# Patient Record
Sex: MALE | Race: BLACK OR AFRICAN AMERICAN
[De-identification: names, ages, dates, MRNs, and addresses within clinical notes are randomized per-mention and may not be internally consistent; named-entity substitution may affect disease eponyms.]

---

## 2017-06-21 ENCOUNTER — HOSPITAL ENCOUNTER (INPATIENT)
Dept: HOSPITAL 47 - EC | Age: 72
LOS: 2 days | Discharge: HOME HEALTH | DRG: 682 | End: 2017-06-23
Attending: HOSPITALIST | Admitting: HOSPITALIST
Payer: MEDICARE

## 2017-06-21 VITALS — BODY MASS INDEX: 33.5 KG/M2

## 2017-06-21 DIAGNOSIS — E86.0: ICD-10-CM

## 2017-06-21 DIAGNOSIS — Z79.4: ICD-10-CM

## 2017-06-21 DIAGNOSIS — E44.0: ICD-10-CM

## 2017-06-21 DIAGNOSIS — M54.30: ICD-10-CM

## 2017-06-21 DIAGNOSIS — Z79.82: ICD-10-CM

## 2017-06-21 DIAGNOSIS — R06.00: ICD-10-CM

## 2017-06-21 DIAGNOSIS — M10.9: ICD-10-CM

## 2017-06-21 DIAGNOSIS — E11.42: ICD-10-CM

## 2017-06-21 DIAGNOSIS — I25.2: ICD-10-CM

## 2017-06-21 DIAGNOSIS — B18.2: ICD-10-CM

## 2017-06-21 DIAGNOSIS — J44.9: ICD-10-CM

## 2017-06-21 DIAGNOSIS — Z95.0: ICD-10-CM

## 2017-06-21 DIAGNOSIS — I50.22: ICD-10-CM

## 2017-06-21 DIAGNOSIS — M51.36: ICD-10-CM

## 2017-06-21 DIAGNOSIS — I11.0: ICD-10-CM

## 2017-06-21 DIAGNOSIS — R01.1: ICD-10-CM

## 2017-06-21 DIAGNOSIS — G93.41: ICD-10-CM

## 2017-06-21 DIAGNOSIS — R32: ICD-10-CM

## 2017-06-21 DIAGNOSIS — E78.5: ICD-10-CM

## 2017-06-21 DIAGNOSIS — M19.90: ICD-10-CM

## 2017-06-21 DIAGNOSIS — D69.6: ICD-10-CM

## 2017-06-21 DIAGNOSIS — Z79.899: ICD-10-CM

## 2017-06-21 DIAGNOSIS — N17.0: Primary | ICD-10-CM

## 2017-06-21 DIAGNOSIS — F32.9: ICD-10-CM

## 2017-06-21 DIAGNOSIS — E11.65: ICD-10-CM

## 2017-06-21 LAB
ALP SERPL-CCNC: 230 U/L (ref 38–126)
ALT SERPL-CCNC: 106 U/L (ref 21–72)
ANION GAP SERPL CALC-SCNC: 9 MMOL/L
APTT BLD: 27 SEC (ref 22–30)
AST SERPL-CCNC: 95 U/L (ref 17–59)
BASOPHILS # BLD AUTO: 0 K/UL (ref 0–0.2)
BASOPHILS NFR BLD AUTO: 1 %
BUN SERPL-SCNC: 70 MG/DL (ref 9–20)
CALCIUM SPEC-MCNC: 8 MG/DL (ref 8.4–10.2)
CH: 31.1
CHCM: 32.9
CHLORIDE SERPL-SCNC: 101 MMOL/L (ref 98–107)
CK SERPL-CCNC: 260 U/L (ref 55–170)
CO2 SERPL-SCNC: 27 MMOL/L (ref 22–30)
EOSINOPHIL # BLD AUTO: 0.3 K/UL (ref 0–0.7)
EOSINOPHIL NFR BLD AUTO: 4 %
ERYTHROCYTE [DISTWIDTH] IN BLOOD BY AUTOMATED COUNT: 4.42 M/UL (ref 4.3–5.9)
ERYTHROCYTE [DISTWIDTH] IN BLOOD: 13.5 % (ref 11.5–15.5)
GLUCOSE BLD-MCNC: 117 MG/DL (ref 75–99)
GLUCOSE BLD-MCNC: 194 MG/DL (ref 75–99)
GLUCOSE BLD-MCNC: 245 MG/DL (ref 75–99)
GLUCOSE SERPL-MCNC: 191 MG/DL (ref 74–99)
HCT VFR BLD AUTO: 42 % (ref 39–53)
HDW: 2.14
HEMOGLOBIN A1C: 9.7 % (ref 4.2–6.1)
HGB BLD-MCNC: 13.6 GM/DL (ref 13–17.5)
INR PPP: 1.3 (ref ?–1.1)
LUC NFR BLD AUTO: 2 %
LYMPHOCYTES # SPEC AUTO: 1 K/UL (ref 1–4.8)
LYMPHOCYTES NFR SPEC AUTO: 15 %
MAGNESIUM SPEC-SCNC: 1.6 MG/DL (ref 1.6–2.3)
MCH RBC QN AUTO: 30.7 PG (ref 25–35)
MCHC RBC AUTO-ENTMCNC: 32.3 G/DL (ref 31–37)
MCV RBC AUTO: 95.1 FL (ref 80–100)
MONOCYTES # BLD AUTO: 0.4 K/UL (ref 0–1)
MONOCYTES NFR BLD AUTO: 6 %
NEUTROPHILS # BLD AUTO: 4.7 K/UL (ref 1.3–7.7)
NEUTROPHILS NFR BLD AUTO: 72 %
NON-AFRICAN AMERICAN GFR(MDRD): 30
PH UR: 5 [PH] (ref 5–8)
POTASSIUM SERPL-SCNC: 4 MMOL/L (ref 3.5–5.1)
PROT SERPL-MCNC: 7.1 G/DL (ref 6.3–8.2)
PT BLD: 12.4 SEC (ref 9–12)
SODIUM SERPL-SCNC: 137 MMOL/L (ref 137–145)
SP GR UR: 1.01 (ref 1–1.03)
TROPONIN I SERPL-MCNC: 0.06 NG/ML (ref 0–0.03)
UA BILLING (MACRO VS. MICRO): (no result)
UROBILINOGEN UR QL STRIP: <2 MG/DL (ref ?–2)
WBC # BLD AUTO: 0.15 10*3/UL
WBC # BLD AUTO: 6.5 K/UL (ref 3.8–10.6)
WBC (PEROX): 6.02

## 2017-06-21 PROCEDURE — 84484 ASSAY OF TROPONIN QUANT: CPT

## 2017-06-21 PROCEDURE — 96374 THER/PROPH/DIAG INJ IV PUSH: CPT

## 2017-06-21 PROCEDURE — 36415 COLL VENOUS BLD VENIPUNCTURE: CPT

## 2017-06-21 PROCEDURE — 71020: CPT

## 2017-06-21 PROCEDURE — 93005 ELECTROCARDIOGRAM TRACING: CPT

## 2017-06-21 PROCEDURE — 74176 CT ABD & PELVIS W/O CONTRAST: CPT

## 2017-06-21 PROCEDURE — 82550 ASSAY OF CK (CPK): CPT

## 2017-06-21 PROCEDURE — 82553 CREATINE MB FRACTION: CPT

## 2017-06-21 PROCEDURE — 76770 US EXAM ABDO BACK WALL COMP: CPT

## 2017-06-21 PROCEDURE — 83735 ASSAY OF MAGNESIUM: CPT

## 2017-06-21 PROCEDURE — 81003 URINALYSIS AUTO W/O SCOPE: CPT

## 2017-06-21 PROCEDURE — 80053 COMPREHEN METABOLIC PANEL: CPT

## 2017-06-21 PROCEDURE — 71010: CPT

## 2017-06-21 PROCEDURE — 83880 ASSAY OF NATRIURETIC PEPTIDE: CPT

## 2017-06-21 PROCEDURE — 94640 AIRWAY INHALATION TREATMENT: CPT

## 2017-06-21 PROCEDURE — 83036 HEMOGLOBIN GLYCOSYLATED A1C: CPT

## 2017-06-21 PROCEDURE — 85610 PROTHROMBIN TIME: CPT

## 2017-06-21 PROCEDURE — 99285 EMERGENCY DEPT VISIT HI MDM: CPT

## 2017-06-21 PROCEDURE — 85025 COMPLETE CBC W/AUTO DIFF WBC: CPT

## 2017-06-21 PROCEDURE — 85730 THROMBOPLASTIN TIME PARTIAL: CPT

## 2017-06-21 RX ADMIN — DOCUSATE SODIUM SCH MG: 100 CAPSULE, LIQUID FILLED ORAL at 21:23

## 2017-06-21 RX ADMIN — CEFAZOLIN SCH MLS/HR: 330 INJECTION, POWDER, FOR SOLUTION INTRAMUSCULAR; INTRAVENOUS at 11:36

## 2017-06-21 RX ADMIN — GABAPENTIN SCH MG: 400 CAPSULE ORAL at 21:20

## 2017-06-21 RX ADMIN — GABAPENTIN SCH MG: 400 CAPSULE ORAL at 17:45

## 2017-06-21 RX ADMIN — CARVEDILOL SCH MG: 12.5 TABLET, FILM COATED ORAL at 17:45

## 2017-06-21 RX ADMIN — IPRATROPIUM BROMIDE AND ALBUTEROL SULFATE SCH ML: .5; 3 SOLUTION RESPIRATORY (INHALATION) at 20:03

## 2017-06-21 RX ADMIN — INSULIN LISPRO SCH UNIT: 100 INJECTION, SOLUTION INTRAVENOUS; SUBCUTANEOUS at 17:49

## 2017-06-21 RX ADMIN — IPRATROPIUM BROMIDE AND ALBUTEROL SULFATE SCH ML: .5; 3 SOLUTION RESPIRATORY (INHALATION) at 15:56

## 2017-06-21 RX ADMIN — INSULIN LISPRO SCH UNIT: 100 INJECTION, SOLUTION INTRAVENOUS; SUBCUTANEOUS at 21:24

## 2017-06-21 RX ADMIN — DOCUSATE SODIUM SCH MG: 100 CAPSULE, LIQUID FILLED ORAL at 21:20

## 2017-06-21 NOTE — US
EXAMINATION TYPE: US kidneys/renal and bladder

 

DATE OF EXAM: 6/21/2017

 

COMPARISON: NONE

 

CLINICAL HISTORY: taina.

 

EXAM MEASUREMENTS:

 

Right Kidney:  11.2 x 5.3 x 5.2 cm

Left Kidney: 11.1 x 5.0 x 5.4 cm

 

Right Kidney: No hydronephrosis or masses seen  

Left Kidney: No hydronephrosis or masses seen  

Bladder: wnl

Ureteral jets are not identified.

 

IMPRESSION:

 

1. Normal retroperitoneal ultrasound.

## 2017-06-21 NOTE — XR
EXAMINATION TYPE: XR chest 2V

 

DATE OF EXAM: 6/21/2017

 

COMPARISON: Chest x-ray November 3, 2016

 

HISTORY: Difficulty in breathing.

 

TECHNIQUE:  Frontal and lateral views of the chest are obtained.

 

FINDINGS:  There is no focal air space opacity, pleural effusion, or pneumothorax seen.  The cardiac 
silhouette size is enlarged with multilead pacemaker/AICD. There is perhaps mild central vascular con
gestion though this is less prominent than prior.  The osseous structures are somewhat demineralized.


 

IMPRESSION:  Cardiomegaly with perhaps mild central vascular congestion, correlate for CHF exacerbati
on though there is improvement from prior chest x-ray noted.

## 2017-06-21 NOTE — ED
General Adult HPI





- General


Chief complaint: Weakness


Stated complaint: SOB


Time Seen by Provider: 06/21/17 08:00


Source: patient, RN notes reviewed


Mode of arrival: wheelchair


Limitations: no limitations





- History of Present Illness


Initial comments: 





This is a 72-year-old male who presents emergency department and is a very poor 

historian.  Patient states he is here because of left buttocks pain which 

radiates down to the mid thigh.  Patient states she's had this for years.  

Patient states he has not followed up with his primary medical care doctor for 

this he has not gotten MRI and is never seen a specialist for this.  Patient 

also states he is always short of breath.  Patient states this is been ongoing 

for many months as well.  Patient states it's no different today than it is any 

day but he would like it checked out today while he is here.  Patient denies 

any chest pain or pressure.  Patient denies any palpitations.  Patient denies 

any recent fever chills or cough.  Patient denies any smoking history.  Patient 

denies any lightheadedness or dizziness per patient denies headache patient 

denies numbness weakness.  Patient denies any abdominal pain.  Patient denies 

nausea vomiting diarrhea.  Patient denies any numbness or weakness he denies 

any problems with urinary retention or urinary incontinence.





- Related Data


 Home Medications











 Medication  Instructions  Recorded  Confirmed


 


Nitroglycerin Sl Tabs [Nitrostat] 0.4 mg SUBLINGUAL Q5M PRN 05/18/14 06/21/17


 


HYDROcodone/APAP 5-325MG [Norco 1 tab PO Q6H PRN 06/04/15 06/21/17





5-325]   


 


Allopurinol [Zyloprim] 100 mg PO DAILY 07/14/15 06/21/17


 


Carvedilol 25 mg PO BID 07/14/15 06/21/17


 


Aspirin 325 mg PO DAILY 11/25/15 06/21/17


 


Docusate [Colace] 100 mg PO HS 11/25/15 06/21/17


 


Gabapentin [Neurontin] 400 mg PO TID 11/03/16 06/21/17


 


Torsemide [Demadex] 50 mg PO DAILY PRN 11/03/16 06/21/17


 


Insulin Glargine [Lantus] 45 unit SQ QAM 06/21/17 06/21/17


 


Losartan [Cozaar] 50 mg PO HS 06/21/17 06/21/17


 


Spironolactone [Aldactone] 25 mg PO DAILY 06/21/17 06/21/17


 


amLODIPine [Norvasc] 5 mg PO DAILY 06/21/17 06/21/17








 Previous Rx's











 Medication  Instructions  Recorded


 


Isosorbide Mononitrate ER [Imdur] 30 mg PO DAILY #30 tab.er.24h 11/27/15











 Allergies











Allergy/AdvReac Type Severity Reaction Status Date / Time


 


No Known Allergies Allergy   Verified 06/21/17 08:48














Review of Systems


ROS Statement: 


Those systems with pertinent positive or pertinent negative responses have been 

documented in the HPI.





ROS Other: All systems not noted in ROS Statement are negative.





Past Medical History


Past Medical History: Chest Pain / Angina, Heart Failure, COPD, Diabetes 

Mellitus, Hyperlipidemia, Hypertension, Myocardial Infarction (MI), 

Osteoarthritis (OA)


Additional Past Medical History / Comment(s): 11/25/15  Pt presented to [facility] ER 

EMS feeling weak and having mild headache and anterior neck pain.  Symptoms 

began days ago.  Pt being admitted with clinical impression of weakness, acute 

renal failure.  PT last admitted to [facility] 7/14/15 with acute DKA, acute 

metabolic encephalitis from DKA, acute renal failure, severe hyperkalemia, 

pseudohyponatremia.  Other HX:  Chronic CHF systolic dysfunction with EF 20%,  

gout, gouty arthiritis to R great toe, nerve pain, ddd lumbar region, 

folliculitis, constipation, renal insufficiency, hep c 1-1-14, diabetes 

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 

liver bx tx with chest tube, cellulitis to lt foot/ankle, pt was born with R 

leg larger than L leg.


Last Myocardial Infarction Date:: 5/2014


History of Any Multi-Drug Resistant Organisms: None Reported


Past Surgical History: Heart Catheterization, Pacemaker


Additional Past Surgical History / Comment(s): CATARACTS ROCKY EYES REMOVED.  

liver biopsy on 4-7-14, heart cath 2005


Past Anesthesia/Blood Transfusion Reactions: No Reported Reaction


Type of Cardiac Device: Permanent Pacemaker


Device Placement Date:: UNK


Past Psychological History: Bipolar, Depression


Smoking Status: Never smoker


Past Alcohol Use History: Occasional


Past Drug Use History: None Reported





- Past Family History


  ** Mother


Family Medical History: Cancer





  ** Sister(s)


Family Medical History: Cancer





General Exam





- General Exam Comments


Initial Comments: 





GENERAL:


Patient is well-developed and well-nourished.  Patient is nontoxic and well-

hydrated and is in mild distress.





ENT:


Neck is soft and supple.  No significant lymphadenopathy is noted.  Oropharynx 

is clear.  Moist mucous membranes.  Neck has full range of motion without 

eliciting any pain.  





EYES:


The sclera were anicteric and conjunctiva were pink and moist.  Extraocular 

movements were intact and pupils were equal round and reactive to light.  

Eyelids were unremarkable.





PULMONARY:


Unlabored respirations.  Good breath sounds bilaterally.  No audible rales 

rhonchi or wheezing was noted.





CARDIOVASCULAR:


There is a regular rate and rhythm without any murmurs gallops or rubs.  





ABDOMEN:


Soft and nontender with normal bowel sounds.  No palpable organomegaly was 

noted.  There is no palpable pulsatile mass.





SKIN:


Skin is clear with no lesions or rashes and otherwise unremarkable.





NEUROLOGIC:


Patient is alert and oriented x3.  Cranial nerves II through XII are grossly 

intact.  Motor and sensory are also intact.  Normal speech, volume and content.

  Symmetrical smile.  


no perineum





LYMPHATICS:


No significant lymphadenopathy is noted





MUSCULOSKELETAL:


Normal extremities with adequate strength and full range of motion.  No lower 

extremity swelling or edema.  No calf tenderness.  Patient has a straight leg 

teis positive at 60 on the left only.  Patient's perineum exam is normal.  

Patient has some slight tenderness in the left buttocks.


Limitations: no limitations





Course


 Vital Signs











  06/21/17 06/21/17 06/21/17





  08:03 08:45 10:29


 


Temperature 97.1 F L  


 


Pulse Rate 54 L  59 L


 


Respiratory 18 24 20





Rate   


 


Blood Pressure 125/73  140/62


 


O2 Sat by Pulse 98  97





Oximetry   














Medical Decision Making





- Medical Decision Making





EKG shows a ventricular paced rhythm at 52 bpm QRS is 142 QT intervals 484 QTC 

is 450.





Chest x-ray shows possible mild venous congestion





I went into the room to reevaluate the patient he was sleeping comfortably.





I spoke with Dr. Kern he agreed to admit the patient and I wrote admitting 

orders.





- Lab Data


Result diagrams: 


 06/21/17 09:09





 06/21/17 09:09


 Lab Results











  06/21/17 06/21/17 06/21/17 Range/Units





  09:09 09:09 09:09 


 


WBC   6.5   (3.8-10.6)  k/uL


 


RBC   4.42   (4.30-5.90)  m/uL


 


Hgb   13.6   (13.0-17.5)  gm/dL


 


Hct   42.0   (39.0-53.0)  %


 


MCV   95.1   (80.0-100.0)  fL


 


MCH   30.7   (25.0-35.0)  pg


 


MCHC   32.3   (31.0-37.0)  g/dL


 


RDW   13.5   (11.5-15.5)  %


 


Plt Count   93 L   (150-450)  k/uL


 


Neutrophils %   72   %


 


Lymphocytes %   15   %


 


Monocytes %   6   %


 


Eosinophils %   4   %


 


Basophils %   1   %


 


Neutrophils #   4.7   (1.3-7.7)  k/uL


 


Lymphocytes #   1.0   (1.0-4.8)  k/uL


 


Monocytes #   0.4   (0-1.0)  k/uL


 


Eosinophils #   0.3   (0-0.7)  k/uL


 


Basophils #   0.0   (0-0.2)  k/uL


 


Large Platelets   Present   


 


Poikilocytosis (manual   Present   


 


Anisocytosis (manual)   Present   


 


PT     (9.0-12.0)  sec


 


INR     (<1.1)  


 


APTT     (22.0-30.0)  sec


 


Sodium    137  (137-145)  mmol/L


 


Potassium    4.0  (3.5-5.1)  mmol/L


 


Chloride    101  ()  mmol/L


 


Carbon Dioxide    27  (22-30)  mmol/L


 


Anion Gap    9  mmol/L


 


BUN    70 H  (9-20)  mg/dL


 


Creatinine    2.20 H  (0.66-1.25)  mg/dL


 


Est GFR (MDRD) Af Amer    36  (>60 ml/min/1.73 sqM)  


 


Est GFR (MDRD) Non-Af    30  (>60 ml/min/1.73 sqM)  


 


Glucose    191 H  (74-99)  mg/dL


 


Calcium    8.0 L  (8.4-10.2)  mg/dL


 


Magnesium    1.6  (1.6-2.3)  mg/dL


 


Total Bilirubin    1.2  (0.2-1.3)  mg/dL


 


AST    95 H  (17-59)  U/L


 


ALT    106 H  (21-72)  U/L


 


Alkaline Phosphatase    230 H  ()  U/L


 


Total Creatine Kinase  260 H    ()  U/L


 


CK-MB (CK-2)  3.8 H*    (0.0-2.4)  ng/mL


 


CK-MB (CK-2) Rel Index  1.5    


 


Troponin I  0.058 H*    (0.000-0.034)  ng/mL


 


NT-Pro-B Natriuret Pep     pg/mL


 


Total Protein    7.1  (6.3-8.2)  g/dL


 


Albumin    2.9 L  (3.5-5.0)  g/dL














  06/21/17 06/21/17 Range/Units





  09:09 09:09 


 


WBC    (3.8-10.6)  k/uL


 


RBC    (4.30-5.90)  m/uL


 


Hgb    (13.0-17.5)  gm/dL


 


Hct    (39.0-53.0)  %


 


MCV    (80.0-100.0)  fL


 


MCH    (25.0-35.0)  pg


 


MCHC    (31.0-37.0)  g/dL


 


RDW    (11.5-15.5)  %


 


Plt Count    (150-450)  k/uL


 


Neutrophils %    %


 


Lymphocytes %    %


 


Monocytes %    %


 


Eosinophils %    %


 


Basophils %    %


 


Neutrophils #    (1.3-7.7)  k/uL


 


Lymphocytes #    (1.0-4.8)  k/uL


 


Monocytes #    (0-1.0)  k/uL


 


Eosinophils #    (0-0.7)  k/uL


 


Basophils #    (0-0.2)  k/uL


 


Large Platelets    


 


Poikilocytosis (manual    


 


Anisocytosis (manual)    


 


PT   12.4 H  (9.0-12.0)  sec


 


INR   1.3  (<1.1)  


 


APTT   27.0  (22.0-30.0)  sec


 


Sodium    (137-145)  mmol/L


 


Potassium    (3.5-5.1)  mmol/L


 


Chloride    ()  mmol/L


 


Carbon Dioxide    (22-30)  mmol/L


 


Anion Gap    mmol/L


 


BUN    (9-20)  mg/dL


 


Creatinine    (0.66-1.25)  mg/dL


 


Est GFR (MDRD) Af Amer    (>60 ml/min/1.73 sqM)  


 


Est GFR (MDRD) Non-Af    (>60 ml/min/1.73 sqM)  


 


Glucose    (74-99)  mg/dL


 


Calcium    (8.4-10.2)  mg/dL


 


Magnesium    (1.6-2.3)  mg/dL


 


Total Bilirubin    (0.2-1.3)  mg/dL


 


AST    (17-59)  U/L


 


ALT    (21-72)  U/L


 


Alkaline Phosphatase    ()  U/L


 


Total Creatine Kinase    ()  U/L


 


CK-MB (CK-2)    (0.0-2.4)  ng/mL


 


CK-MB (CK-2) Rel Index    


 


Troponin I    (0.000-0.034)  ng/mL


 


NT-Pro-B Natriuret Pep  352   pg/mL


 


Total Protein    (6.3-8.2)  g/dL


 


Albumin    (3.5-5.0)  g/dL














Disposition


Clinical Impression: 


 Sciatica, Acute renal failure, Dyspnea





Disposition: ADMITTED AS IP TO THIS HOSP


Referrals: 


Milton Samayoa DO [Primary Care Provider] - 1-2 days


Time of Disposition: 10:52

## 2017-06-21 NOTE — P.NPCON
History of Present Illness





- Reason for Consult


acute renal failure





- History of Present Illness





Reason for consultation: Acute kidney injury





History of present illness:


Patient is a 72-year-old male seen in renal consultation for acute kidney 

injury.  His baseline creatinine from September 2016 was near 1.  It is 

elevated at 2.2 this admission.  Patient presented to the hospital with buttock 

pain which is constant and achy in nature with radiation down to his leg.  

Patient also admits to dyspnea which she states is chronic in nature and not 

any worse than his baseline.  I do note that he was taking Demadex as well as 

Aldactone at home.  He did receive a dose of Toradol in the ER.  He admits to 

good urine output.  No hematuria or dysuria.  Does admit to incontinence.  No 

vomiting or diarrhea.  Oral intake has been fair.  Denies chest pain.  Denies 

use of NSAIDs at home.  Denies any family history of renal disease.  

Hemodynamically he stable.  Denies any fever or chills.  No headache dizziness 

or syncopal episodes.





Vital signs are stable.


General: The patient appeared well nourished and normally developed. 


HEENT: Head exam is unremarkable. Neck is without jugular venous distension.


LUNGS: Lungs are clear to auscultation and percussion. Breath sounds decreased.


HEART: Rate and Rhythm are regular. First and second heart sounds normal. No 

murmurs, rubs or gallops. 


ABDOMEN: Abdominal exam reveals normal bowel sounds. Non-tender and non-

distended. No evidence of peritonitis.


EXTREMITITES: No clubbing, cyanosis, or edema.





Past Medical History


Past Medical History: Chest Pain / Angina, Heart Failure, COPD, Diabetes 

Mellitus, Hyperlipidemia, Hypertension, Myocardial Infarction (MI), 

Osteoarthritis (OA)


Additional Past Medical History / Comment(s): 11/25/15  Pt presented to [facility] ER 

EMS feeling weak and having mild headache and anterior neck pain.  Symptoms 

began days ago.  Pt being admitted with clinical impression of weakness, acute 

renal failure.  PT last admitted to [facility] 7/14/15 with acute DKA, acute 

metabolic encephalitis from DKA, acute renal failure, severe hyperkalemia, 

pseudohyponatremia.  Other HX:  Chronic CHF systolic dysfunction with EF 20%,  

gout, gouty arthiritis to R great toe, nerve pain, ddd lumbar region, 

folliculitis, constipation, renal insufficiency, hep c 1-1-14, diabetes 

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 

liver bx tx with chest tube, cellulitis to lt foot/ankle, pt was born with R 

leg larger than L leg.


Last Myocardial Infarction Date:: 5/2014


History of Any Multi-Drug Resistant Organisms: None Reported


Past Surgical History: Heart Catheterization, Pacemaker


Additional Past Surgical History / Comment(s): CATARACTS ROCKY EYES REMOVED.  

liver biopsy on 4-7-14, heart cath 2005


Past Anesthesia/Blood Transfusion Reactions: No Reported Reaction


Type of Cardiac Device: Permanent Pacemaker


Device Placement Date:: UNK


Past Psychological History: Bipolar, Depression


Smoking Status: Never smoker


Past Alcohol Use History: Occasional


Past Drug Use History: None Reported





- Past Family History


  ** Mother


Family Medical History: Cancer





  ** Sister(s)


Family Medical History: Cancer





Medications and Allergies


 Home Medications











 Medication  Instructions  Recorded  Confirmed  Type


 


Nitroglycerin Sl Tabs [Nitrostat] 0.4 mg SUBLINGUAL Q5M PRN 05/18/14 06/21/17 

History


 


HYDROcodone/APAP 5-325MG [Norco 1 tab PO Q6H PRN 06/04/15 06/21/17 History





5-325]    


 


Allopurinol [Zyloprim] 100 mg PO DAILY 07/14/15 06/21/17 History


 


Carvedilol 25 mg PO BID 07/14/15 06/21/17 History


 


Aspirin 325 mg PO DAILY 11/25/15 06/21/17 History


 


Docusate [Colace] 100 mg PO HS 11/25/15 06/21/17 History


 


Gabapentin [Neurontin] 400 mg PO TID 11/03/16 06/21/17 History


 


Torsemide [Demadex] 50 mg PO DAILY PRN 11/03/16 06/21/17 History


 


Insulin Glargine [Lantus] 45 unit SQ QAM 06/21/17 06/21/17 History


 


Losartan [Cozaar] 50 mg PO HS 06/21/17 06/21/17 History


 


Spironolactone [Aldactone] 25 mg PO DAILY 06/21/17 06/21/17 History


 


amLODIPine [Norvasc] 5 mg PO DAILY 06/21/17 06/21/17 History











 Allergies











Allergy/AdvReac Type Severity Reaction Status Date / Time


 


No Known Allergies Allergy   Verified 06/21/17 08:48














Physical Exam


Vitals: 


 Vital Signs











  Temp Pulse Resp BP Pulse Ox


 


 06/21/17 10:29   59 L  20  140/62  97


 


 06/21/17 08:45    24  


 


 06/21/17 08:03  97.1 F L  54 L  18  125/73  98








 Intake and Output











 06/20/17 06/21/17 06/21/17





 22:59 06:59 14:59


 


Other:   


 


  Weight   99.79 kg








 Patient Weight











 06/22/17





 06:59


 


Weight 99.79 kg














Results





- Lab Results


 Most recent lab results











Calcium  8.0 mg/dL (8.4-10.2)  L  06/21/17  09:09    


 


Magnesium  1.6 mg/dL (1.6-2.3)   06/21/17  09:09    














 06/21/17 09:09





 06/21/17 09:09





Assessment and Plan


Plan: 





Guerrero:


#1.  Nonoliguric acute kidney injury mostly prerenal in nature secondary to 

diuretics and use of ARB.  He should also received a dose of Toradol in the ER.

  Baseline creatinine is near 1 from September 2016 and is up to 2.2 this 

admission.


#2.  Buttock pain with radiation down to his leg possibly due to sciatica.





Plan:


Start normal saline to be run at 60 mL an hour.  


Avoid nephrotoxic agents and hypotensive episodes.


Check urinalysis.


Check renal ultrasound.


Repeat electrolytes in the morning.





Thank you for the consultation.  I will continue to follow the patient with you 

during his hospital stay.

## 2017-06-22 LAB
ALP SERPL-CCNC: 168 U/L (ref 38–126)
ALT SERPL-CCNC: 78 U/L (ref 21–72)
ANION GAP SERPL CALC-SCNC: 9 MMOL/L
AST SERPL-CCNC: 65 U/L (ref 17–59)
BASOPHILS # BLD AUTO: 0 K/UL (ref 0–0.2)
BASOPHILS NFR BLD AUTO: 1 %
BUN SERPL-SCNC: 69 MG/DL (ref 9–20)
CALCIUM SPEC-MCNC: 8.1 MG/DL (ref 8.4–10.2)
CH: 30.4
CHCM: 32
CHLORIDE SERPL-SCNC: 103 MMOL/L (ref 98–107)
CO2 SERPL-SCNC: 22 MMOL/L (ref 22–30)
EOSINOPHIL # BLD AUTO: 0.3 K/UL (ref 0–0.7)
EOSINOPHIL NFR BLD AUTO: 5 %
ERYTHROCYTE [DISTWIDTH] IN BLOOD BY AUTOMATED COUNT: 4.07 M/UL (ref 4.3–5.9)
ERYTHROCYTE [DISTWIDTH] IN BLOOD: 13.3 % (ref 11.5–15.5)
GLUCOSE BLD-MCNC: 148 MG/DL (ref 75–99)
GLUCOSE BLD-MCNC: 168 MG/DL (ref 75–99)
GLUCOSE BLD-MCNC: 175 MG/DL (ref 75–99)
GLUCOSE BLD-MCNC: 482 MG/DL (ref 75–99)
GLUCOSE SERPL-MCNC: 149 MG/DL (ref 74–99)
HCT VFR BLD AUTO: 38.9 % (ref 39–53)
HDW: 2.16
HGB BLD-MCNC: 12.9 GM/DL (ref 13–17.5)
LUC NFR BLD AUTO: 4 %
LYMPHOCYTES # SPEC AUTO: 1.6 K/UL (ref 1–4.8)
LYMPHOCYTES NFR SPEC AUTO: 32 %
MCH RBC QN AUTO: 31.6 PG (ref 25–35)
MCHC RBC AUTO-ENTMCNC: 33.1 G/DL (ref 31–37)
MCV RBC AUTO: 95.5 FL (ref 80–100)
MONOCYTES # BLD AUTO: 0.4 K/UL (ref 0–1)
MONOCYTES NFR BLD AUTO: 8 %
NEUTROPHILS # BLD AUTO: 2.6 K/UL (ref 1.3–7.7)
NEUTROPHILS NFR BLD AUTO: 51 %
NON-AFRICAN AMERICAN GFR(MDRD): 37
POTASSIUM SERPL-SCNC: 4.2 MMOL/L (ref 3.5–5.1)
PROT SERPL-MCNC: 6.4 G/DL (ref 6.3–8.2)
SODIUM SERPL-SCNC: 134 MMOL/L (ref 137–145)
WBC # BLD AUTO: 0.2 10*3/UL
WBC # BLD AUTO: 5.1 K/UL (ref 3.8–10.6)
WBC (PEROX): 5.26

## 2017-06-22 RX ADMIN — ASPIRIN 325 MG ORAL TABLET SCH MG: 325 PILL ORAL at 09:10

## 2017-06-22 RX ADMIN — ALLOPURINOL SCH MG: 100 TABLET ORAL at 13:12

## 2017-06-22 RX ADMIN — CEFAZOLIN SCH MLS/HR: 330 INJECTION, POWDER, FOR SOLUTION INTRAMUSCULAR; INTRAVENOUS at 21:17

## 2017-06-22 RX ADMIN — DOCUSATE SODIUM SCH MG: 100 CAPSULE, LIQUID FILLED ORAL at 21:17

## 2017-06-22 RX ADMIN — IPRATROPIUM BROMIDE AND ALBUTEROL SULFATE SCH ML: .5; 3 SOLUTION RESPIRATORY (INHALATION) at 16:24

## 2017-06-22 RX ADMIN — INSULIN GLARGINE SCH: 100 INJECTION, SOLUTION SUBCUTANEOUS at 09:10

## 2017-06-22 RX ADMIN — GABAPENTIN SCH MG: 400 CAPSULE ORAL at 17:18

## 2017-06-22 RX ADMIN — IPRATROPIUM BROMIDE AND ALBUTEROL SULFATE SCH ML: .5; 3 SOLUTION RESPIRATORY (INHALATION) at 08:38

## 2017-06-22 RX ADMIN — GABAPENTIN SCH MG: 400 CAPSULE ORAL at 06:41

## 2017-06-22 RX ADMIN — IPRATROPIUM BROMIDE AND ALBUTEROL SULFATE SCH: .5; 3 SOLUTION RESPIRATORY (INHALATION) at 20:33

## 2017-06-22 RX ADMIN — ISOSORBIDE MONONITRATE SCH MG: 30 TABLET, EXTENDED RELEASE ORAL at 09:10

## 2017-06-22 RX ADMIN — CEFAZOLIN SCH MLS/HR: 330 INJECTION, POWDER, FOR SOLUTION INTRAMUSCULAR; INTRAVENOUS at 05:54

## 2017-06-22 RX ADMIN — INSULIN LISPRO SCH UNIT: 100 INJECTION, SOLUTION INTRAVENOUS; SUBCUTANEOUS at 21:18

## 2017-06-22 RX ADMIN — IPRATROPIUM BROMIDE AND ALBUTEROL SULFATE SCH ML: .5; 3 SOLUTION RESPIRATORY (INHALATION) at 12:06

## 2017-06-22 RX ADMIN — CARVEDILOL SCH MG: 12.5 TABLET, FILM COATED ORAL at 17:18

## 2017-06-22 RX ADMIN — IOHEXOL PRN ML: 350 INJECTION, SOLUTION INTRAVENOUS at 13:12

## 2017-06-22 RX ADMIN — GABAPENTIN SCH MG: 400 CAPSULE ORAL at 21:17

## 2017-06-22 RX ADMIN — IOHEXOL PRN ML: 350 INJECTION, SOLUTION INTRAVENOUS at 14:24

## 2017-06-22 RX ADMIN — CARVEDILOL SCH MG: 12.5 TABLET, FILM COATED ORAL at 06:42

## 2017-06-22 RX ADMIN — INSULIN LISPRO SCH UNIT: 100 INJECTION, SOLUTION INTRAVENOUS; SUBCUTANEOUS at 13:12

## 2017-06-22 RX ADMIN — INSULIN LISPRO SCH UNIT: 100 INJECTION, SOLUTION INTRAVENOUS; SUBCUTANEOUS at 06:44

## 2017-06-22 RX ADMIN — INSULIN LISPRO SCH UNIT: 100 INJECTION, SOLUTION INTRAVENOUS; SUBCUTANEOUS at 17:18

## 2017-06-22 NOTE — CT
EXAMINATION TYPE: CT abdomen pelvis wo con

 

DATE OF EXAM: 6/22/2017

 

COMPARISON: 6/15/2015

 

INDICATION: Pelvic pain.

 

DLP: 1061 mGycm, Automated exposure control for dose reduction was used.

 

CONTRAST: mL of . 

                        Study performed with Oral Contrast

 

TECHNIQUE: Axial images were obtained from above the diaphragm to the pubic rami in the axial plane a
t 5 mm thick sections.  Reconstructed images are reviewed on the computer in the coronal plane.  

 

FINDINGS:

 

Limited CT sections are obtained the lung bases.  The lung bases are clear.  Minimal reflux or residu
al within the esophagus may be present.

 

CT ABDOMEN:

 

Liver: Normal

 

Spleen: Normal

 

Pancreas: Normal

 

Adrenal glands: The adrenal glands are normal.

 

Gallbladder: Normal  

 

Kidneys: No masses are evident. No hydronephrosis is present.   No cysts are present.  Delayed images
 were obtained through the kidneys, which remain unremarkable.

 

Aorta: Normal

 

Inferior vena cava: Normal.

 

CT PELVIS: 

Loops of bowel within the abdomen and pelvis are normal.     There are loops of bowel which are incom
pletely distended or lack oral contrast limiting their evaluation.

 

Appendix: Normal as visualized.

 

Urinary bladder: Decompressed with limited evaluation. 

 

Genitourinary structures: Prostate appears normal

 

Osseous structures: No suspicious lytic or sclerotic lesions. Facet degenerative changes are present.


 

IMPRESSIONS:

1.  No suspicious acute changes abdomen and pelvis CT

## 2017-06-22 NOTE — P.PN
Subjective





She does seen in follow-up for acute kidney injury.  His baseline creatinine 

from September 2016 was 1 and elevated at 2.200 admission.  He is maintained on 

IV fluids and creatinine is down to 1.8 today.  Diuretics are held.  His 

dyspnea is at baseline.  No vomiting or diarrhea.  Currently having lunch.  No 

issues with urination.





Vital signs are stable.


General: The patient appeared well nourished and normally developed. 


HEENT: Head exam is unremarkable. Neck is without jugular venous distension.


LUNGS: Lungs are clear to auscultation and percussion. Breath sounds decreased.


HEART: Rate and Rhythm are regular. First and second heart sounds normal. No 

murmurs, rubs or gallops. 


ABDOMEN: Abdominal exam reveals normal bowel sounds. Non-tender and non-

distended. No evidence of peritonitis.


EXTREMITITES: No clubbing, cyanosis, or edema.





Objective





- Vital Signs


Vital signs: 


 Vital Signs











Temp  97.1 F L  06/22/17 08:00


 


Pulse  72   06/22/17 12:18


 


Resp  18   06/22/17 11:24


 


BP  133/77   06/22/17 08:00


 


Pulse Ox  100   06/22/17 08:00








 Intake & Output











 06/21/17 06/22/17 06/22/17





 18:59 06:59 18:59


 


Intake Total 360 500 


 


Balance 360 500 


 


Weight 98.4 kg 97.2 kg 97.2 kg


 


Intake:   


 


  Intake, IV Titration  500 





  Amount   


 


    Sodium Chloride 0.9% 1,  500 





    000 ml @ 60 mls/hr IV .   





    [id number] [facility] Rx#:[id number]   


 


  Oral 360  


 


Other:   


 


  Voiding Method  Toilet 





  Urinal 


 


  # Voids  1 


 


  # Bowel Movements  1 














- Labs


CBC & Chem 7: 


 06/22/17 05:53





 06/22/17 05:53


Labs: 


 Abnormal Lab Results - Last 24 Hours (Table)











  06/21/17 06/21/17 06/21/17 Range/Units





  09:09 16:55 21:01 


 


RBC     (4.30-5.90)  m/uL


 


Hgb     (13.0-17.5)  gm/dL


 


Hct     (39.0-53.0)  %


 


Plt Count     (150-450)  k/uL


 


Sodium     (137-145)  mmol/L


 


BUN     (9-20)  mg/dL


 


Creatinine     (0.66-1.25)  mg/dL


 


Glucose     (74-99)  mg/dL


 


POC Glucose (mg/dL)   194 H  245 H  (75-99)  mg/dL


 


Hemoglobin A1c  9.7 H    (4.2-6.1)  %


 


Calcium     (8.4-10.2)  mg/dL














  06/22/17 06/22/17 06/22/17 Range/Units





  05:53 05:53 05:58 


 


RBC  4.07 L    (4.30-5.90)  m/uL


 


Hgb  12.9 L    (13.0-17.5)  gm/dL


 


Hct  38.9 L    (39.0-53.0)  %


 


Plt Count  82 L    (150-450)  k/uL


 


Sodium   134 L   (137-145)  mmol/L


 


BUN   69 H   (9-20)  mg/dL


 


Creatinine   1.83 H   (0.66-1.25)  mg/dL


 


Glucose   149 H   (74-99)  mg/dL


 


POC Glucose (mg/dL)    148 H  (75-99)  mg/dL


 


Hemoglobin A1c     (4.2-6.1)  %


 


Calcium   8.1 L   (8.4-10.2)  mg/dL














  06/22/17 06/22/17 Range/Units





  11:36 11:38 


 


RBC    (4.30-5.90)  m/uL


 


Hgb    (13.0-17.5)  gm/dL


 


Hct    (39.0-53.0)  %


 


Plt Count    (150-450)  k/uL


 


Sodium    (137-145)  mmol/L


 


BUN    (9-20)  mg/dL


 


Creatinine    (0.66-1.25)  mg/dL


 


Glucose    (74-99)  mg/dL


 


POC Glucose (mg/dL)  422 H  482 H  (75-99)  mg/dL


 


Hemoglobin A1c    (4.2-6.1)  %


 


Calcium    (8.4-10.2)  mg/dL














Assessment and Plan


Plan: 





Guerrero:


#1.  Nonoliguric acute kidney injury mostly prerenal in nature secondary to 

diuretics and use of ARB.  He also received a dose of Toradol in the ER.  

Baseline creatinine is near 1 from September 2016 and was up to 2.2 this 

admission.  Improving with IV hydration.  Down to 1.8 today.  Calluses noted to 

be benign.  No evidence of hydronephrosis on renal ultrasound.


#2.  Buttock pain with radiation down to his leg possibly due to sciatica.  CT 

scan pending.





Plan:


Continue normal saline to be run at 60 mL an hour.  


Encourage oral intake.


Avoid nephrotoxic agents and hypotensive episodes.


Repeat electrolytes in the morning.

## 2017-06-22 NOTE — HP
DATE OF ADMISSION:  



DATE OF SERVICE:  06/21/2017 



CHIEF COMPLAINT:  Weakness, back pain and as well as renal failure. 



HISTORY OF PRESENT ILLNESS: This 72-year-old gentleman with a past 

medical history of multiple medical problems including history of CHF, 

COPD, diabetes mellitus, hypertension, hyperlipidemia, history of DJD, 

being followed by Dr. Milton Samayoa and as well as Dr. Nehal Katz in 

the outpatient setting was admitted with significant weakness. The 

patient had multiple episodes of diabetic ketoacidosis. The patient 

also had ejection fraction of 20% with chronic systolic dysfunction, 

history  

of gout as well.  Currently, the patient is complaining of back pain 

which radiated to the back, which is also is also aggravated by 

walking and being relieved by sitting or lying on the bed. The patient 

was apparently being evaluated previously. There is history of any 

fever, rigors. No history of headache, loss of consciousness or 

seizures.  



PAST MEDICAL HISTORY: History of CHF, history of COPD, diabetes 

mellitus, hypertension, hyperlipidemia, history of DJD, history of 

cardiac catheterization, history of pacemaker, history of bipolar, 

depression.  



Medications prior to admission include home medications are: 

1. Aldactone 25 mg p.o. daily. 

2. Cozaar 50 mg q.h.s.

3. Lantus 45 units subcu q.a.m. 

4. Norvasc 5 mg p.o. daily. 

5. Demadex 50 mg daily p.r.n. 

6. Nitrostat 0.4 sublingual p.r.n. 

7. Imdur 30 mg daily. 

8. Norco 1 tablet 5 mg q.6 p.r.n. 

9. Neurontin 400 mg p.o. t.i.d. 

10. Colace 100 mg p.o. q.h.s. 

11. Coreg 25 mg p.o. b.i.d. 

12. Aspirin 325 mg daily. 

13. Zyloprim 100 mg p.o. daily. 



ALLERGIES: None. 



FAMILY HISTORY: History of cancer in the family. 



SOCIAL HISTORY: No history of smoking. No history of alcohol intake. 



REVIEW OF SYSTEMS:

ENT: No diminishing hearing or diminished vision. 

CARDIOVASCULAR SYSTEM: No angina. 

RESPIRATORY SYSTEM: No cough or hemoptysis. 

GI: No nausea. 

: No dysuria. 

NERVOUS SYSTEM:  No numbness or weakness. 

ALLERGY/IMMUNOLOGY:  No history of asthma. 

MUSCULOSKELETAL:  As mentioned earlier. 

HEMATOLOGY/ONCOLOGY:  No history of anemia. 

ENDOCRINE:  ntd. 

CONSTITUTIONAL:  As mentioned earlier. 

DERMATOLOGY:  Negative. 

RHEUMATOLOGY:  Negative. 

PSYCHIATRY:  As mentioned earlier. 



PHYSICAL EXAMINATION: Patient is alert and oriented x3. Pulse is 54, 

blood pressure 94/59,  respirations 18, temperature 97.3, pulse ox 98% 

on 2 L.  

HEENT:  Conjunctivae normal. 

NECK:  No jugular venous distention. 

CARDIOVASCULAR: S1 and S2, muffled. 

RESPIRATORY:  Breath sounds diminished at the bases. Bilateral 

scattered rhonchi and crackles.  

ABDOMEN:  Soft, obese, nontender. No mass palpable. 

LEGS: No edema, no swelling.  

NERVOUS SYSTEM:  Higher function as mentioned. Moves all 4 limbs. No 

focal motor or sensory deficits.  

LYMPHATICS:  No lymphadenopathy of neck, axillae or groin. 

EXAMINATION OF THE BACK:   tenderness appreciated. 



LABS: WBC 6.5, hemoglobin 13. 6, platelets 93. INR 1.3. Creatinine is 

2.20. Hemoglobin A1c 9.7, AST is 95, ALT is 106, alk phos 230. CK MB 

3.8 and troponin 0.058. Albumin is 2.9.  



ASSESSMENT:

1. Acute renal failure with some prerenal factors and dehydration, 

acute tubular necrosis.  

2. Back pain, possible degenerative joint disease. 

3. Elevated AST, ALT, possible acute hepatitis, rule out chronic liver 

disease.  

4. Hypoalbuminemia with mild to moderate chronic protein calorie 

malnutrition.  

5. Increased random blood sugar and diabetes mellitus type 2 

uncontrolled.  

6. Thrombocytopenia, chronic. 

7. History of renal failure. 

8. History of gout. 

9. History of congestive heart failure with chronic systolic 

dysfunction with  ejection fraction 20% to 25%.  

10. History of hypertension. 

11. History of diabetic ketoacidosis. 

12. History of metabolic encephalopathy. 

13. History of hepatitis C. 

14. History of peripheral neuropathy. 

15. History of pacemaker. 

16. FULL CODE. 



RECOMMENDATIONS AND DISCUSSION: This 72-year-old gentleman who 

presented with multiple complex medical issues, we will monitor the 

patient closely. Continue the current medications. Gentle hydration. 

Patient with multiple complex medical issues as mentioned earlier. The 

patient also possibly has chronic liver disease as well with some 

acute component. I will follow the patient closely. Recommend also CAT 

scan of the abdomen and pelvis to rule out the possibility of any 

pelvic pathology as the reason for the severe back pain the patient is 

complaining of, which is severe debilitating back pain the patient is 

complaining. Prognosis guarded. Further recommendations to follow. See 

orders for further details.  



MTDD

## 2017-06-23 VITALS
HEART RATE: 56 BPM | SYSTOLIC BLOOD PRESSURE: 123 MMHG | DIASTOLIC BLOOD PRESSURE: 63 MMHG | TEMPERATURE: 97 F | RESPIRATION RATE: 20 BRPM

## 2017-06-23 LAB
ALP SERPL-CCNC: 143 U/L (ref 38–126)
ALT SERPL-CCNC: 69 U/L (ref 21–72)
ANION GAP SERPL CALC-SCNC: 9 MMOL/L
AST SERPL-CCNC: 55 U/L (ref 17–59)
BASOPHILS # BLD AUTO: 0 K/UL (ref 0–0.2)
BASOPHILS NFR BLD AUTO: 0 %
BUN SERPL-SCNC: 56 MG/DL (ref 9–20)
CALCIUM SPEC-MCNC: 9.1 MG/DL (ref 8.4–10.2)
CH: 30.2
CHCM: 32.4
CHLORIDE SERPL-SCNC: 113 MMOL/L (ref 98–107)
CO2 SERPL-SCNC: 23 MMOL/L (ref 22–30)
EOSINOPHIL # BLD AUTO: 0.3 K/UL (ref 0–0.7)
EOSINOPHIL NFR BLD AUTO: 6 %
ERYTHROCYTE [DISTWIDTH] IN BLOOD BY AUTOMATED COUNT: 3.94 M/UL (ref 4.3–5.9)
ERYTHROCYTE [DISTWIDTH] IN BLOOD: 13.4 % (ref 11.5–15.5)
GLUCOSE BLD-MCNC: 116 MG/DL (ref 75–99)
GLUCOSE BLD-MCNC: 235 MG/DL (ref 75–99)
GLUCOSE SERPL-MCNC: 229 MG/DL (ref 74–99)
HCT VFR BLD AUTO: 36.9 % (ref 39–53)
HDW: 2.25
HGB BLD-MCNC: 12.7 GM/DL (ref 13–17.5)
LUC NFR BLD AUTO: 3 %
LYMPHOCYTES # SPEC AUTO: 1.3 K/UL (ref 1–4.8)
LYMPHOCYTES NFR SPEC AUTO: 29 %
MCH RBC QN AUTO: 32.1 PG (ref 25–35)
MCHC RBC AUTO-ENTMCNC: 34.3 G/DL (ref 31–37)
MCV RBC AUTO: 93.6 FL (ref 80–100)
MONOCYTES # BLD AUTO: 0.4 K/UL (ref 0–1)
MONOCYTES NFR BLD AUTO: 8 %
NEUTROPHILS # BLD AUTO: 2.5 K/UL (ref 1.3–7.7)
NEUTROPHILS NFR BLD AUTO: 54 %
NON-AFRICAN AMERICAN GFR(MDRD): 46
POTASSIUM SERPL-SCNC: 5.2 MMOL/L (ref 3.5–5.1)
PROT SERPL-MCNC: 6.5 G/DL (ref 6.3–8.2)
SODIUM SERPL-SCNC: 145 MMOL/L (ref 137–145)
WBC # BLD AUTO: 0.15 10*3/UL
WBC # BLD AUTO: 4.6 K/UL (ref 3.8–10.6)
WBC (PEROX): 5.15

## 2017-06-23 RX ADMIN — ISOSORBIDE MONONITRATE SCH MG: 30 TABLET, EXTENDED RELEASE ORAL at 10:36

## 2017-06-23 RX ADMIN — GABAPENTIN SCH MG: 400 CAPSULE ORAL at 10:35

## 2017-06-23 RX ADMIN — IPRATROPIUM BROMIDE AND ALBUTEROL SULFATE SCH ML: .5; 3 SOLUTION RESPIRATORY (INHALATION) at 07:04

## 2017-06-23 RX ADMIN — INSULIN GLARGINE SCH: 100 INJECTION, SOLUTION SUBCUTANEOUS at 09:45

## 2017-06-23 RX ADMIN — IPRATROPIUM BROMIDE AND ALBUTEROL SULFATE SCH ML: .5; 3 SOLUTION RESPIRATORY (INHALATION) at 11:26

## 2017-06-23 RX ADMIN — CARVEDILOL SCH MG: 12.5 TABLET, FILM COATED ORAL at 08:15

## 2017-06-23 RX ADMIN — GABAPENTIN SCH MG: 400 CAPSULE ORAL at 15:50

## 2017-06-23 RX ADMIN — INSULIN GLARGINE SCH UNIT: 100 INJECTION, SOLUTION SUBCUTANEOUS at 12:22

## 2017-06-23 RX ADMIN — ASPIRIN 325 MG ORAL TABLET SCH MG: 325 PILL ORAL at 10:35

## 2017-06-23 RX ADMIN — INSULIN LISPRO SCH UNIT: 100 INJECTION, SOLUTION INTRAVENOUS; SUBCUTANEOUS at 12:22

## 2017-06-23 RX ADMIN — INSULIN GLARGINE SCH UNIT: 100 INJECTION, SOLUTION SUBCUTANEOUS at 09:45

## 2017-06-23 RX ADMIN — INSULIN LISPRO SCH: 100 INJECTION, SOLUTION INTRAVENOUS; SUBCUTANEOUS at 07:46

## 2017-06-23 RX ADMIN — ALLOPURINOL SCH MG: 100 TABLET ORAL at 10:35

## 2017-06-23 RX ADMIN — CEFAZOLIN SCH: 330 INJECTION, POWDER, FOR SOLUTION INTRAMUSCULAR; INTRAVENOUS at 15:49

## 2017-06-23 NOTE — PN
DATE OF SERVICE:  06/22/2017



This 72-year-old gentleman who was admitted with back pain and acute 

renal failure with prerenal factors, dehydration, acute tubular 

necrosis, is being closely monitored. The patient also had an 

abdominal pelvic CAT scan did not show acute abnormalities. Patient 

also had elevated LFTs.  



PAST MEDICAL HISTORY: Reviewed. 



REVIEW OF SYSTEMS:

CARDIOVASCULAR: No angina. 

RESPIRATORY: As mentioned earlier. 

GI: As mentioned earlier. 

: No dysuria. 

NERVOUS SYSTEM: No numbness or weakness. 



Current medications are reviewed and include: 

1. Norco 5 mg p.o. q.6. 

2. DuoNeb q.i.d. and p.r.n. 

3. Zyloprim 100 mg daily. 

4. Norvasc 5 mg daily. 

5. Aspirin 325 mg daily. 

6. Coreg 25 mg p.o. b.i.d.

7. Colace 100 mg p.o. q.h.s. 

8. Neurontin. 

9. Lantus. 

10. Omnipaque. 

11. Imdur. 

12. Melatonin. 

13. Nitrostat. 



PHYSICAL EXAMINATION: The patient is alert and oriented x3. Pulse is 

58, blood pressure 112/61, respirations 14, temperature 97.1, pulse ox 

100% on 2 L.  

HEENT:  Conjunctivae normal. 

NECK:  No jugular venous distention. 

CARDIOVASCULAR: S1 and S2, muffled. 

RESPIRATORY:  Breath sounds diminished at the bases. A few scattered 

rhonchi, no crackles.  

ABDOMEN:  Soft, obese, nontender. 

LEGS: No edema, no swelling.  

NERVOUS SYSTEM:  No focal deficits. 



LABS:  Creatinine 1.83. Creatinine is improved. AST is 65 and ALT 78, 

which is also improving.  



ASSESSMENT:

1. Acute renal failure with some prerenal factors and dehydration with 

acute tubular necrosis.  

2. Back pain, possible degenerative joint disease. 

3. Hepatitis secondary to chronic hepatitis C. 

4. Elevated AST, ALT and acute hepatitis, rule out chronic liver 

disease.  

5. Hypoalbuminemia with mild to moderate protein calorie malnutrition. 

6. Increased random blood sugar and diabetes mellitus type 2, 

uncontrolled.  

7. Thrombocytopenia, chronic. 

8. History of renal failure. 

9. History of gout. 

10. History of congestive heart failure with chronic systolic 

dysfunction, ejection fraction 20% to  25%.  

11. History of hypertension, essential. 

12. History of diabetic ketoacidosis. 

13. History of metabolic encephalopathy. 

14. History of hepatitis C. 

15. History of peripheral neuropathy. 

16. History of pacemaker. 

17. FULL CODE. 



RECOMMENDATIONS AND DISCUSSION: In this 72-year-old gentleman who 

presented with multiple complex medical issues, we will monitor the 

patient closely. Continue the current medications. Continue 

symptomatic treatment. Otherwise, at this time I recommend repeat 

labs. Monitor creatinine closely. Prognosis guarded because of 

multiple complex medical issues and further recommendations to follow. 

  See orders for details.

## 2017-06-23 NOTE — XR
EXAMINATION TYPE: XR chest 1V portable

 

DATE OF EXAM: 6/23/2017

 

COMPARISON: 6/21/2017

 

HISTORY: Shortness of breath

 

TECHNIQUE: Single frontal view of the chest is obtained.

 

FINDINGS:  Hyperinflation noted. There is mild cardiomegaly and cardiac device. Subsegmental changes 
at the left lung base. Correlate for COPD

 

IMPRESSION:  

1. Technically Limited exam demonstrates bibasilar subsegmental elevations. Correlate for atelectasis
 versus early infiltrate.

2. Correlate for COPD and  cardiomegaly.

## 2017-06-24 NOTE — DS
DATE OF ADMISSION:   06/22/2017

DATE OF DISCHARGE:   06/23/2017



FINAL DIAGNOSES: 

1. Acute renal failure with prerenal factors with dehydration with 

acute tubular necrosis.  

2. Back pain, possible degenerative joint disease. 

3. Hepatitis secondary to chronic hepatitis C. 

4. Elevated AST, ALT from chronic hepatitis C. 

5. Hypoalbuminemia with mild to moderate protein calorie malnutrition. 

6. Increased random blood sugar, diabetes mellitus type 2 uncontrolled. 

7. Thrombocytopenia, chronic. 

8. History of renal failure. 

9. History of gout. 

10. History of congestive heart failure with chronic systolic 

dysfunction, ejection fraction 25%. 

11. History of hypertension, essential. 

12. History of diabetic ketoacidosis. 

13. Metabolic encephalopathy.

14. History of hepatitis C.

15. History of peripheral neuropathy.

16. History of pacemaker.

17. FULL CODE. 

 

DISCHARGE DISPOSITION: The patient will be discharged in stable 

condition with guarded prognosis. Total time taken is 35 minutes.  



HISTORY OF PRESENT ILLNESS: This 72-year-old gentleman with a past 

history medical history of multiple medical problems admitted with 

back pain and multiple other medical problems. Otherwise, the patient 

was treated symptomatically. CT scan of the abdomen was negative. The 

patient seen by nephrology. The chest x-ray had significant 

improvement. The creatinine did improve to 1.5 at this time and from 

the admission value of 2.2.  



On exam, vitals stable. 

CARDIOVASCULAR SYSTEM: S1, S2. 

ABDOMEN: Soft. 

NERVOUS SYSTEM: No focal deficits. 



DISCHARGE ADVICE:

1. Diet id cardiac. 

2. Activity is limited until follow up. 

3. Follow up with Dr. Samayoa in 2 to 3 days. 

4. Follow with Dr. Green as advised. 



Medications will be as recommended:

1. Zyloprim 100 mg p.o. daily. 

2. Norvasc 5 mg p.o. daily. 

3. Aspirin 325 mg daily. 

4. Coreg 25 mg p.o. b.i.d. 

5. Colace 100 mg q.h.s. 

6. Neurontin 400 mg t.i.d. 

7. Hydrocodone 5 mg q.6. 

8. Lantus 45 units subcu in the morning.

9. Imdur ER 30 mg p.o. daily. 

10. Nitrostat 0.4 sublingual p.r.n. 



Once again, the patient is discharged in stable condition with guarded 

prognosis.

## 2018-03-03 ENCOUNTER — HOSPITAL ENCOUNTER (INPATIENT)
Dept: HOSPITAL 47 - EC | Age: 73
LOS: 13 days | Discharge: NURSING, REHAB OR LONG-TERM CARE | DRG: 438 | End: 2018-03-16
Payer: MEDICARE

## 2018-03-03 VITALS — BODY MASS INDEX: 32.8 KG/M2

## 2018-03-03 DIAGNOSIS — E87.2: ICD-10-CM

## 2018-03-03 DIAGNOSIS — S37.39XA: ICD-10-CM

## 2018-03-03 DIAGNOSIS — J44.9: ICD-10-CM

## 2018-03-03 DIAGNOSIS — I25.2: ICD-10-CM

## 2018-03-03 DIAGNOSIS — D69.59: ICD-10-CM

## 2018-03-03 DIAGNOSIS — I42.9: ICD-10-CM

## 2018-03-03 DIAGNOSIS — K38.1: ICD-10-CM

## 2018-03-03 DIAGNOSIS — R31.0: ICD-10-CM

## 2018-03-03 DIAGNOSIS — M19.90: ICD-10-CM

## 2018-03-03 DIAGNOSIS — E11.42: ICD-10-CM

## 2018-03-03 DIAGNOSIS — N17.0: ICD-10-CM

## 2018-03-03 DIAGNOSIS — R53.81: ICD-10-CM

## 2018-03-03 DIAGNOSIS — E66.9: ICD-10-CM
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
## 2018-03-03 DIAGNOSIS — I11.0: ICD-10-CM

## 2018-03-03 DIAGNOSIS — E78.5: ICD-10-CM

## 2018-03-03 DIAGNOSIS — F32.9: ICD-10-CM

## 2018-03-03 DIAGNOSIS — G47.30: ICD-10-CM

## 2018-03-03 DIAGNOSIS — J98.11: ICD-10-CM

## 2018-03-03 DIAGNOSIS — Z79.4: ICD-10-CM

## 2018-03-03 DIAGNOSIS — I50.23: ICD-10-CM

## 2018-03-03 DIAGNOSIS — E78.00: ICD-10-CM

## 2018-03-03 DIAGNOSIS — B18.2: ICD-10-CM

## 2018-03-03 DIAGNOSIS — Z86.61: ICD-10-CM

## 2018-03-03 DIAGNOSIS — J96.01: ICD-10-CM

## 2018-03-03 DIAGNOSIS — Z95.810: ICD-10-CM

## 2018-03-03 DIAGNOSIS — I27.20: ICD-10-CM

## 2018-03-03 DIAGNOSIS — E87.5: ICD-10-CM

## 2018-03-03 DIAGNOSIS — Z79.82: ICD-10-CM

## 2018-03-03 DIAGNOSIS — K85.90: Primary | ICD-10-CM

## 2018-03-03 DIAGNOSIS — F03.90: ICD-10-CM

## 2018-03-03 DIAGNOSIS — R32: ICD-10-CM

## 2018-03-03 DIAGNOSIS — E11.65: ICD-10-CM
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
## 2018-03-03 DIAGNOSIS — M10.9: ICD-10-CM

## 2018-03-03 DIAGNOSIS — Z79.899: ICD-10-CM

## 2018-03-03 DIAGNOSIS — M51.36: ICD-10-CM

## 2018-03-03 LAB
ALBUMIN SERPL-MCNC: 3.9 G/DL (ref 3.5–5)
ALP SERPL-CCNC: 386 U/L (ref 38–126)
ALT SERPL-CCNC: 44 U/L (ref 21–72)
AMYLASE SERPL-CCNC: 305 U/L (ref 30–110)
ANION GAP SERPL CALC-SCNC: 8 MMOL/L
AST SERPL-CCNC: 69 U/L (ref 17–59)
BASOPHILS # BLD AUTO: 0 K/UL (ref 0–0.2)
BASOPHILS NFR BLD AUTO: 1 %
BILIRUB UR QL STRIP.AUTO: (no result)
BUN SERPL-SCNC: 23 MG/DL (ref 9–20)
CALCIUM SPEC-MCNC: 9.3 MG/DL (ref 8.4–10.2)
CHLORIDE SERPL-SCNC: 102 MMOL/L (ref 98–107)
CK SERPL-CCNC: 121 U/L (ref 55–170)
CO2 SERPL-SCNC: 28 MMOL/L (ref 22–30)
EOSINOPHIL # BLD AUTO: 0.4 K/UL (ref 0–0.7)
EOSINOPHIL NFR BLD AUTO: 7 %
ERYTHROCYTE [DISTWIDTH] IN BLOOD BY AUTOMATED COUNT: 4.74 M/UL (ref 4.3–5.9)
ERYTHROCYTE [DISTWIDTH] IN BLOOD: 13 % (ref 11.5–15.5)
GLUCOSE BLD-MCNC: 170 MG/DL (ref 75–99)
GLUCOSE BLD-MCNC: 186 MG/DL (ref 75–99)
GLUCOSE BLD-MCNC: 239 MG/DL (ref 75–99)
GLUCOSE SERPL-MCNC: 227 MG/DL (ref 74–99)
GLUCOSE UR QL: (no result)
HCT VFR BLD AUTO: 44.1 % (ref 39–53)
HGB BLD-MCNC: 14.1 GM/DL (ref 13–17.5)
HYALINE CASTS UR QL AUTO: 82 /LPF (ref 0–2)
LIPASE SERPL-CCNC: 4990 U/L (ref 23–300)
LYMPHOCYTES # SPEC AUTO: 1.1 K/UL (ref 1–4.8)
LYMPHOCYTES NFR SPEC AUTO: 20 %
MCH RBC QN AUTO: 29.8 PG (ref 25–35)
MCHC RBC AUTO-ENTMCNC: 32.1 G/DL (ref 31–37)
MCV RBC AUTO: 93 FL (ref 80–100)
MONOCYTES # BLD AUTO: 0.2 K/UL (ref 0–1)
MONOCYTES NFR BLD AUTO: 4 %
NEUTROPHILS # BLD AUTO: 3.6 K/UL (ref 1.3–7.7)
NEUTROPHILS NFR BLD AUTO: 68 %
PH UR: 5 [PH] (ref 5–8)
PLATELET # BLD AUTO: 114 K/UL (ref 150–450)
POTASSIUM SERPL-SCNC: 4.6 MMOL/L (ref 3.5–5.1)
PROT SERPL-MCNC: 7.9 G/DL (ref 6.3–8.2)
PROT UR QL: (no result)
RBC UR QL: 4 /HPF (ref 0–5)
SODIUM SERPL-SCNC: 138 MMOL/L (ref 137–145)
SP GR UR: 1.01 (ref 1–1.03)
SQUAMOUS UR QL AUTO: 1 /HPF (ref 0–4)
TROPONIN I SERPL-MCNC: 0.02 NG/ML (ref 0–0.03)
UROBILINOGEN UR QL STRIP: 8 MG/DL (ref ?–2)
WBC # BLD AUTO: 5.4 K/UL (ref 3.8–10.6)
WBC #/AREA URNS HPF: 7 /HPF (ref 0–5)

## 2018-03-03 PROCEDURE — 84132 ASSAY OF SERUM POTASSIUM: CPT

## 2018-03-03 PROCEDURE — 82248 BILIRUBIN DIRECT: CPT

## 2018-03-03 PROCEDURE — 94760 N-INVAS EAR/PLS OXIMETRY 1: CPT

## 2018-03-03 PROCEDURE — 87205 SMEAR GRAM STAIN: CPT

## 2018-03-03 PROCEDURE — 85610 PROTHROMBIN TIME: CPT

## 2018-03-03 PROCEDURE — 87902 NFCT AGT GNTYP ALYS HEP C: CPT

## 2018-03-03 PROCEDURE — 82140 ASSAY OF AMMONIA: CPT

## 2018-03-03 PROCEDURE — 86301 IMMUNOASSAY TUMOR CA 19-9: CPT

## 2018-03-03 PROCEDURE — 93306 TTE W/DOPPLER COMPLETE: CPT

## 2018-03-03 PROCEDURE — 71046 X-RAY EXAM CHEST 2 VIEWS: CPT

## 2018-03-03 PROCEDURE — 82553 CREATINE MB FRACTION: CPT

## 2018-03-03 PROCEDURE — 82150 ASSAY OF AMYLASE: CPT

## 2018-03-03 PROCEDURE — 93005 ELECTROCARDIOGRAM TRACING: CPT

## 2018-03-03 PROCEDURE — 94660 CPAP INITIATION&MGMT: CPT

## 2018-03-03 PROCEDURE — 80074 ACUTE HEPATITIS PANEL: CPT

## 2018-03-03 PROCEDURE — 74176 CT ABD & PELVIS W/O CONTRAST: CPT

## 2018-03-03 PROCEDURE — 96361 HYDRATE IV INFUSION ADD-ON: CPT

## 2018-03-03 PROCEDURE — 85025 COMPLETE CBC W/AUTO DIFF WBC: CPT

## 2018-03-03 PROCEDURE — 74022 RADEX COMPL AQT ABD SERIES: CPT

## 2018-03-03 PROCEDURE — 36600 WITHDRAWAL OF ARTERIAL BLOOD: CPT

## 2018-03-03 PROCEDURE — 83036 HEMOGLOBIN GLYCOSYLATED A1C: CPT

## 2018-03-03 PROCEDURE — 87040 BLOOD CULTURE FOR BACTERIA: CPT

## 2018-03-03 PROCEDURE — 80320 DRUG SCREEN QUANTALCOHOLS: CPT

## 2018-03-03 PROCEDURE — 96375 TX/PRO/DX INJ NEW DRUG ADDON: CPT

## 2018-03-03 PROCEDURE — 83605 ASSAY OF LACTIC ACID: CPT

## 2018-03-03 PROCEDURE — 81001 URINALYSIS AUTO W/SCOPE: CPT

## 2018-03-03 PROCEDURE — 80053 COMPREHEN METABOLIC PANEL: CPT

## 2018-03-03 PROCEDURE — 76705 ECHO EXAM OF ABDOMEN: CPT

## 2018-03-03 PROCEDURE — 36415 COLL VENOUS BLD VENIPUNCTURE: CPT

## 2018-03-03 PROCEDURE — 82550 ASSAY OF CK (CPK): CPT

## 2018-03-03 PROCEDURE — 71045 X-RAY EXAM CHEST 1 VIEW: CPT

## 2018-03-03 PROCEDURE — 82105 ALPHA-FETOPROTEIN SERUM: CPT

## 2018-03-03 PROCEDURE — 87522 HEPATITIS C REVRS TRNSCRPJ: CPT

## 2018-03-03 PROCEDURE — 87324 CLOSTRIDIUM AG IA: CPT

## 2018-03-03 PROCEDURE — 83735 ASSAY OF MAGNESIUM: CPT

## 2018-03-03 PROCEDURE — 82805 BLOOD GASES W/O2 SATURATION: CPT

## 2018-03-03 PROCEDURE — 83690 ASSAY OF LIPASE: CPT

## 2018-03-03 PROCEDURE — 80061 LIPID PANEL: CPT

## 2018-03-03 PROCEDURE — 87086 URINE CULTURE/COLONY COUNT: CPT

## 2018-03-03 PROCEDURE — 84484 ASSAY OF TROPONIN QUANT: CPT

## 2018-03-03 PROCEDURE — 84100 ASSAY OF PHOSPHORUS: CPT

## 2018-03-03 PROCEDURE — 99285 EMERGENCY DEPT VISIT HI MDM: CPT

## 2018-03-03 PROCEDURE — 96374 THER/PROPH/DIAG INJ IV PUSH: CPT

## 2018-03-03 RX ADMIN — INSULIN ASPART SCH: 100 INJECTION, SOLUTION INTRAVENOUS; SUBCUTANEOUS at 20:43

## 2018-03-03 RX ADMIN — CARVEDILOL SCH MG: 12.5 TABLET, FILM COATED ORAL at 17:30

## 2018-03-03 RX ADMIN — INSULIN ASPART SCH UNIT: 100 INJECTION, SOLUTION INTRAVENOUS; SUBCUTANEOUS at 18:23

## 2018-03-03 RX ADMIN — DEXTROSE MONOHYDRATE SCH MLS/HR: 5 INJECTION, SOLUTION INTRAVENOUS at 23:25

## 2018-03-03 RX ADMIN — DEXTROSE MONOHYDRATE SCH MLS/HR: 5 INJECTION, SOLUTION INTRAVENOUS at 17:29

## 2018-03-03 NOTE — US
EXAMINATION TYPE: US gallbladder

 

DATE OF EXAM: 3/3/2018

 

COMPARISON: Retroperitoneal ultrasound 6/21/2017, CT abdomen pelvis 3/3/2018

 

CLINICAL HISTORY: Pain.

 

EXAM MEASUREMENTS:

 

Liver Length:  13.7 cm   

Gallbladder Wall:  0.3 cm   

CBD:  0.4 cm

Right Kidney:  10.9 x 5.6 x 5.1 cm

 

Patient has large abdomen, SOB, and confusion. Unable to cooperate with examiner.

 

Pancreas:  Obscured by bowel gas

Liver:  limited views, portions visualized appear unremarkable  

Gallbladder:  limited views, portions visualized appear unremarkable 

**Evidence for sonographic Miller's sign:  No

CBD:  wnl 

Right Kidney: no hydro or masses identified

 

 

 

IMPRESSION: 

1. Some limitation due to bowel gas.

2. Right upper quadrant ultrasound appears without acute changes.

## 2018-03-03 NOTE — ED
Abdominal Pain HPI





- General


Source: family, EMS, RN notes reviewed


Mode of arrival: EMS


Limitations: no limitations





<Stephen Sandoval - Last Filed: 03/03/18 06:54>





<Stephen Orta - Last Filed: 03/03/18 09:33>





- General


Chief Complaint: Abdominal Pain


Stated Complaint: Chest Pain


Time Seen by Provider: 03/03/18 05:30





- History of Present Illness


Initial Comments: 





This is a 73-year-old male with past medical history significant for kidney 

failure diabetes hypertension high cholesterol and high cholesterol.  Patient 

states about 5 hours ago started having mid abdominal pain.  Patient states it 

is been constant since then.  Patient denies any nausea vomiting or diarrhea.  

Patient denies any fever chills.  Patient states his abdomen is not distended 

is normally is but easily is currently.  Patient denies any chest pain 

difficulty breathing or shortness of breath.  Patient denies any recent fever 

chills or cough.  Patient denies any palpitations.  Patient denies any headache 

patient denies numbness weakness.  Patient denies any injury or trauma 

recently.  Patient denies any lightheadedness dizziness or near syncopal 

episode. (Stephen Sandoval)





- Related Data


 Home Medications











 Medication  Instructions  Recorded  Confirmed


 


Nitroglycerin Sl Tabs [Nitrostat] 0.4 mg SUBLINGUAL Q5M PRN 05/18/14 03/03/18


 


HYDROcodone/APAP 5-325MG [Norco 1 tab PO Q6H PRN 06/04/15 03/03/18





5-325]   


 


Allopurinol [Zyloprim] 100 mg PO DAILY 07/14/15 03/03/18


 


Carvedilol 25 mg PO BID 07/14/15 03/03/18


 


Aspirin 325 mg PO DAILY 11/25/15 03/03/18


 


Docusate [Colace] 100 mg PO HS 11/25/15 03/03/18


 


Gabapentin [Neurontin] 400 mg PO TID 11/03/16 03/03/18


 


Insulin Glargine [Lantus] 45 unit SQ QAM 06/21/17 03/03/18


 


amLODIPine [Norvasc] 5 mg PO DAILY 06/21/17 03/03/18








 Previous Rx's











 Medication  Instructions  Recorded


 


Isosorbide Mononitrate ER [Imdur] 30 mg PO DAILY #30 tab.er.24h 11/27/15











 Allergies











Allergy/AdvReac Type Severity Reaction Status Date / Time


 


No Known Allergies Allergy   Verified 03/03/18 05:36














Review of Systems


ROS Other: All systems not noted in ROS Statement are negative.





<Stephen Sandoval - Last Filed: 03/03/18 06:54>


ROS Other: All systems not noted in ROS Statement are negative.





<Stephen Orta - Last Filed: 03/03/18 09:33>


ROS Statement: 


Those systems with pertinent positive or pertinent negative responses have been 

documented in the HPI.








Past Medical History


Past Medical History: Chest Pain / Angina, Heart Failure, COPD, Diabetes 

Mellitus, Hyperlipidemia, Hypertension, Myocardial Infarction (MI), 

Osteoarthritis (OA)


Additional Past Medical History / Comment(s): 11/25/15  Pt presented to [facility] ER 

EMS feeling weak and having mild headache and anterior neck pain.  Symptoms 

began days ago.  Pt being admitted with clinical impression of weakness, acute 

renal failure.  PT last admitted to [facility] 7/14/15 with acute DKA, acute 

metabolic encephalitis from DKA, acute renal failure, severe hyperkalemia, 

pseudohyponatremia.  Other HX:  Chronic CHF systolic dysfunction with EF 20%,  

gout, gouty arthiritis to R great toe, nerve pain, ddd lumbar region, 

folliculitis, constipation, renal insufficiency, hep c 1-1-14, diabetes 

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 

liver bx tx with chest tube, cellulitis to lt foot/ankle, pt was born with R 

leg larger than L leg.


Last Myocardial Infarction Date:: 5/2014


History of Any Multi-Drug Resistant Organisms: None Reported


Past Surgical History: Heart Catheterization, Pacemaker


Additional Past Surgical History / Comment(s): CATARACTS ROCKY EYES REMOVED.  

liver biopsy on 4-7-14, heart cath 2005


Past Anesthesia/Blood Transfusion Reactions: No Reported Reaction


Type of Cardiac Device: Permanent Pacemaker


Device Placement Date:: UNK


Past Psychological History: Bipolar, Depression


Smoking Status: Never smoker


Past Alcohol Use History: Occasional


Past Drug Use History: None Reported





- Past Family History


  ** Mother


Family Medical History: Cancer





  ** Sister(s)


Family Medical History: Cancer





<Stephen Sandoval - Last Filed: 03/03/18 06:54>





General Exam


Limitations: no limitations





<Stephen Sandoval - Last Filed: 03/03/18 06:54>





<Stephen Orta - Last Filed: 03/03/18 09:33>





- General Exam Comments


Initial Comments: 





GENERAL:


Patient is well-developed and well-nourished.  Patient is nontoxic and well-

hydrated and is in mild distress.





ENT:


Neck is soft and supple.  No significant lymphadenopathy is noted.  Oropharynx 

is clear.  Moist mucous membranes.  Neck has full range of motion without 

eliciting any pain.  





EYES:


The sclera were anicteric and conjunctiva were pink and moist.  Extraocular 

movements were intact and pupils were equal round and reactive to light.  

Eyelids were unremarkable.





PULMONARY:


Unlabored respirations.  Good breath sounds bilaterally.  No audible rales 

rhonchi or wheezing was noted.





CARDIOVASCULAR:


There is a regular rate and rhythm without any murmurs gallops or rubs. 





ABDOMEN:


Soft and nontender with normal bowel sounds.  No palpable organomegaly was 

noted.  There is no palpable pulsatile mass.





SKIN:


Skin is clear with no lesions or rashes and otherwise unremarkable.





NEUROLOGIC:


Patient is alert and oriented x3.  Cranial nerves II through XII are grossly 

intact.  Motor and sensory are also intact.  Normal speech, volume and content.

  Symmetrical smile.  





MUSCULOSKELETAL:


Normal extremities with adequate strength and full range of motion.  





LYMPHATICS:


No significant lymphadenopathy is noted





PSYCHIATRIC:


Normal psychiatric evaluation.  Normal interpersonal interactions appears 

functionally intact in deals appropriately with others.   (Stephen Sandoval)





 Vital Signs











  03/03/18 03/03/18 03/03/18





  05:33 07:49 09:20


 


Temperature 97.9 F  


 


Pulse Rate 57 L 68 75


 


Respiratory 20 18 18





Rate   


 


Blood Pressure 212/98 214/103 207/90


 


O2 Sat by Pulse 99 97 94 L





Oximetry   














Medical Decision Making





- Lab Data


Result diagrams: 


 03/03/18 06:23








<Stephen Sandoval - Last Filed: 03/03/18 06:54>





- Lab Data


Result diagrams: 


 03/03/18 06:23





 03/03/18 06:23





- Radiology Data


Radiology results: report reviewed (CT abdomen and pelvis positive for 

appendicitis, ultrasound gallbladder pending), image reviewed





<Stephen Orta - Last Filed: 03/03/18 09:33>





- Medical Decision Making





EKG shows a ventricular paced rhythm at 50 bpm SC interval is 132 QRS is 132 QT 

interval 490 QTC is 481.





Dr. Orta will be taking over the care of this patient at 7 AM


 (Stephen Sandoval)


73 male the ER for evaluation of bowel pain.  Patient's positive for 

appendicitis, positive for pancreatitis.  Patient be admitted for surgical 

evaluation and treatment


 (Stephen Orta)





- Lab Data





 Lab Results











  03/03/18 03/03/18 03/03/18 Range/Units





  06:23 06:23 06:23 


 


WBC    5.4  (3.8-10.6)  k/uL


 


RBC    4.74  (4.30-5.90)  m/uL


 


Hgb    14.1  (13.0-17.5)  gm/dL


 


Hct    44.1  (39.0-53.0)  %


 


MCV    93.0  (80.0-100.0)  fL


 


MCH    29.8  (25.0-35.0)  pg


 


MCHC    32.1  (31.0-37.0)  g/dL


 


RDW    13.0  (11.5-15.5)  %


 


Plt Count    114 L  (150-450)  k/uL


 


Neutrophils %    68  %


 


Lymphocytes %    20  %


 


Monocytes %    4  %


 


Eosinophils %    7  %


 


Basophils %    1  %


 


Neutrophils #    3.6  (1.3-7.7)  k/uL


 


Lymphocytes #    1.1  (1.0-4.8)  k/uL


 


Monocytes #    0.2  (0-1.0)  k/uL


 


Eosinophils #    0.4  (0-0.7)  k/uL


 


Basophils #    0.0  (0-0.2)  k/uL


 


Manual Slide Review    Performed  


 


Large Platelets    Present  


 


Sodium  138    (137-145)  mmol/L


 


Potassium  4.6    (3.5-5.1)  mmol/L


 


Chloride  102    ()  mmol/L


 


Carbon Dioxide  28    (22-30)  mmol/L


 


Anion Gap  8    mmol/L


 


BUN  23 H    (9-20)  mg/dL


 


Creatinine  0.87    (0.66-1.25)  mg/dL


 


Est GFR (MDRD) Af Amer  >60    (>60 ml/min/1.73 sqM)  


 


Est GFR (MDRD) Non-Af  >60    (>60 ml/min/1.73 sqM)  


 


Glucose  227 H    (74-99)  mg/dL


 


Plasma Lactic Acid Buddy     (0.7-2.0)  mmol/L


 


Calcium  9.3    (8.4-10.2)  mg/dL


 


Total Bilirubin  2.1 H    (0.2-1.3)  mg/dL


 


AST  69 H    (17-59)  U/L


 


ALT  44    (21-72)  U/L


 


Alkaline Phosphatase  386 H    ()  U/L


 


Total Creatine Kinase   121   ()  U/L


 


CK-MB (CK-2)   2.9 H*   (0.0-2.4)  ng/mL


 


CK-MB (CK-2) Rel Index   2.4   


 


Troponin I   0.024   (0.000-0.034)  ng/mL


 


Total Protein  7.9    (6.3-8.2)  g/dL


 


Albumin  3.9    (3.5-5.0)  g/dL


 


Amylase  305 H*    ()  U/L


 


Lipase  4990 H    ()  U/L














  03/03/18 Range/Units





  07:37 


 


WBC   (3.8-10.6)  k/uL


 


RBC   (4.30-5.90)  m/uL


 


Hgb   (13.0-17.5)  gm/dL


 


Hct   (39.0-53.0)  %


 


MCV   (80.0-100.0)  fL


 


MCH   (25.0-35.0)  pg


 


MCHC   (31.0-37.0)  g/dL


 


RDW   (11.5-15.5)  %


 


Plt Count   (150-450)  k/uL


 


Neutrophils %   %


 


Lymphocytes %   %


 


Monocytes %   %


 


Eosinophils %   %


 


Basophils %   %


 


Neutrophils #   (1.3-7.7)  k/uL


 


Lymphocytes #   (1.0-4.8)  k/uL


 


Monocytes #   (0-1.0)  k/uL


 


Eosinophils #   (0-0.7)  k/uL


 


Basophils #   (0-0.2)  k/uL


 


Manual Slide Review   


 


Large Platelets   


 


Sodium   (137-145)  mmol/L


 


Potassium   (3.5-5.1)  mmol/L


 


Chloride   ()  mmol/L


 


Carbon Dioxide   (22-30)  mmol/L


 


Anion Gap   mmol/L


 


BUN   (9-20)  mg/dL


 


Creatinine   (0.66-1.25)  mg/dL


 


Est GFR (MDRD) Af Amer   (>60 ml/min/1.73 sqM)  


 


Est GFR (MDRD) Non-Af   (>60 ml/min/1.73 sqM)  


 


Glucose   (74-99)  mg/dL


 


Plasma Lactic Acid Buddy  1.4  (0.7-2.0)  mmol/L


 


Calcium   (8.4-10.2)  mg/dL


 


Total Bilirubin   (0.2-1.3)  mg/dL


 


AST   (17-59)  U/L


 


ALT   (21-72)  U/L


 


Alkaline Phosphatase   ()  U/L


 


Total Creatine Kinase   ()  U/L


 


CK-MB (CK-2)   (0.0-2.4)  ng/mL


 


CK-MB (CK-2) Rel Index   


 


Troponin I   (0.000-0.034)  ng/mL


 


Total Protein   (6.3-8.2)  g/dL


 


Albumin   (3.5-5.0)  g/dL


 


Amylase   ()  U/L


 


Lipase   ()  U/L














Disposition





<Stephen Sandoval - Last Filed: 03/03/18 06:54>





<Stephen Orta - Last Filed: 03/03/18 09:33>


Clinical Impression: 


 Pancreatitis, Acute appendicitis





Disposition: ADMITTED AS IP TO THIS HOSP


Condition: Fair


Referrals: 


Matti Adams DO [Primary Care Provider] - 1-2 days

## 2018-03-03 NOTE — P.GSCN
History of Present Illness


Consult date: 03/03/18


History of present illness: 


 Patient is a 73-year-old black male who presents to the emergency room after 

calling an ambulance and complaining of chest/abdominal discomfort.  The 

patient states he last ate yesterday and had a bowel movement yesterday.  The 

patient is a very poor historian and states at this time is not having 

abdominal pain.  The patient had radiographic studies performed including a CAT 

scan of the abdomen which is consistent with a the ansa possible distal 

fecalith with appendicitis.  Additionally patient was noted to have elevated 

lipase 4990 elevated amylase of 305 and elevated bilirubin at 2.1 elevated AST 

of 69 and elevated alk phos at 386 the patient's white blood cell count is 5.4


Past surgical history: Patient unable to give history past medical history: 

Patient states he has congestive heart failure


ALLERGIES: Patient states negative


Social history: Patient denies smoking or alcohol use


Review of systems:


Lungs: Negative


Heart: Congestive heart failure


Abdomen: As above


: Negative








Review of Systems





- Constitutional


Reports as per HPI





- Cardiovascular


Cardiovascular Comment(s): 





CHF





- Gastrointestinal


Reports as per HPI





- Genitourinary


Reports as per HPI





Past Medical History


Past Medical History: Chest Pain / Angina, Heart Failure, COPD, Diabetes 

Mellitus, Hyperlipidemia, Hypertension, Myocardial Infarction (MI), 

Osteoarthritis (OA)


Additional Past Medical History / Comment(s): 11/25/15  Pt presented to [facility] ER 

EMS feeling weak and having mild headache and anterior neck pain.  Symptoms 

began days ago.  Pt being admitted with clinical impression of weakness, acute 

renal failure.  PT last admitted to [facility] 7/14/15 with acute DKA, acute 

metabolic encephalitis from DKA, acute renal failure, severe hyperkalemia, 

pseudohyponatremia.  Other HX:  Chronic CHF systolic dysfunction with EF 20%,  

gout, gouty arthiritis to R great toe, nerve pain, ddd lumbar region, 

folliculitis, constipation, renal insufficiency, hep c 1-1-14, diabetes 

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 

liver bx tx with chest tube, cellulitis to lt foot/ankle, pt was born with R 

leg larger than L leg.


Last Myocardial Infarction Date:: 5/2014


History of Any Multi-Drug Resistant Organisms: None Reported


Past Surgical History: Heart Catheterization, Pacemaker


Additional Past Surgical History / Comment(s): CATARACTS ROCKY EYES REMOVED.  

liver biopsy on 4-7-14, heart cath 2005


Past Anesthesia/Blood Transfusion Reactions: No Reported Reaction


Type of Cardiac Device: Permanent Pacemaker


Device Placement Date:: UNK


Past Psychological History: Bipolar, Depression


Smoking Status: Never smoker


Past Alcohol Use History: Occasional


Past Drug Use History: None Reported





- Past Family History


  ** Mother


Family Medical History: Cancer





  ** Sister(s)


Family Medical History: Cancer





Medications and Allergies


 Home Medications











 Medication  Instructions  Recorded  Confirmed  Type


 


Nitroglycerin Sl Tabs [Nitrostat] 0.4 mg SUBLINGUAL Q5M PRN 05/18/14 03/03/18 

History


 


HYDROcodone/APAP 5-325MG [Norco 1 tab PO Q6H PRN 06/04/15 03/03/18 History





5-325]    


 


Allopurinol [Zyloprim] 100 mg PO DAILY 07/14/15 03/03/18 History


 


Carvedilol 25 mg PO BID 07/14/15 03/03/18 History


 


Aspirin 325 mg PO DAILY 11/25/15 03/03/18 History


 


Docusate [Colace] 100 mg PO HS 11/25/15 03/03/18 History


 


Isosorbide Mononitrate ER [Imdur] 30 mg PO DAILY #30 tab.er.24h 11/27/15 03/03/

18 Rx


 


Gabapentin [Neurontin] 400 mg PO TID 11/03/16 03/03/18 History


 


Insulin Glargine [Lantus] 45 unit SQ QAM 06/21/17 03/03/18 History


 


amLODIPine [Norvasc] 5 mg PO DAILY 06/21/17 03/03/18 History











 Allergies











Allergy/AdvReac Type Severity Reaction Status Date / Time


 


No Known Allergies Allergy   Verified 03/03/18 05:36














Surgical - Exam


 Vital Signs











Temp Pulse Resp BP Pulse Ox


 


 97.9 F   57 L  20   212/98   99 


 


 03/03/18 05:33  03/03/18 05:33  03/03/18 05:33  03/03/18 05:33  03/03/18 05:33














- General


moderate distress, obese





- Eyes





 Mild exophthalmos





- ENT


normal pinna





- Neck


no masses, trachea midline





- Respiratory





Decreased breath sounds bilaterally





- Cardiovascular


Heart Sounds: normal: S1, S2





- Abdomen





No guarding or rebound


Midepigastric discomfort


Abdomen: soft, distended





- Integumentary


no rash





- Psychiatric





 poor historian.  Difficult to obtain history


oriented to person





Results





- Labs





 03/03/18 06:23





 03/03/18 06:23


 Abnormal Lab Results - Last 24 Hours (Table)











  03/03/18 03/03/18 03/03/18 Range/Units





  06:23 06:23 06:23 


 


Plt Count    114 L  (150-450)  k/uL


 


BUN  23 H    (9-20)  mg/dL


 


Glucose  227 H    (74-99)  mg/dL


 


Total Bilirubin  2.1 H    (0.2-1.3)  mg/dL


 


AST  69 H    (17-59)  U/L


 


Alkaline Phosphatase  386 H    ()  U/L


 


CK-MB (CK-2)   2.9 H*   (0.0-2.4)  ng/mL


 


Amylase  305 H*    ()  U/L


 


Lipase  4990 H    ()  U/L








 Diabetes panel











  03/03/18 Range/Units





  06:23 


 


Sodium  138  (137-145)  mmol/L


 


Potassium  4.6  (3.5-5.1)  mmol/L


 


Chloride  102  ()  mmol/L


 


Carbon Dioxide  28  (22-30)  mmol/L


 


BUN  23 H  (9-20)  mg/dL


 


Creatinine  0.87  (0.66-1.25)  mg/dL


 


Glucose  227 H  (74-99)  mg/dL


 


Calcium  9.3  (8.4-10.2)  mg/dL


 


AST  69 H  (17-59)  U/L


 


ALT  44  (21-72)  U/L


 


Alkaline Phosphatase  386 H  ()  U/L


 


Total Protein  7.9  (6.3-8.2)  g/dL


 


Albumin  3.9  (3.5-5.0)  g/dL








 Calcium panel











  03/03/18 Range/Units





  06:23 


 


Calcium  9.3  (8.4-10.2)  mg/dL


 


Albumin  3.9  (3.5-5.0)  g/dL








 Pituitary panel











  03/03/18 Range/Units





  06:23 


 


Sodium  138  (137-145)  mmol/L


 


Potassium  4.6  (3.5-5.1)  mmol/L


 


Chloride  102  ()  mmol/L


 


Carbon Dioxide  28  (22-30)  mmol/L


 


BUN  23 H  (9-20)  mg/dL


 


Creatinine  0.87  (0.66-1.25)  mg/dL


 


Glucose  227 H  (74-99)  mg/dL


 


Calcium  9.3  (8.4-10.2)  mg/dL








 Adrenal panel











  03/03/18 Range/Units





  06:23 


 


Sodium  138  (137-145)  mmol/L


 


Potassium  4.6  (3.5-5.1)  mmol/L


 


Chloride  102  ()  mmol/L


 


Carbon Dioxide  28  (22-30)  mmol/L


 


BUN  23 H  (9-20)  mg/dL


 


Creatinine  0.87  (0.66-1.25)  mg/dL


 


Glucose  227 H  (74-99)  mg/dL


 


Calcium  9.3  (8.4-10.2)  mg/dL


 


Total Bilirubin  2.1 H  (0.2-1.3)  mg/dL


 


AST  69 H  (17-59)  U/L


 


ALT  44  (21-72)  U/L


 


Alkaline Phosphatase  386 H  ()  U/L


 


Total Protein  7.9  (6.3-8.2)  g/dL


 


Albumin  3.9  (3.5-5.0)  g/dL














- Imaging


CT scan - abdomen: report reviewed, image reviewed





Assessment and Plan


Assessment: 


Impression/plan:


1.  Extremely poor historian presents without family support and complaint of 

abdominal/chest discomfort


2.  Computed tomography scan of the abdomen is consistent with a possible early 

acute distal appendicitis


2.  Elevated lipase and amylase consistent with a pancreatitis


3.  History of diabetes in the past records


4.  History of renal disease


5.  History of depression


Plan:


1.  Patient  admited for medical stabilization


2.  GI consultation regarding pancreatitis


3.  Possible surgical intervention after medically stabilized from possible 

early appendicitis


4.  At this time the patient does not have an acute surgical abdomen his white 

count is normal and he has no guarding or rebound.  His pain seems to be 

consistent with pancreatitis more than an early acute appendicitis and would 

like to get an ultrasound of the gallbladder and GI consultation prior to any 

operative intervention.

## 2018-03-03 NOTE — XR
EXAM:

  XR Chest, 2 Views

 

CLINICAL HISTORY:

  abdominal pain

 

TECHNIQUE:

  Frontal and lateral views of the chest.

 

COMPARISON:

  6/21/17

 

FINDINGS:

  Lungs:  Prominent pulmonary vasculature bilaterally

  Pleural space:  Unremarkable.  No pneumothorax.

  Heart:  Unremarkable.  No cardiomegaly.

  Mediastinum:  Unremarkable.

  Bones/joints:  Unremarkable.

  Other findings:  Left pacer is again noted.

 

IMPRESSION:     Prominent pulmonary vasculature, likely due to 

underinflation rather than pulmonary edema.

## 2018-03-03 NOTE — P.PN
Progress Note - Text


 Patient's computed tomography scan and ultrasound were reviewed with Dr. Gallegos from radiology.  Although there does appear to be a distal 

appendicolith there is minimal stranding near the area of the appendix to 

indicate acute appendicitis.  The patient does have an elevated lipase 

indicating pancreatitis however the  CAT scan does not reveal pancreatitis.  

Additionally it should be noted that ultrasound of the gallbladder does not 

appear to show any acute biliary disease.  At this time we are obtaining a GI 

consult and are hesitant to operate in the face of possible acute pancreatitis.

  The patient is very comfortable and abdomen is very soft with no guarding or 

rebound at this time.

## 2018-03-03 NOTE — P.HPIM
History of Present Illness


H&P Date: 03/03/18


Chief Complaint: Abdominal pain





This is a 73-year-old -American male with past medical history 

significant for systolic congestive heart failure, type 2 diabetes with 

peripheral neuropathy,  hypertension, dyslipidemia, chronic hepatitis C, 

chronic thrombocytopenia.  Patient states he began having moderate related to 

severe mid abdominal pain with radiation into the bilateral lower quadrants 

since last night  Patient states it is been constant since then.  Patient 

denies any nausea, diarrhea reports mild vomiting  Patient denies any fever 

chills.  Patient states his abdomen is not distended is normally is but easily 

is currently.  Patient denies any chest pain difficulty breathing or shortness 

of breath.  Patient denies any recent fever chills or cough.  Patient denies 

any palpitations.  Patient denies any headache patient denies numbness 

weakness.  Patient denies any injury or trauma recently.  Patient denies any 

lightheadedness dizziness or near syncopal episode.  The patient denies any 

recent drinking binges, and reports his only had 3 beers all week. 


In the ER he had a workup consistent with CT abdomen and pelvis that had a 

distal dilated appendix with the 8 mm appendicolith the small amount of 

stranding with possibility of early appenicitis, he was also noted to have 

elevated serum lipase level of 4990 suggestive of acute pancreatitis and was 

recommended for admission, he was started on IV morphine and antiemetics with 

Zofran and started on fluids and general surgery Dr. Rafi Ballard was consulted 





Past Medical History


Past Medical History: Chest Pain / Angina, Heart Failure, COPD, Diabetes 

Mellitus, Hyperlipidemia, Hypertension, Myocardial Infarction (MI), 

Osteoarthritis (OA)


Additional Past Medical History / Comment(s): 11/25/15  Pt presented to [facility] ER 

EMS feeling weak and having mild headache and anterior neck pain.  Symptoms 

began days ago.  Pt being admitted with clinical impression of weakness, acute 

renal failure.  PT last admitted to [facility] 7/14/15 with acute DKA, acute 

metabolic encephalitis from DKA, acute renal failure, severe hyperkalemia, 

pseudohyponatremia.  Other HX:  Chronic CHF systolic dysfunction with EF 20%,  

gout, gouty arthiritis to R great toe, nerve pain, ddd lumbar region, 

folliculitis, constipation, renal insufficiency, hep c 1-1-14, diabetes 

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 

liver bx tx with chest tube, cellulitis to lt foot/ankle, pt was born with R 

leg larger than L leg.


Last Myocardial Infarction Date:: 5/2014


History of Any Multi-Drug Resistant Organisms: None Reported


Past Surgical History: Heart Catheterization, Pacemaker


Additional Past Surgical History / Comment(s): CATARACTS ROCKY EYES REMOVED.  

liver biopsy on 4-7-14, heart cath 2005


Past Anesthesia/Blood Transfusion Reactions: No Reported Reaction


Type of Cardiac Device: Permanent Pacemaker


Device Placement Date:: UNK


Past Psychological History: Bipolar, Depression


Smoking Status: Never smoker


Past Alcohol Use History: Occasional


Past Drug Use History: None Reported





- Past Family History


  ** Mother


Family Medical History: Cancer





  ** Sister(s)


Family Medical History: Cancer





Medications and Allergies


 Home Medications











 Medication  Instructions  Recorded  Confirmed  Type


 


Nitroglycerin Sl Tabs [Nitrostat] 0.4 mg SUBLINGUAL Q5M PRN 05/18/14 03/03/18 

History


 


HYDROcodone/APAP 5-325MG [Norco 1 tab PO Q6H PRN 06/04/15 03/03/18 History





5-325]    


 


Carvedilol 25 mg PO AC-BID 07/14/15 03/03/18 History


 


Docusate [Colace] 100 - 200 mg PO DAILY 11/25/15 03/03/18 History


 


Isosorbide Mononitrate ER [Imdur] 30 mg PO DAILY #30 tab.er.24h 11/27/15 03/03/

18 Rx


 


Insulin Glargine [Lantus] 35 unit SQ QAM 06/21/17 03/03/18 History


 


INSULIN LISPRO (humaLOG) [humaLOG] 8 - 21 units SQ AC-TID 03/03/18 03/03/18 

History


 


Ibuprofen [Motrin] 400 mg PO Q6HR PRN 03/03/18 03/03/18 History


 


Losartan [Cozaar] 12.5 mg PO DAILY 03/03/18 03/03/18 History


 


Spironolactone [Aldactone] 25 mg PO DAILY 03/03/18 03/03/18 History











 Allergies











Allergy/AdvReac Type Severity Reaction Status Date / Time


 


No Known Allergies Allergy   Verified 03/03/18 11:35














Physical Exam


Vitals: 


 Vital Signs











  Temp Pulse Pulse Resp BP BP Pulse Ox


 


 03/03/18 11:50  97.8 F   77  18   150/73  97


 


 03/03/18 11:33  98.4 F  80   16  160/82   95


 


 03/03/18 10:18   76     


 


 03/03/18 10:08   79     


 


 03/03/18 10:00   79   18  160/75   96


 


 03/03/18 09:20   75   18  207/90   94 L


 


 03/03/18 07:49   68   18  214/103   97


 


 03/03/18 05:33  97.9 F  57 L   20  212/98   99








 Intake and Output











 03/02/18 03/03/18 03/03/18





 22:59 06:59 14:59


 


Other:   


 


  Weight  99.79 kg 














Constitutional: No acute distress, conversant, pleasant





Eyes: Anicteric sclerae, moist conjunctiva, no lid-lag, PERRLA





ENMT: NC/AT,Oropharynx clear, no erythema, exudates





Neck:Supple, FROM, no masses, or JVD, No carotid bruits; No thyromegaly





Lungs: Clear to auscultation, Clear to percussion, Normal respiratory effort, 

no accessory muscle use 





Cardiovascular: Heart regular in rate and rhythm,  No murmurs, gallops, or rubs 

no peripheral edema





Abdominal: Soft tender to deep palpation in the midepigastrium and right lower 

quadrant, moderately distended, no guarding, no rebound or  rigidity, 

Normoactive bowel sounds No hepatomegaly, No splenomegaly,  No palpable mass No 

abdominal wall hernia noted 





Skin: Normal temperature, tone, texture, turgor, No induration No subcutaneous 

nodules, No rash, lesions, No ulcers





Extremities:No digital cyanosis No clubbing, Pedal pulses intact and  

symmetrical Radial pulses intact and symmetrical Normal gait and station, No 

calf tenderness


 


Psychiatric: Awake alert, Appropriate affect Intact judgement   


      


Neuro: Muscles Strength 5/5 in all 4 extremities, Sensation to light touch 

grossly present throughout, Cranial nerves II-XII grossly intact. No focal 

sensory deficits








Results


CBC & Chem 7: 


 03/03/18 06:23





 03/03/18 06:23


Labs: 


 Abnormal Lab Results - Last 24 Hours (Table)











  03/03/18 03/03/18 03/03/18 Range/Units





  06:23 06:23 06:23 


 


Plt Count    114 L  (150-450)  k/uL


 


BUN  23 H    (9-20)  mg/dL


 


Glucose  227 H    (74-99)  mg/dL


 


POC Glucose (mg/dL)     (75-99)  mg/dL


 


Total Bilirubin  2.1 H    (0.2-1.3)  mg/dL


 


AST  69 H    (17-59)  U/L


 


Alkaline Phosphatase  386 H    ()  U/L


 


CK-MB (CK-2)   2.9 H*   (0.0-2.4)  ng/mL


 


Amylase  305 H*    ()  U/L


 


Lipase  4990 H    ()  U/L














  03/03/18 Range/Units





  12:13 


 


Plt Count   (150-450)  k/uL


 


BUN   (9-20)  mg/dL


 


Glucose   (74-99)  mg/dL


 


POC Glucose (mg/dL)  239 H  (75-99)  mg/dL


 


Total Bilirubin   (0.2-1.3)  mg/dL


 


AST   (17-59)  U/L


 


Alkaline Phosphatase   ()  U/L


 


CK-MB (CK-2)   (0.0-2.4)  ng/mL


 


Amylase   ()  U/L


 


Lipase   ()  U/L














Thrombosis Risk Factor Assmnt





- Choose All That Apply


Each Risk Factor Represents 2 Points: Age 61-74 years


Thrombosis Risk Factor Assessment Total Risk Factor Score: 2


Thrombosis Risk Factor Assessment Level: Low Risk





Assessment and Plan


(1) Acute pancreatitis


Current Visit: Yes   Status: Acute   Code(s): K85.90 - ACUTE PANCREATITIS 

WITHOUT NECROSIS OR INFECTION, UNSP   SNOMED Code(s): 074669650


   





(2) Acute appendicitis


Current Visit: Yes   Status: Acute   Code(s): K35.80 - UNSPECIFIED ACUTE 

APPENDICITIS   SNOMED Code(s): 30178850


   





(3) Type 2 diabetes mellitus with peripheral neuropathy


Current Visit: Yes   Status: Acute   Code(s): E11.42 - TYPE 2 DIABETES MELLITUS 

WITH DIABETIC POLYNEUROPATHY   SNOMED Code(s): 0833635643347


   





(4) Essential hypertension


Current Visit: Yes   Status: Acute   Code(s): I10 - ESSENTIAL (PRIMARY) 

HYPERTENSION   SNOMED Code(s): 73462442


   


Plan: 





The patient is a 73-year-old -American male was admitted with 

anticipation of a greater than 2 midnight stay for acute pancreatitis and 

concern for possible acute appendicitis.  Right upper quadrant ultrasound didn'

t show any findings suggestive of acute cholecystitis, the patient was started 

on IV fluid hydration, made nothing by mouth except meds, and started on IV 

morphine and antiemetics with Zofran.  Gen. surgery Dr. Rafi Ballard was 

consulted and recommends watchful waiting at this time as a patient is having 

acute pancreatitis flare and is reluctant to consider any appendectomy while 

this persists.  GI was consulted for further recommendations.  I will order a 

serum alcohol level, lipid panel, hemoglobin A1c, Accu-Cheks and initiated 

correctional scale insulin.  Initiate Protonix and Lovenox for GI and DVT 

prophylaxis respectively.  Resume his oral antihypertensive medications for his 

accelerated hypertension. We'll continue to follow this patient's clinical 

course

## 2018-03-03 NOTE — CT
EXAM:

  CT Abdomen and Pelvis Without Intravenous Contrast

 

CLINICAL HISTORY: abdominal pain

 

TECHNIQUE:

  Axial computed tomography images of the abdomen and pelvis without 

intravenous contrast.  CTDI is 24.2  mGy and DLP is 1207.7 mGy-cm.  This 

CT exam was performed using one or more of the following dose reduction 

techniques: automated exposure control, adjustment of the mA and/or kV 

according to patient size, and/or use of iterative reconstruction 

technique.  Coronal and sagittal reconstructions are performed

 

COMPARISON:

   6/22/17

 

FINDINGS:

  Lower thorax: No acute findings.

 

 ABDOMEN:

  Liver:  Unremarkable.

  Gallbladder and bile ducts:  Unremarkable.  No calcified stones.  No 

ductal dilation.

  Pancreas:  Unremarkable.  No ductal dilation.

  Spleen:  Unremarkable.  No splenomegaly.

  Adrenals:  Unremarkable.  No mass.

  Kidneys and ureters:  Small left renal cyst, cannot be fully 

characterized due to lack of IV contrast, similar to on the prior study.  

No obstructing stones.

  Stomach and bowel:  Unremarkable.  No obstruction.  No mucosal 

thickening.

  Appendix: Distal  appendix is dilated to 9 mm containing an 8 mm 

appendicolith, surrounded by small amount of stranding,  new from the 

prior study

 

 PELVIS:

  Bladder:  Unremarkable.  No stones.

  Reproductive:  Unremarkable as visualized.

 

 ABDOMEN and PELVIS:

  Intraperitoneal space:  Unremarkable.  No free air.  No significant 

fluid collection.

  Bones/joints:  Osteopenia.  Moderate degenerative changes.  No acute 

fracture.  No dislocation.

  Soft tissues: Questionable right gynecomastia.

  Vasculature:  Unremarkable.  No abdominal aortic aneurysm.

  Lymph nodes:  Unremarkable.  No enlarged lymph nodes.

  Tubes, lines and devices:  Left pacer leads are again noted.

 

IMPRESSION:  Distal  appendix is dilated to 9 mm containing an 8 mm 

appendicolith, surrounded by small amount of stranding,  new from the 

prior study , may suggest early appendicitis.

 

 

 

[employer]

 

03/03/18 07:41 Verify Receipt Verified receipt with MESHA Grimaldo in ER for 

Dr. Mathis on 03/03 07:41 (-05:00)

## 2018-03-04 LAB
AMYLASE SERPL-CCNC: 213 U/L (ref 30–110)
BASOPHILS # BLD AUTO: 0 K/UL (ref 0–0.2)
BASOPHILS NFR BLD AUTO: 0 %
CHOLEST SERPL-MCNC: 129 MG/DL (ref ?–200)
EOSINOPHIL # BLD AUTO: 0.1 K/UL (ref 0–0.7)
EOSINOPHIL NFR BLD AUTO: 1 %
ERYTHROCYTE [DISTWIDTH] IN BLOOD BY AUTOMATED COUNT: 4.08 M/UL (ref 4.3–5.9)
ERYTHROCYTE [DISTWIDTH] IN BLOOD: 13.1 % (ref 11.5–15.5)
GLUCOSE BLD-MCNC: 127 MG/DL (ref 75–99)
GLUCOSE BLD-MCNC: 130 MG/DL (ref 75–99)
GLUCOSE BLD-MCNC: 134 MG/DL (ref 75–99)
GLUCOSE BLD-MCNC: 136 MG/DL (ref 75–99)
HBA1C MFR BLD: 10.6 % (ref 4–6)
HCT VFR BLD AUTO: 38.5 % (ref 39–53)
HDLC SERPL-MCNC: 70 MG/DL (ref 40–60)
HGB BLD-MCNC: 12.2 GM/DL (ref 13–17.5)
LDLC SERPL CALC-MCNC: 30 MG/DL (ref 0–99)
LIPASE SERPL-CCNC: 1072 U/L (ref 23–300)
LYMPHOCYTES # SPEC AUTO: 1.3 K/UL (ref 1–4.8)
LYMPHOCYTES NFR SPEC AUTO: 21 %
MCH RBC QN AUTO: 30 PG (ref 25–35)
MCHC RBC AUTO-ENTMCNC: 31.7 G/DL (ref 31–37)
MCV RBC AUTO: 94.4 FL (ref 80–100)
MONOCYTES # BLD AUTO: 0.4 K/UL (ref 0–1)
MONOCYTES NFR BLD AUTO: 6 %
NEUTROPHILS # BLD AUTO: 4.5 K/UL (ref 1.3–7.7)
NEUTROPHILS NFR BLD AUTO: 70 %
PLATELET # BLD AUTO: 97 K/UL (ref 150–450)
TRIGL SERPL-MCNC: 146 MG/DL (ref ?–150)
WBC # BLD AUTO: 6.4 K/UL (ref 3.8–10.6)

## 2018-03-04 RX ADMIN — ENOXAPARIN SODIUM SCH MG: 40 INJECTION SUBCUTANEOUS at 08:10

## 2018-03-04 RX ADMIN — CARVEDILOL SCH MG: 12.5 TABLET, FILM COATED ORAL at 17:24

## 2018-03-04 RX ADMIN — CARVEDILOL SCH MG: 12.5 TABLET, FILM COATED ORAL at 08:10

## 2018-03-04 RX ADMIN — ISOSORBIDE MONONITRATE SCH MG: 30 TABLET, EXTENDED RELEASE ORAL at 08:10

## 2018-03-04 RX ADMIN — DEXTROSE MONOHYDRATE SCH MLS/HR: 5 INJECTION, SOLUTION INTRAVENOUS at 15:52

## 2018-03-04 RX ADMIN — INSULIN ASPART SCH: 100 INJECTION, SOLUTION INTRAVENOUS; SUBCUTANEOUS at 14:23

## 2018-03-04 RX ADMIN — DEXTROSE MONOHYDRATE SCH MLS/HR: 5 INJECTION, SOLUTION INTRAVENOUS at 08:10

## 2018-03-04 RX ADMIN — SPIRONOLACTONE SCH MG: 25 TABLET, FILM COATED ORAL at 08:10

## 2018-03-04 RX ADMIN — INSULIN ASPART SCH: 100 INJECTION, SOLUTION INTRAVENOUS; SUBCUTANEOUS at 17:13

## 2018-03-04 RX ADMIN — INSULIN ASPART SCH UNIT: 100 INJECTION, SOLUTION INTRAVENOUS; SUBCUTANEOUS at 20:24

## 2018-03-04 RX ADMIN — PANTOPRAZOLE SODIUM SCH MG: 40 INJECTION, POWDER, FOR SOLUTION INTRAVENOUS at 08:10

## 2018-03-04 RX ADMIN — DEXTROSE MONOHYDRATE SCH MLS/HR: 5 INJECTION, SOLUTION INTRAVENOUS at 23:11

## 2018-03-04 RX ADMIN — INSULIN ASPART SCH: 100 INJECTION, SOLUTION INTRAVENOUS; SUBCUTANEOUS at 12:44

## 2018-03-04 NOTE — P.PN
Subjective


Progress Note Date: 03/04/18





Patient at this time denies any abdominal pain.  The patient was seen in 

consultation by Dr. Chirinos who believes he may have alcoholic related 

pancreatitis.  His lipase is decreased today to 1072 and his amylase is 213.  

The patient's computed tomography scan findings did show an appendicolith in 

the distal appendix with a question of some early appendicitis.  However 

clinically the patient is denying any abdominal pain and states he is hungry.  

His white blood cell count is 6.4.  The patient's main complaint at this time 

is that he is hungry.





Objective





- Vital Signs


Vital signs: 


 Vital Signs











Temp  99.0 F   03/04/18 07:00


 


Pulse  83   03/04/18 07:00


 


Resp  16   03/04/18 07:00


 


BP  170/84   03/04/18 07:00


 


Pulse Ox  90 L  03/04/18 07:00








 Intake & Output











 03/03/18 03/04/18 03/04/18





 18:59 06:59 18:59


 


Intake Total 0 1390 


 


Output Total 100  300


 


Balance -100 1390 -300


 


Intake:   


 


  Intake, IV Titration  800 





  Amount   


 


    Sodium Chloride 0.9% 1,  800 





    000 ml @ 100 mls/hr IV .   





    Q10H STA Rx#:[id number]   


 


  Oral 0 590 


 


Output:   


 


  Urine 100  300


 


Other:   


 


  # Voids  2 














- Constitutional


General appearance: Present: obese





- Respiratory


Details: 





Decreased breath sounds at the bases





- Cardiovascular


Heart sounds: normal: S1, S2





- Gastrointestinal


General gastrointestinal: Present: decreased bowel sounds, distended, soft





- Psychiatric


Psychiatric Comment(s): 





Lethargic


Difficult to obtain history





- Labs


CBC & Chem 7: 


 03/04/18 07:17





 03/03/18 06:23


Labs: 


 Abnormal Lab Results - Last 24 Hours (Table)











  03/03/18 03/03/18 03/03/18 Range/Units





  12:13 17:17 17:31 


 


RBC     (4.30-5.90)  m/uL


 


Hgb     (13.0-17.5)  gm/dL


 


Hct     (39.0-53.0)  %


 


Plt Count     (150-450)  k/uL


 


POC Glucose (mg/dL)  239 H   186 H  (75-99)  mg/dL


 


HDL Cholesterol     (40-60)  mg/dL


 


Amylase     ()  U/L


 


Lipase     ()  U/L


 


Urine Protein   1+ H   (Negative)  


 


Urine Glucose (UA)   1+ H   (Negative)  


 


Urine Bilirubin   2+ H   (Negative)  


 


Urine WBC   7 H   (0-5)  /hpf


 


Amorphous Sediment   Rare H   (None)  /hpf


 


Hyaline Casts   82 H   (0-2)  /lpf


 


Urine Mucus   Rare H   (None)  /hpf














  03/03/18 03/04/18 03/04/18 Range/Units





  20:25 07:17 07:17 


 


RBC   4.08 L   (4.30-5.90)  m/uL


 


Hgb   12.2 L   (13.0-17.5)  gm/dL


 


Hct   38.5 L   (39.0-53.0)  %


 


Plt Count   97 L   (150-450)  k/uL


 


POC Glucose (mg/dL)  170 H    (75-99)  mg/dL


 


HDL Cholesterol    70 H  (40-60)  mg/dL


 


Amylase    213 H  ()  U/L


 


Lipase    1072 H  ()  U/L


 


Urine Protein     (Negative)  


 


Urine Glucose (UA)     (Negative)  


 


Urine Bilirubin     (Negative)  


 


Urine WBC     (0-5)  /hpf


 


Amorphous Sediment     (None)  /hpf


 


Hyaline Casts     (0-2)  /lpf


 


Urine Mucus     (None)  /hpf














  03/04/18 Range/Units





  08:08 


 


RBC   (4.30-5.90)  m/uL


 


Hgb   (13.0-17.5)  gm/dL


 


Hct   (39.0-53.0)  %


 


Plt Count   (150-450)  k/uL


 


POC Glucose (mg/dL)  134 H  (75-99)  mg/dL


 


HDL Cholesterol   (40-60)  mg/dL


 


Amylase   ()  U/L


 


Lipase   ()  U/L


 


Urine Protein   (Negative)  


 


Urine Glucose (UA)   (Negative)  


 


Urine Bilirubin   (Negative)  


 


Urine WBC   (0-5)  /hpf


 


Amorphous Sediment   (None)  /hpf


 


Hyaline Casts   (0-2)  /lpf


 


Urine Mucus   (None)  /hpf














Assessment and Plan


Assessment: 


Impression/plan:


1.  Extremely poor historian presents without family support and complaint of 

abdominal/chest discomfort


2.  Computed tomography scan of the abdomen is consistent with a possible early 

acute distal appendicitis


2.  Elevated lipase and amylase consistent with a pancreatitis


3.  History of diabetes in the past records


4.  History of renal disease


5.  History of depression


Plan:


1.  Patient  admited for medical stabilization


2.  GI consultation regarding pancreatitis appreciated and appears to have 

improvement in lipase and amylase at this time


3.  Possible surgical intervention after medically stabilized for possible 

early appendicitis


4.  At this time the patient does not have an acute surgical abdomen his white 

count is normal and he has no guarding or rebound.  His pain seems to be 

consistent with pancreatitis more than an early acute appendicitis and would 

like to get an ultrasound of the gallbladder was negative for gallstones, and 

GI consultation feels he may well have alcoholic pancreatitis which seems to be 

improving


5.  At this time patient does not have an acute surgical abdomen, he has no 

pain related to possible early appendicitis, he has a normal white count, and 

we are hesitant to operate in the setting of pancreatitis, he appears to have a 

distal appendicolit which is not symptomatic at this time we're following this 

very closely


6.  Clear liquids if okay with GI

## 2018-03-04 NOTE — P.CONS
History of Present Illness





- Reason for Consult


Consult date: 03/03/18


Abdominal pain and suspected pancreatitis





- History of Present Illness





The patient is a 73-year-old male who arrived to the emergency room by 

ambulance because of abdominal and chest pains.  The patient pointed to his 

whole abdomen as the area of pain and was having chest discomfort as well.  His 

evaluation revealed elevated amylase and lipase.  Very slight elevation in his 

liver enzymes.  CT of the abdomen and pelvis did not show stranding around the 

pancreas or the appendix.  The patient reported drinking alcohol 3-4 times a 

week up to 3 or 4 beers at the time.  No vomiting or hematemesis.  No jaundice, 

fever or chills.  He was evaluated by surgery for possible appendicitis, but 

this was found to be low probability.  Were asked to see him for possible 

pancreatitis.





Review of Systems





Constitutional: Denies fever, chills, sweats, weight gain, or loss.


HEENT: Negative for migraines, blurred vision or loss, earaches, drainage, 

tinnitus, oral mucosal lesions, dysphagia, or odynophagia.


Cardiac: Negative for chest pain, arrhythmias, or palpitation.


Respiratory: Negative for shortness of breath, hemoptysis, cough, or sputum 

production.


Gastrointestinal: See HPI for pertinent findings.


Genitourinary: Negative for hematuria, urgency, frequency, polyuria, dysuria.


Musculoskeletal: Negative for muscle aches, swelling, arthritis, and 

arthralgias.  


Neurologic: Negative for stroke or TIA.


Endocrine: Negative for thyroid problems.


Skin: Negative for rash or itching.


Psychiatric: Negative history for depression and anxiety





Past Medical History


Past Medical History: Chest Pain / Angina, Heart Failure, COPD, Diabetes 

Mellitus, Hyperlipidemia, Hypertension, Myocardial Infarction (MI), 

Osteoarthritis (OA)


Additional Past Medical History / Comment(s): 11/25/15  Pt presented to [facility] ER 

EMS feeling weak and having mild headache and anterior neck pain.  Symptoms 

began days ago.  Pt being admitted with clinical impression of weakness, acute 

renal failure.  PT last admitted to [facility] 7/14/15 with acute DKA, acute 

metabolic encephalitis from DKA, acute renal failure, severe hyperkalemia, 

pseudohyponatremia.  Other HX:  Chronic CHF systolic dysfunction with EF 20%,  

gout, gouty arthiritis to R great toe, nerve pain, ddd lumbar region, 

folliculitis, constipation, renal insufficiency, hep c 1-1-14, diabetes 

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 

liver bx tx with chest tube, cellulitis to lt foot/ankle, pt was born with R 

leg larger than L leg.


Last Myocardial Infarction Date:: 5/2014


History of Any Multi-Drug Resistant Organisms: None Reported


Past Surgical History: Heart Catheterization, Pacemaker


Additional Past Surgical History / Comment(s): CATARACTS ROCKY EYES REMOVED.  

liver biopsy on 4-7-14, heart cath 2005


Past Anesthesia/Blood Transfusion Reactions: No Reported Reaction


Type of Cardiac Device: Permanent Pacemaker


Device Placement Date:: UNK


Past Psychological History: Bipolar, Depression


Smoking Status: Never smoker


Past Alcohol Use History: Occasional


Past Drug Use History: None Reported





- Past Family History


  ** Mother


Family Medical History: Cancer





  ** Sister(s)


Family Medical History: Cancer





Medications and Allergies


 Home Medications











 Medication  Instructions  Recorded  Confirmed  Type


 


Nitroglycerin Sl Tabs [Nitrostat] 0.4 mg SUBLINGUAL Q5M PRN 05/18/14 03/03/18 

History


 


HYDROcodone/APAP 5-325MG [Norco 1 tab PO Q6H PRN 06/04/15 03/03/18 History





5-325]    


 


Carvedilol 25 mg PO AC-BID 07/14/15 03/03/18 History


 


Docusate [Colace] 100 - 200 mg PO DAILY 11/25/15 03/03/18 History


 


Isosorbide Mononitrate ER [Imdur] 30 mg PO DAILY #30 tab.er.24h 11/27/15 03/03/

18 Rx


 


Insulin Glargine [Lantus] 35 unit SQ QAM 06/21/17 03/03/18 History


 


INSULIN LISPRO (humaLOG) [humaLOG] 8 - 21 units SQ AC-TID 03/03/18 03/03/18 

History


 


Ibuprofen [Motrin] 400 mg PO Q6HR PRN 03/03/18 03/03/18 History


 


Losartan [Cozaar] 12.5 mg PO DAILY 03/03/18 03/03/18 History


 


Spironolactone [Aldactone] 25 mg PO DAILY 03/03/18 03/03/18 History











 Allergies











Allergy/AdvReac Type Severity Reaction Status Date / Time


 


No Known Allergies Allergy   Verified 03/03/18 11:35














Physical Exam


Vitals: 


 Vital Signs











  Temp Pulse Resp BP Pulse Ox


 


 03/03/18 10:18   76   


 


 03/03/18 10:08   79   


 


 03/03/18 10:00   79  18  160/75  96


 


 03/03/18 09:20   75  18  207/90  94 L


 


 03/03/18 07:49   68  18  214/103  97


 


 03/03/18 05:33  97.9 F  57 L  20  212/98  99








 Intake and Output











 03/02/18 03/03/18 03/03/18





 22:59 06:59 14:59


 


Other:   


 


  Weight  99.79 kg 














General appearance: The patient is alert, oriented, in no acute distress.


HET: Head is normocephalic and atraumatic.  Pupils are equal and reactive.  

Oropharynx is clear without lesions.


Neck: Supple without lymphadenopathy.  Trachea midline.


Heart: S1 S2.  Regular rate and rhythm.


Lungs: No crackles or wheezes are heard.


Abdomen: Soft, distended with  bowel sounds.  No peritoneal signs.  No palpable 

organomegaly or masses.


Extremities: Normal skin color and turgor.  No cyanosis, rash, ulceration, 

clubbing, or edema.  Radial and pedal pulses are 2/4 bilaterally.


Neurological: No focal deficits.  Strength and sensation are grossly intact.





Results


CBC & Chem 7: 


 03/04/18 07:17





 03/03/18 06:23


Labs: 


 Abnormal Lab Results - Last 24 Hours (Table)











  03/03/18 03/03/18 03/03/18 Range/Units





  06:23 06:23 06:23 


 


Plt Count    114 L  (150-450)  k/uL


 


BUN  23 H    (9-20)  mg/dL


 


Glucose  227 H    (74-99)  mg/dL


 


Total Bilirubin  2.1 H    (0.2-1.3)  mg/dL


 


AST  69 H    (17-59)  U/L


 


Alkaline Phosphatase  386 H    ()  U/L


 


CK-MB (CK-2)   2.9 H*   (0.0-2.4)  ng/mL


 


Amylase  305 H*    ()  U/L


 


Lipase  4990 H    ()  U/L














Assessment and Plan


Assessment: 





Acute onset of abdominal pain and elevated amylase and lipase likely on the 

basis of acute pancreatitis despite the normal CT of the abdomen at this time.  

With his history of alcohol consumption, I suspect alcohol related pancreatitis 

is the etiology.


Plan: 





Agree with your current management.  Would monitor closely.  Repeat his labs 

and make further further decisions based on his course.  Will follow with you 

closely.

## 2018-03-04 NOTE — P.PN
Subjective


Progress Note Date: 03/04/18





Patient only reports mild abdominal pain denies any nausea or vomiting states 

it is hungry and wants to eat.  No acute events overnight





Objective





- Vital Signs


Vital signs: 


 Vital Signs











Temp  99.0 F   03/04/18 07:00


 


Pulse  83   03/04/18 08:00


 


Resp  16   03/04/18 08:00


 


BP  170/84   03/04/18 07:00


 


Pulse Ox  90 L  03/04/18 07:00








 Intake & Output











 03/03/18 03/04/18 03/04/18





 18:59 06:59 18:59


 


Intake Total 0 1390 


 


Output Total 100  300


 


Balance -100 1390 -300


 


Intake:   


 


  Intake, IV Titration  800 





  Amount   


 


    Sodium Chloride 0.9% 1,  800 





    000 ml @ 100 mls/hr IV .   





    Q10H STA Rx#:[id number]   


 


  Oral 0 590 


 


Output:   


 


  Urine 100  300


 


Other:   


 


  Voiding Method   Urinal


 


  # Voids  2 














- Exam





Constitutional: No acute distress, conversant, pleasant





Eyes: Anicteric sclerae, moist conjunctiva, no lid-lag, PERRLA





ENMT: NC/AT,Oropharynx clear, no erythema, exudates





Neck:Supple, FROM, no masses, or JVD, No carotid bruits; No thyromegaly





Lungs: Clear to auscultation, Clear to percussion, Normal respiratory effort, 

no accessory muscle use 





Cardiovascular: Heart regular in rate and rhythm,  No murmurs, gallops, or rubs 

no peripheral edema





Abdominal: Mildly distended, nom distended, no guarding, no rebound or  rigidity

, Normoactive bowel sounds No hepatomegaly, No splenomegaly,  No palpable mass 

No abdominal wall hernia noted 





Skin: Normal temperature, tone, texture, turgor, No induration No subcutaneous 

nodules, No rash, lesions, No ulcers





Extremities:No digital cyanosis No clubbing, Pedal pulses intact and  

symmetrical Radial pulses intact and symmetrical Normal gait and station, No 

calf tenderness


 


Psychiatric: Awake alert, Appropriate affect Intact judgement   


      


Neuro: Muscles Strength 5/5 in all 4 extremities, Sensation to light touch 

grossly present throughout, Cranial nerves II-XII grossly intact. No focal 

sensory deficits








- Labs


CBC & Chem 7: 


 03/04/18 07:17





 03/03/18 06:23


Labs: 


 Abnormal Lab Results - Last 24 Hours (Table)











  03/03/18 03/03/18 03/03/18 Range/Units





  17:17 17:31 20:25 


 


RBC     (4.30-5.90)  m/uL


 


Hgb     (13.0-17.5)  gm/dL


 


Hct     (39.0-53.0)  %


 


Plt Count     (150-450)  k/uL


 


POC Glucose (mg/dL)   186 H  170 H  (75-99)  mg/dL


 


HDL Cholesterol     (40-60)  mg/dL


 


Amylase     ()  U/L


 


Lipase     ()  U/L


 


Urine Protein  1+ H    (Negative)  


 


Urine Glucose (UA)  1+ H    (Negative)  


 


Urine Bilirubin  2+ H    (Negative)  


 


Urine WBC  7 H    (0-5)  /hpf


 


Amorphous Sediment  Rare H    (None)  /hpf


 


Hyaline Casts  82 H    (0-2)  /lpf


 


Urine Mucus  Rare H    (None)  /hpf














  03/04/18 03/04/18 03/04/18 Range/Units





  07:17 07:17 08:08 


 


RBC  4.08 L    (4.30-5.90)  m/uL


 


Hgb  12.2 L    (13.0-17.5)  gm/dL


 


Hct  38.5 L    (39.0-53.0)  %


 


Plt Count  97 L    (150-450)  k/uL


 


POC Glucose (mg/dL)    134 H  (75-99)  mg/dL


 


HDL Cholesterol   70 H   (40-60)  mg/dL


 


Amylase   213 H   ()  U/L


 


Lipase   1072 H   ()  U/L


 


Urine Protein     (Negative)  


 


Urine Glucose (UA)     (Negative)  


 


Urine Bilirubin     (Negative)  


 


Urine WBC     (0-5)  /hpf


 


Amorphous Sediment     (None)  /hpf


 


Hyaline Casts     (0-2)  /lpf


 


Urine Mucus     (None)  /hpf














  03/04/18 Range/Units





  11:19 


 


RBC   (4.30-5.90)  m/uL


 


Hgb   (13.0-17.5)  gm/dL


 


Hct   (39.0-53.0)  %


 


Plt Count   (150-450)  k/uL


 


POC Glucose (mg/dL)  127 H  (75-99)  mg/dL


 


HDL Cholesterol   (40-60)  mg/dL


 


Amylase   ()  U/L


 


Lipase   ()  U/L


 


Urine Protein   (Negative)  


 


Urine Glucose (UA)   (Negative)  


 


Urine Bilirubin   (Negative)  


 


Urine WBC   (0-5)  /hpf


 


Amorphous Sediment   (None)  /hpf


 


Hyaline Casts   (0-2)  /lpf


 


Urine Mucus   (None)  /hpf














Assessment and Plan


(1) Acute pancreatitis


Narrative/Plan: 





* Likely alcohol induced pancreatitis


* Lipase trending down from 4990 to 1072, appreciate GI recommendations


* We'll continue with nothing by mouth status, IV fluids, morphine and Zofran


Current Visit: Yes   Status: Acute   Code(s): K85.90 - ACUTE PANCREATITIS 

WITHOUT NECROSIS OR INFECTION, UNSP   SNOMED Code(s): 405448591


   





(2) Acute appendicitis


Narrative/Plan: 





* Appreciate recommendations by general surgery


* Despite having an appendicolith on CT exam appears that his abdominal pain is 

more due to his pancreatitis rather than any acute appendicitis the patient 

does not have a surgical abdomen on exam


Current Visit: Yes   Status: Acute   Code(s): K35.80 - UNSPECIFIED ACUTE 

APPENDICITIS   SNOMED Code(s): 04385899


   





(3) Type 2 diabetes mellitus with peripheral neuropathy


Narrative/Plan: 





* Blood sugars are pretty stable continue with correctional scale coverage


* A1c still pending


Current Visit: Yes   Status: Acute   Code(s): E11.42 - TYPE 2 DIABETES MELLITUS 

WITH DIABETIC POLYNEUROPATHY   SNOMED Code(s): 3256290675021


   





(4) Essential hypertension


Narrative/Plan: 





* Patient's blood pressures elevated up to systolic of 170s we'll titrate up 

his Cozaar 25 mg by mouth daily


* Continue to monitor


Current Visit: Yes   Status: Acute   Code(s): I10 - ESSENTIAL (PRIMARY) 

HYPERTENSION   SNOMED Code(s): 24807770

## 2018-03-05 LAB
ALBUMIN SERPL-MCNC: 3.3 G/DL (ref 3.5–5)
ALP SERPL-CCNC: 342 U/L (ref 38–126)
ALT SERPL-CCNC: 38 U/L (ref 21–72)
AMYLASE SERPL-CCNC: 189 U/L (ref 30–110)
ANION GAP SERPL CALC-SCNC: 14 MMOL/L
AST SERPL-CCNC: 58 U/L (ref 17–59)
BASOPHILS # BLD AUTO: 0 K/UL (ref 0–0.2)
BASOPHILS NFR BLD AUTO: 0 %
BUN SERPL-SCNC: 37 MG/DL (ref 9–20)
CALCIUM SPEC-MCNC: 9 MG/DL (ref 8.4–10.2)
CHLORIDE SERPL-SCNC: 110 MMOL/L (ref 98–107)
CO2 SERPL-SCNC: 16 MMOL/L (ref 22–30)
EOSINOPHIL # BLD AUTO: 0.1 K/UL (ref 0–0.7)
EOSINOPHIL NFR BLD AUTO: 2 %
ERYTHROCYTE [DISTWIDTH] IN BLOOD BY AUTOMATED COUNT: 3.91 M/UL (ref 4.3–5.9)
ERYTHROCYTE [DISTWIDTH] IN BLOOD: 13.1 % (ref 11.5–15.5)
GLUCOSE BLD-MCNC: 177 MG/DL (ref 75–99)
GLUCOSE BLD-MCNC: 257 MG/DL (ref 75–99)
GLUCOSE BLD-MCNC: 315 MG/DL (ref 75–99)
GLUCOSE BLD-MCNC: 360 MG/DL (ref 75–99)
GLUCOSE SERPL-MCNC: 237 MG/DL (ref 74–99)
HCT VFR BLD AUTO: 37.5 % (ref 39–53)
HGB BLD-MCNC: 11.8 GM/DL (ref 13–17.5)
LIPASE SERPL-CCNC: 1043 U/L (ref 23–300)
LYMPHOCYTES # SPEC AUTO: 1.3 K/UL (ref 1–4.8)
LYMPHOCYTES NFR SPEC AUTO: 16 %
MCH RBC QN AUTO: 30.3 PG (ref 25–35)
MCHC RBC AUTO-ENTMCNC: 31.5 G/DL (ref 31–37)
MCV RBC AUTO: 96 FL (ref 80–100)
MONOCYTES # BLD AUTO: 0.6 K/UL (ref 0–1)
MONOCYTES NFR BLD AUTO: 7 %
NEUTROPHILS # BLD AUTO: 6 K/UL (ref 1.3–7.7)
NEUTROPHILS NFR BLD AUTO: 74 %
PLATELET # BLD AUTO: 93 K/UL (ref 150–450)
POTASSIUM SERPL-SCNC: 5.4 MMOL/L (ref 3.5–5.1)
PROT SERPL-MCNC: 7 G/DL (ref 6.3–8.2)
SODIUM SERPL-SCNC: 140 MMOL/L (ref 137–145)
WBC # BLD AUTO: 8.1 K/UL (ref 3.8–10.6)

## 2018-03-05 RX ADMIN — INSULIN ASPART SCH UNIT: 100 INJECTION, SOLUTION INTRAVENOUS; SUBCUTANEOUS at 20:36

## 2018-03-05 RX ADMIN — ENOXAPARIN SODIUM SCH MG: 40 INJECTION SUBCUTANEOUS at 07:36

## 2018-03-05 RX ADMIN — SPIRONOLACTONE SCH MG: 25 TABLET, FILM COATED ORAL at 07:37

## 2018-03-05 RX ADMIN — DEXTROSE MONOHYDRATE SCH MLS/HR: 5 INJECTION, SOLUTION INTRAVENOUS at 23:09

## 2018-03-05 RX ADMIN — CARVEDILOL SCH MG: 12.5 TABLET, FILM COATED ORAL at 17:51

## 2018-03-05 RX ADMIN — PANTOPRAZOLE SODIUM SCH MG: 40 INJECTION, POWDER, FOR SOLUTION INTRAVENOUS at 07:36

## 2018-03-05 RX ADMIN — DEXTROSE MONOHYDRATE SCH MLS/HR: 5 INJECTION, SOLUTION INTRAVENOUS at 16:26

## 2018-03-05 RX ADMIN — INSULIN ASPART SCH UNIT: 100 INJECTION, SOLUTION INTRAVENOUS; SUBCUTANEOUS at 07:26

## 2018-03-05 RX ADMIN — DEXTROSE MONOHYDRATE SCH MLS/HR: 5 INJECTION, SOLUTION INTRAVENOUS at 07:27

## 2018-03-05 RX ADMIN — INSULIN ASPART SCH UNIT: 100 INJECTION, SOLUTION INTRAVENOUS; SUBCUTANEOUS at 12:32

## 2018-03-05 RX ADMIN — ISOSORBIDE MONONITRATE SCH MG: 30 TABLET, EXTENDED RELEASE ORAL at 07:36

## 2018-03-05 RX ADMIN — CARVEDILOL SCH MG: 12.5 TABLET, FILM COATED ORAL at 07:35

## 2018-03-05 RX ADMIN — INSULIN ASPART SCH UNIT: 100 INJECTION, SOLUTION INTRAVENOUS; SUBCUTANEOUS at 17:52

## 2018-03-05 NOTE — XR
EXAMINATION TYPE: XR chest 1V portable

 

DATE OF EXAM: 3/5/2018

 

COMPARISON: 3/3/2018

 

HISTORY: Shortness of breath

 

TECHNIQUE: Single frontal view of the chest is obtained.

 

FINDINGS:  There is mild pulmonary vascular congestion and cardiomegaly in combination with a multile
ad left-sided cardiac device. Skinfold overlies the right peripheral upper lung. Copious soft tissues
 partially obscure the lower lungs and costophrenic angles. Mild degenerative changes of the right ac
romioclavicular joint are noted.

 

IMPRESSION:  Mild pulmonary vascular congestion and cardiomegaly raises suspicion for to compensating
 congestive heart failure.

## 2018-03-05 NOTE — P.PN
Subjective


Progress Note Date: 03/05/18





Patient is a pretty poor historian, is complaining of bilateral eye pain, 

denies any significant abdominal pain or nausea, patient had had a low-grade 

temperature 100.2 overnight.  Is being followed by Dr. Mckee and Dr. Ang 

[facility] general surgery and GI respectively





Objective





- Vital Signs


Vital signs: 


 Vital Signs











Temp  98.7 F   03/05/18 07:00


 


Pulse  67   03/05/18 08:00


 


Resp  22   03/05/18 08:00


 


BP  158/74   03/05/18 07:00


 


Pulse Ox  97   03/05/18 07:00








 Intake & Output











 03/04/18 03/05/18 03/05/18





 18:59 06:59 18:59


 


Intake Total 700 2180 


 


Output Total 900 200 


 


Balance -200 1980 


 


Weight  99.79 kg 99.79 kg


 


Intake:   


 


  IV  1000 


 


    0.9@100  1000 


 


  Intake, IV Titration 700 100 





  Amount   


 


    Piperacillin-Tazobactam 3  100 





    .375 gm In Dextrose/Water   





    1 50ml.bag @ 12.5 mls/hr   





    IVPB Q8HR ANTOLIN Rx#:   





    [id number]   


 


    Sodium Chloride 0.9% 1, 700  





    000 ml @ 100 mls/hr IV .   





    Q10H STA Rx#:[id number]   


 


  Oral  1080 


 


Output:   


 


  Urine 900 200 


 


Other:   


 


  Voiding Method Urinal Urinal Urinal





  Incontinent Incontinent


 


  # Voids  2 














- Exam





Constitutional: No acute distress, conversant, pleasant





Eyes: Anicteric sclerae, moist conjunctiva, no lid-lag, PERRLA





ENMT: NC/AT,Oropharynx clear, no erythema, exudates





Neck:Supple, FROM, no masses, or JVD, No carotid bruits; No thyromegaly





Lungs: Clear to auscultation, Clear to percussion, Normal respiratory effort, 

no accessory muscle use 





Cardiovascular: Heart regular in rate and rhythm,  No murmurs, gallops, or rubs 

no peripheral edema





Abdominal: Mildly distended, nom distended, no guarding, no rebound or  rigidity

, Normoactive bowel sounds No hepatomegaly, No splenomegaly,  No palpable mass 

No abdominal wall hernia noted 





Skin: Normal temperature, tone, texture, turgor, No induration No subcutaneous 

nodules, No rash, lesions, No ulcers





Extremities:No digital cyanosis No clubbing, Pedal pulses intact and  

symmetrical Radial pulses intact and symmetrical Normal gait and station, No 

calf tenderness


 


Psychiatric: Somnolent but arousable te affect Intact judgement   


      


Neuro: Muscles Strength 5/5 in all 4 extremities, Sensation to light touch 

grossly present throughout, Cranial nerves II-XII grossly intact. No focal 

sensory deficits








- Labs


CBC & Chem 7: 


 03/05/18 08:51





 03/05/18 08:51


Labs: 


 Abnormal Lab Results - Last 24 Hours (Table)











  03/03/18 03/04/18 03/04/18 Range/Units





  06:23 16:50 20:18 


 


RBC     (4.30-5.90)  m/uL


 


Hgb     (13.0-17.5)  gm/dL


 


Hct     (39.0-53.0)  %


 


Plt Count     (150-450)  k/uL


 


Potassium     (3.5-5.1)  mmol/L


 


Chloride     ()  mmol/L


 


Carbon Dioxide     (22-30)  mmol/L


 


BUN     (9-20)  mg/dL


 


Creatinine     (0.66-1.25)  mg/dL


 


Glucose     (74-99)  mg/dL


 


POC Glucose (mg/dL)   130 H  136 H  (75-99)  mg/dL


 


Hemoglobin A1c  10.6 H    (4.0-6.0)  %


 


Total Bilirubin     (0.2-1.3)  mg/dL


 


Alkaline Phosphatase     ()  U/L


 


Albumin     (3.5-5.0)  g/dL


 


Amylase     ()  U/L


 


Lipase     ()  U/L














  03/05/18 03/05/18 03/05/18 Range/Units





  07:00 08:51 08:51 


 


RBC   3.91 L   (4.30-5.90)  m/uL


 


Hgb   11.8 L   (13.0-17.5)  gm/dL


 


Hct   37.5 L   (39.0-53.0)  %


 


Plt Count   93 L   (150-450)  k/uL


 


Potassium     (3.5-5.1)  mmol/L


 


Chloride     ()  mmol/L


 


Carbon Dioxide     (22-30)  mmol/L


 


BUN     (9-20)  mg/dL


 


Creatinine     (0.66-1.25)  mg/dL


 


Glucose     (74-99)  mg/dL


 


POC Glucose (mg/dL)  177 H    (75-99)  mg/dL


 


Hemoglobin A1c     (4.0-6.0)  %


 


Total Bilirubin     (0.2-1.3)  mg/dL


 


Alkaline Phosphatase     ()  U/L


 


Albumin     (3.5-5.0)  g/dL


 


Amylase    189 H  ()  U/L


 


Lipase    1043 H  ()  U/L














  03/05/18 03/05/18 Range/Units





  08:51 11:17 


 


RBC    (4.30-5.90)  m/uL


 


Hgb    (13.0-17.5)  gm/dL


 


Hct    (39.0-53.0)  %


 


Plt Count    (150-450)  k/uL


 


Potassium  5.4 H   (3.5-5.1)  mmol/L


 


Chloride  110 H   ()  mmol/L


 


Carbon Dioxide  16 L   (22-30)  mmol/L


 


BUN  37 H   (9-20)  mg/dL


 


Creatinine  1.74 H   (0.66-1.25)  mg/dL


 


Glucose  237 H   (74-99)  mg/dL


 


POC Glucose (mg/dL)   257 H  (75-99)  mg/dL


 


Hemoglobin A1c    (4.0-6.0)  %


 


Total Bilirubin  4.9 H   (0.2-1.3)  mg/dL


 


Alkaline Phosphatase  342 H   ()  U/L


 


Albumin  3.3 L   (3.5-5.0)  g/dL


 


Amylase    ()  U/L


 


Lipase    ()  U/L














Assessment and Plan


(1) Acute pancreatitis


Narrative/Plan: 





* Likely alcohol induced pancreatitis


* Lipase trending down from 4990 to 1072, appreciate GI recommendations


* Continue with clear liquids for now then NPO AFTER MIDNIGHT IV fluids, 

morphine and Zofran


Current Visit: Yes   Status: Acute   Code(s): K85.90 - ACUTE PANCREATITIS 

WITHOUT NECROSIS OR INFECTION, UNSP   SNOMED Code(s): 095289049


   





(2) Acute appendicitis


Narrative/Plan: 





* Patient did have a fever overnight despite being on antibiotics with Zosyn 

since admission, concern for smoldering sepsis pancreatitis


* Appreciate recommendations by general surgery will reevaluate the patient in 

the morning check labs and if there is no significant improvement we'll plan to 

take the patient for appendicectomy


* Despite having an appendicolith on CT exam appears that his abdominal pain is 

more due to his pancreatitis rather than any acute appendicitis the patient 

does not have a surgical abdomen on exam


Current Visit: Yes   Status: Acute   Code(s): K35.80 - UNSPECIFIED ACUTE 

APPENDICITIS   SNOMED Code(s): 89646366


   





(3) Type 2 diabetes mellitus with peripheral neuropathy


Narrative/Plan: 





* Blood sugars are pretty stable continue with correctional scale coverage


* A1c still pending


Current Visit: Yes   Status: Acute   Code(s): E11.42 - TYPE 2 DIABETES MELLITUS 

WITH DIABETIC POLYNEUROPATHY   SNOMED Code(s): 1492792299521


   





(4) Essential hypertension


Narrative/Plan: 





* Patient's blood pressures elevated up to systolic of 150s-170s


* We'll add Norvasc to his regimen, continue Aldactone and Coreg


Current Visit: Yes   Status: Acute   Code(s): I10 - ESSENTIAL (PRIMARY) 

HYPERTENSION   SNOMED Code(s): 49385737


   





(5) Acute renal failure


Narrative/Plan: 





* Acute kidney injury with mild hyperkalemia and metabolic acidosis, due to 

possible sepsis?? vs medication induced from increased dose of Cozaar


* Repeat labs later today


* We'll hold ARB, consult nephrology for further recommendations 





Current Visit: No   Status: Acute   Code(s): N17.9 - ACUTE KIDNEY FAILURE, 

UNSPECIFIED   SNOMED Code(s): 94557534

## 2018-03-05 NOTE — P.PN
Subjective


Progress Note Date: 03/05/18





Patient at this time denies any abdominal pain.  The patient was seen in 

consultation by Dr. Chirinos who believes he may have alcoholic related 

pancreatitis.    The patient's computed tomography scan findings did show an 

appendicolith in the distal appendix with a question of some early 

appendicitis.  However clinically the patient is denying any abdominal pain and 

states he is hungry.  He seems to have resolving pancreatitis and we are 

awaiting today's laboratory studies.  The patient does complain of some pain in 

his eyes. 





Objective





- Vital Signs


Vital signs: 


 Vital Signs











Temp  98.7 F   03/05/18 07:00


 


Pulse  67   03/05/18 07:00


 


Resp  22   03/05/18 07:00


 


BP  158/74   03/05/18 07:00


 


Pulse Ox  97   03/05/18 07:00








 Intake & Output











 03/04/18 03/05/18 03/05/18





 18:59 06:59 18:59


 


Intake Total 700 2180 


 


Output Total 900 200 


 


Balance -200 1980 


 


Weight  99.79 kg 


 


Intake:   


 


  IV  1000 


 


    0.9@100  1000 


 


  Intake, IV Titration 700 100 





  Amount   


 


    Piperacillin-Tazobactam 3  100 





    .375 gm In Dextrose/Water   





    1 50ml.bag @ 12.5 mls/hr   





    IVPB Q8HR ANTOLIN Rx#:   





    [id number]   


 


    Sodium Chloride 0.9% 1, 700  





    000 ml @ 100 mls/hr IV .   





    Q10H STA Rx#:[id number]   


 


  Oral  1080 


 


Output:   


 


  Urine 900 200 


 


Other:   


 


  Voiding Method Urinal Urinal 





  Incontinent 


 


  # Voids  2 














- Constitutional


General appearance: Present: obese





- Respiratory


Details: 





Decreased breath sounds at the bases





- Cardiovascular


Rhythm: regular


Heart sounds: normal: S1, S2





- Gastrointestinal


Gastrointestinal Comment(s): 





No guarding or rebound


General gastrointestinal: Present: distended, soft





- Psychiatric


Psychiatric Comment(s): 





Patient seems more alert and oriented today


 I discussed with the patient the fact that he may have 2 things going on 

resolving pancreatitis as well as an early appendicitis however he has no 

abdominal pain and is refusing any operative intervention at this time; he is 

his own power of : At this time the patient clinically has no evidence 

of an acute abdomen and this is a reasonable approach


I discussed his case with his cousin as well who was given as his next of kin





- Labs


CBC & Chem 7: 


 03/04/18 07:17





 03/03/18 06:23


Labs: 


 Abnormal Lab Results - Last 24 Hours (Table)











  03/03/18 03/04/18 03/04/18 Range/Units





  06:23 11:19 16:50 


 


POC Glucose (mg/dL)   127 H  130 H  (75-99)  mg/dL


 


Hemoglobin A1c  10.6 H    (4.0-6.0)  %














  03/04/18 03/05/18 Range/Units





  20:18 07:00 


 


POC Glucose (mg/dL)  136 H  177 H  (75-99)  mg/dL


 


Hemoglobin A1c    (4.0-6.0)  %














Assessment and Plan


Assessment: 


Impression/plan:


1.  Improved alertness today have discussed pancreatitis as well as possible 

early appendicitis as noted on computed tomography scan which is clinically not 

apparent at this time


2.  Computed tomography scan of the abdomen is consistent with a possible early 

acute distal appendicitis


2.  Elevated lipase and amylase consistent with a pancreatitis awaiting 

laboratory studies


3.  History of diabetes in the past records


4.  History of renal disease


5.  History of depression


6.  History of alcohol intake


Plan:


1.  Patient  admited for medical stabilization


2.  GI consultation regarding pancreatitis appreciated and appears to have 

improvement in lipase and amylase at this time


3.  Possible surgical intervention after medically stabilized for possible 

early appendicitis


4.  At this time the patient does not have an acute surgical abdomen his white 

count is normal and he has no guarding or rebound.  His pain seems to be 

consistent with pancreatitis more than an early acute appendicitis and an 

ultrasound of the gallbladder was negative for gallstones, and GI consultation 

feels he may well have alcoholic pancreatitis which seems to be improving


5.  At this time patient does not have an acute surgical abdomen, he has no 

pain related to possible early appendicitis,  and we are hesitant to operate in 

the setting of pancreatitis, he appears to have a distal appendicolit which is 

not symptomatic at this time we're following this very closely


6.  Clear liquids if okay with GI


7.  Await a.m. laboratory studies

## 2018-03-06 LAB
ALBUMIN SERPL-MCNC: 3.3 G/DL (ref 3.5–5)
ALP SERPL-CCNC: 347 U/L (ref 38–126)
ALT SERPL-CCNC: 52 U/L (ref 21–72)
AMYLASE SERPL-CCNC: 143 U/L (ref 30–110)
ANION GAP SERPL CALC-SCNC: 8 MMOL/L
AST SERPL-CCNC: 77 U/L (ref 17–59)
BILIRUB INDIRECT SERPL-MCNC: 0.6 MG/DL (ref 0–1.1)
BILIRUB UR QL STRIP.AUTO: (no result)
BILIRUBIN DIRECT+TOT PNL SERPL-MCNC: 2 MG/DL (ref 0–0.2)
BUN SERPL-SCNC: 45 MG/DL (ref 9–20)
CALCIUM SPEC-MCNC: 8.9 MG/DL (ref 8.4–10.2)
CHLORIDE SERPL-SCNC: 103 MMOL/L (ref 98–107)
CO2 SERPL-SCNC: 22 MMOL/L (ref 22–30)
GLUCOSE BLD-MCNC: 231 MG/DL (ref 75–99)
GLUCOSE BLD-MCNC: 258 MG/DL (ref 75–99)
GLUCOSE BLD-MCNC: 266 MG/DL (ref 75–99)
GLUCOSE BLD-MCNC: 284 MG/DL (ref 75–99)
GLUCOSE SERPL-MCNC: 264 MG/DL (ref 74–99)
LIPASE SERPL-CCNC: 1044 U/L (ref 23–300)
PH UR: 5.5 [PH] (ref 5–8)
POTASSIUM SERPL-SCNC: 5.5 MMOL/L (ref 3.5–5.1)
PROT SERPL-MCNC: 6.9 G/DL (ref 6.3–8.2)
RBC UR QL: 3 /HPF (ref 0–5)
SODIUM SERPL-SCNC: 133 MMOL/L (ref 137–145)
SP GR UR: 1.02 (ref 1–1.03)
SQUAMOUS UR QL AUTO: <1 /HPF (ref 0–4)
UROBILINOGEN UR QL STRIP: 3 MG/DL (ref ?–2)
WBC #/AREA URNS HPF: 17 /HPF (ref 0–5)

## 2018-03-06 RX ADMIN — IOHEXOL PRN ML: 350 INJECTION, SOLUTION INTRAVENOUS at 12:16

## 2018-03-06 RX ADMIN — CEFAZOLIN SCH MLS/HR: 330 INJECTION, POWDER, FOR SOLUTION INTRAMUSCULAR; INTRAVENOUS at 17:30

## 2018-03-06 RX ADMIN — INSULIN ASPART SCH UNIT: 100 INJECTION, SOLUTION INTRAVENOUS; SUBCUTANEOUS at 21:35

## 2018-03-06 RX ADMIN — CEFAZOLIN SCH MLS/HR: 330 INJECTION, POWDER, FOR SOLUTION INTRAMUSCULAR; INTRAVENOUS at 09:53

## 2018-03-06 RX ADMIN — INSULIN ASPART SCH UNIT: 100 INJECTION, SOLUTION INTRAVENOUS; SUBCUTANEOUS at 07:33

## 2018-03-06 RX ADMIN — ENOXAPARIN SODIUM SCH: 40 INJECTION SUBCUTANEOUS at 08:36

## 2018-03-06 RX ADMIN — SPIRONOLACTONE SCH MG: 25 TABLET, FILM COATED ORAL at 08:37

## 2018-03-06 RX ADMIN — PANTOPRAZOLE SODIUM SCH MG: 40 INJECTION, POWDER, FOR SOLUTION INTRAVENOUS at 08:37

## 2018-03-06 RX ADMIN — ISOSORBIDE MONONITRATE SCH MG: 30 TABLET, EXTENDED RELEASE ORAL at 08:37

## 2018-03-06 RX ADMIN — CARVEDILOL SCH MG: 12.5 TABLET, FILM COATED ORAL at 07:33

## 2018-03-06 RX ADMIN — INSULIN ASPART SCH: 100 INJECTION, SOLUTION INTRAVENOUS; SUBCUTANEOUS at 16:57

## 2018-03-06 RX ADMIN — DEXTROSE MONOHYDRATE SCH MLS/HR: 5 INJECTION, SOLUTION INTRAVENOUS at 23:22

## 2018-03-06 RX ADMIN — DEXTROSE MONOHYDRATE SCH MLS/HR: 5 INJECTION, SOLUTION INTRAVENOUS at 15:54

## 2018-03-06 RX ADMIN — CARVEDILOL SCH MG: 12.5 TABLET, FILM COATED ORAL at 17:31

## 2018-03-06 RX ADMIN — INSULIN ASPART SCH: 100 INJECTION, SOLUTION INTRAVENOUS; SUBCUTANEOUS at 12:08

## 2018-03-06 RX ADMIN — DEXTROSE MONOHYDRATE SCH MLS/HR: 5 INJECTION, SOLUTION INTRAVENOUS at 08:31

## 2018-03-06 RX ADMIN — IOHEXOL PRN ML: 350 INJECTION, SOLUTION INTRAVENOUS at 11:19

## 2018-03-06 NOTE — P.PN
Subjective


Progress Note Date: 03/06/18


Principal diagnosis: 


Acute pancreatitis





73-year-old gentleman with a history of diabetes mellitus kidney disease 

obesity presented with acute abdominal pain elevated pancreatic liver enzymes 

consistent with acute pancreatitis.  No stones mentioned on ultrasound abdomen/

CT.  CT abdomen and pelvis without IV contrast reported appendicolith possible 

early appendicitis could not be excluded and he's been monitored closely by 

general surgery since admission.  This morning he is sitting up in a bedside 

chair denies abdominal pain no nausea vomiting.  Afebrile.  Incontinent of 

urine.  Elevated creatinine 2.1.  He appears jaundice.  Glucose 284-87788 

hours.  Potassium 5.5.  Sodium 133.  Lipase unchanged from yesterday 1044.  

Amylase 143.








Objective





- Vital Signs


Vital signs: 


 Vital Signs











Temp  98.2 F   03/06/18 07:00


 


Pulse  67   03/06/18 07:00


 


Resp  24   03/06/18 07:00


 


BP  181/82   03/06/18 07:00


 


Pulse Ox  91 L  03/06/18 07:00








 Intake & Output











 03/05/18 03/06/18 03/06/18





 18:59 06:59 18:59


 


Intake Total 550 50 


 


Balance 550 50 


 


Weight 99.79 kg 110 kg 


 


Intake:   


 


  Intake, IV Titration 50 50 





  Amount   


 


    Piperacillin-Tazobactam 3 50 50 





    .375 gm In Dextrose/Water   





    1 50ml.bag @ 12.5 mls/hr   





    IVPB Q8HR [facility] Rx#:   





    [id number]   


 


  Oral 500  


 


Other:   


 


  Voiding Method Urinal Urinal 





 Incontinent Incontinent 


 


  # Voids  1 














- Exam


General appearance: The patient is alert, oriented, in no acute distress.  

Jaundice.


HET: Head is normocephalic and atraumatic.  Pupils are equal and reactive.  

Sclerae icterus.  Oropharynx is clear without lesions.


Neck: Supple without lymphadenopathy.  Trachea midline.


Heart: S1 S2.  Regular rate and rhythm.


Lungs: No crackles or wheezes are heard.


Abdomen: Soft, nontender, nondistended with  bowel sounds.  No peritoneal 

signs.  No palpable organomegaly or masses.


Extremities: Normal skin color and turgor.  No cyanosis, rash, ulceration, 

clubbing, or edema.  Radial and pedal pulses are 2/4 bilaterally.


Neurological: No focal deficits.  Strength and sensation are grossly intact.








- Labs


CBC & Chem 7: 


 03/05/18 08:51





 03/06/18 07:05


Labs: 


 Abnormal Lab Results - Last 24 Hours (Table)











  03/05/18 03/05/18 03/05/18 Range/Units





  08:51 08:51 08:51 


 


RBC  3.91 L    (4.30-5.90)  m/uL


 


Hgb  11.8 L    (13.0-17.5)  gm/dL


 


Hct  37.5 L    (39.0-53.0)  %


 


Plt Count  93 L    (150-450)  k/uL


 


Sodium     (137-145)  mmol/L


 


Potassium    5.4 H  (3.5-5.1)  mmol/L


 


Chloride    110 H  ()  mmol/L


 


Carbon Dioxide    16 L  (22-30)  mmol/L


 


BUN    37 H  (9-20)  mg/dL


 


Creatinine    1.74 H  (0.66-1.25)  mg/dL


 


Glucose    237 H  (74-99)  mg/dL


 


POC Glucose (mg/dL)     (75-99)  mg/dL


 


Total Bilirubin    4.9 H  (0.2-1.3)  mg/dL


 


Alkaline Phosphatase    342 H  ()  U/L


 


Albumin    3.3 L  (3.5-5.0)  g/dL


 


Amylase   189 H   ()  U/L


 


Lipase   1043 H   ()  U/L


 


Urine Protein     (Negative)  


 


Urine Ketones     (Negative)  


 


Urine Bilirubin     (Negative)  


 


Urine WBC     (0-5)  /hpf


 


Urine Mucus     (None)  /hpf














  03/05/18 03/05/18 03/05/18 Range/Units





  11:17 17:17 18:02 


 


RBC     (4.30-5.90)  m/uL


 


Hgb     (13.0-17.5)  gm/dL


 


Hct     (39.0-53.0)  %


 


Plt Count     (150-450)  k/uL


 


Sodium     (137-145)  mmol/L


 


Potassium    5.4 H  (3.5-5.1)  mmol/L


 


Chloride     ()  mmol/L


 


Carbon Dioxide     (22-30)  mmol/L


 


BUN     (9-20)  mg/dL


 


Creatinine     (0.66-1.25)  mg/dL


 


Glucose     (74-99)  mg/dL


 


POC Glucose (mg/dL)  257 H  360 H   (75-99)  mg/dL


 


Total Bilirubin     (0.2-1.3)  mg/dL


 


Alkaline Phosphatase     ()  U/L


 


Albumin     (3.5-5.0)  g/dL


 


Amylase     ()  U/L


 


Lipase     ()  U/L


 


Urine Protein     (Negative)  


 


Urine Ketones     (Negative)  


 


Urine Bilirubin     (Negative)  


 


Urine WBC     (0-5)  /hpf


 


Urine Mucus     (None)  /hpf














  03/05/18 03/06/18 03/06/18 Range/Units





  20:01 01:40 07:05 


 


RBC     (4.30-5.90)  m/uL


 


Hgb     (13.0-17.5)  gm/dL


 


Hct     (39.0-53.0)  %


 


Plt Count     (150-450)  k/uL


 


Sodium    133 L  (137-145)  mmol/L


 


Potassium    5.5 H  (3.5-5.1)  mmol/L


 


Chloride     ()  mmol/L


 


Carbon Dioxide     (22-30)  mmol/L


 


BUN    45 H  (9-20)  mg/dL


 


Creatinine    2.13 H  (0.66-1.25)  mg/dL


 


Glucose    264 H  (74-99)  mg/dL


 


POC Glucose (mg/dL)  315 H    (75-99)  mg/dL


 


Total Bilirubin     (0.2-1.3)  mg/dL


 


Alkaline Phosphatase     ()  U/L


 


Albumin     (3.5-5.0)  g/dL


 


Amylase    143 H  ()  U/L


 


Lipase    1044 H  ()  U/L


 


Urine Protein   Trace H   (Negative)  


 


Urine Ketones   Trace H   (Negative)  


 


Urine Bilirubin   2+ H   (Negative)  


 


Urine WBC   17 H   (0-5)  /hpf


 


Urine Mucus   Rare H   (None)  /hpf














  03/06/18 Range/Units





  07:05 


 


RBC   (4.30-5.90)  m/uL


 


Hgb   (13.0-17.5)  gm/dL


 


Hct   (39.0-53.0)  %


 


Plt Count   (150-450)  k/uL


 


Sodium   (137-145)  mmol/L


 


Potassium   (3.5-5.1)  mmol/L


 


Chloride   ()  mmol/L


 


Carbon Dioxide   (22-30)  mmol/L


 


BUN   (9-20)  mg/dL


 


Creatinine   (0.66-1.25)  mg/dL


 


Glucose   (74-99)  mg/dL


 


POC Glucose (mg/dL)  284 H  (75-99)  mg/dL


 


Total Bilirubin   (0.2-1.3)  mg/dL


 


Alkaline Phosphatase   ()  U/L


 


Albumin   (3.5-5.0)  g/dL


 


Amylase   ()  U/L


 


Lipase   ()  U/L


 


Urine Protein   (Negative)  


 


Urine Ketones   (Negative)  


 


Urine Bilirubin   (Negative)  


 


Urine WBC   (0-5)  /hpf


 


Urine Mucus   (None)  /hpf














Assessment and Plan


(1) Acute pancreatitis


Narrative/Plan: 


73-year-old male admitted with acute abdominal pain elevated pancreatic and 

liver enzymes consistent with acute pancreatitis.  No mentioning of gallstones 

on ultrasound or CAT scan with worsening hyperbilirubinemia and creatinine.


Current Visit: Yes   Status: Acute   Code(s): K85.90 - ACUTE PANCREATITIS 

WITHOUT NECROSIS OR INFECTION, UNSP   SNOMED Code(s): 002444441


   





(2) Elevated serum creatinine


Narrative/Plan: 


Acute kidney injury


Current Visit: Yes   Status: Acute   Code(s): R79.89 - OTHER SPECIFIED ABNORMAL 

FINDINGS OF BLOOD CHEMISTRY   SNOMED Code(s): 136532795


   





(3) Elevated liver enzymes


Current Visit: Yes   Status: Acute   Code(s): R74.8 - ABNORMAL LEVELS OF OTHER 

SERUM ENZYMES   SNOMED Code(s): 555652132


   





(4) Jaundice


Narrative/Plan: 


Elevated bilirubin possibly from peripancreatic edema


Current Visit: Yes   Status: Acute   Code(s): R17 - UNSPECIFIED JAUNDICE   

SNOMED Code(s): 66307274


   





(5) Appendicolith


Narrative/Plan: 


Possible early acute appendicitis being monitored closely by general surgery 

presently without fever or abdominal pain.


Current Visit: Yes   Status: Acute   Code(s): K38.9 - DISEASE OF APPENDIX, 

UNSPECIFIED   SNOMED Code(s): 84279331


   





(6) Obesity (BMI 30-39.9)


Current Visit: Yes   Status: Chronic   Code(s): E66.9 - OBESITY, UNSPECIFIED   

SNOMED Code(s): 333475983


   





(7) Hyperkalemia


Current Visit: Yes   Status: Acute   Code(s): E87.5 - HYPERKALEMIA   SNOMED Code

(s): 86892460


   





(8) Diabetes mellitus


Current Visit: Yes   Status: Chronic   Code(s): E11.9 - TYPE 2 DIABETES 

MELLITUS WITHOUT COMPLICATIONS   SNOMED Code(s): 10996576


   


Plan: 


1.  Hepatitis screen.  Ca 19-9. CMP with fractionated bilirubin.


2.  Intravenous hydration normal saline 125 mL an hour; with electrolyte 

monitoring.


3.  Nothing by mouth except medications.  Case was discussed with Dr. Burks and 

general surgery nurse practitioner Kajal Burks.


4.  Will follow closely with you.





Assessment and plan a care discussed with Dr. Murcia

## 2018-03-06 NOTE — CONS
CONSULTATION



REASON FOR CONSULT:

Renal failure.



HISTORY OF PRESENT ILLNESS:

Patient is a 73-year-old -American male who was admitted to the hospital 
on

3/3/2018 with abdominal pain. He was found to have acute pancreatitis and is 
currently

maintained on IV fluids.  The patient is being followed by General Surgery.  The

abdominal CT was done twice, initially on 3/3 and then again today, and both the

studies were without IV contrast.  No bile duct stones were noted. No 
hydronephrosis.



Patient's serum creatinine was 0.87 mg/dL on initial admission.  It has gone up 
to 2.13

today.  The patient has been incontinent.  He has been voiding in a urinal. 
Blood

pressure has not been low; in fact, it is on the higher side, and again patient 
has not

received any IV contrast.  He is not on any nephrotoxic medications currently.



PAST MEDICAL HISTORY:

1. COPD.

2. Type 2 diabetes.

3. Hyperlipidemia.

4. History of MI.

5. Osteoarthritis.

6. Hypertension.

7. Cardiomyopathy, ejection fraction 20%.

8. Gout.

9. Peripheral neuropathy.

10.Bipolar disorder.

11.Depression.



PAST SURGICAL HISTORY:

1. Cardiac catheterization.

2. Cataract surgery.

3. Pacemaker.

4. Liver biopsy.



MEDICATIONS:

Medications prior to admission included:

1. Motrin.

2. Cozaar.

3. Aldactone.

4. Colace.

5. Coreg.



ALLERGIES:

NONE.



PHYSICAL EXAMINATION:

Patient is currently in the bathroom.  His blood pressure was noted to be 147/
72.

Heart rate was at 56 per minute and regular.  Patient is afebrile.

I did not examine his abdomen.

He has no underlying edema and no major focal deficits are noted.

The patient is alert and oriented x3.



LABS:

Sodium 133, potassium 5.5 today. BUN 45, serum creatinine 2.13, AST 77, ALT 52, 
lipase

1044, which is down from 4990 on initial admission.  UA shows trace protein, 
trace

ketones, WBC 17, no blood.



ASSESSMENT:

1. Acute kidney injury, most likely acute tubular necrosis, currently non-
oliguric

    associated with  pancreatitis.  Rule out underlying urine retention.  Will 
check a

    postvoidal residual.  Continue with IV fluids.  No nephrotoxic agents on 
board.

    Patient had been on ACE inhibitors and nonsteroidal anti-inflammatory 
agents prior

    to admission, which are now currently on hold.  I will also continue with 
the IV

    fluids for now and repeat labs in a.m.  Avoid hypotension.  No need to check

    ultrasound of the kidneys, as no evidence of hydronephrosis noted on 2 
abdominal

    CTs done already.

2. Cardiomyopathy with ejection fraction 20%, currently compensated, although 
chest x-

    ray from yesterday did show mild pulmonary vascular congestion.

3. Acute pancreatitis, being followed by GI and Surgery.  No evidence of biliary

    obstruction noted on the CT scan.

4. Hyperkalemia associated with acute kidney injury.  Maintain patient on low-

    potassium diet.  Avoid Kayexalate and control blood sugars.  The patient's 
blood

    sugar was elevated at 264. Continue off of ACE/I/ARBs.



PLAN:

Check postvoidal residual.  We continue IV fluids.  I will decrease the rate of 
the IV

fluids tomorrow.  Continue current antihypertensive medications and repeat labs 
in a.m.

Currently patient is n.p.o.  If he remains hyperkalemic, he will be maintained 
on a low-

potassium diet.



Thank you for this consultation.  Will continue to follow the patient with you 
during

his hospitalization.





MMODL / IJN: [id number] / Job#: [id number]

JÚNIOR

## 2018-03-06 NOTE — P.PN
Subjective


Progress Note Date: 03/06/18





Patient is a pretty poor historian, but is much more awake and alert today up 

sitting on the chair at the bedside.  Is being followed by Dr. Mckee and Dr. Ang [facility] general surgery and GI respectively





Objective





- Vital Signs


Vital signs: 


 Vital Signs











Temp  98.2 F   03/06/18 07:00


 


Pulse  67   03/06/18 08:00


 


Resp  24   03/06/18 08:00


 


BP  181/82   03/06/18 07:00


 


Pulse Ox  91 L  03/06/18 07:00








 Intake & Output











 03/05/18 03/06/18 03/06/18





 18:59 06:59 18:59


 


Intake Total 550 50 


 


Balance 550 50 


 


Weight 99.79 kg 110 kg 


 


Intake:   


 


  Intake, IV Titration 50 50 





  Amount   


 


    Piperacillin-Tazobactam 3 50 50 





    .375 gm In Dextrose/Water   





    1 50ml.bag @ 12.5 mls/hr   





    IVPB Q8HR [facility] Rx#:   





    [id number]   


 


  Oral 500  


 


Other:   


 


  Voiding Method Urinal Urinal Urinal





 Incontinent Incontinent 


 


  # Voids  1 














- Exam





Constitutional: No acute distress, conversant, pleasant





Eyes: Anicteric sclerae, moist conjunctiva, no lid-lag, PERRLA





ENMT: NC/AT,Oropharynx clear, no erythema, exudates





Neck:Supple, FROM, no masses, or JVD, No carotid bruits; No thyromegaly





Lungs: Clear to auscultation, Clear to percussion, Normal respiratory effort, 

no accessory muscle use 





Cardiovascular: Heart regular in rate and rhythm,  No murmurs, gallops, or rubs 

no peripheral edema





Abdominal: Mildly distended, nom distended, no guarding, no rebound or  rigidity

, Normoactive bowel sounds No hepatomegaly, No splenomegaly,  No palpable mass 

No abdominal wall hernia noted 





Skin: Normal temperature, tone, texture, turgor, No induration No subcutaneous 

nodules, No rash, lesions, No ulcers





Extremities:No digital cyanosis No clubbing, Pedal pulses intact and  

symmetrical Radial pulses intact and symmetrical Normal gait and station, No 

calf tenderness


 


Psychiatric: Somnolent but arousable te affect Intact judgement   


      


Neuro: Muscles Strength 5/5 in all 4 extremities, Sensation to light touch 

grossly present throughout, Cranial nerves II-XII grossly intact. No focal 

sensory deficits








- Labs


CBC & Chem 7: 


 03/05/18 08:51





 03/06/18 07:05


Labs: 


 Abnormal Lab Results - Last 24 Hours (Table)











  03/05/18 03/05/18 03/05/18 Range/Units





  17:17 18:02 20:01 


 


Sodium     (137-145)  mmol/L


 


Potassium   5.4 H   (3.5-5.1)  mmol/L


 


BUN     (9-20)  mg/dL


 


Creatinine     (0.66-1.25)  mg/dL


 


Glucose     (74-99)  mg/dL


 


POC Glucose (mg/dL)  360 H   315 H  (75-99)  mg/dL


 


Total Bilirubin     (0.2-1.3)  mg/dL


 


Conjugated Bilirubin     (0.0-0.3)  mg/dL


 


Delta Bilirubin     (0.0-0.2)  mg/dL


 


AST     (17-59)  U/L


 


Alkaline Phosphatase     ()  U/L


 


Albumin     (3.5-5.0)  g/dL


 


Amylase     ()  U/L


 


Lipase     ()  U/L


 


Urine Protein     (Negative)  


 


Urine Ketones     (Negative)  


 


Urine Bilirubin     (Negative)  


 


Urine WBC     (0-5)  /hpf


 


Urine Mucus     (None)  /hpf














  03/06/18 03/06/18 03/06/18 Range/Units





  01:40 07:05 07:05 


 


Sodium   133 L   (137-145)  mmol/L


 


Potassium   5.5 H   (3.5-5.1)  mmol/L


 


BUN   45 H   (9-20)  mg/dL


 


Creatinine   2.13 H   (0.66-1.25)  mg/dL


 


Glucose   264 H   (74-99)  mg/dL


 


POC Glucose (mg/dL)    284 H  (75-99)  mg/dL


 


Total Bilirubin   5.4 H   (0.2-1.3)  mg/dL


 


Conjugated Bilirubin   2.8 H   (0.0-0.3)  mg/dL


 


Delta Bilirubin   2.0 H   (0.0-0.2)  mg/dL


 


AST   77 H   (17-59)  U/L


 


Alkaline Phosphatase   347 H   ()  U/L


 


Albumin   3.3 L   (3.5-5.0)  g/dL


 


Amylase   143 H   ()  U/L


 


Lipase   1044 H   ()  U/L


 


Urine Protein  Trace H    (Negative)  


 


Urine Ketones  Trace H    (Negative)  


 


Urine Bilirubin  2+ H    (Negative)  


 


Urine WBC  17 H    (0-5)  /hpf


 


Urine Mucus  Rare H    (None)  /hpf














  03/06/18 Range/Units





  11:04 


 


Sodium   (137-145)  mmol/L


 


Potassium   (3.5-5.1)  mmol/L


 


BUN   (9-20)  mg/dL


 


Creatinine   (0.66-1.25)  mg/dL


 


Glucose   (74-99)  mg/dL


 


POC Glucose (mg/dL)  231 H  (75-99)  mg/dL


 


Total Bilirubin   (0.2-1.3)  mg/dL


 


Conjugated Bilirubin   (0.0-0.3)  mg/dL


 


Delta Bilirubin   (0.0-0.2)  mg/dL


 


AST   (17-59)  U/L


 


Alkaline Phosphatase   ()  U/L


 


Albumin   (3.5-5.0)  g/dL


 


Amylase   ()  U/L


 


Lipase   ()  U/L


 


Urine Protein   (Negative)  


 


Urine Ketones   (Negative)  


 


Urine Bilirubin   (Negative)  


 


Urine WBC   (0-5)  /hpf


 


Urine Mucus   (None)  /hpf














Assessment and Plan


(1) Acute pancreatitis


Narrative/Plan: 





* Likely alcohol induced pancreatitis


* Lipase trending down from 4990 to 1072, appreciate GI recommendations


* Continue with clear liquids IV fluids, morphine and Zofran


* Repeat CT abdomen and pelvis pending


Current Visit: Yes   Status: Acute   Code(s): K85.90 - ACUTE PANCREATITIS 

WITHOUT NECROSIS OR INFECTION, UNSP   SNOMED Code(s): 880144779


   





(2) Acute appendicitis


Narrative/Plan: 





* Patient did have a fever overnight despite being on antibiotics with Zosyn 

since admission, concern for smoldering sepsis pancreatitis


* Appreciate recommendations by general surgery will reevaluate the patient in 

the morning check labs and if there is no significant improvement we'll plan to 

take the patient for appendicectomy


* Despite having an appendicolith on CT exam appears that his abdominal pain is 

more due to his pancreatitis rather than any acute appendicitis the patient 

does not have a surgical abdomen on exam


Current Visit: Yes   Status: Acute   Code(s): K35.80 - UNSPECIFIED ACUTE 

APPENDICITIS   SNOMED Code(s): 09126255


   





(3) Type 2 diabetes mellitus with peripheral neuropathy


Narrative/Plan: 





* Blood sugars are elevated continue with correctional scale coverage


* A1c 10.6


* Initiated basal insulin Levemir at 12 units at at bedtime and continue with 

correctional scale


Current Visit: Yes   Status: Acute   Code(s): E11.42 - TYPE 2 DIABETES MELLITUS 

WITH DIABETIC POLYNEUROPATHY   SNOMED Code(s): 1873030489860


   





(4) Essential hypertension


Narrative/Plan: 





* Patient's blood pressures elevated up to systolic of 150s-170s


* We'll add Norvasc to his regimen, continue Aldactone and Coreg


Current Visit: Yes   Status: Acute   Code(s): I10 - ESSENTIAL (PRIMARY) 

HYPERTENSION   SNOMED Code(s): 27824341


   





(5) Acute renal failure


Narrative/Plan: 





* Acute kidney injury with mild hyperkalemia and metabolic acidosis, due to 

possible sepsis?? vs medication induced from increased dose of Cozaar


* Creatinine trending up gradually to 2.1 today


* We'll hold ARB, awaiting nephrology recommendations


* Apparently the patient's IV fluids was discontinued or fell off his MAR, NS @ 

125 cc/hr was restarted this am 





Current Visit: No   Status: Acute   Code(s): N17.9 - ACUTE KIDNEY FAILURE, 

UNSPECIFIED   SNOMED Code(s): 02927767


   





(6) Hyperbilirubinemia


Narrative/Plan: 





* Possibly secondary to dehydration/ ESTEVAN  


* Continue with hydration and repeat  labs tomorrow


Current Visit: Yes   Status: Acute   Code(s): E80.6 - OTHER DISORDERS OF 

BILIRUBIN METABOLISM   SNOMED Code(s): 31339423

## 2018-03-06 NOTE — P.PN
Subjective


Progress Note Date: 03/06/18





73-year-old male being seen and examined at bedside this morning.  Patient 

continues to report having no abdominal pain no nausea vomiting did note the 

patient's creatinine is elevated 2.1.  Patient has a jaundice appearance.  

Lipase is unchanged from the day before remains elevated at 1044.  Patient is 

being followed by surgical service for initial presentation to the emergency 

room with acute abdominal pain and elevated pancreatic liver enzymes consistent 

with acute pancreatitis.  Ultrasound of the abdomen report reviewed no mention 

of stones.  CAT scan abdomen and pelvis without IV contrast obtained report 

indicated appendicoth with possible early appendicitis could not be excluded.  


Patient will be scheduled this morning for an oral contrast CT abdomen pelvis 

evaluating the appendix patient is aware of the plan of care





Objective





- Vital Signs


Vital signs: 


 Vital Signs











Temp  98.2 F   03/06/18 07:00


 


Pulse  67   03/06/18 08:00


 


Resp  24   03/06/18 08:00


 


BP  181/82   03/06/18 07:00


 


Pulse Ox  91 L  03/06/18 07:00








 Intake & Output











 03/05/18 03/06/18 03/06/18





 18:59 06:59 18:59


 


Intake Total 550 50 


 


Balance 550 50 


 


Weight 99.79 kg 110 kg 


 


Intake:   


 


  Intake, IV Titration 50 50 





  Amount   


 


    Piperacillin-Tazobactam 3 50 50 





    .375 gm In Dextrose/Water   





    1 50ml.bag @ 12.5 mls/hr   





    IVPB Q8HR [facility] Rx#:   





    [id number]   


 


  Oral 500  


 


Other:   


 


  Voiding Method Urinal Urinal Urinal





 Incontinent Incontinent 


 


  # Voids  1 














- Exam





Physical exam


73-year-old male sitting up in bed drinking the oral contrast continues to 

report tired out appears jaundiced


Lungs diminished at the bases otherwise adequate air movement


Heart S1-S2 audible regular


Abdomen firm and distended no facial grimacing with palpitation to the 

abdominal wall not able to palpate any organomegaly no nausea no vomiting no 

stool


Extremities no edema noted to the bilateral lower extremities





- Labs


CBC & Chem 7: 


 03/05/18 08:51





 03/06/18 07:05


Labs: 


 Abnormal Lab Results - Last 24 Hours (Table)











  03/05/18 03/05/18 03/05/18 Range/Units





  17:17 18:02 20:01 


 


Sodium     (137-145)  mmol/L


 


Potassium   5.4 H   (3.5-5.1)  mmol/L


 


BUN     (9-20)  mg/dL


 


Creatinine     (0.66-1.25)  mg/dL


 


Glucose     (74-99)  mg/dL


 


POC Glucose (mg/dL)  360 H   315 H  (75-99)  mg/dL


 


Total Bilirubin     (0.2-1.3)  mg/dL


 


Conjugated Bilirubin     (0.0-0.3)  mg/dL


 


Delta Bilirubin     (0.0-0.2)  mg/dL


 


AST     (17-59)  U/L


 


Alkaline Phosphatase     ()  U/L


 


Albumin     (3.5-5.0)  g/dL


 


Amylase     ()  U/L


 


Lipase     ()  U/L


 


Urine Protein     (Negative)  


 


Urine Ketones     (Negative)  


 


Urine Bilirubin     (Negative)  


 


Urine WBC     (0-5)  /hpf


 


Urine Mucus     (None)  /hpf














  03/06/18 03/06/18 03/06/18 Range/Units





  01:40 07:05 07:05 


 


Sodium   133 L   (137-145)  mmol/L


 


Potassium   5.5 H   (3.5-5.1)  mmol/L


 


BUN   45 H   (9-20)  mg/dL


 


Creatinine   2.13 H   (0.66-1.25)  mg/dL


 


Glucose   264 H   (74-99)  mg/dL


 


POC Glucose (mg/dL)    284 H  (75-99)  mg/dL


 


Total Bilirubin   5.4 H   (0.2-1.3)  mg/dL


 


Conjugated Bilirubin   2.8 H   (0.0-0.3)  mg/dL


 


Delta Bilirubin   2.0 H   (0.0-0.2)  mg/dL


 


AST   77 H   (17-59)  U/L


 


Alkaline Phosphatase   347 H   ()  U/L


 


Albumin   3.3 L   (3.5-5.0)  g/dL


 


Amylase   143 H   ()  U/L


 


Lipase   1044 H   ()  U/L


 


Urine Protein  Trace H    (Negative)  


 


Urine Ketones  Trace H    (Negative)  


 


Urine Bilirubin  2+ H    (Negative)  


 


Urine WBC  17 H    (0-5)  /hpf


 


Urine Mucus  Rare H    (None)  /hpf














  03/06/18 Range/Units





  11:04 


 


Sodium   (137-145)  mmol/L


 


Potassium   (3.5-5.1)  mmol/L


 


BUN   (9-20)  mg/dL


 


Creatinine   (0.66-1.25)  mg/dL


 


Glucose   (74-99)  mg/dL


 


POC Glucose (mg/dL)  231 H  (75-99)  mg/dL


 


Total Bilirubin   (0.2-1.3)  mg/dL


 


Conjugated Bilirubin   (0.0-0.3)  mg/dL


 


Delta Bilirubin   (0.0-0.2)  mg/dL


 


AST   (17-59)  U/L


 


Alkaline Phosphatase   ()  U/L


 


Albumin   (3.5-5.0)  g/dL


 


Amylase   ()  U/L


 


Lipase   ()  U/L


 


Urine Protein   (Negative)  


 


Urine Ketones   (Negative)  


 


Urine Bilirubin   (Negative)  


 


Urine WBC   (0-5)  /hpf


 


Urine Mucus   (None)  /hpf














Assessment and Plan


Assessment: 





Impression


Present on admission abdominal pain CAT scan abdomen pelvis reported 

appendicolith possible acute early appendicitis not ruled out


Hyperkalemia


Obesity BMI 36


Type 2 diabetes with episodes of hyperglycemia


Prior history of alcohol intake


Depressive disorder nonspecified


Present on admission elevated lipase and amylase consistent with pancreatitis


Acute kidney injury


Hyperkalemia





Plan


We'll repeat a CAT scan abdomen pelvis with oral contrast follow up on results


Further surgical recommendations pending findings from the CAT scan abdomen 

pelvis


IV fluid for hydration


Will follow labs


DVT and GI prophylaxis


Follow-up on pending hepatitis panel





Progress note dictated for Dr. Young





The above impression and plan of care have been discussed and directed by 

signing physician. Kajal Burks nurse practitioner acting as scribe for signing 

physician.

## 2018-03-06 NOTE — P.PN
Progress Note - Text





Patient was seen and evaluated.  At this time patient denies any abdominal 

pain.  The patient states he is passing flatus.  The patient had a repeat 

computed tomography scan this reveals persistent colon with no change in the 

findings at the distal appendix.  It is felt that this is most likely an 

incidental finding and the patient does not have an acute appendicitis at this 

time.  The patient was noted to have an elevated bilirubin today and there is 

some concern that he may have hepatitis.  He is being evaluated for this by the 

GI service.  At this time we will hold off on operative intervention.

## 2018-03-06 NOTE — CT
EXAMINATION TYPE: CT abdomen pelvis wo con

 

DATE OF EXAM: 3/6/2018

 

COMPARISON: Prior CT 3/3/2018 an ultrasound 3/3/2018

 

HISTORY: Patient complains of periumbilical pain.

 

CT DLP: 1322.1 mGycm

Automated exposure control for dose reduction was used.

 

TECHNIQUE:  Helical acquisition of images from the lung bases through the pelvis.

 

FINDINGS: The heart is enlarged. Anterior cardiac defibrillator leads are again noted. Pulmonary gui
ry may be prominent. Lack of contrast may compromise sensitivity.

 

LUNG BASES: Small effusions and associated atelectasis have developed in the interval.

 

AORTA:  No significant abnormality is appreciated.

 

LIVER/GB: No significant abnormality is appreciated.

 

PANCREAS: Mildly bulbous appearance of the pancreas is indeterminate but stable..

 

SPLEEN: No significant abnormality is seen.

 

ADRENALS: No significant abnormality is seen.

 

KIDNEYS: No significant abnormality is seen.

 

 

REPRODUCTIVE ORGANS:  No significant abnormality is seen.

 

URINARY BLADDER:  Thickened wall may be due to chronic outlet obstruction, correlate to exclude cysti
tis versus lack of distention.

 

BOWEL:  Stable appearance. There is an appendicolith present within a mildly distended gallbladder wi
th mild wall thickening, there is not significant local inflammatory change however. Only mild thicke
warner of the appendix wall extending to the level of the cecum is noted with some minimal local linear
 increased soft tissue density within the mesenteric fat.

 

FREE AIR:  No Free Air is visible.

 

ASCITES:  None visible.

 

PELVIC ADENOPATHY:  None visualized.

 

RETROPERITONEAL ADENOPATHY:  No Retroperitoneal Adenopathy visible.

 

OSSEOUS STRUCTURES:  No significant abnormality is seen.

 

Some local anasarca changes are present, increased attenuation within the subcutaneous fat about the 
flanks.

 

IMPRESSION: 

FINDINGS ARE SIMILAR TO PRIOR EXAM, CORRELATE TO EXCLUDE APPENDICITIS. LACK OF CONTRAST MAY COMPROMIS
E SENSITIVITY. INTERVAL DEVELOPMENT OF SMALL PLEURAL EFFUSIONS AND LOCAL ATELECTASIS. CARDIOMEGALY. A
DDITIONAL FINDINGS ABOVE. SOME MILD PROMINENCE OF THE PANCREATIC HEAD, MRI MAY BE OF BENEFIT

## 2018-03-07 LAB
ALBUMIN SERPL-MCNC: 3 G/DL (ref 3.5–5)
ALP SERPL-CCNC: 326 U/L (ref 38–126)
ALT SERPL-CCNC: 44 U/L (ref 21–72)
ANION GAP SERPL CALC-SCNC: 9 MMOL/L
AST SERPL-CCNC: 59 U/L (ref 17–59)
BASOPHILS # BLD AUTO: 0 K/UL (ref 0–0.2)
BASOPHILS NFR BLD AUTO: 1 %
BILIRUB INDIRECT SERPL-MCNC: 0.4 MG/DL (ref 0–1.1)
BILIRUBIN DIRECT+TOT PNL SERPL-MCNC: 2 MG/DL (ref 0–0.2)
BUN SERPL-SCNC: 46 MG/DL (ref 9–20)
CALCIUM SPEC-MCNC: 8.6 MG/DL (ref 8.4–10.2)
CHLORIDE SERPL-SCNC: 104 MMOL/L (ref 98–107)
CO2 SERPL-SCNC: 22 MMOL/L (ref 22–30)
EOSINOPHIL # BLD AUTO: 0.1 K/UL (ref 0–0.7)
EOSINOPHIL NFR BLD AUTO: 3 %
ERYTHROCYTE [DISTWIDTH] IN BLOOD BY AUTOMATED COUNT: 4.02 M/UL (ref 4.3–5.9)
ERYTHROCYTE [DISTWIDTH] IN BLOOD: 12.9 % (ref 11.5–15.5)
GLUCOSE BLD-MCNC: 187 MG/DL (ref 75–99)
GLUCOSE BLD-MCNC: 192 MG/DL (ref 75–99)
GLUCOSE BLD-MCNC: 202 MG/DL (ref 75–99)
GLUCOSE BLD-MCNC: 276 MG/DL (ref 75–99)
GLUCOSE BLD-MCNC: 285 MG/DL (ref 75–99)
GLUCOSE SERPL-MCNC: 189 MG/DL (ref 74–99)
HCT VFR BLD AUTO: 37.3 % (ref 39–53)
HGB BLD-MCNC: 12.3 GM/DL (ref 13–17.5)
LIPASE SERPL-CCNC: 1150 U/L (ref 23–300)
LYMPHOCYTES # SPEC AUTO: 0.8 K/UL (ref 1–4.8)
LYMPHOCYTES NFR SPEC AUTO: 18 %
MCH RBC QN AUTO: 30.5 PG (ref 25–35)
MCHC RBC AUTO-ENTMCNC: 32.9 G/DL (ref 31–37)
MCV RBC AUTO: 92.7 FL (ref 80–100)
MONOCYTES # BLD AUTO: 0.4 K/UL (ref 0–1)
MONOCYTES NFR BLD AUTO: 8 %
NEUTROPHILS # BLD AUTO: 3.1 K/UL (ref 1.3–7.7)
NEUTROPHILS NFR BLD AUTO: 67 %
PLATELET # BLD AUTO: 107 K/UL (ref 150–450)
POTASSIUM SERPL-SCNC: 5 MMOL/L (ref 3.5–5.1)
PROT SERPL-MCNC: 6.6 G/DL (ref 6.3–8.2)
SODIUM SERPL-SCNC: 135 MMOL/L (ref 137–145)
WBC # BLD AUTO: 4.6 K/UL (ref 3.8–10.6)

## 2018-03-07 RX ADMIN — ENOXAPARIN SODIUM SCH MG: 40 INJECTION SUBCUTANEOUS at 08:01

## 2018-03-07 RX ADMIN — DEXTROSE MONOHYDRATE SCH MLS/HR: 5 INJECTION, SOLUTION INTRAVENOUS at 08:05

## 2018-03-07 RX ADMIN — INSULIN ASPART SCH UNIT: 100 INJECTION, SOLUTION INTRAVENOUS; SUBCUTANEOUS at 07:59

## 2018-03-07 RX ADMIN — DEXTROSE MONOHYDRATE SCH MLS/HR: 5 INJECTION, SOLUTION INTRAVENOUS at 23:00

## 2018-03-07 RX ADMIN — INSULIN DETEMIR SCH UNIT: 100 INJECTION, SOLUTION SUBCUTANEOUS at 20:25

## 2018-03-07 RX ADMIN — CEFAZOLIN SCH: 330 INJECTION, POWDER, FOR SOLUTION INTRAMUSCULAR; INTRAVENOUS at 14:33

## 2018-03-07 RX ADMIN — CEFAZOLIN SCH MLS/HR: 330 INJECTION, POWDER, FOR SOLUTION INTRAMUSCULAR; INTRAVENOUS at 02:09

## 2018-03-07 RX ADMIN — PANTOPRAZOLE SODIUM SCH MG: 40 INJECTION, POWDER, FOR SOLUTION INTRAVENOUS at 08:00

## 2018-03-07 RX ADMIN — INSULIN ASPART SCH UNIT: 100 INJECTION, SOLUTION INTRAVENOUS; SUBCUTANEOUS at 12:55

## 2018-03-07 RX ADMIN — ISOSORBIDE MONONITRATE SCH MG: 30 TABLET, EXTENDED RELEASE ORAL at 08:00

## 2018-03-07 RX ADMIN — DEXTROSE MONOHYDRATE SCH MLS/HR: 5 INJECTION, SOLUTION INTRAVENOUS at 17:29

## 2018-03-07 RX ADMIN — CARVEDILOL SCH MG: 12.5 TABLET, FILM COATED ORAL at 07:59

## 2018-03-07 RX ADMIN — INSULIN ASPART SCH UNIT: 100 INJECTION, SOLUTION INTRAVENOUS; SUBCUTANEOUS at 18:13

## 2018-03-07 RX ADMIN — CEFAZOLIN SCH MLS/HR: 330 INJECTION, POWDER, FOR SOLUTION INTRAMUSCULAR; INTRAVENOUS at 12:12

## 2018-03-07 RX ADMIN — INSULIN ASPART SCH UNIT: 100 INJECTION, SOLUTION INTRAVENOUS; SUBCUTANEOUS at 20:24

## 2018-03-07 RX ADMIN — SPIRONOLACTONE SCH MG: 25 TABLET, FILM COATED ORAL at 08:00

## 2018-03-07 RX ADMIN — CARVEDILOL SCH MG: 12.5 TABLET, FILM COATED ORAL at 17:29

## 2018-03-07 NOTE — P.PN
Subjective


Progress Note Date: 03/07/18


Principal diagnosis: 


Acute pancreatitis





73-year-old gentleman with a history of diabetes mellitus kidney disease 

obesity presented with acute abdominal pain elevated pancreatic liver enzymes 

consistent with acute pancreatitis.  No stones mentioned on ultrasound abdomen/

CT.  CT abdomen and pelvis without IV contrast reported appendicolith possible 

early appendicitis could not be excluded and he's been monitored closely by 

general surgery since admission.  





Afebrile.  Incontinent of urine.  Morning chemistries pending.  Hepatitis 

screen positive hepatitis C IgG antibody.





Surveillance CT abdomen and pelvis without contrast yesterday findings prior 

exam correlate to exclude appendicitis.  Interval development of small pleural 

effusions and local atelectasis.  Cardiomegaly.  Mild prominence of the 

pancreatic head.





Objective





- Vital Signs


Vital signs: 


 Vital Signs











Temp  97.7 F   03/07/18 07:00


 


Pulse  62   03/07/18 07:00


 


Resp  18   03/07/18 07:00


 


BP  186/84   03/07/18 07:00


 


Pulse Ox  92 L  03/07/18 07:00








 Intake & Output











 03/06/18 03/07/18 03/07/18





 18:59 06:59 18:59


 


Output Total 250  


 


Balance -250  


 


Weight 110 kg 110.2 kg 


 


Output:   


 


  Urine 250  


 


Other:   


 


  Voiding Method Urinal Toilet Urinal





  Urinal Incontinent


 


  # Voids  1 














- Exam


General appearance: The patient is alert,  in no acute distress.  Jaundice.


HET: Head is normocephalic and atraumatic.  Pupils are equal and reactive.  

Sclerae icterus.  Oropharynx is clear without lesions.


Neck: Supple without lymphadenopathy.  Trachea midline.


Heart: S1 S2.  Regular rate and rhythm.


Lungs: Poor inspiratory effort.  Diminished in bases bilaterally.  No crackles 

or wheezes are heard.


Abdomen: Soft, nontender, with  bowel sounds.  No peritoneal signs.  No 

palpable organomegaly or masses.


Extremities: Normal skin color and turgor.  No cyanosis, rash, ulceration, 

clubbing, or edema.  Radial and pedal pulses are 2/4 bilaterally.


Neurological: No focal deficits.  Strength and sensation are grossly intact.








- Labs


CBC & Chem 7: 


 03/05/18 08:51





 03/06/18 07:05


Labs: 


 Abnormal Lab Results - Last 24 Hours (Table)











  03/06/18 03/06/18 03/06/18 Range/Units





  07:05 10:20 11:04 


 


Sodium  133 L    (137-145)  mmol/L


 


Potassium  5.5 H    (3.5-5.1)  mmol/L


 


BUN  45 H    (9-20)  mg/dL


 


Creatinine  2.13 H    (0.66-1.25)  mg/dL


 


Glucose  264 H    (74-99)  mg/dL


 


POC Glucose (mg/dL)    231 H  (75-99)  mg/dL


 


Total Bilirubin  5.4 H    (0.2-1.3)  mg/dL


 


Conjugated Bilirubin  2.8 H    (0.0-0.3)  mg/dL


 


Delta Bilirubin  2.0 H    (0.0-0.2)  mg/dL


 


AST  77 H    (17-59)  U/L


 


Alkaline Phosphatase  347 H    ()  U/L


 


Albumin  3.3 L    (3.5-5.0)  g/dL


 


Amylase  143 H    ()  U/L


 


Lipase  1044 H    ()  U/L


 


Hep C IgG Ab   Reactive H   (Non-Reactive)  














  03/06/18 03/06/18 03/07/18 Range/Units





  16:50 20:14 01:18 


 


Sodium     (137-145)  mmol/L


 


Potassium     (3.5-5.1)  mmol/L


 


BUN     (9-20)  mg/dL


 


Creatinine     (0.66-1.25)  mg/dL


 


Glucose     (74-99)  mg/dL


 


POC Glucose (mg/dL)  258 H  266 H  202 H  (75-99)  mg/dL


 


Total Bilirubin     (0.2-1.3)  mg/dL


 


Conjugated Bilirubin     (0.0-0.3)  mg/dL


 


Delta Bilirubin     (0.0-0.2)  mg/dL


 


AST     (17-59)  U/L


 


Alkaline Phosphatase     ()  U/L


 


Albumin     (3.5-5.0)  g/dL


 


Amylase     ()  U/L


 


Lipase     ()  U/L


 


Hep C IgG Ab     (Non-Reactive)  














  03/07/18 Range/Units





  07:50 


 


Sodium   (137-145)  mmol/L


 


Potassium   (3.5-5.1)  mmol/L


 


BUN   (9-20)  mg/dL


 


Creatinine   (0.66-1.25)  mg/dL


 


Glucose   (74-99)  mg/dL


 


POC Glucose (mg/dL)  187 H  (75-99)  mg/dL


 


Total Bilirubin   (0.2-1.3)  mg/dL


 


Conjugated Bilirubin   (0.0-0.3)  mg/dL


 


Delta Bilirubin   (0.0-0.2)  mg/dL


 


AST   (17-59)  U/L


 


Alkaline Phosphatase   ()  U/L


 


Albumin   (3.5-5.0)  g/dL


 


Amylase   ()  U/L


 


Lipase   ()  U/L


 


Hep C IgG Ab   (Non-Reactive)  








 Microbiology - Last 24 Hours (Table)











 03/05/18 13:20 Blood Culture - Preliminary





 Blood    No Growth after 24 hours














Assessment and Plan


(1) Acute pancreatitis


Narrative/Plan: 


73-year-old male admitted with acute abdominal pain elevated pancreatic and 

liver enzymes consistent with acute pancreatitis.  No mentioning of gallstones 

on ultrasound or CAT scan with worsening hyperbilirubinemia and creatinine.


Current Visit: Yes   Status: Acute   Code(s): K85.90 - ACUTE PANCREATITIS 

WITHOUT NECROSIS OR INFECTION, UNSP   SNOMED Code(s): 522084813


   





(2) Elevated serum creatinine


Narrative/Plan: 


Acute kidney injury most likely acute tubular necrosis nonoliguric


Current Visit: Yes   Status: Acute   Code(s): R79.89 - OTHER SPECIFIED ABNORMAL 

FINDINGS OF BLOOD CHEMISTRY   SNOMED Code(s): 061978621


   





(3) Elevated liver enzymes


Current Visit: Yes   Status: Acute   Code(s): R74.8 - ABNORMAL LEVELS OF OTHER 

SERUM ENZYMES   SNOMED Code(s): 556255121


   





(4) Jaundice


Narrative/Plan: 


Elevated bilirubin possibly from peripancreatic edema


Current Visit: Yes   Status: Acute   Code(s): R17 - UNSPECIFIED JAUNDICE   

SNOMED Code(s): 06025570


   





(5) Appendicolith


Narrative/Plan: 


Possible early acute appendicitis being monitored closely by general surgery 

presently without fever or abdominal pain.


Current Visit: Yes   Status: Acute   Code(s): K38.9 - DISEASE OF APPENDIX, 

UNSPECIFIED   SNOMED Code(s): 35911187


   





(6) Obesity (BMI 30-39.9)


Current Visit: Yes   Status: Chronic   Code(s): E66.9 - OBESITY, UNSPECIFIED   

SNOMED Code(s): 355551693


   





(7) Hyperkalemia


Current Visit: Yes   Status: Acute   Code(s): E87.5 - HYPERKALEMIA   SNOMED Code

(s): 16486130


   





(8) Diabetes mellitus


Current Visit: Yes   Status: Chronic   Code(s): E11.9 - TYPE 2 DIABETES 

MELLITUS WITHOUT COMPLICATIONS   SNOMED Code(s): 95615282


   


Plan: 


1.  Hepatitis screen reviewed.  Morning chemistries including CA 19-9 pending.  

Consideration for MRCP today after review of morning chemistries.


2.  Continue with Intravenous hydration with electrolyte monitoring.  

Nephrology recommendations reviewed and appreciated.


3.  Nothing by mouth except medications.  Case was discussed with Dr. Goldsmith and  

general surgery nurse practitioner Kajal Burks.


4.  Will follow closely with you.





Assessment and plan a care discussed with Dr. Murcia

## 2018-03-07 NOTE — P.PN
Subjective


Progress Note Date: 03/07/18


Principal diagnosis: 





Abdominal pain.








Patient was asking to go home today. He denied having any chest pain or 

shortness of breath. No abdominal pain, no nausea or vomiting.





Objective





- Vital Signs


Vital signs: 


 Vital Signs











Temp  97.7 F   03/07/18 07:00


 


Pulse  62   03/07/18 07:00


 


Resp  18   03/07/18 07:00


 


BP  186/84   03/07/18 07:00


 


Pulse Ox  92 L  03/07/18 07:00








 Intake & Output











 03/06/18 03/07/18 03/07/18





 18:59 06:59 18:59


 


Output Total 250  


 


Balance -250  


 


Weight 110 kg 110.2 kg 


 


Output:   


 


  Urine 250  


 


Other:   


 


  Voiding Method Urinal Toilet Urinal





  Urinal Incontinent


 


  # Voids  1 














- Labs


CBC & Chem 7: 


 03/07/18 08:33





 03/07/18 08:28


Labs: 


 Abnormal Lab Results - Last 24 Hours (Table)











  03/06/18 03/06/18 03/06/18 Range/Units





  10:20 16:50 20:14 


 


RBC     (4.30-5.90)  m/uL


 


Hgb     (13.0-17.5)  gm/dL


 


Hct     (39.0-53.0)  %


 


Plt Count     (150-450)  k/uL


 


Lymphocytes #     (1.0-4.8)  k/uL


 


Sodium     (137-145)  mmol/L


 


BUN     (9-20)  mg/dL


 


Creatinine     (0.66-1.25)  mg/dL


 


Glucose     (74-99)  mg/dL


 


POC Glucose (mg/dL)   258 H  266 H  (75-99)  mg/dL


 


Total Bilirubin     (0.2-1.3)  mg/dL


 


Conjugated Bilirubin     (0.0-0.3)  mg/dL


 


Delta Bilirubin     (0.0-0.2)  mg/dL


 


Alkaline Phosphatase     ()  U/L


 


Albumin     (3.5-5.0)  g/dL


 


Lipase     ()  U/L


 


Hep C IgG Ab  Reactive H    (Non-Reactive)  














  03/07/18 03/07/18 03/07/18 Range/Units





  01:18 07:50 08:28 


 


RBC     (4.30-5.90)  m/uL


 


Hgb     (13.0-17.5)  gm/dL


 


Hct     (39.0-53.0)  %


 


Plt Count     (150-450)  k/uL


 


Lymphocytes #     (1.0-4.8)  k/uL


 


Sodium    135 L  (137-145)  mmol/L


 


BUN    46 H  (9-20)  mg/dL


 


Creatinine    1.96 H  (0.66-1.25)  mg/dL


 


Glucose    189 H  (74-99)  mg/dL


 


POC Glucose (mg/dL)  202 H  187 H   (75-99)  mg/dL


 


Total Bilirubin    5.1 H  (0.2-1.3)  mg/dL


 


Conjugated Bilirubin    2.7 H  (0.0-0.3)  mg/dL


 


Delta Bilirubin    2.0 H  (0.0-0.2)  mg/dL


 


Alkaline Phosphatase    326 H  ()  U/L


 


Albumin    3.0 L  (3.5-5.0)  g/dL


 


Lipase    1150 H  ()  U/L


 


Hep C IgG Ab     (Non-Reactive)  














  03/07/18 03/07/18 Range/Units





  08:33 12:08 


 


RBC  4.02 L   (4.30-5.90)  m/uL


 


Hgb  12.3 L   (13.0-17.5)  gm/dL


 


Hct  37.3 L   (39.0-53.0)  %


 


Plt Count  107 L   (150-450)  k/uL


 


Lymphocytes #  0.8 L   (1.0-4.8)  k/uL


 


Sodium    (137-145)  mmol/L


 


BUN    (9-20)  mg/dL


 


Creatinine    (0.66-1.25)  mg/dL


 


Glucose    (74-99)  mg/dL


 


POC Glucose (mg/dL)   192 H  (75-99)  mg/dL


 


Total Bilirubin    (0.2-1.3)  mg/dL


 


Conjugated Bilirubin    (0.0-0.3)  mg/dL


 


Delta Bilirubin    (0.0-0.2)  mg/dL


 


Alkaline Phosphatase    ()  U/L


 


Albumin    (3.5-5.0)  g/dL


 


Lipase    ()  U/L


 


Hep C IgG Ab    (Non-Reactive)  








 Microbiology - Last 24 Hours (Table)











 03/05/18 13:20 Blood Culture - Preliminary





 Blood    No Growth after 24 hours














Assessment and Plan


Plan: 





(1) Acute pancreatitis/Hyperbilirubinemia


* Likely alcohol induced pancreatitis


* Lipase plateaued today. 


* Continue with clear liquids IV fluids, morphine and Zofran


* Will discuss with GI.  No gallstones were found on computed tomography scan, 

Will possibly require ERCP versus MRCP.


   


(2) Acute appendicitis


* Was initially suspected because of the presence of appendicolith on CT


* Surgery service doesn't think patient has appendicitis, no plans for 

appendicectomy


* Despite having an appendicolith on CT exam appears that his abdominal pain is 

more due to his pancreatitis rather than any acute appendicitis the patient 

does not have a surgical abdomen on exam





(3) Type 2 diabetes mellitus with peripheral neuropathy


* Blood sugars are still slightly elevated 


* A1c 10.6


* Basal insulin Levemir was started, initially received 10 units, will increase 

to 12 units at at bedtime and continue with correctional scale   





(4) Essential hypertension


* Still slightly uncontrolled


* Norvasc added to his regimen, continue Aldactone and Coreg   





(5) Acute renal failure


* Acute kidney injury with mild hyperkalemia and metabolic acidosis, due to 

possible sepsis?? vs medication induced from increased dose of Cozaar


* Creatinine improved today


* Awaiting nephrology recommendations


* Continue IV fluids

## 2018-03-07 NOTE — PN
PROGRESS NOTE



Patient is seen for followup for acute kidney injury.  This morning he is 
complaining

of more shortness of breath.  He has been maintained on IV fluids overnight.  
Patient

denies any chest pain.  No significant abdominal pain.  He states he has been 
voiding

well.



PHYSICAL EXAMINATION:

Blood pressure was 186/84, heart rate 62 per minute.  He is afebrile.

Examination of the heart, S1, S2.  Examination of the lungs, bilateral basal 
crackles

are heard.  Abdomen is soft.  No significant tenderness noted. Examination of 
lower

extremities shows trace edema bilaterally.  CNS exam is grossly intact. Patient 
moving

all 4 extremities.



LABS:

Show sodium 135, potassium 5.0, BUN 46, serum creatinine 1.96, hemoglobin 12.3 g
/dL.

Lipase is 1150.



ASSESSMENT:

1. Acute kidney injury secondary to ATN with acute pancreatitis.  patient is 
currently

    nonoliguric.  A bladder scan will be done to rule out any underlying urine

    retention.  patient has been maintained on IV fluids.  However, he is more 
short of

    breath today.  I will decrease the fluids and give him one dose of IV Lasix.

2. Cardiomyopathy with ejection fraction of 20% mainly systolic heart failure 
with

    exacerbation currently, decrease IV fluids and Lasix x1 now.

3. Acute pancreatitis with no evidence of biliary obstruction noted on the CAT 
scan,

    being followed by GI and Surgery.

4. Hyperkalemia associated with acute kidney injury and hyperglycemia, currently

    improved with improved blood sugars.

5. Type 2 diabetes.



PLAN:

Decrease IV fluids.  Lasix x1.  Continue to hold off on angiotensin receptor 
blockers

and repeat labs in a.m.  Check bladder scan, rule out urine retention.





MMODL / IJN: [id number] / Job#: [id number]

[facility]

## 2018-03-08 LAB
ALBUMIN SERPL-MCNC: 3.1 G/DL (ref 3.5–5)
ALBUMIN SERPL-MCNC: 3.2 G/DL (ref 3.5–5)
ALP SERPL-CCNC: 304 U/L (ref 38–126)
ALP SERPL-CCNC: 307 U/L (ref 38–126)
ALT SERPL-CCNC: 41 U/L (ref 21–72)
ALT SERPL-CCNC: 42 U/L (ref 21–72)
AMYLASE SERPL-CCNC: 185 U/L (ref 30–110)
ANION GAP SERPL CALC-SCNC: 9 MMOL/L
ANION GAP SERPL CALC-SCNC: 9 MMOL/L
AST SERPL-CCNC: 56 U/L (ref 17–59)
AST SERPL-CCNC: 56 U/L (ref 17–59)
BASOPHILS # BLD AUTO: 0 K/UL (ref 0–0.2)
BASOPHILS NFR BLD AUTO: 0 %
BUN SERPL-SCNC: 50 MG/DL (ref 9–20)
BUN SERPL-SCNC: 52 MG/DL (ref 9–20)
CALCIUM SPEC-MCNC: 8.8 MG/DL (ref 8.4–10.2)
CALCIUM SPEC-MCNC: 8.8 MG/DL (ref 8.4–10.2)
CHLORIDE SERPL-SCNC: 106 MMOL/L (ref 98–107)
CHLORIDE SERPL-SCNC: 107 MMOL/L (ref 98–107)
CO2 BLDA-SCNC: 23 MMOL/L (ref 19–24)
CO2 SERPL-SCNC: 20 MMOL/L (ref 22–30)
CO2 SERPL-SCNC: 20 MMOL/L (ref 22–30)
EOSINOPHIL # BLD AUTO: 0.1 K/UL (ref 0–0.7)
EOSINOPHIL NFR BLD AUTO: 1 %
ERYTHROCYTE [DISTWIDTH] IN BLOOD BY AUTOMATED COUNT: 3.96 M/UL (ref 4.3–5.9)
ERYTHROCYTE [DISTWIDTH] IN BLOOD: 13.2 % (ref 11.5–15.5)
GLUCOSE BLD-MCNC: 129 MG/DL (ref 75–99)
GLUCOSE BLD-MCNC: 132 MG/DL (ref 75–99)
GLUCOSE BLD-MCNC: 151 MG/DL (ref 75–99)
GLUCOSE BLD-MCNC: 189 MG/DL (ref 75–99)
GLUCOSE SERPL-MCNC: 116 MG/DL (ref 74–99)
GLUCOSE SERPL-MCNC: 124 MG/DL (ref 74–99)
HCO3 BLDA-SCNC: 21 MMOL/L (ref 21–25)
HCO3 BLDA-SCNC: 22 MMOL/L (ref 21–25)
HCT VFR BLD AUTO: 37.7 % (ref 39–53)
HGB BLD-MCNC: 12.2 GM/DL (ref 13–17.5)
HYALINE CASTS UR QL AUTO: 3 /LPF (ref 0–2)
INR PPP: 1 (ref ?–1.2)
LIPASE SERPL-CCNC: 1194 U/L (ref 23–300)
LYMPHOCYTES # SPEC AUTO: 0.7 K/UL (ref 1–4.8)
LYMPHOCYTES NFR SPEC AUTO: 13 %
MAGNESIUM SPEC-SCNC: 2.2 MG/DL (ref 1.6–2.3)
MCH RBC QN AUTO: 30.8 PG (ref 25–35)
MCHC RBC AUTO-ENTMCNC: 32.4 G/DL (ref 31–37)
MCV RBC AUTO: 95.1 FL (ref 80–100)
MONOCYTES # BLD AUTO: 0.4 K/UL (ref 0–1)
MONOCYTES NFR BLD AUTO: 8 %
NEUTROPHILS # BLD AUTO: 3.8 K/UL (ref 1.3–7.7)
NEUTROPHILS NFR BLD AUTO: 75 %
PCO2 BLDA: 46 MMHG (ref 35–45)
PCO2 BLDA: 48 MMHG (ref 35–45)
PH BLDA: 7.27 [PH] (ref 7.35–7.45)
PH BLDA: 7.29 [PH] (ref 7.35–7.45)
PH UR: 5 [PH] (ref 5–8)
PLATELET # BLD AUTO: 80 K/UL (ref 150–450)
PO2 BLDA: 62 MMHG (ref 83–108)
PO2 BLDA: 75 MMHG (ref 83–108)
POTASSIUM SERPL-SCNC: 4.9 MMOL/L (ref 3.5–5.1)
POTASSIUM SERPL-SCNC: 5.2 MMOL/L (ref 3.5–5.1)
PROT SERPL-MCNC: 6.9 G/DL (ref 6.3–8.2)
PROT SERPL-MCNC: 6.9 G/DL (ref 6.3–8.2)
PT BLD: 10.1 SEC (ref 9–12)
RBC UR QL: 19 /HPF (ref 0–5)
SODIUM SERPL-SCNC: 135 MMOL/L (ref 137–145)
SODIUM SERPL-SCNC: 136 MMOL/L (ref 137–145)
SP GR UR: 1.01 (ref 1–1.03)
UROBILINOGEN UR QL STRIP: <2 MG/DL (ref ?–2)
WBC # BLD AUTO: 5.1 K/UL (ref 3.8–10.6)
WBC #/AREA URNS HPF: 1 /HPF (ref 0–5)

## 2018-03-08 RX ADMIN — ISOSORBIDE MONONITRATE SCH MG: 30 TABLET, EXTENDED RELEASE ORAL at 08:06

## 2018-03-08 RX ADMIN — INSULIN ASPART SCH UNIT: 100 INJECTION, SOLUTION INTRAVENOUS; SUBCUTANEOUS at 20:59

## 2018-03-08 RX ADMIN — HEPARIN SODIUM SCH UNIT: 5000 INJECTION, SOLUTION INTRAVENOUS; SUBCUTANEOUS at 20:59

## 2018-03-08 RX ADMIN — INSULIN ASPART SCH: 100 INJECTION, SOLUTION INTRAVENOUS; SUBCUTANEOUS at 08:06

## 2018-03-08 RX ADMIN — PANTOPRAZOLE SODIUM SCH MG: 40 INJECTION, POWDER, FOR SOLUTION INTRAVENOUS at 08:10

## 2018-03-08 RX ADMIN — INSULIN ASPART SCH: 100 INJECTION, SOLUTION INTRAVENOUS; SUBCUTANEOUS at 16:25

## 2018-03-08 RX ADMIN — INSULIN ASPART SCH: 100 INJECTION, SOLUTION INTRAVENOUS; SUBCUTANEOUS at 12:27

## 2018-03-08 RX ADMIN — CARVEDILOL SCH MG: 12.5 TABLET, FILM COATED ORAL at 17:13

## 2018-03-08 RX ADMIN — CEFAZOLIN SCH: 330 INJECTION, POWDER, FOR SOLUTION INTRAMUSCULAR; INTRAVENOUS at 02:06

## 2018-03-08 RX ADMIN — DEXTROSE MONOHYDRATE SCH MLS/HR: 5 INJECTION, SOLUTION INTRAVENOUS at 08:06

## 2018-03-08 RX ADMIN — INSULIN DETEMIR SCH UNIT: 100 INJECTION, SOLUTION SUBCUTANEOUS at 20:59

## 2018-03-08 RX ADMIN — HEPARIN SODIUM SCH: 5000 INJECTION, SOLUTION INTRAVENOUS; SUBCUTANEOUS at 16:21

## 2018-03-08 RX ADMIN — CARVEDILOL SCH MG: 12.5 TABLET, FILM COATED ORAL at 08:06

## 2018-03-08 RX ADMIN — ENOXAPARIN SODIUM SCH: 40 INJECTION SUBCUTANEOUS at 08:06

## 2018-03-08 NOTE — P.PN
Subjective


Progress Note Date: 03/08/18


Principal diagnosis: 


Acute pancreatitis





73-year-old gentleman with a history of diabetes mellitus kidney disease 

obesity presented with acute abdominal pain elevated pancreatic liver enzymes 

consistent with acute pancreatitis.  No stones mentioned on ultrasound abdomen/

CT.  CT abdomen and pelvis without IV contrast reported appendicolith possible 

early appendicitis could not be excluded and he's been monitored closely by 

general surgery since admission.  





Afebrile.  Incontinent of urine.  Increased creatinine over the last few days 

evaluation by nephrology completed; creatinine 2.1 today.  Slow improvement in 

liver enzymes; total bilirubin 4.3.  AST 56.  ALT 41.  Alkaline phosphatase 

304.  Lipase 1194.  Amylase 185.  CA-19-9 50.6.  Hepatitis screen positive 

hepatitis C IgG antibody.





Surveillance CT abdomen and pelvis without contrast 2 days ago reported 

findings prior exam correlate to exclude appendicitis.  Interval development of 

small pleural effusions and local atelectasis.  Cardiomegaly.  Mild prominence 

of the pancreatic head.





MRI unable to be completed secondary to history of pacemaker.  Patient is more 

agitated this morning removed his Castro catheter with balloon intact.  No 

passage of stool or flatus.





Abdominal x-rays left lower lobe pneumonia correlation with minimal left 

pleural effusion is not excluded.  Unremarkable abdomen.  Cardiomegaly.





Objective





- Vital Signs


Vital signs: 


 Vital Signs











Temp  98.6 F   03/08/18 07:00


 


Pulse  65   03/08/18 07:00


 


Resp  19   03/08/18 07:00


 


BP  177/91   03/08/18 07:00


 


Pulse Ox  95   03/08/18 07:00








 Intake & Output











 03/07/18 03/08/18 03/08/18





 18:59 06:59 18:59


 


Intake Total 650 120 


 


Output Total  75 


 


Balance 650 45 


 


Weight  110 kg 110 kg


 


Intake:   


 


    


 


    0.9@100 400  


 


  Intake, IV Titration 250  





  Amount   


 


    Piperacillin-Tazobactam 3 50  





    .375 gm In Dextrose/Water   





    1 50ml.bag @ 12.5 mls/hr   





    IVPB Q8HR ANTOLIN Rx#:   





    [id number]   


 


    Sodium Chloride 0.9% 1, 200  





    000 ml @ 50 mls/hr IV .   





    Q20H ANTOLIN Rx#:[id number]   


 


  Oral  120 


 


Output:   


 


  Urine  75 


 


Other:   


 


  Voiding Method Urinal Urinal 





 Incontinent Incontinent 


 


  # Voids  2 














- Exam


General appearance: The patient is alert, agitated in no acute distress.  

Jaundice.


HET: Head is normocephalic and atraumatic.  Pupils are equal and reactive.  

Sclerae icterus.  Oropharynx is clear without lesions.


Neck: Supple without lymphadenopathy.  Trachea midline.


Heart: S1 S2.  Regular rate and rhythm.


Lungs: Poor inspiratory effort.  Diminished in bases bilaterally.  No crackles 

or wheezes are heard.


Abdomen: Soft, nontender, with  bloatedness distention hypoactive bowel sounds.

  No peritoneal signs.  No palpable organomegaly or masses.


Extremities: Normal skin color and turgor.  No cyanosis, rash, ulceration, 

clubbing, or edema.  Radial and pedal pulses are 2/4 bilaterally.  Castro 

removed a clear yellow urine in collection container.


Neurological: No focal deficits.  Strength and sensation are grossly intact.








- Labs


CBC & Chem 7: 


 03/08/18 04:06





 03/08/18 07:56


Labs: 


 Abnormal Lab Results - Last 24 Hours (Table)











  03/07/18 03/07/18 03/07/18 Range/Units





  07:41 12:08 17:26 


 


RBC     (4.30-5.90)  m/uL


 


Hgb     (13.0-17.5)  gm/dL


 


Hct     (39.0-53.0)  %


 


Plt Count     (150-450)  k/uL


 


Lymphocytes #     (1.0-4.8)  k/uL


 


ABG pH     (7.35-7.45)  


 


ABG pCO2     (35-45)  mmHg


 


ABG pO2     ()  mmHg


 


ABG O2 Saturation     (94-97)  %


 


Sodium     (137-145)  mmol/L


 


Potassium     (3.5-5.1)  mmol/L


 


Carbon Dioxide     (22-30)  mmol/L


 


BUN     (9-20)  mg/dL


 


Creatinine     (0.66-1.25)  mg/dL


 


Glucose     (74-99)  mg/dL


 


POC Glucose (mg/dL)   192 H  285 H  (75-99)  mg/dL


 


Plasma Lactic Acid Buddy     (0.7-2.0)  mmol/L


 


Total Bilirubin     (0.2-1.3)  mg/dL


 


Alkaline Phosphatase     ()  U/L


 


Albumin     (3.5-5.0)  g/dL


 


Amylase     ()  U/L


 


Lipase     ()  U/L


 


CA 19-9 Antigen  50.6 H    (0.0-34.9)  U/mL














  03/07/18 03/08/18 03/08/18 Range/Units





  20:02 04:04 04:06 


 


RBC     (4.30-5.90)  m/uL


 


Hgb     (13.0-17.5)  gm/dL


 


Hct     (39.0-53.0)  %


 


Plt Count     (150-450)  k/uL


 


Lymphocytes #     (1.0-4.8)  k/uL


 


ABG pH   7.27 L   (7.35-7.45)  


 


ABG pCO2   48 H   (35-45)  mmHg


 


ABG pO2   62 L   ()  mmHg


 


ABG O2 Saturation   91.0 L   (94-97)  %


 


Sodium    135 L  (137-145)  mmol/L


 


Potassium     (3.5-5.1)  mmol/L


 


Carbon Dioxide    20 L  (22-30)  mmol/L


 


BUN    50 H  (9-20)  mg/dL


 


Creatinine    2.10 H  (0.66-1.25)  mg/dL


 


Glucose    116 H  (74-99)  mg/dL


 


POC Glucose (mg/dL)  276 H    (75-99)  mg/dL


 


Plasma Lactic Acid Buddy     (0.7-2.0)  mmol/L


 


Total Bilirubin    4.5 H  (0.2-1.3)  mg/dL


 


Alkaline Phosphatase    307 H  ()  U/L


 


Albumin    3.2 L  (3.5-5.0)  g/dL


 


Amylase     ()  U/L


 


Lipase     ()  U/L


 


CA 19-9 Antigen     (0.0-34.9)  U/mL














  03/08/18 03/08/18 03/08/18 Range/Units





  04:06 04:06 07:17 


 


RBC   3.96 L   (4.30-5.90)  m/uL


 


Hgb   12.2 L   (13.0-17.5)  gm/dL


 


Hct   37.7 L   (39.0-53.0)  %


 


Plt Count   80 L   (150-450)  k/uL


 


Lymphocytes #   0.7 L   (1.0-4.8)  k/uL


 


ABG pH     (7.35-7.45)  


 


ABG pCO2     (35-45)  mmHg


 


ABG pO2     ()  mmHg


 


ABG O2 Saturation     (94-97)  %


 


Sodium     (137-145)  mmol/L


 


Potassium     (3.5-5.1)  mmol/L


 


Carbon Dioxide     (22-30)  mmol/L


 


BUN     (9-20)  mg/dL


 


Creatinine     (0.66-1.25)  mg/dL


 


Glucose     (74-99)  mg/dL


 


POC Glucose (mg/dL)    132 H  (75-99)  mg/dL


 


Plasma Lactic Acid Buddy  0.6 L    (0.7-2.0)  mmol/L


 


Total Bilirubin     (0.2-1.3)  mg/dL


 


Alkaline Phosphatase     ()  U/L


 


Albumin     (3.5-5.0)  g/dL


 


Amylase     ()  U/L


 


Lipase     ()  U/L


 


CA 19-9 Antigen     (0.0-34.9)  U/mL














  03/08/18 03/08/18 03/08/18 Range/Units





  07:46 07:47 07:56 


 


RBC     (4.30-5.90)  m/uL


 


Hgb     (13.0-17.5)  gm/dL


 


Hct     (39.0-53.0)  %


 


Plt Count     (150-450)  k/uL


 


Lymphocytes #     (1.0-4.8)  k/uL


 


ABG pH   7.29 L   (7.35-7.45)  


 


ABG pCO2   46 H   (35-45)  mmHg


 


ABG pO2   75 L   ()  mmHg


 


ABG O2 Saturation     (94-97)  %


 


Sodium    136 L  (137-145)  mmol/L


 


Potassium    5.2 H  (3.5-5.1)  mmol/L


 


Carbon Dioxide    20 L  (22-30)  mmol/L


 


BUN    52 H  (9-20)  mg/dL


 


Creatinine    2.18 H  (0.66-1.25)  mg/dL


 


Glucose    124 H  (74-99)  mg/dL


 


POC Glucose (mg/dL)     (75-99)  mg/dL


 


Plasma Lactic Acid Buddy     (0.7-2.0)  mmol/L


 


Total Bilirubin    4.3 H  (0.2-1.3)  mg/dL


 


Alkaline Phosphatase    304 H  ()  U/L


 


Albumin    3.1 L  (3.5-5.0)  g/dL


 


Amylase  185 H    ()  U/L


 


Lipase  1194 H    ()  U/L


 


CA 19-9 Antigen     (0.0-34.9)  U/mL








 Microbiology - Last 24 Hours (Table)











 03/06/18 09:32 Urine Culture - Preliminary





 Urine,Voided 


 


 03/05/18 13:20 Blood Culture - Preliminary





 Blood    No Growth after 48 hours














Assessment and Plan


(1) Acute pancreatitis


Narrative/Plan: 


73-year-old male admitted with acute abdominal pain elevated pancreatic and 

liver enzymes consistent with acute moderate pancreatitis.  No mentioning of 

gallstones on ultrasound or CAT scan with elevated hyperbilirubinemia and 

creatinine.


Current Visit: Yes   Status: Acute   Code(s): K85.90 - ACUTE PANCREATITIS 

WITHOUT NECROSIS OR INFECTION, UNSP   SNOMED Code(s): 405084746


   





(2) Elevated serum creatinine


Narrative/Plan: 


Acute kidney injury most likely acute tubular necrosis nonoliguric


Current Visit: Yes   Status: Acute   Code(s): R79.89 - OTHER SPECIFIED ABNORMAL 

FINDINGS OF BLOOD CHEMISTRY   SNOMED Code(s): 924279291


   





(3) Elevated liver enzymes


Current Visit: Yes   Status: Acute   Code(s): R74.8 - ABNORMAL LEVELS OF OTHER 

SERUM ENZYMES   SNOMED Code(s): 520226341


   





(4) Jaundice


Narrative/Plan: 


Elevated bilirubin suspect from peripancreatic edema slowly improving


Current Visit: Yes   Status: Acute   Code(s): R17 - UNSPECIFIED JAUNDICE   

SNOMED Code(s): 99786784


   





(5) Appendicolith


Narrative/Plan: 


Possible early acute appendicitis being monitored closely by general surgery 

presently without fever or abdominal pain.


Current Visit: Yes   Status: Acute   Code(s): K38.9 - DISEASE OF APPENDIX, 

UNSPECIFIED   SNOMED Code(s): 39466888


   





(6) Obesity (BMI 30-39.9)


Current Visit: Yes   Status: Chronic   Code(s): E66.9 - OBESITY, UNSPECIFIED   

SNOMED Code(s): 110244997


   





(7) Hyperkalemia


Current Visit: Yes   Status: Acute   Code(s): E87.5 - HYPERKALEMIA   SNOMED Code

(s): 01156873


   





(8) Diabetes mellitus


Current Visit: Yes   Status: Chronic   Code(s): E11.9 - TYPE 2 DIABETES 

MELLITUS WITHOUT COMPLICATIONS   SNOMED Code(s): 50095537


   





(9) Hepatitis C antibody test positive


Current Visit: Yes   Status: Acute   Code(s): R76.8 - OTHER SPECIFIED ABNORMAL 

IMMUNOLOGICAL FINDINGS IN SERUM   SNOMED Code(s): 350952949


   


Plan: 


1.  Additional hepatitis C serology pending.  Hepatitis screen reviewed.  AFP 

requested.  Not candidate for MRCP secondary to pacemaker.  Clinically ERCP is 

not indicated at this time total bilirubin is slowly improving but also 

consideration for patient's poor inspiratory effort required BiPAP earlier this 

morning as well as large body habitus would certainly compromise his pulmonary 

status with prone positioning for ERCP. These recommendations were discussed 

with general surgeon Dr. Rafi Ballard as well as attending Dr. Parson.


2.  Continue with Intravenous hydration with electrolyte monitoring.  

Nephrology recommendations reviewed and appreciated.


3.  Dulcolax suppository ordered.  Clear liquid diet as tolerated.  Parenteral 

nutrition was discussed with attending if patient is not able to tolerate a 

diet within the next 24-48 hours.  


4.  Will follow closely with you.





Assessment and plan a care discussed with Dr. Murcia

## 2018-03-08 NOTE — PN
PROGRESS NOTE



Patient is seen for followup for acute kidney injury. Overnight  the patient was quite

short of breath.  He had a Castro catheter placed.  However, he had only about 75 mL of

urine output.  Patient received another extra dose of Lasix last night.  He did go down

for an x-ray which showed evidence of left lower lobe infiltrate. There was a prominent

interstitial markings consistent with pulmonary edema on the x-ray done at 3:30 am.

The patient has been refusing BiPAP.  This morning I gave him 80 mg of Lasix IV push

and he has diuresed.  According to nursing staff, he is breathing easier.



EXAMINATION:

Blood pressure is 177/91, heart rate 65 per minute. Patient is afebrile.  Examination

of the heart: S1, S2.  Examination of lungs: Bilateral breath sounds are heard.

Crackles noted at the bases.  Abdomen is soft, distended, nontender.  Examination lower

extremities shows trace edema bilaterally.  CNS exam is grossly intact.



LABS:

Sodium 136, potassium 5.2, chloride 107, BUN 62, serum creatinine 2.1, lipase is 1194.



ASSESSMENT:

1. Acute kidney injury, acute tubular necrosis, currently nonoliguric.  Responded to

    higher dose of Lasix.  Will repeat another dose of Lasix this evening and continue

    off of IV fluids.  There are no nephrotoxic agents on board and continue with the

    indwelling Castro catheter.  I will switch the Protonix to Pepcid.

2. Volume overload, responded to 80 mg of Lasix this morning.  We will repeat another

    dose in the evening.  Continue off of IV fluids.

3. Hypertension, maintained on Coreg.  Add hydralazine if blood pressure remains

    elevated.

4. Acute pancreatitis with serum lipase still remaining elevated.  No evidence of

    biliary obstruction noted on CT scan.

5. Mild hyperkalemia.  Expect improvement with increased diuresis.  A MRI CT was

    initially planned, but cannot be done as the patient has had a pacemaker.

6. Positive hepatitis C antibody.



PLAN:

Continue off of IV fluids.  Repeat Lasix this evening.  Monitor blood pressure more

closely.  Consider switching Protonix to IV Pepcid.





MMODL / IJN: [id number] / Job#: [id number]

## 2018-03-08 NOTE — XR
EXAMINATION TYPE: XR abdomen acute w cxr

 

DATE OF EXAM: 3/8/2018

 

COMPARISON: NONE

 

HISTORY: Distended abdomen difficulty breathing

 

TECHNIQUE: Acute abdominal series performed with a frontal chest upright and supine views of the abdo
men.

 

FINDINGS: Heart size is mildly prominent. A left lower lobe infiltrate is likely present. Pacemaker o
verlies left chest. No free air is under the diaphragm.

 

Normal colonic bowel gas is present. No mass effect is evident. Psoas margins faintly visualized are 
normal.

 

IMPRESSION:

1.  Clinical correlation recommended for left lower lobe pneumonia. Minimal left pleural effusion is 
not excluded.

2. Cardiomegaly.

3. Unremarkable abdomen.

## 2018-03-08 NOTE — P.PN
Progress Note - Text


Progress Note Date: 03/08/18





I was called to evaluate the patient for SOB. Pt with acute pancreatitis and 

appendicitis, CHF wit hEF 20%, developing ESTEVAN. SOB since this am and earlier 

received Lasix and fluid decreased to 100 cc/hr. 1 hr ago received 40 mg Lasix 

IV and fluid changed to KVO. No urine output since then and bladder scan 

showing only minimal urine. 


Pt was in bed, laying flat, awake,  looks in discomfort due to SOB. Denies any 

abdominal or chest pain. States no pain but only SOB. Conversational dyspnea 

obvious


on exam VS showed BP in 130s, HR in 90s-100, no O2 available


Heads showed facial puffiness


Respiratory: quick shallow breaths, diminished BS and crackles in the bases


CVS muffled, tachy, regular


Abdomen: soft, obese, BS diminished, no tenderness


Extr: 2+ edema b/l no warmth or tenderness





A/P:


Clearly pt is in fluid overload, CHF exacerbation


Concern for decreased urine output and ESTEVAN


Immediately will need BIPAP, BMP, Lactic acid and ABG, CXR, nitro paste, Castro 

catheter, EKG


He also may need dialysis if no urine output. I will contact nephrology if any 

significant derangements on BMP and no urine output in next hour. Discontinue 

Aldactone. 


Needs stepdown unit at least, but no beds available there or in ICU hence he 

will need to remain on the floor for now until bed available, will start him on 

telemetry.

## 2018-03-08 NOTE — P.PN
Subjective


Progress Note Date: 03/08/18


Principal diagnosis: 





patient is seen and examined in follow up for acute pancreatitis





Patient seen and examined today, night shift reported difficulty breathing 

along with diminished breath sounds and crackles for which she was started on 

Lasix and BiPAP.


Patient only made less than 75 mL of urine over 4 hours with Lasix IV 40 mg.


Currently patient is sitting in bed reports some trouble breathing but 

otherwise denies any chest pain or abdominal pain.  He denies any coughing 

headache or dizziness.  Patient probably has some component of dementia however 

he recognizes that he is at the hospital.


I discussed the case with Kajal Burks who came to bedside to address my concerns 

and reevaluate the abd which I found to be distended and probably causing some 

trouble breathing


Patient has used the BiPAP for short period and then took it off because he 

found uncomfortable





Objective





- Vital Signs


Vital signs: 


 Vital Signs











Temp  98.6 F   03/08/18 07:00


 


Pulse  65   03/08/18 07:00


 


Resp  19   03/08/18 07:00


 


BP  177/91   03/08/18 07:00


 


Pulse Ox  95   03/08/18 07:00








 Intake & Output











 03/07/18 03/08/18 03/08/18





 18:59 06:59 18:59


 


Intake Total 650 120 


 


Output Total  75 


 


Balance 650 45 


 


Weight  110 kg 


 


Intake:   


 


    


 


    0.9@100 400  


 


  Intake, IV Titration 250  





  Amount   


 


    Piperacillin-Tazobactam 3 50  





    .375 gm In Dextrose/Water   





    1 50ml.bag @ 12.5 mls/hr   





    IVPB Q8HR ANTOLIN Rx#:   





    [id number]   


 


    Sodium Chloride 0.9% 1, 200  





    000 ml @ 50 mls/hr IV .   





    Q20H ANTOLIN Rx#:[id number]   


 


  Oral  120 


 


Output:   


 


  Urine  75 


 


Other:   


 


  Voiding Method Urinal Urinal 





 Incontinent Incontinent 


 


  # Voids  2 














- Exam





Constitutional:   vital signs showing elevated blood pressure, oxygenation 

saturation 95% on 3 L nasal cannula right now, patient is using accessory 

muscle of respiration and at times is having shallow rapid breathing.


Eyes: Pupils equal round reactive to light, conjunctiva is edematous


Lungs: Clear to auscultation bilaterally, clear to percussion, patient using 

accessory muscles of respiration, there is some diminished breath sounds over 

the right lung base, no crackles no rales no wheezing 


Cardiovascular: Regular rate and rhythm, no murmurs, no gallops, no rubs, +1 

peripheral edema, and dependent edema and the abdominal flanks


Gastrointestinal: Abdomen is distended mainly at the epigastric and right upper 

quadrant, at times feels hard to the touch due to contraction off abdominal 

wall muscles, bowel sounds positive but sluggish, no tenderness to palpation, 

possible component of epigastric hernia


Extremities: No digital cyanosis or clubbing, peripheral pulses palpable and 

equal over bilateral radial arteries and dorsalis pedis artery,  no calf muscle 

tenderness, capillary refill is immediate over bilateral big toes


Psych: Alert, oriented to place, person, appropriate affect, following commands 

appropriately


Castro catheter in place, only 75 mL of urine was made over the past 5 hours, 

clear yellow urine














- Labs


CBC & Chem 7: 


 03/08/18 04:06





 03/08/18 07:56


Labs: 


 Abnormal Lab Results - Last 24 Hours (Table)











  03/07/18 03/07/18 03/07/18 Range/Units





  07:41 08:28 08:33 


 


RBC    4.02 L  (4.30-5.90)  m/uL


 


Hgb    12.3 L  (13.0-17.5)  gm/dL


 


Hct    37.3 L  (39.0-53.0)  %


 


Plt Count    107 L  (150-450)  k/uL


 


Lymphocytes #    0.8 L  (1.0-4.8)  k/uL


 


ABG pH     (7.35-7.45)  


 


ABG pCO2     (35-45)  mmHg


 


ABG pO2     ()  mmHg


 


ABG O2 Saturation     (94-97)  %


 


Sodium   135 L   (137-145)  mmol/L


 


Carbon Dioxide     (22-30)  mmol/L


 


BUN   46 H   (9-20)  mg/dL


 


Creatinine   1.96 H   (0.66-1.25)  mg/dL


 


Glucose   189 H   (74-99)  mg/dL


 


POC Glucose (mg/dL)     (75-99)  mg/dL


 


Plasma Lactic Acid Buddy     (0.7-2.0)  mmol/L


 


Total Bilirubin   5.1 H   (0.2-1.3)  mg/dL


 


Conjugated Bilirubin   2.7 H   (0.0-0.3)  mg/dL


 


Delta Bilirubin   2.0 H   (0.0-0.2)  mg/dL


 


Alkaline Phosphatase   326 H   ()  U/L


 


Albumin   3.0 L   (3.5-5.0)  g/dL


 


Lipase   1150 H   ()  U/L


 


CA 19-9 Antigen  50.6 H    (0.0-34.9)  U/mL














  03/07/18 03/07/18 03/07/18 Range/Units





  12:08 17:26 20:02 


 


RBC     (4.30-5.90)  m/uL


 


Hgb     (13.0-17.5)  gm/dL


 


Hct     (39.0-53.0)  %


 


Plt Count     (150-450)  k/uL


 


Lymphocytes #     (1.0-4.8)  k/uL


 


ABG pH     (7.35-7.45)  


 


ABG pCO2     (35-45)  mmHg


 


ABG pO2     ()  mmHg


 


ABG O2 Saturation     (94-97)  %


 


Sodium     (137-145)  mmol/L


 


Carbon Dioxide     (22-30)  mmol/L


 


BUN     (9-20)  mg/dL


 


Creatinine     (0.66-1.25)  mg/dL


 


Glucose     (74-99)  mg/dL


 


POC Glucose (mg/dL)  192 H  285 H  276 H  (75-99)  mg/dL


 


Plasma Lactic Acid Buddy     (0.7-2.0)  mmol/L


 


Total Bilirubin     (0.2-1.3)  mg/dL


 


Conjugated Bilirubin     (0.0-0.3)  mg/dL


 


Delta Bilirubin     (0.0-0.2)  mg/dL


 


Alkaline Phosphatase     ()  U/L


 


Albumin     (3.5-5.0)  g/dL


 


Lipase     ()  U/L


 


CA 19-9 Antigen     (0.0-34.9)  U/mL














  03/08/18 03/08/18 03/08/18 Range/Units





  04:04 04:06 04:06 


 


RBC     (4.30-5.90)  m/uL


 


Hgb     (13.0-17.5)  gm/dL


 


Hct     (39.0-53.0)  %


 


Plt Count     (150-450)  k/uL


 


Lymphocytes #     (1.0-4.8)  k/uL


 


ABG pH  7.27 L    (7.35-7.45)  


 


ABG pCO2  48 H    (35-45)  mmHg


 


ABG pO2  62 L    ()  mmHg


 


ABG O2 Saturation  91.0 L    (94-97)  %


 


Sodium   135 L   (137-145)  mmol/L


 


Carbon Dioxide   20 L   (22-30)  mmol/L


 


BUN   50 H   (9-20)  mg/dL


 


Creatinine   2.10 H   (0.66-1.25)  mg/dL


 


Glucose   116 H   (74-99)  mg/dL


 


POC Glucose (mg/dL)     (75-99)  mg/dL


 


Plasma Lactic Acid Buddy    0.6 L  (0.7-2.0)  mmol/L


 


Total Bilirubin   4.5 H   (0.2-1.3)  mg/dL


 


Conjugated Bilirubin     (0.0-0.3)  mg/dL


 


Delta Bilirubin     (0.0-0.2)  mg/dL


 


Alkaline Phosphatase   307 H   ()  U/L


 


Albumin   3.2 L   (3.5-5.0)  g/dL


 


Lipase     ()  U/L


 


CA 19-9 Antigen     (0.0-34.9)  U/mL














  03/08/18 03/08/18 03/08/18 Range/Units





  04:06 07:17 07:47 


 


RBC  3.96 L    (4.30-5.90)  m/uL


 


Hgb  12.2 L    (13.0-17.5)  gm/dL


 


Hct  37.7 L    (39.0-53.0)  %


 


Plt Count  80 L    (150-450)  k/uL


 


Lymphocytes #  0.7 L    (1.0-4.8)  k/uL


 


ABG pH    7.29 L  (7.35-7.45)  


 


ABG pCO2    46 H  (35-45)  mmHg


 


ABG pO2    75 L  ()  mmHg


 


ABG O2 Saturation     (94-97)  %


 


Sodium     (137-145)  mmol/L


 


Carbon Dioxide     (22-30)  mmol/L


 


BUN     (9-20)  mg/dL


 


Creatinine     (0.66-1.25)  mg/dL


 


Glucose     (74-99)  mg/dL


 


POC Glucose (mg/dL)   132 H   (75-99)  mg/dL


 


Plasma Lactic Acid Buddy     (0.7-2.0)  mmol/L


 


Total Bilirubin     (0.2-1.3)  mg/dL


 


Conjugated Bilirubin     (0.0-0.3)  mg/dL


 


Delta Bilirubin     (0.0-0.2)  mg/dL


 


Alkaline Phosphatase     ()  U/L


 


Albumin     (3.5-5.0)  g/dL


 


Lipase     ()  U/L


 


CA 19-9 Antigen     (0.0-34.9)  U/mL








 Microbiology - Last 24 Hours (Table)











 03/06/18 09:32 Urine Culture - Preliminary





 Urine,Voided 


 


 03/05/18 13:20 Blood Culture - Preliminary





 Blood    No Growth after 48 hours














Assessment and Plan


Assessment: 





73-year-old male with past medical history of congestive heart failure with 

left ventricular ejection fraction of 20%.  Patient patient presented to the 

hospital with abdominal pain found to have acute pancreatitis, he was also 

suspected to have appendicitis however that was ruled out by general surgery 

team.  GI is assisting in management of his pancreatitis however patient cannot 

give MRCP due to having a pacemaker.  Lipase has been trending down.  Patient 

denies any more abdominal pain nausea or vomiting.  He continues to be nothing 

by mouth.  Patient also developed acute kidney injury and currently he is 

oliguric Castro catheter was placed overnight for accurate ins and outs.  

Patient has developed respiratory distress today overnight which is thought to 

be due to fluid overload and heart failure he was given a dose of Lasix and was 

placed on BiPAP however there were no beds in the ICU or in the stepdown so 

patient could not be transferred.


Plan: 





# Acute renal failure, oliguric 


#. Metabolic acidosis secondary to acute renal failure


#. Shortness of breath , multifactorial , 


    1.  I believe Acute renal failure and acidosis is the main culprit here


     2.  acute exacerbation of CHF with LVEF of 20% s/p ICD due to fluid 

overload (oliguria and patient was receiving IVF for acute pancreatitis)


     3.  Distended abd which is not leaving much room for breathing





I discussed with nephrology consideration for urgent dialysis, they will 

evaluate


Continue with BiPAP at bedside to help with work of breathing, as obviously 

patient is trying to clear the CO2 to help with acidosis but he is getting 

tired.


Continue with close monitoring of vital signs, oxygen saturation, and urine 

output


Check acute abdominal series rule out any dilated loops of bowels due to 

distended abdomen


Case discussed with general surgery at bedside


Continue with KVO at this time due to fluid overload earlier


Patient could not be transferred to the ICU or stepdown due to unavailability 

of beds





#Hypertension, malignant with component of pulmonary edema


Systolic blood pressure in the high 170s with evidence of pulmonary edema 

earlier


Nitropaste in place


Patient to take morning meds including Coreg isoSorbide


Reevaluate blood pressure 1 hour after taking medications


I will add when necessary hydralazine for systolic blood pressures more than 170





#Diabetes mellitus with peripheral neuropathy


Continue with current insulin management


Blood sugars overall are ranging between 150s and low 200s





#Acute pancreatitis, improving


Lipase was trending down, currently patient denies any abdominal pain nausea or 

vomiting


Patient could not get MRCP done due to having pacemaker





#Hyperbilirubinemia, mostly of the conjugated type, liver enzymes are 

unremarkable except for elevated alkaline phosphatase


This is an obstructive jaundice picture, due to edema from acute pancreatitis


However patient is afebrile, no leukocytosis


GI and general surgery are following





DVT prophylaxis, discontinue Lovenox due to acute kidney injury switch to 

heparin sc TID





History of chronic hepatitis C





Thrombocytopenia most likely secondary to underlying hepatitis C


No evidence of active bleeding at this time








Patient is at moderate to high risk for any surgical intervention at this point 

due to acute CHF exacerbation with fluid overload and acute renal failure, and 

if not absolutely needed should be postponed at this point, per general surgery 

they think that the finding of fecalith in the appendix is an incidental 

finding and there is no enough evidence that the appendix is acutely inflamed, 

however patient did receive 5 days worth of antibiotics Zosyn and currently 

patient is not complaining of any fevers no leukocytosis and abdominal exam 

does not reveal any tenderness to palpation.  General surgery will continue to 

follow-up and update the plan as needed.  Meanwhile we will optimize his renal 

function and fluid overload status to get him in shape for any future plans.  





Discontinue Zosyn as general surgery ruled out acute appendicitis


If patient develops diarrhea consider checking C. diff


I will keep close monitoring of the patient today once bed available patient 

will at least need to be transferred to stepdown unit

## 2018-03-08 NOTE — XR
EXAM:

  XR Chest, 1 View

 

CLINICAL HISTORY:

  ITS.REASON XR Reason: sob

 

TECHNIQUE:

  Frontal view of the chest.

 

COMPARISON:

  Chest x-ray dated 3/3/2018.

 

FINDINGS:

  Lungs:  Reidentified patchy perihilar opacities and prominent 

interstitial structures compatible with pulmonary edema.  These findings 

appear similar to the prior exam.  There is worsened obscuration of the 

left hemidiaphragm.

  Pleural space:  Unremarkable.  No pneumothorax.

  Heart:  Reidentified severe cardiomegaly.

  Mediastinum:  Unremarkable.

  Bones/joints:  Unremarkable.

  Tubes, lines and devices:  3-lead AICD overlying the left chest wall.

 

IMPRESSION:     

1.  Reidentified patchy perihilar opacities and prominent interstitial 

structures compatible with pulmonary edema.  These findings appear 

similar to the prior exam.

2.  Worsened obscuration of the left hemidiaphragm that may represent 

atelectasis, pneumonia, or effusion.

## 2018-03-08 NOTE — P.PN
Subjective


Progress Note Date: 03/08/18





73-year-old male seen and examined this morning at the bedside.  Events noted 

during the night.  Patient reportedly was experiencing increase difficulty in 

breathing with diminished breath sounds  patient was given Lasix and placed on 

BiPAP.   This morning the lung fields are clear  abdomen remains distended  

firm with a few hypoactive bowel tones    Nursing reports they found Castro 

catheter with the balloon intact between the patient's legs patient was totally 

unaware that he had  pulled the Castro catheter out  was not experiencing any 

pain With the event   patient continues to adamantly deny any abdominal pain 

any nausea or vomiting.  With palpitation to the abdominal wall no facial 

grimacing.  Patients being followed by GI service.  Patient was scheduled for 

an MRI yesterday this was not able to be completed given the patient has a 

history of a pacemaker.





Objective





- Vital Signs


Vital signs: 


 Vital Signs











Temp  98.6 F   03/08/18 07:00


 


Pulse  65   03/08/18 08:00


 


Resp  19   03/08/18 08:00


 


BP  177/91   03/08/18 07:00


 


Pulse Ox  95   03/08/18 07:00








 Intake & Output











 03/07/18 03/08/18 03/08/18





 18:59 06:59 18:59


 


Intake Total 650 120 


 


Output Total  75 250


 


Balance 650 45 -250


 


Weight  110 kg 110 kg


 


Intake:   


 


    


 


    0.9@100 400  


 


  Intake, IV Titration 250  





  Amount   


 


    Piperacillin-Tazobactam 3 50  





    .375 gm In Dextrose/Water   





    1 50ml.bag @ 12.5 mls/hr   





    IVPB Q8HR ANTOLIN Rx#:   





    [id number]   


 


    Sodium Chloride 0.9% 1, 200  





    000 ml @ 50 mls/hr IV .   





    Q20H ANTOLIN Rx#:[id number]   


 


  Oral  120 


 


Output:   


 


  Urine  75 250


 


    Uretheral (Castro)   250


 


Other:   


 


  Voiding Method Urinal Urinal Urinal





 Incontinent Incontinent Indwelling Catheter


 


  # Voids  2 














- Exam





Physical exam:


73-year-old male more confused this morning and prior assessment oriented to 

self and place


Lungs posterior diminished at the bases not able to appreciate any crackles no 

cough noted currently on a nasal cannula at 3 L keeping a sat greater than 90% 

no cough noted


Heart S1-S2 audible and regular


Abdomen firm  distended with bloating active bowel tones noted patient is 

adamant and gets upset when questioning about abdominal pain stating stop 

asking that I don't have any pain in my abdomen no facial grimacing with 

palpitation to the abdominal wall no guarding no nausea no vomiting


Extremity no edema noted tenderness to the bilateral lower extreme





- Labs


CBC & Chem 7: 


 03/08/18 04:06





 03/08/18 07:56


Labs: 


 Abnormal Lab Results - Last 24 Hours (Table)











  03/07/18 03/07/18 03/07/18 Range/Units





  07:41 17:26 20:02 


 


RBC     (4.30-5.90)  m/uL


 


Hgb     (13.0-17.5)  gm/dL


 


Hct     (39.0-53.0)  %


 


Plt Count     (150-450)  k/uL


 


Lymphocytes #     (1.0-4.8)  k/uL


 


ABG pH     (7.35-7.45)  


 


ABG pCO2     (35-45)  mmHg


 


ABG pO2     ()  mmHg


 


ABG O2 Saturation     (94-97)  %


 


Sodium     (137-145)  mmol/L


 


Potassium     (3.5-5.1)  mmol/L


 


Carbon Dioxide     (22-30)  mmol/L


 


BUN     (9-20)  mg/dL


 


Creatinine     (0.66-1.25)  mg/dL


 


Glucose     (74-99)  mg/dL


 


POC Glucose (mg/dL)   285 H  276 H  (75-99)  mg/dL


 


Plasma Lactic Acid Buddy     (0.7-2.0)  mmol/L


 


Total Bilirubin     (0.2-1.3)  mg/dL


 


Alkaline Phosphatase     ()  U/L


 


Ammonia     (<30)  umol/L


 


Albumin     (3.5-5.0)  g/dL


 


Amylase     ()  U/L


 


Lipase     ()  U/L


 


CA 19-9 Antigen  50.6 H    (0.0-34.9)  U/mL


 


Urine Blood     (Negative)  


 


Urine RBC     (0-5)  /hpf


 


Urine Bacteria     (None)  /hpf


 


Hyaline Casts     (0-2)  /lpf


 


Urine Mucus     (None)  /hpf














  03/08/18 03/08/18 03/08/18 Range/Units





  04:04 04:06 04:06 


 


RBC     (4.30-5.90)  m/uL


 


Hgb     (13.0-17.5)  gm/dL


 


Hct     (39.0-53.0)  %


 


Plt Count     (150-450)  k/uL


 


Lymphocytes #     (1.0-4.8)  k/uL


 


ABG pH  7.27 L    (7.35-7.45)  


 


ABG pCO2  48 H    (35-45)  mmHg


 


ABG pO2  62 L    ()  mmHg


 


ABG O2 Saturation  91.0 L    (94-97)  %


 


Sodium   135 L   (137-145)  mmol/L


 


Potassium     (3.5-5.1)  mmol/L


 


Carbon Dioxide   20 L   (22-30)  mmol/L


 


BUN   50 H   (9-20)  mg/dL


 


Creatinine   2.10 H   (0.66-1.25)  mg/dL


 


Glucose   116 H   (74-99)  mg/dL


 


POC Glucose (mg/dL)     (75-99)  mg/dL


 


Plasma Lactic Acid Buddy    0.6 L  (0.7-2.0)  mmol/L


 


Total Bilirubin   4.5 H   (0.2-1.3)  mg/dL


 


Alkaline Phosphatase   307 H   ()  U/L


 


Ammonia     (<30)  umol/L


 


Albumin   3.2 L   (3.5-5.0)  g/dL


 


Amylase     ()  U/L


 


Lipase     ()  U/L


 


CA 19-9 Antigen     (0.0-34.9)  U/mL


 


Urine Blood     (Negative)  


 


Urine RBC     (0-5)  /hpf


 


Urine Bacteria     (None)  /hpf


 


Hyaline Casts     (0-2)  /lpf


 


Urine Mucus     (None)  /hpf














  03/08/18 03/08/18 03/08/18 Range/Units





  04:06 07:17 07:46 


 


RBC  3.96 L    (4.30-5.90)  m/uL


 


Hgb  12.2 L    (13.0-17.5)  gm/dL


 


Hct  37.7 L    (39.0-53.0)  %


 


Plt Count  80 L    (150-450)  k/uL


 


Lymphocytes #  0.7 L    (1.0-4.8)  k/uL


 


ABG pH     (7.35-7.45)  


 


ABG pCO2     (35-45)  mmHg


 


ABG pO2     ()  mmHg


 


ABG O2 Saturation     (94-97)  %


 


Sodium     (137-145)  mmol/L


 


Potassium     (3.5-5.1)  mmol/L


 


Carbon Dioxide     (22-30)  mmol/L


 


BUN     (9-20)  mg/dL


 


Creatinine     (0.66-1.25)  mg/dL


 


Glucose     (74-99)  mg/dL


 


POC Glucose (mg/dL)   132 H   (75-99)  mg/dL


 


Plasma Lactic Acid Buddy     (0.7-2.0)  mmol/L


 


Total Bilirubin     (0.2-1.3)  mg/dL


 


Alkaline Phosphatase     ()  U/L


 


Ammonia     (<30)  umol/L


 


Albumin     (3.5-5.0)  g/dL


 


Amylase    185 H  ()  U/L


 


Lipase    1194 H  ()  U/L


 


CA 19-9 Antigen     (0.0-34.9)  U/mL


 


Urine Blood     (Negative)  


 


Urine RBC     (0-5)  /hpf


 


Urine Bacteria     (None)  /hpf


 


Hyaline Casts     (0-2)  /lpf


 


Urine Mucus     (None)  /hpf














  03/08/18 03/08/18 03/08/18 Range/Units





  07:47 07:56 11:26 


 


RBC     (4.30-5.90)  m/uL


 


Hgb     (13.0-17.5)  gm/dL


 


Hct     (39.0-53.0)  %


 


Plt Count     (150-450)  k/uL


 


Lymphocytes #     (1.0-4.8)  k/uL


 


ABG pH  7.29 L    (7.35-7.45)  


 


ABG pCO2  46 H    (35-45)  mmHg


 


ABG pO2  75 L    ()  mmHg


 


ABG O2 Saturation     (94-97)  %


 


Sodium   136 L   (137-145)  mmol/L


 


Potassium   5.2 H   (3.5-5.1)  mmol/L


 


Carbon Dioxide   20 L   (22-30)  mmol/L


 


BUN   52 H   (9-20)  mg/dL


 


Creatinine   2.18 H   (0.66-1.25)  mg/dL


 


Glucose   124 H   (74-99)  mg/dL


 


POC Glucose (mg/dL)    129 H  (75-99)  mg/dL


 


Plasma Lactic Acid Buddy     (0.7-2.0)  mmol/L


 


Total Bilirubin   4.3 H   (0.2-1.3)  mg/dL


 


Alkaline Phosphatase   304 H   ()  U/L


 


Ammonia     (<30)  umol/L


 


Albumin   3.1 L   (3.5-5.0)  g/dL


 


Amylase     ()  U/L


 


Lipase     ()  U/L


 


CA 19-9 Antigen     (0.0-34.9)  U/mL


 


Urine Blood     (Negative)  


 


Urine RBC     (0-5)  /hpf


 


Urine Bacteria     (None)  /hpf


 


Hyaline Casts     (0-2)  /lpf


 


Urine Mucus     (None)  /hpf














  03/08/18 03/08/18 Range/Units





  12:46 13:45 


 


RBC    (4.30-5.90)  m/uL


 


Hgb    (13.0-17.5)  gm/dL


 


Hct    (39.0-53.0)  %


 


Plt Count    (150-450)  k/uL


 


Lymphocytes #    (1.0-4.8)  k/uL


 


ABG pH    (7.35-7.45)  


 


ABG pCO2    (35-45)  mmHg


 


ABG pO2    ()  mmHg


 


ABG O2 Saturation    (94-97)  %


 


Sodium    (137-145)  mmol/L


 


Potassium    (3.5-5.1)  mmol/L


 


Carbon Dioxide    (22-30)  mmol/L


 


BUN    (9-20)  mg/dL


 


Creatinine    (0.66-1.25)  mg/dL


 


Glucose    (74-99)  mg/dL


 


POC Glucose (mg/dL)    (75-99)  mg/dL


 


Plasma Lactic Acid Buddy    (0.7-2.0)  mmol/L


 


Total Bilirubin    (0.2-1.3)  mg/dL


 


Alkaline Phosphatase    ()  U/L


 


Ammonia  49 H   (<30)  umol/L


 


Albumin    (3.5-5.0)  g/dL


 


Amylase    ()  U/L


 


Lipase    ()  U/L


 


CA 19-9 Antigen    (0.0-34.9)  U/mL


 


Urine Blood   Moderate H  (Negative)  


 


Urine RBC   19 H  (0-5)  /hpf


 


Urine Bacteria   Rare H  (None)  /hpf


 


Hyaline Casts   3 H  (0-2)  /lpf


 


Urine Mucus   Rare H  (None)  /hpf








 Microbiology - Last 24 Hours (Table)











 03/06/18 09:32 Urine Culture - Final





 Urine,Voided 


 


 03/05/18 13:20 Blood Culture - Preliminary





 Blood    No Growth after 48 hours














Assessment and Plan


Assessment: 





Impression


Present on admission abdominal pain CAT scan abdomen pelvis reported 

appendicolith possible acute early appendicitis not ruled out no fever no white 

count


Hyperkalemia


Obesity BMI 36


Type 2 diabetes with episodes of hyperglycemia


Prior history of alcohol intake


Depressive disorder nonspecified


Present on admission elevated lipase and amylase consistent with acute 

pancreatitis


Acute kidney injury


Hyperkalemia


CAT scan abdomen and pelvis incidental finding appendicoth with no evidence of 

an acute surgical abdomen no fever no white count


Acute exacerbation of systolic heart failure EF 20% status post ICD likely due 

to fluid overload








Plan


Patient is felt to be at a moderate to high risk for any surgical intervention 

given patient's comorbidities given patient's systolic heart failure 

exacerbation with worsening of the renal status with fluid overload


No plan for a surgical intervention at this time


IV fluid for hydration


Will follow labs


DVT and GI prophylaxis


GI service indicates clinically ERCP is not indicated at this time total 

bilirubin slowly improving


Dulcolax suppository ordered now


Defer to the attending to address medical issues


Parental nutrition will be initiated if not able to tolerate a diet within the 

next 24-40 hours defer to the attending





Progress note dictated for 





The above impression and plan of care have been discussed and directed by 

signing physician. Kajal Burks nurse practitioner acting as scribe for signing 

physician.

## 2018-03-09 LAB
ALBUMIN SERPL-MCNC: 3.2 G/DL (ref 3.5–5)
ALP SERPL-CCNC: 305 U/L (ref 38–126)
ALT SERPL-CCNC: 38 U/L (ref 21–72)
AMYLASE SERPL-CCNC: 199 U/L (ref 30–110)
ANION GAP SERPL CALC-SCNC: 11 MMOL/L
AST SERPL-CCNC: 48 U/L (ref 17–59)
BASOPHILS # BLD AUTO: 0 K/UL (ref 0–0.2)
BASOPHILS NFR BLD AUTO: 1 %
BUN SERPL-SCNC: 57 MG/DL (ref 9–20)
CALCIUM SPEC-MCNC: 8.7 MG/DL (ref 8.4–10.2)
CHLORIDE SERPL-SCNC: 106 MMOL/L (ref 98–107)
CO2 SERPL-SCNC: 20 MMOL/L (ref 22–30)
EOSINOPHIL # BLD AUTO: 0.1 K/UL (ref 0–0.7)
EOSINOPHIL NFR BLD AUTO: 1 %
ERYTHROCYTE [DISTWIDTH] IN BLOOD BY AUTOMATED COUNT: 4.03 M/UL (ref 4.3–5.9)
ERYTHROCYTE [DISTWIDTH] IN BLOOD: 13.1 % (ref 11.5–15.5)
GLUCOSE BLD-MCNC: 150 MG/DL (ref 75–99)
GLUCOSE BLD-MCNC: 239 MG/DL (ref 75–99)
GLUCOSE BLD-MCNC: 248 MG/DL (ref 75–99)
GLUCOSE BLD-MCNC: 269 MG/DL (ref 75–99)
GLUCOSE BLD-MCNC: 287 MG/DL (ref 75–99)
GLUCOSE SERPL-MCNC: 142 MG/DL (ref 74–99)
HCT VFR BLD AUTO: 38 % (ref 39–53)
HCV RNA SERPL NAA+PROBE-LOG IU: 5.62 {LOG_IU}/ML (ref ?–1.08)
HGB BLD-MCNC: 12.3 GM/DL (ref 13–17.5)
LIPASE SERPL-CCNC: 1591 U/L (ref 23–300)
LYMPHOCYTES # SPEC AUTO: 0.9 K/UL (ref 1–4.8)
LYMPHOCYTES NFR SPEC AUTO: 15 %
MCH RBC QN AUTO: 30.5 PG (ref 25–35)
MCHC RBC AUTO-ENTMCNC: 32.3 G/DL (ref 31–37)
MCV RBC AUTO: 94.4 FL (ref 80–100)
MONOCYTES # BLD AUTO: 0.4 K/UL (ref 0–1)
MONOCYTES NFR BLD AUTO: 7 %
NEUTROPHILS # BLD AUTO: 4.2 K/UL (ref 1.3–7.7)
NEUTROPHILS NFR BLD AUTO: 73 %
PLATELET # BLD AUTO: 114 K/UL (ref 150–450)
POTASSIUM SERPL-SCNC: 5.2 MMOL/L (ref 3.5–5.1)
PROT SERPL-MCNC: 6.8 G/DL (ref 6.3–8.2)
SODIUM SERPL-SCNC: 137 MMOL/L (ref 137–145)
WBC # BLD AUTO: 5.8 K/UL (ref 3.8–10.6)

## 2018-03-09 RX ADMIN — HEPARIN SODIUM SCH UNIT: 5000 INJECTION, SOLUTION INTRAVENOUS; SUBCUTANEOUS at 08:45

## 2018-03-09 RX ADMIN — INSULIN ASPART SCH UNIT: 100 INJECTION, SOLUTION INTRAVENOUS; SUBCUTANEOUS at 12:22

## 2018-03-09 RX ADMIN — LACTULOSE SCH GM: 20 SOLUTION ORAL at 12:22

## 2018-03-09 RX ADMIN — LACTULOSE SCH GM: 20 SOLUTION ORAL at 22:16

## 2018-03-09 RX ADMIN — HEPARIN SODIUM SCH UNIT: 5000 INJECTION, SOLUTION INTRAVENOUS; SUBCUTANEOUS at 22:16

## 2018-03-09 RX ADMIN — INSULIN DETEMIR SCH UNIT: 100 INJECTION, SOLUTION SUBCUTANEOUS at 22:16

## 2018-03-09 RX ADMIN — INSULIN ASPART SCH UNIT: 100 INJECTION, SOLUTION INTRAVENOUS; SUBCUTANEOUS at 22:16

## 2018-03-09 RX ADMIN — INSULIN ASPART SCH UNIT: 100 INJECTION, SOLUTION INTRAVENOUS; SUBCUTANEOUS at 08:45

## 2018-03-09 RX ADMIN — ISOSORBIDE MONONITRATE SCH MG: 30 TABLET, EXTENDED RELEASE ORAL at 08:45

## 2018-03-09 RX ADMIN — PANTOPRAZOLE SODIUM SCH MG: 40 INJECTION, POWDER, FOR SOLUTION INTRAVENOUS at 08:45

## 2018-03-09 RX ADMIN — HEPARIN SODIUM SCH UNIT: 5000 INJECTION, SOLUTION INTRAVENOUS; SUBCUTANEOUS at 17:39

## 2018-03-09 RX ADMIN — CARVEDILOL SCH MG: 12.5 TABLET, FILM COATED ORAL at 17:40

## 2018-03-09 RX ADMIN — INSULIN ASPART SCH UNIT: 100 INJECTION, SOLUTION INTRAVENOUS; SUBCUTANEOUS at 17:39

## 2018-03-09 RX ADMIN — CARVEDILOL SCH MG: 12.5 TABLET, FILM COATED ORAL at 08:45

## 2018-03-09 NOTE — P.PN
Subjective


Progress Note Date: 03/09/18





73-year-old seen and examined this morning.  Is more awake and alert this 

morning chief complaint this morning is he's hungry.  Did note the indwelling 

Castro catheter gross hematuria.  Patient is adamant that he is not having any 

abdominal pain.  Nursing reports patient did have a bowel movement last night.  

No reports the nausea vomiting.  Patient's being followed by GI service.  An 

ERCP is contraindicated at this time total bilirubin improving per 

recommendations GI service abdomen is significantly softer compared to prior 

assessment





Patient has been followed by surgical service since admission following a CAT 

scan of the abdomen pelvis without contrast reported appendicolith possible 

early appendicitis could not be excluded  being  monitored closely by general 

surgery since admission.  











Objective





- Vital Signs


Vital signs: 


 Vital Signs











Temp  97.9 F   03/09/18 07:00


 


Pulse  51 L  03/09/18 08:00


 


Resp  22   03/09/18 09:08


 


BP  170/81   03/09/18 07:00


 


Pulse Ox  93 L  03/09/18 09:08








 Intake & Output











 03/08/18 03/09/18 03/09/18





 18:59 06:59 18:59


 


Output Total 250  


 


Balance -250  


 


Weight 110 kg 113 kg 113 kg


 


Output:   


 


  Urine 250  


 


    Uretheral (Castro) 250  


 


Other:   


 


  Voiding Method Urinal Indwelling Catheter Indwelling Catheter





 Indwelling Catheter  


 


  # Bowel Movements  1 














- Exam





Physical exam:


73-year-old male more awake and alert this morning cooperative oriented to self 

and place


Lungs posterior decreased at the bases currently 2 L nasal cannula sats are 93%


Heart S1-S2 audible and regular


Abdomen softer less distended compared to prior assessment bowel tones active 

no nausea no vomiting indwelling Castro catheter hematuria noted


Extremity no edema noted tenderness to the bilateral lower extreme





- Labs


CBC & Chem 7: 


 03/09/18 07:09





 03/09/18 07:09


Labs: 


 Abnormal Lab Results - Last 24 Hours (Table)











  03/08/18 03/08/18 03/08/18 Range/Units





  12:46 13:45 17:11 


 


RBC     (4.30-5.90)  m/uL


 


Hgb     (13.0-17.5)  gm/dL


 


Hct     (39.0-53.0)  %


 


Plt Count     (150-450)  k/uL


 


Lymphocytes #     (1.0-4.8)  k/uL


 


Potassium     (3.5-5.1)  mmol/L


 


Carbon Dioxide     (22-30)  mmol/L


 


BUN     (9-20)  mg/dL


 


Creatinine     (0.66-1.25)  mg/dL


 


Glucose     (74-99)  mg/dL


 


POC Glucose (mg/dL)    151 H  (75-99)  mg/dL


 


Total Bilirubin     (0.2-1.3)  mg/dL


 


Alkaline Phosphatase     ()  U/L


 


Ammonia  49 H    (<30)  umol/L


 


Albumin     (3.5-5.0)  g/dL


 


Amylase     ()  U/L


 


Lipase     ()  U/L


 


Urine Blood   Moderate H   (Negative)  


 


Urine RBC   19 H   (0-5)  /hpf


 


Urine Bacteria   Rare H   (None)  /hpf


 


Hyaline Casts   3 H   (0-2)  /lpf


 


Urine Mucus   Rare H   (None)  /hpf














  03/08/18 03/09/18 03/09/18 Range/Units





  19:42 07:09 07:09 


 


RBC   4.03 L   (4.30-5.90)  m/uL


 


Hgb   12.3 L   (13.0-17.5)  gm/dL


 


Hct   38.0 L   (39.0-53.0)  %


 


Plt Count   114 L   (150-450)  k/uL


 


Lymphocytes #   0.9 L   (1.0-4.8)  k/uL


 


Potassium    5.2 H  (3.5-5.1)  mmol/L


 


Carbon Dioxide    20 L  (22-30)  mmol/L


 


BUN    57 H  (9-20)  mg/dL


 


Creatinine    2.18 H  (0.66-1.25)  mg/dL


 


Glucose    142 H  (74-99)  mg/dL


 


POC Glucose (mg/dL)  189 H    (75-99)  mg/dL


 


Total Bilirubin    3.1 H  (0.2-1.3)  mg/dL


 


Alkaline Phosphatase    305 H  ()  U/L


 


Ammonia     (<30)  umol/L


 


Albumin    3.2 L  (3.5-5.0)  g/dL


 


Amylase    199 H  ()  U/L


 


Lipase    1591 H  ()  U/L


 


Urine Blood     (Negative)  


 


Urine RBC     (0-5)  /hpf


 


Urine Bacteria     (None)  /hpf


 


Hyaline Casts     (0-2)  /lpf


 


Urine Mucus     (None)  /hpf














  03/09/18 03/09/18 Range/Units





  08:06 11:25 


 


RBC    (4.30-5.90)  m/uL


 


Hgb    (13.0-17.5)  gm/dL


 


Hct    (39.0-53.0)  %


 


Plt Count    (150-450)  k/uL


 


Lymphocytes #    (1.0-4.8)  k/uL


 


Potassium    (3.5-5.1)  mmol/L


 


Carbon Dioxide    (22-30)  mmol/L


 


BUN    (9-20)  mg/dL


 


Creatinine    (0.66-1.25)  mg/dL


 


Glucose    (74-99)  mg/dL


 


POC Glucose (mg/dL)  150 H  287 H  (75-99)  mg/dL


 


Total Bilirubin    (0.2-1.3)  mg/dL


 


Alkaline Phosphatase    ()  U/L


 


Ammonia    (<30)  umol/L


 


Albumin    (3.5-5.0)  g/dL


 


Amylase    ()  U/L


 


Lipase    ()  U/L


 


Urine Blood    (Negative)  


 


Urine RBC    (0-5)  /hpf


 


Urine Bacteria    (None)  /hpf


 


Hyaline Casts    (0-2)  /lpf


 


Urine Mucus    (None)  /hpf








 Microbiology - Last 24 Hours (Table)











 03/05/18 13:20 Blood Culture - Preliminary





 Blood    No Growth after 72 hours


 


 03/06/18 09:32 Urine Culture - Final





 Urine,Voided 














Assessment and Plan


Assessment: 





Impression


Present on admission abdominal pain CAT scan abdomen pelvis reported 

appendicolith possible acute early appendicitis not ruled out no fever no white 

count


Hyperkalemia


Obesity BMI 36


Type 2 diabetes with episodes of hyperglycemia


Prior history of alcohol intake


Depressive disorder nonspecified


Present on admission elevated lipase and amylase consistent with acute 

pancreatitis


Acute kidney injury


Hyperkalemia


CAT scan abdomen and pelvis incidental finding appendicoth with no evidence of 

an acute surgical abdomen no fever no white count


Acute exacerbation of systolic heart failure EF 20% status post ICD likely due 

to fluid overload


Gross hematuria likely related to self removal of his indwelling Castro catheter 

with the balloon inflated





Plan


Patient is felt to be at a moderate to high risk for any surgical intervention 

given patient's comorbidities given patient's systolic heart failure 

exacerbation with worsening of the renal status with fluid overload


No plan for a surgical intervention at this time


IV fluid for hydration


Will follow with you


DVT and GI prophylaxis


GI service indicates clinically ERCP is not indicated at this time total 

bilirubin slowly improving


Defer to the attending to address medical issues


Parental nutrition will be initiated if not able to tolerate a diet within the 

next 24-40 hours defer to the attending


Neurology's recommendations noted and appreciated








Progress note dictated for 





The above impression and plan of care have been discussed and directed by 

signing physician. Kajal Burks nurse practitioner acting as scribe for signing 

physician.

## 2018-03-09 NOTE — P.PN
Subjective


Progress Note Date: 03/09/18


Principal diagnosis: 





patient is seen and examined in follow up for acute pancreatitis, acute renal 

failure, and CHF exacerbation





Patient seen and examined today, patient continues to have some shortness of 

breath even while resting doing nothing, no wheezing no coughing no phlegm 

production.  Denies any abdominal pain nausea or vomiting.  Denies any chest 

pain dizziness or lightheadedness


Patient developed hematuria after he had self removed the Peters catheter while 

the balloon is inflated.





Objective





- Vital Signs


Vital signs: 


 Vital Signs











Temp  97.9 F   03/09/18 07:00


 


Pulse  58 L  03/09/18 12:40


 


Resp  20   03/09/18 12:40


 


BP  173/81   03/09/18 12:40


 


Pulse Ox  98   03/09/18 12:40








 Intake & Output











 03/08/18 03/09/18 03/09/18





 18:59 06:59 18:59


 


Output Total 250  


 


Balance -250  


 


Weight 110 kg 113 kg 113 kg


 


Output:   


 


  Urine 250  


 


    Uretheral (Peters) 250  


 


Other:   


 


  Voiding Method Urinal Indwelling Catheter Indwelling Catheter





 Indwelling Catheter  


 


  # Bowel Movements  1 














- Exam





Constitutional:   vital signs showing elevated systolic blood pressure, patient 

is conversant, not in any acute distress


Lungs: Clear to auscultation bilaterally except for respiratory rales and some 

diminished sounds at lung bases, clear to percussion, patient using accessory 

muscles of respiration, no wheezing


Cardiovascular: Regular rate and rhythm, no murmurs, no gallops, no rubs, no 

peripheral edema


Gastrointestinal: No tenderness palpation of the abdomen, mildly distended, 

bowel sounds are positive 


Extremities: No digital cyanosis or clubbing, peripheral pulses palpable and 

equal over bilateral radial arteries and dorsalis pedis artery,  no calf muscle 

tenderness, capillary refill is immediate over bilateral big toes


Psych: Alert, oriented to place, person, appropriate affect, following commands 

appropriately


Peters catheter in place, with gross hematuria 














- Labs


CBC & Chem 7: 


 03/09/18 07:09





 03/09/18 07:09


Labs: 


 Abnormal Lab Results - Last 24 Hours (Table)











  03/08/18 03/08/18 03/08/18 Range/Units





  12:46 13:45 17:11 


 


RBC     (4.30-5.90)  m/uL


 


Hgb     (13.0-17.5)  gm/dL


 


Hct     (39.0-53.0)  %


 


Plt Count     (150-450)  k/uL


 


Lymphocytes #     (1.0-4.8)  k/uL


 


Potassium     (3.5-5.1)  mmol/L


 


Carbon Dioxide     (22-30)  mmol/L


 


BUN     (9-20)  mg/dL


 


Creatinine     (0.66-1.25)  mg/dL


 


Glucose     (74-99)  mg/dL


 


POC Glucose (mg/dL)    151 H  (75-99)  mg/dL


 


Total Bilirubin     (0.2-1.3)  mg/dL


 


Alkaline Phosphatase     ()  U/L


 


Ammonia  49 H    (<30)  umol/L


 


Albumin     (3.5-5.0)  g/dL


 


Amylase     ()  U/L


 


Lipase     ()  U/L


 


Urine Blood   Moderate H   (Negative)  


 


Urine RBC   19 H   (0-5)  /hpf


 


Urine Bacteria   Rare H   (None)  /hpf


 


Hyaline Casts   3 H   (0-2)  /lpf


 


Urine Mucus   Rare H   (None)  /hpf














  03/08/18 03/09/18 03/09/18 Range/Units





  19:42 07:09 07:09 


 


RBC   4.03 L   (4.30-5.90)  m/uL


 


Hgb   12.3 L   (13.0-17.5)  gm/dL


 


Hct   38.0 L   (39.0-53.0)  %


 


Plt Count   114 L   (150-450)  k/uL


 


Lymphocytes #   0.9 L   (1.0-4.8)  k/uL


 


Potassium    5.2 H  (3.5-5.1)  mmol/L


 


Carbon Dioxide    20 L  (22-30)  mmol/L


 


BUN    57 H  (9-20)  mg/dL


 


Creatinine    2.18 H  (0.66-1.25)  mg/dL


 


Glucose    142 H  (74-99)  mg/dL


 


POC Glucose (mg/dL)  189 H    (75-99)  mg/dL


 


Total Bilirubin    3.1 H  (0.2-1.3)  mg/dL


 


Alkaline Phosphatase    305 H  ()  U/L


 


Ammonia     (<30)  umol/L


 


Albumin    3.2 L  (3.5-5.0)  g/dL


 


Amylase    199 H  ()  U/L


 


Lipase    1591 H  ()  U/L


 


Urine Blood     (Negative)  


 


Urine RBC     (0-5)  /hpf


 


Urine Bacteria     (None)  /hpf


 


Hyaline Casts     (0-2)  /lpf


 


Urine Mucus     (None)  /hpf














  03/09/18 03/09/18 Range/Units





  08:06 11:25 


 


RBC    (4.30-5.90)  m/uL


 


Hgb    (13.0-17.5)  gm/dL


 


Hct    (39.0-53.0)  %


 


Plt Count    (150-450)  k/uL


 


Lymphocytes #    (1.0-4.8)  k/uL


 


Potassium    (3.5-5.1)  mmol/L


 


Carbon Dioxide    (22-30)  mmol/L


 


BUN    (9-20)  mg/dL


 


Creatinine    (0.66-1.25)  mg/dL


 


Glucose    (74-99)  mg/dL


 


POC Glucose (mg/dL)  150 H  287 H  (75-99)  mg/dL


 


Total Bilirubin    (0.2-1.3)  mg/dL


 


Alkaline Phosphatase    ()  U/L


 


Ammonia    (<30)  umol/L


 


Albumin    (3.5-5.0)  g/dL


 


Amylase    ()  U/L


 


Lipase    ()  U/L


 


Urine Blood    (Negative)  


 


Urine RBC    (0-5)  /hpf


 


Urine Bacteria    (None)  /hpf


 


Hyaline Casts    (0-2)  /lpf


 


Urine Mucus    (None)  /hpf








 Microbiology - Last 24 Hours (Table)











 03/05/18 13:20 Blood Culture - Preliminary





 Blood    No Growth after 72 hours


 


 03/06/18 09:32 Urine Culture - Final





 Urine,Voided 














Assessment and Plan


Assessment: 





73-year-old male with past medical history of congestive heart failure with 

left ventricular ejection fraction of 20%.  





 patient presented to the hospital with abdominal pain found to have acute 

pancreatitis, he was also suspected to have appendicitis however that was ruled 

out by general surgery team , and surgery team thought it's an incidental 

finding of fecalith in the appendix without any active acute  appendicitis, 

patient to continue to follow up with general surgery after discharge, patient 

received short course of Zosyn during this hospital stay.  





GI is assisting in management of his pancreatitis however patient cannot give 

MRCP due to having a pacemaker.  Lipase has been trending down.  Patient denies 

any more abdominal pain nausea or vomiting.  He continues to be nothing by 

mouth. 





Patient also developed acute kidney injury and oliguria, which resulted in 

fluid overload and acute CHF exacerbation, ESTEVAN due to 3rd spacing with 

decreased intravascular volume initially due to acute pancreatitis resulting in 

prerenal hypoperfusion . peters catheter inserted, which he self removed with 

balloon inflated, resulting in gross hematuria later. Patient continues to be 

oliquric despite aggressive diuresis 





 However, nephrology suggested aggressive diuresis before considering 

hemodialysis , and patient started making some urine. his breathing started to 

improve with aggressive diuresis, holidng IV fluids, and utilizing Bipap.  

Peters catheter maintained  for accurate ins and outs.  patient could not be 

transferred yesterday to selective or ICU due to no available bed, today he 

continues to have SOB at rest mostly due to CHF, and will be trasferred to 

[facility] for close monitoring.


Plan: 





# Acute hypoxic respiratory failure 


# Pulmonary edema due to acute CHF exacerbation (LVEF of 20% s/p ICD ) due to 

fluid overload


#. Acute kidney injury ,  oliguric  


#. Metabolic acidosis secondary to acute renal failure


#. Malignant hypertension with pulmonary edema


continue with aggressive diuresis per nephrology 


strict Is and Os


continue cardiac meds , coreg, imdur


added hydralazine (for CHF and HTN)


Continue with BiPAP at bedside to help with work of breathing


cardiology consulted 


transfer patient to [facility] for close monitoring 





#Gross hematuria secondary to traumatic removal of the Peters catheter by the 

patient


Urology input noted


Continue with Peters catheter now and irrigation of the bladder as needed


Continue to monitor urine output


Hemoglobin stable





#Diabetes mellitus with peripheral neuropathy


Continue with current insulin management


Blood sugars controlle d





#Acute pancreatitis, improving


Lipase was trending down, currently patient denies any abdominal pain nausea or 

vomiting


Patient could not get MRCP done due to having pacemaker





#Hyperbilirubinemia, mostly of the conjugated type, liver enzymes are 

unremarkable except for elevated alkaline phosphatase, improving now


This is an obstructive jaundice picture, due to edema from acute pancreatitis


However patient is afebrile, no leukocytosis


GI and general surgery are following





DVT prophylaxis,  heparin sc TID





History of chronic hepatitis C





Thrombocytopenia most likely secondary to underlying hepatitis C


No evidence of active bleeding at this time








Patient is at moderate to high risk for any surgical intervention at this point 

due to acute CHF exacerbation with fluid overload and acute renal failure, and 

if not absolutely needed should be postponed at this point, per general surgery 

they think that the finding of fecalith in the appendix is an incidental 

finding and there is no enough evidence that the appendix is acutely inflamed, 

however patient did receive 5 days worth of antibiotics Zosyn and currently 

patient is not complaining of any fevers no leukocytosis and abdominal exam 

does not reveal any tenderness to palpation.  General surgery will continue to 

follow-up and update the plan as needed.  Meanwhile we will optimize his renal 

function and fluid overload status to get him in shape for any future plans.

## 2018-03-09 NOTE — PN
PROGRESS NOTE



Patient is seen for followup for acute kidney injury, mainly ATN, currently non-

oliguric.  Patient was admitted with acute pancreatitis.  His lipase remains elevated

and he developed respiratory distress and volume overload.  His IV fluids were

discontinued.  Initially patient did not respond to the lower dose of Lasix, but he did

well with 80 mg of Lasix that he received yesterday.  Since then patient has an

indwelling Castro catheter.  His urine output over 24 hours was not much.  It is

documented at 695, but this was after the Castro catheter was placed.  Patient had

pulled out his Castro catheter and then it was reinserted.  He states he is feeling

better.  He denies any abdominal pain today.  Blood pressure is elevated at 173/81,

heart rate of 58 per minute.  Patient is afebrile.

EXAMINATION OF THE HEART: S1, S2.

EXAMINATION OF LUNGS: Bilateral breath sounds are heard.

ABDOMEN:  Distended, soft, non-tender.

Examination of lower extremities shows edema 1+ bilaterally.



Labs show sodium of 137, potassium 5.2, chloride 106, BUN 57, serum creatinine 2.18,

hemoglobin 12.3 g/dL.  Amylase at 199 and lipase at 1591 today.



ASSESSMENT:

1. Acute kidney injury, acute tubular necrosis, currently non-oliguric, associated

    with acute pancreatitis and possibly cardiorenal as well.  Patient's ejection

    fraction is at 20%.  He is currently off of IV fluids.  I will repeat another dose

    of Lasix 80 mg and we will give him 60 mg this evening.  No nephrotoxic agents are

    on board at this time.  Continue with the indwelling Castro catheter as well.

2. Acute pancreatitis.  Lipase remains elevated.  No evidence of biliary obstruction

    noted on CT scan.

3. Mild hyperkalemia.  Expect improvement with diuresis.

4. Hypertension, partly volume-sensitive.  Will continue with the Lasix and I will

    increase the oral hydralazine to 25 mg b.i.d.  Continue with the Coreg as well.





MMODL / IJN: [id number] / Job#: [id number]

## 2018-03-09 NOTE — P.PN
Subjective


Progress Note Date: 03/09/18


Principal diagnosis: 


Acute pancreatitis





73-year-old gentleman with a history of diabetes mellitus kidney disease 

obesity presented with acute abdominal pain elevated pancreatic liver enzymes 

consistent with acute pancreatitis.  No stones mentioned on ultrasound abdomen/

CT.  CT abdomen and pelvis without IV contrast reported appendicolith possible 

early appendicitis could not be excluded and he's been monitored closely by 

general surgery since admission.  





Afebrile.  Castro catheter inserted the other day patient has pulled on it more 

than once presently with blood tinged urine.  Increased creatinine over the 

last few days evaluation by nephrology completed; creatinine remains at 2.1 

today.  Slow improvement in liver enzymes; total bilirubin 3.1  AST 48.  ALT 

38.  Alkaline phosphatase 305.  Lipase 1591.  Amylase 199.  CA-19-9 50.6.  WBC 

5.8.  Hemoglobin 12.3.  Hepatitis screen positive hepatitis C IgG antibody; 

additional hepatitis C serology pending.  Ammonia yesterday 49.





Surveillance CT abdomen and pelvis without contrast 3 days ago reported 

findings prior exam correlate to exclude appendicitis.  Interval development of 

small pleural effusions and local atelectasis.  Cardiomegaly.  Mild prominence 

of the pancreatic head.  Intravenous antibiotics discontinued.





MRI unable to be completed secondary to history of pacemaker.  Patient is 

sitting up in bedside chair this morning more alert, conversive not as 

agitated.  Bowel movement 1 last night per nursing. 











Objective





- Vital Signs


Vital signs: 


 Vital Signs











Temp  97.9 F   03/09/18 07:00


 


Pulse  51 L  03/09/18 08:00


 


Resp  22   03/09/18 09:08


 


BP  170/81   03/09/18 07:00


 


Pulse Ox  93 L  03/09/18 09:08








 Intake & Output











 03/08/18 03/09/18 03/09/18





 18:59 06:59 18:59


 


Output Total 250  


 


Balance -250  


 


Weight 110 kg 113 kg 


 


Output:   


 


  Urine 250  


 


    Uretheral (Castro) 250  


 


Other:   


 


  Voiding Method Urinal Indwelling Catheter Indwelling Catheter





 Indwelling Catheter  


 


  # Bowel Movements  1 














- Exam


General appearance: The patient is alert, agitated in no acute distress.  

Jaundice.


HET: Head is normocephalic and atraumatic.  Pupils are equal and reactive.  

Sclerae icterus.  Oropharynx is clear without lesions.


Neck: Supple without lymphadenopathy.  Trachea midline.


Heart: S1 S2.  Regular rate and rhythm.


Lungs: Poor inspiratory effort.  Diminished in bases bilaterally.  No crackles 

or wheezes are heard.


Abdomen: Soft, nontender, with  bloatedness distention hypoactive bowel sounds.

  No peritoneal signs.  No palpable organomegaly or masses.


Extremities: Normal skin color and turgor.  No cyanosis, rash, ulceration, 

clubbing, or edema.  Radial and pedal pulses are 2/4 bilaterally.  Castro with 

blood tinged urine.


Neurological: No focal deficits.  Strength and sensation are grossly intact.








- Labs


CBC & Chem 7: 


 03/09/18 07:09





 03/09/18 07:09


Labs: 


 Abnormal Lab Results - Last 24 Hours (Table)











  03/08/18 03/08/18 03/08/18 Range/Units





  07:46 11:26 12:46 


 


RBC     (4.30-5.90)  m/uL


 


Hgb     (13.0-17.5)  gm/dL


 


Hct     (39.0-53.0)  %


 


Potassium     (3.5-5.1)  mmol/L


 


Carbon Dioxide     (22-30)  mmol/L


 


BUN     (9-20)  mg/dL


 


Creatinine     (0.66-1.25)  mg/dL


 


Glucose     (74-99)  mg/dL


 


POC Glucose (mg/dL)   129 H   (75-99)  mg/dL


 


Total Bilirubin     (0.2-1.3)  mg/dL


 


Alkaline Phosphatase     ()  U/L


 


Ammonia    49 H  (<30)  umol/L


 


Albumin     (3.5-5.0)  g/dL


 


Amylase  185 H    ()  U/L


 


Lipase  1194 H    ()  U/L


 


Urine Blood     (Negative)  


 


Urine RBC     (0-5)  /hpf


 


Urine Bacteria     (None)  /hpf


 


Hyaline Casts     (0-2)  /lpf


 


Urine Mucus     (None)  /hpf














  03/08/18 03/08/18 03/08/18 Range/Units





  13:45 17:11 19:42 


 


RBC     (4.30-5.90)  m/uL


 


Hgb     (13.0-17.5)  gm/dL


 


Hct     (39.0-53.0)  %


 


Potassium     (3.5-5.1)  mmol/L


 


Carbon Dioxide     (22-30)  mmol/L


 


BUN     (9-20)  mg/dL


 


Creatinine     (0.66-1.25)  mg/dL


 


Glucose     (74-99)  mg/dL


 


POC Glucose (mg/dL)   151 H  189 H  (75-99)  mg/dL


 


Total Bilirubin     (0.2-1.3)  mg/dL


 


Alkaline Phosphatase     ()  U/L


 


Ammonia     (<30)  umol/L


 


Albumin     (3.5-5.0)  g/dL


 


Amylase     ()  U/L


 


Lipase     ()  U/L


 


Urine Blood  Moderate H    (Negative)  


 


Urine RBC  19 H    (0-5)  /hpf


 


Urine Bacteria  Rare H    (None)  /hpf


 


Hyaline Casts  3 H    (0-2)  /lpf


 


Urine Mucus  Rare H    (None)  /hpf














  03/09/18 03/09/18 03/09/18 Range/Units





  07:09 07:09 08:06 


 


RBC  4.03 L    (4.30-5.90)  m/uL


 


Hgb  12.3 L    (13.0-17.5)  gm/dL


 


Hct  38.0 L    (39.0-53.0)  %


 


Potassium   5.2 H   (3.5-5.1)  mmol/L


 


Carbon Dioxide   20 L   (22-30)  mmol/L


 


BUN   57 H   (9-20)  mg/dL


 


Creatinine   2.18 H   (0.66-1.25)  mg/dL


 


Glucose   142 H   (74-99)  mg/dL


 


POC Glucose (mg/dL)    150 H  (75-99)  mg/dL


 


Total Bilirubin   3.1 H   (0.2-1.3)  mg/dL


 


Alkaline Phosphatase   305 H   ()  U/L


 


Ammonia     (<30)  umol/L


 


Albumin   3.2 L   (3.5-5.0)  g/dL


 


Amylase   199 H   ()  U/L


 


Lipase   1591 H   ()  U/L


 


Urine Blood     (Negative)  


 


Urine RBC     (0-5)  /hpf


 


Urine Bacteria     (None)  /hpf


 


Hyaline Casts     (0-2)  /lpf


 


Urine Mucus     (None)  /hpf








 Microbiology - Last 24 Hours (Table)











 03/05/18 13:20 Blood Culture - Preliminary





 Blood    No Growth after 72 hours


 


 03/06/18 09:32 Urine Culture - Final





 Urine,Voided 














Assessment and Plan


(1) Acute pancreatitis


Narrative/Plan: 


73-year-old male admitted with acute abdominal pain elevated pancreatic and 

liver enzymes consistent with acute moderate  to severe pancreatitis.  No 

mentioning of gallstones on ultrasound or CAT scan with elevated 

hyperbilirubinemia and creatinine.


Current Visit: Yes   Status: Acute   Code(s): K85.90 - ACUTE PANCREATITIS 

WITHOUT NECROSIS OR INFECTION, UNSP   SNOMED Code(s): 989589228


   





(2) Elevated serum creatinine


Narrative/Plan: 


Acute kidney injury most likely acute tubular necrosis nonoliguric


Current Visit: Yes   Status: Acute   Code(s): R79.89 - OTHER SPECIFIED ABNORMAL 

FINDINGS OF BLOOD CHEMISTRY   SNOMED Code(s): 387710249


   





(3) Elevated liver enzymes


Current Visit: Yes   Status: Acute   Code(s): R74.8 - ABNORMAL LEVELS OF OTHER 

SERUM ENZYMES   SNOMED Code(s): 565323622


   





(4) Jaundice


Narrative/Plan: 


Elevated bilirubin suspect from peripancreatic edema improving


Current Visit: Yes   Status: Acute   Code(s): R17 - UNSPECIFIED JAUNDICE   

SNOMED Code(s): 90831678


   





(5) Appendicolith


Narrative/Plan: 


Possible early acute appendicitis being monitored closely by general surgery 

presently without fever or abdominal pain.


Current Visit: Yes   Status: Acute   Code(s): K38.9 - DISEASE OF APPENDIX, 

UNSPECIFIED   SNOMED Code(s): 22591969


   





(6) Obesity (BMI 30-39.9)


Current Visit: Yes   Status: Chronic   Code(s): E66.9 - OBESITY, UNSPECIFIED   

SNOMED Code(s): 460963247


   





(7) Hyperkalemia


Current Visit: Yes   Status: Acute   Code(s): E87.5 - HYPERKALEMIA   SNOMED Code

(s): 58636151


   





(8) Diabetes mellitus


Current Visit: Yes   Status: Chronic   Code(s): E11.9 - TYPE 2 DIABETES 

MELLITUS WITHOUT COMPLICATIONS   SNOMED Code(s): 08836855


   





(9) Hepatitis C antibody test positive


Current Visit: Yes   Status: Acute   Code(s): R76.8 - OTHER SPECIFIED ABNORMAL 

IMMUNOLOGICAL FINDINGS IN SERUM   SNOMED Code(s): 495624622


   





(10) Hyperammonemia


Narrative/Plan: 


Possible underlying liver disease


Current Visit: Yes   Status: Acute   Code(s): E72.20 - DISORDER OF UREA CYCLE 

METABOLISM, UNSPECIFIED   SNOMED Code(s): 9182179


   


Plan: 


1.  Lactulose 20 g twice a day hold for bowel movements more than 3 times daily.


2.  Diet advancement per surgery.


3.  Continue with supportive measures.  ERCP contraindicated at this time total 

bilirubin is improving.


4.  Will follow closely with you.





Assessment and plan of care discussed with Dr. Joseph

## 2018-03-09 NOTE — P.GSCN
History of Present Illness


Consult date: 03/09/18


Reason for Consult: 


gross hematuria





History of present illness: 


the patient is a 73-year-old male who developed abdominal pain and was 

evaluated in the emergency room and discovered to have a markedly elevated 

lipase consistent with acute pancreatitis.  Patient's pain has improved 

although he still has some intermittent mid abdominal discomfort.  He was noted 

to have an elevated creatinine however a computed tomography scan of the 

abdomen showed no evidence of hydronephrosis.  Bladder scan was done on 3/7 and 

reportedly showed no urine in the bladder.  Sometime yesterday morning a 

catheter was inserted but only drained 75 mL.  The patient became confused and 

removed his catheter with the balloon inflated.  The catheter was reinserted 

and has drained brownish urine without clots since then.  The patient's 

elevated creatinine was felt to be in part related to fluid overload and he has 

been diuresed with furosemide.  I was asked to see the patient due to the gross 

hematuria.





The patient has no previous history of gross hematuria.  He says he usually 

voids every 2-4 hours during the day and once or twice at night.  He describes 

a moderate urinary flow and feels he voids completely.





Review of Systems


All systems: negative (discomfort from his Castro catheter)





Past Medical History


Past Medical History: Chest Pain / Angina, Heart Failure, COPD, Diabetes 

Mellitus, Hyperlipidemia, Hypertension, Myocardial Infarction (MI), 

Osteoarthritis (OA)


Additional Past Medical History / Comment(s): 11/25/15  Pt presented to [facility] ER 

EMS feeling weak and having mild headache and anterior neck pain.  Symptoms 

began days ago.  Pt being admitted with clinical impression of weakness, acute 

renal failure.  PT last admitted to [facility] 7/14/15 with acute DKA, acute 

metabolic encephalitis from DKA, acute renal failure, severe hyperkalemia, 

pseudohyponatremia.  Other HX:  Chronic CHF systolic dysfunction with EF 20%,  

gout, gouty arthiritis to R great toe, nerve pain, ddd lumbar region, 

folliculitis, constipation, renal insufficiency, hep c 1-1-14, diabetes 

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 

liver bx tx with chest tube, cellulitis to lt foot/ankle, pt was born with R 

leg larger than L leg.


Last Myocardial Infarction Date:: 5/2014


History of Any Multi-Drug Resistant Organisms: None Reported


Past Surgical History: Heart Catheterization, Pacemaker


Additional Past Surgical History / Comment(s): CATARACTS ROCKY EYES REMOVED.  

liver biopsy on 4-7-14, heart cath 2005


Past Anesthesia/Blood Transfusion Reactions: No Reported Reaction


Type of Cardiac Device: Permanent Pacemaker


Device Placement Date:: UNK


Past Psychological History: Bipolar, Depression


Smoking Status: Never smoker


Past Alcohol Use History: Occasional


Past Drug Use History: None Reported





- Past Family History


  ** Mother


Family Medical History: Cancer





  ** Sister(s)


Family Medical History: Cancer





Medications and Allergies


 Home Medications











 Medication  Instructions  Recorded  Confirmed  Type


 


Nitroglycerin Sl Tabs [Nitrostat] 0.4 mg SUBLINGUAL Q5M PRN 05/18/14 03/03/18 

History


 


HYDROcodone/APAP 5-325MG [Norco 1 tab PO Q6H PRN 06/04/15 03/03/18 History





5-325]    


 


Carvedilol 25 mg PO AC-BID 07/14/15 03/03/18 History


 


Docusate [Colace] 100 - 200 mg PO DAILY 11/25/15 03/03/18 History


 


Isosorbide Mononitrate ER [Imdur] 30 mg PO DAILY #30 tab.er.24h 11/27/15 03/03/

18 Rx


 


Insulin Glargine [Lantus] 35 unit SQ QAM 06/21/17 03/03/18 History


 


INSULIN LISPRO (humaLOG) [humaLOG] 8 - 21 units SQ AC-TID 03/03/18 03/03/18 

History


 


Ibuprofen [Motrin] 400 mg PO Q6HR PRN 03/03/18 03/03/18 History


 


Losartan [Cozaar] 12.5 mg PO DAILY 03/03/18 03/03/18 History


 


Spironolactone [Aldactone] 25 mg PO DAILY 03/03/18 03/03/18 History











 Allergies











Allergy/AdvReac Type Severity Reaction Status Date / Time


 


No Known Allergies Allergy   Verified 03/03/18 11:35














Surgical - Exam


 Vital Signs











Temp Pulse Resp BP Pulse Ox


 


 97.9 F   57 L  20   212/98   99 


 


 03/03/18 05:33  03/03/18 05:33  03/03/18 05:33  03/03/18 05:33  03/03/18 05:33














- General


well developed, well nourished, no distress, obese





- Respiratory


normal respiratory effort





- Abdomen


Abdomen: soft, non tender, no organomegaly





- Genitourinary


normal penis with no external lesions, testicles non-tender, other (a 16-Amharic 

Castro catheter is in place and is draining brownish colored urine.  No clots 

are present)





Results





- Labs





 03/09/18 07:09





 03/09/18 07:09


 Abnormal Lab Results - Last 24 Hours (Table)











  03/08/18 03/08/18 03/08/18 Range/Units





  12:46 13:45 17:11 


 


RBC     (4.30-5.90)  m/uL


 


Hgb     (13.0-17.5)  gm/dL


 


Hct     (39.0-53.0)  %


 


Plt Count     (150-450)  k/uL


 


Lymphocytes #     (1.0-4.8)  k/uL


 


Potassium     (3.5-5.1)  mmol/L


 


Carbon Dioxide     (22-30)  mmol/L


 


BUN     (9-20)  mg/dL


 


Creatinine     (0.66-1.25)  mg/dL


 


Glucose     (74-99)  mg/dL


 


POC Glucose (mg/dL)    151 H  (75-99)  mg/dL


 


Total Bilirubin     (0.2-1.3)  mg/dL


 


Alkaline Phosphatase     ()  U/L


 


Ammonia  49 H    (<30)  umol/L


 


Albumin     (3.5-5.0)  g/dL


 


Amylase     ()  U/L


 


Lipase     ()  U/L


 


Urine Blood   Moderate H   (Negative)  


 


Urine RBC   19 H   (0-5)  /hpf


 


Urine Bacteria   Rare H   (None)  /hpf


 


Hyaline Casts   3 H   (0-2)  /lpf


 


Urine Mucus   Rare H   (None)  /hpf














  03/08/18 03/09/18 03/09/18 Range/Units





  19:42 07:09 07:09 


 


RBC   4.03 L   (4.30-5.90)  m/uL


 


Hgb   12.3 L   (13.0-17.5)  gm/dL


 


Hct   38.0 L   (39.0-53.0)  %


 


Plt Count   114 L   (150-450)  k/uL


 


Lymphocytes #   0.9 L   (1.0-4.8)  k/uL


 


Potassium    5.2 H  (3.5-5.1)  mmol/L


 


Carbon Dioxide    20 L  (22-30)  mmol/L


 


BUN    57 H  (9-20)  mg/dL


 


Creatinine    2.18 H  (0.66-1.25)  mg/dL


 


Glucose    142 H  (74-99)  mg/dL


 


POC Glucose (mg/dL)  189 H    (75-99)  mg/dL


 


Total Bilirubin    3.1 H  (0.2-1.3)  mg/dL


 


Alkaline Phosphatase    305 H  ()  U/L


 


Ammonia     (<30)  umol/L


 


Albumin    3.2 L  (3.5-5.0)  g/dL


 


Amylase    199 H  ()  U/L


 


Lipase    1591 H  ()  U/L


 


Urine Blood     (Negative)  


 


Urine RBC     (0-5)  /hpf


 


Urine Bacteria     (None)  /hpf


 


Hyaline Casts     (0-2)  /lpf


 


Urine Mucus     (None)  /hpf














  03/09/18 03/09/18 Range/Units





  08:06 11:25 


 


RBC    (4.30-5.90)  m/uL


 


Hgb    (13.0-17.5)  gm/dL


 


Hct    (39.0-53.0)  %


 


Plt Count    (150-450)  k/uL


 


Lymphocytes #    (1.0-4.8)  k/uL


 


Potassium    (3.5-5.1)  mmol/L


 


Carbon Dioxide    (22-30)  mmol/L


 


BUN    (9-20)  mg/dL


 


Creatinine    (0.66-1.25)  mg/dL


 


Glucose    (74-99)  mg/dL


 


POC Glucose (mg/dL)  150 H  287 H  (75-99)  mg/dL


 


Total Bilirubin    (0.2-1.3)  mg/dL


 


Alkaline Phosphatase    ()  U/L


 


Ammonia    (<30)  umol/L


 


Albumin    (3.5-5.0)  g/dL


 


Amylase    ()  U/L


 


Lipase    ()  U/L


 


Urine Blood    (Negative)  


 


Urine RBC    (0-5)  /hpf


 


Urine Bacteria    (None)  /hpf


 


Hyaline Casts    (0-2)  /lpf


 


Urine Mucus    (None)  /hpf








 Microbiology - Last 24 Hours (Table)











 03/05/18 13:20 Blood Culture - Preliminary





 Blood    No Growth after 72 hours


 


 03/06/18 09:32 Urine Culture - Final





 Urine,Voided 








 Diabetes panel











  03/09/18 Range/Units





  07:09 


 


Sodium  137  (137-145)  mmol/L


 


Potassium  5.2 H  (3.5-5.1)  mmol/L


 


Chloride  106  ()  mmol/L


 


Carbon Dioxide  20 L  (22-30)  mmol/L


 


BUN  57 H  (9-20)  mg/dL


 


Creatinine  2.18 H  (0.66-1.25)  mg/dL


 


Glucose  142 H  (74-99)  mg/dL


 


Calcium  8.7  (8.4-10.2)  mg/dL


 


AST  48  (17-59)  U/L


 


ALT  38  (21-72)  U/L


 


Alkaline Phosphatase  305 H  ()  U/L


 


Total Protein  6.8  (6.3-8.2)  g/dL


 


Albumin  3.2 L  (3.5-5.0)  g/dL








 Calcium panel











  03/09/18 Range/Units





  07:09 


 


Calcium  8.7  (8.4-10.2)  mg/dL


 


Albumin  3.2 L  (3.5-5.0)  g/dL








 Pituitary panel











  03/09/18 Range/Units





  07:09 


 


Sodium  137  (137-145)  mmol/L


 


Potassium  5.2 H  (3.5-5.1)  mmol/L


 


Chloride  106  ()  mmol/L


 


Carbon Dioxide  20 L  (22-30)  mmol/L


 


BUN  57 H  (9-20)  mg/dL


 


Creatinine  2.18 H  (0.66-1.25)  mg/dL


 


Glucose  142 H  (74-99)  mg/dL


 


Calcium  8.7  (8.4-10.2)  mg/dL








 Adrenal panel











  03/09/18 Range/Units





  07:09 


 


Sodium  137  (137-145)  mmol/L


 


Potassium  5.2 H  (3.5-5.1)  mmol/L


 


Chloride  106  ()  mmol/L


 


Carbon Dioxide  20 L  (22-30)  mmol/L


 


BUN  57 H  (9-20)  mg/dL


 


Creatinine  2.18 H  (0.66-1.25)  mg/dL


 


Glucose  142 H  (74-99)  mg/dL


 


Calcium  8.7  (8.4-10.2)  mg/dL


 


Total Bilirubin  3.1 H  (0.2-1.3)  mg/dL


 


AST  48  (17-59)  U/L


 


ALT  38  (21-72)  U/L


 


Alkaline Phosphatase  305 H  ()  U/L


 


Total Protein  6.8  (6.3-8.2)  g/dL


 


Albumin  3.2 L  (3.5-5.0)  g/dL














Assessment and Plan


(1) Hematuria, gross


Narrative/Plan: 


the patient's gross hematuria appears to be directly related to self removal of 

his Castro catheter with the balloon inflated.  It may be worsened because he is 

also receiving minidose heparin for SBE prophylaxis.  At the present time the 

patient does not appear to be active bleeding as the color of the urine is more 

consistent with blood that has been present in the bladder since yesterday.  I 

would suggest the bladder be irrigated periodically to ensure that no large 

clots are present.  Once the urine is clear and the need for close monitoring 

of the patient's urine output has ended his catheter can be removed.  I would 

suggest that he have his postvoid residual checked via bladder scan within 3-5 

hours after it is removed to ensure that he does not develop urinary retention 

related to swelling of the prostate.


Current Visit: Yes   Status: Acute   Code(s): R31.0 - GROSS HEMATURIA   SNOMED 

Code(s): 888537311

## 2018-03-10 LAB
ALBUMIN SERPL-MCNC: 3.1 G/DL (ref 3.5–5)
ALP SERPL-CCNC: 355 U/L (ref 38–126)
ALT SERPL-CCNC: 38 U/L (ref 21–72)
AMYLASE SERPL-CCNC: 190 U/L (ref 30–110)
ANION GAP SERPL CALC-SCNC: 9 MMOL/L
AST SERPL-CCNC: 41 U/L (ref 17–59)
BASOPHILS # BLD AUTO: 0 K/UL (ref 0–0.2)
BASOPHILS NFR BLD AUTO: 0 %
BUN SERPL-SCNC: 59 MG/DL (ref 9–20)
CALCIUM SPEC-MCNC: 8.5 MG/DL (ref 8.4–10.2)
CHLORIDE SERPL-SCNC: 107 MMOL/L (ref 98–107)
CO2 SERPL-SCNC: 21 MMOL/L (ref 22–30)
EOSINOPHIL # BLD AUTO: 0.1 K/UL (ref 0–0.7)
EOSINOPHIL NFR BLD AUTO: 1 %
ERYTHROCYTE [DISTWIDTH] IN BLOOD BY AUTOMATED COUNT: 3.86 M/UL (ref 4.3–5.9)
ERYTHROCYTE [DISTWIDTH] IN BLOOD: 13.1 % (ref 11.5–15.5)
GLUCOSE BLD-MCNC: 222 MG/DL (ref 75–99)
GLUCOSE BLD-MCNC: 231 MG/DL (ref 75–99)
GLUCOSE BLD-MCNC: 276 MG/DL (ref 75–99)
GLUCOSE BLD-MCNC: 288 MG/DL (ref 75–99)
GLUCOSE SERPL-MCNC: 279 MG/DL (ref 74–99)
HCT VFR BLD AUTO: 36.7 % (ref 39–53)
HGB BLD-MCNC: 11.7 GM/DL (ref 13–17.5)
LIPASE SERPL-CCNC: 2185 U/L (ref 23–300)
LYMPHOCYTES # SPEC AUTO: 0.9 K/UL (ref 1–4.8)
LYMPHOCYTES NFR SPEC AUTO: 15 %
MCH RBC QN AUTO: 30.2 PG (ref 25–35)
MCHC RBC AUTO-ENTMCNC: 31.8 G/DL (ref 31–37)
MCV RBC AUTO: 95.2 FL (ref 80–100)
MONOCYTES # BLD AUTO: 0.5 K/UL (ref 0–1)
MONOCYTES NFR BLD AUTO: 9 %
NEUTROPHILS # BLD AUTO: 4.2 K/UL (ref 1.3–7.7)
NEUTROPHILS NFR BLD AUTO: 71 %
PLATELET # BLD AUTO: 131 K/UL (ref 150–450)
POTASSIUM SERPL-SCNC: 5.2 MMOL/L (ref 3.5–5.1)
PROT SERPL-MCNC: 6.7 G/DL (ref 6.3–8.2)
SODIUM SERPL-SCNC: 137 MMOL/L (ref 137–145)
WBC # BLD AUTO: 6 K/UL (ref 3.8–10.6)

## 2018-03-10 RX ADMIN — INSULIN ASPART SCH UNIT: 100 INJECTION, SOLUTION INTRAVENOUS; SUBCUTANEOUS at 06:36

## 2018-03-10 RX ADMIN — INSULIN DETEMIR SCH UNIT: 100 INJECTION, SOLUTION SUBCUTANEOUS at 20:50

## 2018-03-10 RX ADMIN — INSULIN ASPART SCH UNIT: 100 INJECTION, SOLUTION INTRAVENOUS; SUBCUTANEOUS at 17:12

## 2018-03-10 RX ADMIN — CARVEDILOL SCH MG: 12.5 TABLET, FILM COATED ORAL at 06:36

## 2018-03-10 RX ADMIN — HEPARIN SODIUM SCH UNIT: 5000 INJECTION, SOLUTION INTRAVENOUS; SUBCUTANEOUS at 07:45

## 2018-03-10 RX ADMIN — INSULIN ASPART SCH UNIT: 100 INJECTION, SOLUTION INTRAVENOUS; SUBCUTANEOUS at 11:54

## 2018-03-10 RX ADMIN — INSULIN ASPART SCH UNIT: 100 INJECTION, SOLUTION INTRAVENOUS; SUBCUTANEOUS at 20:59

## 2018-03-10 RX ADMIN — HEPARIN SODIUM SCH UNIT: 5000 INJECTION, SOLUTION INTRAVENOUS; SUBCUTANEOUS at 15:11

## 2018-03-10 RX ADMIN — CARVEDILOL SCH MG: 12.5 TABLET, FILM COATED ORAL at 17:13

## 2018-03-10 RX ADMIN — HEPARIN SODIUM SCH UNIT: 5000 INJECTION, SOLUTION INTRAVENOUS; SUBCUTANEOUS at 23:26

## 2018-03-10 RX ADMIN — LACTULOSE SCH GM: 20 SOLUTION ORAL at 20:50

## 2018-03-10 RX ADMIN — LACTULOSE SCH: 20 SOLUTION ORAL at 07:44

## 2018-03-10 RX ADMIN — ISOSORBIDE MONONITRATE SCH MG: 30 TABLET, EXTENDED RELEASE ORAL at 07:45

## 2018-03-10 RX ADMIN — PANTOPRAZOLE SODIUM SCH MG: 40 INJECTION, POWDER, FOR SOLUTION INTRAVENOUS at 07:45

## 2018-03-10 RX ADMIN — FUROSEMIDE SCH MG: 10 INJECTION, SOLUTION INTRAMUSCULAR; INTRAVENOUS at 20:50

## 2018-03-10 RX ADMIN — FUROSEMIDE SCH MG: 10 INJECTION, SOLUTION INTRAMUSCULAR; INTRAVENOUS at 07:44

## 2018-03-10 NOTE — P.PN
Subjective


Progress Note Date: 03/10/18


Principal diagnosis: 


Acute pancreatitis





Seen and examined for the follow-up of acute kidney injury.  Still still 

wearing a Castro catheter, very dark urine.  Denies any nausea vomiting or 

diarrhea.








Objective





- Vital Signs


Vital signs: 


 Vital Signs











Temp  98 F   03/10/18 07:43


 


Pulse  70   03/10/18 07:52


 


Resp  20   03/10/18 07:52


 


BP  177/85   03/10/18 07:43


 


Pulse Ox  94 L  03/10/18 07:43








 Intake & Output











 03/09/18 03/10/18 03/10/18





 18:59 06:59 18:59


 


Intake Total 600 360 


 


Output Total  300 


 


Balance 600 60 


 


Weight 113 kg 104.5 kg 


 


Intake:   


 


  Oral 600 360 


 


Output:   


 


  Urine  300 


 


Other:   


 


  Voiding Method Indwelling Catheter Indwelling Catheter Indwelling Catheter














- Exam


Sitting on the commode no acute distress


S1-S2 heard


Lungs basal crackles


Castro dark urine


Edema








- Labs


CBC & Chem 7: 


 03/10/18 05:49





 03/10/18 05:49


Labs: 


 Abnormal Lab Results - Last 24 Hours (Table)











  03/07/18 03/09/18 03/09/18 Range/Units





  07:41 07:09 11:25 


 


RBC   4.03 L   (4.30-5.90)  m/uL


 


Hgb   12.3 L   (13.0-17.5)  gm/dL


 


Hct   38.0 L   (39.0-53.0)  %


 


Plt Count   114 L   (150-450)  k/uL


 


Lymphocytes #   0.9 L   (1.0-4.8)  k/uL


 


Potassium     (3.5-5.1)  mmol/L


 


Carbon Dioxide     (22-30)  mmol/L


 


BUN     (9-20)  mg/dL


 


Creatinine     (0.66-1.25)  mg/dL


 


Glucose     (74-99)  mg/dL


 


POC Glucose (mg/dL)    287 H  (75-99)  mg/dL


 


Total Bilirubin     (0.2-1.3)  mg/dL


 


Alkaline Phosphatase     ()  U/L


 


Ammonia     (<30)  umol/L


 


Albumin     (3.5-5.0)  g/dL


 


Amylase     ()  U/L


 


Lipase     ()  U/L


 


HCV RNA Qual (PCR)  DETECTED H    (Not detected)  


 


Hepatitis C RNA Quant  415,721 H    (<12)  IU/mL


 


HCV RNA PCR log /ml  5.62 H    (<1.08)   


 


Hepatitis C Genotype  1a H    














  03/09/18 03/09/18 03/09/18 Range/Units





  14:03 17:09 20:36 


 


RBC     (4.30-5.90)  m/uL


 


Hgb     (13.0-17.5)  gm/dL


 


Hct     (39.0-53.0)  %


 


Plt Count     (150-450)  k/uL


 


Lymphocytes #     (1.0-4.8)  k/uL


 


Potassium     (3.5-5.1)  mmol/L


 


Carbon Dioxide     (22-30)  mmol/L


 


BUN     (9-20)  mg/dL


 


Creatinine     (0.66-1.25)  mg/dL


 


Glucose     (74-99)  mg/dL


 


POC Glucose (mg/dL)  248 H  269 H  239 H  (75-99)  mg/dL


 


Total Bilirubin     (0.2-1.3)  mg/dL


 


Alkaline Phosphatase     ()  U/L


 


Ammonia     (<30)  umol/L


 


Albumin     (3.5-5.0)  g/dL


 


Amylase     ()  U/L


 


Lipase     ()  U/L


 


HCV RNA Qual (PCR)     (Not detected)  


 


Hepatitis C RNA Quant     (<12)  IU/mL


 


HCV RNA PCR log /ml     (<1.08)   


 


Hepatitis C Genotype     














  03/10/18 03/10/18 03/10/18 Range/Units





  05:38 05:49 05:49 


 


RBC     (4.30-5.90)  m/uL


 


Hgb     (13.0-17.5)  gm/dL


 


Hct     (39.0-53.0)  %


 


Plt Count     (150-450)  k/uL


 


Lymphocytes #     (1.0-4.8)  k/uL


 


Potassium     (3.5-5.1)  mmol/L


 


Carbon Dioxide     (22-30)  mmol/L


 


BUN     (9-20)  mg/dL


 


Creatinine     (0.66-1.25)  mg/dL


 


Glucose     (74-99)  mg/dL


 


POC Glucose (mg/dL)  276 H    (75-99)  mg/dL


 


Total Bilirubin     (0.2-1.3)  mg/dL


 


Alkaline Phosphatase     ()  U/L


 


Ammonia    61 H  (<30)  umol/L


 


Albumin     (3.5-5.0)  g/dL


 


Amylase   190 H   ()  U/L


 


Lipase   2185 H   ()  U/L


 


HCV RNA Qual (PCR)     (Not detected)  


 


Hepatitis C RNA Quant     (<12)  IU/mL


 


HCV RNA PCR log /ml     (<1.08)   


 


Hepatitis C Genotype     














  03/10/18 03/10/18 Range/Units





  05:49 05:49 


 


RBC  3.86 L   (4.30-5.90)  m/uL


 


Hgb  11.7 L   (13.0-17.5)  gm/dL


 


Hct  36.7 L   (39.0-53.0)  %


 


Plt Count  131 L   (150-450)  k/uL


 


Lymphocytes #  0.9 L   (1.0-4.8)  k/uL


 


Potassium   5.2 H  (3.5-5.1)  mmol/L


 


Carbon Dioxide   21 L  (22-30)  mmol/L


 


BUN   59 H  (9-20)  mg/dL


 


Creatinine   2.00 H  (0.66-1.25)  mg/dL


 


Glucose   279 H  (74-99)  mg/dL


 


POC Glucose (mg/dL)    (75-99)  mg/dL


 


Total Bilirubin   2.0 H  (0.2-1.3)  mg/dL


 


Alkaline Phosphatase   355 H  ()  U/L


 


Ammonia    (<30)  umol/L


 


Albumin   3.1 L  (3.5-5.0)  g/dL


 


Amylase    ()  U/L


 


Lipase    ()  U/L


 


HCV RNA Qual (PCR)    (Not detected)  


 


Hepatitis C RNA Quant    (<12)  IU/mL


 


HCV RNA PCR log /ml    (<1.08)   


 


Hepatitis C Genotype    








 Microbiology - Last 24 Hours (Table)











 03/05/18 13:20 Blood Culture - Preliminary





 Blood    No Growth after 96 hours














Assessment and Plan


Assessment: 


Impression:


#1 nonoliguric acute kidney injury secondary to ischemic ATN.


#2 mild hyperkalemia


#3 metabolic acidosis


#4 hypertension uncontrolled


#5 pancreatitis





Recommendations:


#1 continue with Lasix 60 mg IV twice a day.  Renal function stable.


#2 low potassium diet.  Anticipate potassium getting better with Lasix.


#3 add sodium bicarbonate for metabolic acidosis


#4 adjust antihypertensive medications.

## 2018-03-10 NOTE — PN
PROGRESS NOTE



Patient is a 73-year-old pleasant male admitted to the hospital in with abdominal pain

and subsequently diagnosed with acute pancreatitis.  The patient never had these

symptoms in the past. He was noted to have elevated amylase and lipase and CT scan

showing changes consistent with acute pancreatitis.  This morning he denies any

abdominal pain.  He reports no nausea or vomiting.  On a full liquid diet, tolerating

well.  He reports no fever, chills, or night sweats.



PHYSICAL EXAMINATION:

He is sleeping.  VITAL SIGNS:  Stable.  Blood pressure was 165/80, pulse 67,

temperature 97.9.

HEENT: Examination unremarkable.  Conjunctivae pink. Sclerae anicteric.  Oral cavity no

lesions.  CHEST:  Clear to auscultation.  Abdomen was slightly distended, but was

nontender, nondistended.  EXTREMITIES:  No pedal edema. SKIN: No rashes. NEURO: He is

alert and oriented to name and place.



LAB DATA:

Showed a WBC of 6, hemoglobin 11.7, platelets of 131,000.  AST and ALT of 41 and 38

respectively.  T bilirubin is 2, and alkaline phosphatase is 355. The lipase was

elevated at 2185 and amylase is 190.



IMPRESSION:

1. Acute pancreatitis, first episode.  Etiology unclear.  Ultrasound and CT scan did

    not show any evidence of gallstones.

2. Mild elevation of T bilirubin as well as alkaline phosphatase, but normal serum

    transaminases.

3. Thrombocytopenia.

4. Positive hepatitis C antibody.  HB RNA is still pending.  The present.



RECOMMENDATIONS:

1. Continue with a full liquid diet for now.

2. We will repeat labs in the morning.

3. No indication to proceed with an ERCP at the present time.  We will follow him

    closely.

Thank you for this consultation.





MMODL / IJN: [id number] / Job#: [id number]

## 2018-03-10 NOTE — P.PN
Subjective





Patient short of breath at rest.  No chest pain compressive abdominal pain with 

elevated amylase lipase





On examination blood pressure is 166/78 mmHg afebrile respirations 20-22 pulse 

rate in the 70s


Breath sounds are decreased bilaterally


Heart sounds S1 and S2 are soft





Impression


Hypertension volume overload


Acute kidney injury acute tubular necrosis non-oliguric renal failure


Acute pancreatitis





Plan


Continue current management management











Objective





- Vital Signs


Vital signs: 


 Vital Signs











Temp  97.7 F   03/10/18 10:59


 


Pulse  74   03/10/18 10:59


 


Resp  21   03/10/18 10:59


 


BP  166/78   03/10/18 10:59


 


Pulse Ox  94 L  03/10/18 10:59








 Intake & Output











 03/09/18 03/10/18 03/10/18





 18:59 06:59 18:59


 


Intake Total 600 360 240


 


Output Total  300 1


 


Balance 600 60 239


 


Weight 113 kg 104.5 kg 


 


Intake:   


 


  Oral 600 360 240


 


Output:   


 


  Urine  300 


 


  Urine/Stool Mix   1


 


Other:   


 


  Voiding Method Indwelling Catheter Indwelling Catheter Indwelling Catheter














- Labs


CBC & Chem 7: 


 03/10/18 05:49





 03/10/18 05:49


Labs: 


 Abnormal Lab Results - Last 24 Hours (Table)











  03/07/18 03/09/18 03/09/18 Range/Units





  07:41 14:03 17:09 


 


RBC     (4.30-5.90)  m/uL


 


Hgb     (13.0-17.5)  gm/dL


 


Hct     (39.0-53.0)  %


 


Plt Count     (150-450)  k/uL


 


Lymphocytes #     (1.0-4.8)  k/uL


 


Potassium     (3.5-5.1)  mmol/L


 


Carbon Dioxide     (22-30)  mmol/L


 


BUN     (9-20)  mg/dL


 


Creatinine     (0.66-1.25)  mg/dL


 


Glucose     (74-99)  mg/dL


 


POC Glucose (mg/dL)   248 H  269 H  (75-99)  mg/dL


 


Total Bilirubin     (0.2-1.3)  mg/dL


 


Alkaline Phosphatase     ()  U/L


 


Ammonia     (<30)  umol/L


 


Albumin     (3.5-5.0)  g/dL


 


Amylase     ()  U/L


 


Lipase     ()  U/L


 


HCV RNA Qual (PCR)  DETECTED H    (Not detected)  


 


Hepatitis C RNA Quant  415,721 H    (<12)  IU/mL


 


HCV RNA PCR log /ml  5.62 H    (<1.08)   


 


Hepatitis C Genotype  1a H    














  03/09/18 03/10/18 03/10/18 Range/Units





  20:36 05:38 05:49 


 


RBC     (4.30-5.90)  m/uL


 


Hgb     (13.0-17.5)  gm/dL


 


Hct     (39.0-53.0)  %


 


Plt Count     (150-450)  k/uL


 


Lymphocytes #     (1.0-4.8)  k/uL


 


Potassium     (3.5-5.1)  mmol/L


 


Carbon Dioxide     (22-30)  mmol/L


 


BUN     (9-20)  mg/dL


 


Creatinine     (0.66-1.25)  mg/dL


 


Glucose     (74-99)  mg/dL


 


POC Glucose (mg/dL)  239 H  276 H   (75-99)  mg/dL


 


Total Bilirubin     (0.2-1.3)  mg/dL


 


Alkaline Phosphatase     ()  U/L


 


Ammonia     (<30)  umol/L


 


Albumin     (3.5-5.0)  g/dL


 


Amylase    190 H  ()  U/L


 


Lipase    2185 H  ()  U/L


 


HCV RNA Qual (PCR)     (Not detected)  


 


Hepatitis C RNA Quant     (<12)  IU/mL


 


HCV RNA PCR log /ml     (<1.08)   


 


Hepatitis C Genotype     














  03/10/18 03/10/18 03/10/18 Range/Units





  05:49 05:49 05:49 


 


RBC   3.86 L   (4.30-5.90)  m/uL


 


Hgb   11.7 L   (13.0-17.5)  gm/dL


 


Hct   36.7 L   (39.0-53.0)  %


 


Plt Count   131 L   (150-450)  k/uL


 


Lymphocytes #   0.9 L   (1.0-4.8)  k/uL


 


Potassium    5.2 H  (3.5-5.1)  mmol/L


 


Carbon Dioxide    21 L  (22-30)  mmol/L


 


BUN    59 H  (9-20)  mg/dL


 


Creatinine    2.00 H  (0.66-1.25)  mg/dL


 


Glucose    279 H  (74-99)  mg/dL


 


POC Glucose (mg/dL)     (75-99)  mg/dL


 


Total Bilirubin    2.0 H  (0.2-1.3)  mg/dL


 


Alkaline Phosphatase    355 H  ()  U/L


 


Ammonia  61 H    (<30)  umol/L


 


Albumin    3.1 L  (3.5-5.0)  g/dL


 


Amylase     ()  U/L


 


Lipase     ()  U/L


 


HCV RNA Qual (PCR)     (Not detected)  


 


Hepatitis C RNA Quant     (<12)  IU/mL


 


HCV RNA PCR log /ml     (<1.08)   


 


Hepatitis C Genotype     














  03/10/18 Range/Units





  11:31 


 


RBC   (4.30-5.90)  m/uL


 


Hgb   (13.0-17.5)  gm/dL


 


Hct   (39.0-53.0)  %


 


Plt Count   (150-450)  k/uL


 


Lymphocytes #   (1.0-4.8)  k/uL


 


Potassium   (3.5-5.1)  mmol/L


 


Carbon Dioxide   (22-30)  mmol/L


 


BUN   (9-20)  mg/dL


 


Creatinine   (0.66-1.25)  mg/dL


 


Glucose   (74-99)  mg/dL


 


POC Glucose (mg/dL)  288 H  (75-99)  mg/dL


 


Total Bilirubin   (0.2-1.3)  mg/dL


 


Alkaline Phosphatase   ()  U/L


 


Ammonia   (<30)  umol/L


 


Albumin   (3.5-5.0)  g/dL


 


Amylase   ()  U/L


 


Lipase   ()  U/L


 


HCV RNA Qual (PCR)   (Not detected)  


 


Hepatitis C RNA Quant   (<12)  IU/mL


 


HCV RNA PCR log /ml   (<1.08)   


 


Hepatitis C Genotype   








 Microbiology - Last 24 Hours (Table)











 03/05/18 13:20 Blood Culture - Preliminary





 Blood    No Growth after 96 hours

## 2018-03-10 NOTE — P.PN
Subjective


Progress Note Date: 03/10/18


Principal diagnosis: 





patient is seen and examined in follow up for acute pancreatitis, acute renal 

failure, and CHF exacerbation





Patient seen and examined today, patient continues to have some shortness of 

breath even while resting doing nothing, denies any chest pain, or dizziness. 

He reports that he always feels like this at home . he denies any abd pain , 

nausea or vomiting. He continues to have hematuria however, it is clearing up 





Objective





- Vital Signs


Vital signs: 


 Vital Signs











Temp  97.7 F   03/10/18 10:59


 


Pulse  74   03/10/18 10:59


 


Resp  21   03/10/18 10:59


 


BP  166/78   03/10/18 10:59


 


Pulse Ox  94 L  03/10/18 10:59








 Intake & Output











 03/09/18 03/10/18 03/10/18





 18:59 06:59 18:59


 


Intake Total 600 360 240


 


Output Total  300 1


 


Balance 600 60 239


 


Weight 113 kg 104.5 kg 


 


Intake:   


 


  Oral 600 360 240


 


Output:   


 


  Urine  300 


 


  Urine/Stool Mix   1


 


Other:   


 


  Voiding Method Indwelling Catheter Indwelling Catheter Indwelling Catheter














- Exam





Constitutional:   vital signs showing elevated systolic blood pressure, patient 

is conversant, not in any acute distress


Lungs: Clear to auscultation bilaterally  , clear to percussion, no wheezing no 

rales no rhonchi


Cardiovascular: Regular rate and rhythm, no murmurs, no gallops, no rubs, no 

peripheral edema


Gastrointestinal: No tenderness palpation of the abdomen, mildly distended, 

bowel sounds are positive 


Extremities: No digital cyanosis or clubbing, peripheral pulses palpable and 

equal over bilateral radial arteries and dorsalis pedis artery,  no calf muscle 

tenderness, capillary refill is immediate over bilateral big toes


Psych: Alert, oriented to place, person, appropriate affect


Peters catheter in place, hematuria clearing up














- Labs


CBC & Chem 7: 


 03/10/18 05:49





 03/10/18 05:49


Labs: 


 Abnormal Lab Results - Last 24 Hours (Table)











  03/07/18 03/09/18 03/09/18 Range/Units





  07:41 17:09 20:36 


 


RBC     (4.30-5.90)  m/uL


 


Hgb     (13.0-17.5)  gm/dL


 


Hct     (39.0-53.0)  %


 


Plt Count     (150-450)  k/uL


 


Lymphocytes #     (1.0-4.8)  k/uL


 


Potassium     (3.5-5.1)  mmol/L


 


Carbon Dioxide     (22-30)  mmol/L


 


BUN     (9-20)  mg/dL


 


Creatinine     (0.66-1.25)  mg/dL


 


Glucose     (74-99)  mg/dL


 


POC Glucose (mg/dL)   269 H  239 H  (75-99)  mg/dL


 


Total Bilirubin     (0.2-1.3)  mg/dL


 


Alkaline Phosphatase     ()  U/L


 


Ammonia     (<30)  umol/L


 


Albumin     (3.5-5.0)  g/dL


 


Amylase     ()  U/L


 


Lipase     ()  U/L


 


HCV RNA Qual (PCR)  DETECTED H    (Not detected)  


 


Hepatitis C RNA Quant  415,721 H    (<12)  IU/mL


 


HCV RNA PCR log /ml  5.62 H    (<1.08)   


 


Hepatitis C Genotype  1a H    














  03/10/18 03/10/18 03/10/18 Range/Units





  05:38 05:49 05:49 


 


RBC     (4.30-5.90)  m/uL


 


Hgb     (13.0-17.5)  gm/dL


 


Hct     (39.0-53.0)  %


 


Plt Count     (150-450)  k/uL


 


Lymphocytes #     (1.0-4.8)  k/uL


 


Potassium     (3.5-5.1)  mmol/L


 


Carbon Dioxide     (22-30)  mmol/L


 


BUN     (9-20)  mg/dL


 


Creatinine     (0.66-1.25)  mg/dL


 


Glucose     (74-99)  mg/dL


 


POC Glucose (mg/dL)  276 H    (75-99)  mg/dL


 


Total Bilirubin     (0.2-1.3)  mg/dL


 


Alkaline Phosphatase     ()  U/L


 


Ammonia    61 H  (<30)  umol/L


 


Albumin     (3.5-5.0)  g/dL


 


Amylase   190 H   ()  U/L


 


Lipase   2185 H   ()  U/L


 


HCV RNA Qual (PCR)     (Not detected)  


 


Hepatitis C RNA Quant     (<12)  IU/mL


 


HCV RNA PCR log /ml     (<1.08)   


 


Hepatitis C Genotype     














  03/10/18 03/10/18 03/10/18 Range/Units





  05:49 05:49 11:31 


 


RBC  3.86 L    (4.30-5.90)  m/uL


 


Hgb  11.7 L    (13.0-17.5)  gm/dL


 


Hct  36.7 L    (39.0-53.0)  %


 


Plt Count  131 L    (150-450)  k/uL


 


Lymphocytes #  0.9 L    (1.0-4.8)  k/uL


 


Potassium   5.2 H   (3.5-5.1)  mmol/L


 


Carbon Dioxide   21 L   (22-30)  mmol/L


 


BUN   59 H   (9-20)  mg/dL


 


Creatinine   2.00 H   (0.66-1.25)  mg/dL


 


Glucose   279 H   (74-99)  mg/dL


 


POC Glucose (mg/dL)    288 H  (75-99)  mg/dL


 


Total Bilirubin   2.0 H   (0.2-1.3)  mg/dL


 


Alkaline Phosphatase   355 H   ()  U/L


 


Ammonia     (<30)  umol/L


 


Albumin   3.1 L   (3.5-5.0)  g/dL


 


Amylase     ()  U/L


 


Lipase     ()  U/L


 


HCV RNA Qual (PCR)     (Not detected)  


 


Hepatitis C RNA Quant     (<12)  IU/mL


 


HCV RNA PCR log /ml     (<1.08)   


 


Hepatitis C Genotype     








 Microbiology - Last 24 Hours (Table)











 03/05/18 13:20 Blood Culture - Preliminary





 Blood    No Growth after 96 hours














Assessment and Plan


Assessment: 





73-year-old male with past medical history of congestive heart failure with 

left ventricular ejection fraction of 20%.  





 patient presented to the hospital with abdominal pain found to have acute 

pancreatitis, he was also suspected to have appendicitis however that was ruled 

out by general surgery team , and surgery team thought it's an incidental 

finding of fecalith in the appendix without any active acute  appendicitis, 

patient to continue to follow up with general surgery after discharge, patient 

received short course of Zosyn during this hospital stay.  





GI is assisting in management of his pancreatitis however patient cannot give 

MRCP due to having a pacemaker.  Lipase has been trending down initially .  

Patient denies any more abdominal pain nausea or vomiting.  patient tolerated 

PO intake, clinically his acute pancreatitis attack has resolved, however, his 

lipase today is trending up again.  





Patient also developed acute kidney injury and oliguria, which resulted in 

fluid overload and acute CHF exacerbation, ESTEVAN due to 3rd spacing with 

decreased intravascular volume initially due to acute pancreatitis resulting in 

prerenal hypoperfusion . peters catheter inserted, which he self removed with 

balloon inflated, resulting in gross hematuria later.  However, nephrology 

suggested aggressive diuresis before considering hemodialysis , and patient 

started making some urine. his breathing started to improve with aggressive 

diuresis, holidng IV fluids, and utilizing Bipap.  Peters catheter maintained  

for accurate ins and outs.  patient is currently being managed at [facility] (formerly [facility]) unit

, however, he is stable enough to be moved back to med surg with tele. Patient 

responded to aggressive diuresis , and is not oliguric anymore. renal function 

continues to improve.





Cardiology input was requested regarding his SOB at rest due to fluid overload 

and poor cardiac function, cardiology agreed with current management and will 

continue to follow up their recommendations


Plan: 





# Acute hypoxic respiratory failure , improving 


# Pulmonary edema due to acute CHF exacerbation (LVEF of 20% s/p ICD ) due to 

fluid overload, improving


#. Acute kidney injury ,  non-oliguric  now


#. Metabolic acidosis secondary to acute renal failure


#. Malignant hypertension with pulmonary edema, improving 


continue with aggressive diuresis per nephrology 


strict Is and Os


continue cardiac meds , coreg, imdur


  hydralazine (for CHF and HTN), added and now adjusting dose to optimize BP 

control 


Continue with BiPAP at bedside to help with work of breathing


cardiology input noted


Ok to transfer patient back to med surg unit with tele





#Gross hematuria secondary to traumatic removal of the Peters catheter by the 

patient, improving 


Urology input noted


Continue with Peters catheter now and irrigation of the bladder as needed


Continue to monitor urine output


Hemoglobin stable





#Diabetes mellitus with peripheral neuropathy


Continue with current insulin management


Blood sugars controlled





#Acute pancreatitis, improving


Lipase was trending down, today increased alittle bit, but clinically currently 

patient denies any abdominal pain nausea or vomiting, and tolerating diet


Patient could not get MRCP done due to having pacemaker





#Hyperbilirubinemia, mostly of the conjugated type, liver enzymes are 

unremarkable except for elevated alkaline phosphatase, improving now


This is an obstructive jaundice picture, due to edema from acute pancreatitis


However patient is afebrile, no leukocytosis


GI and general surgery are following





DVT prophylaxis,  heparin sc TID





History of chronic hepatitis C





Thrombocytopenia most likely secondary to underlying hepatitis C, now improving 


No evidence of active bleeding at this time





 





Ok to transfer to med surg unit with tele


discharge planning, patient agrees to go to SNF for short period , due to 

overall debility and dyspnea at rest 


possible discharge on Monday 


follow up labs

## 2018-03-11 LAB
ALBUMIN SERPL-MCNC: 3.2 G/DL (ref 3.5–5)
ALP SERPL-CCNC: 310 U/L (ref 38–126)
ALT SERPL-CCNC: 35 U/L (ref 21–72)
AMYLASE SERPL-CCNC: 190 U/L (ref 30–110)
ANION GAP SERPL CALC-SCNC: 7 MMOL/L
AST SERPL-CCNC: 41 U/L (ref 17–59)
BASOPHILS # BLD AUTO: 0 K/UL (ref 0–0.2)
BASOPHILS NFR BLD AUTO: 1 %
BUN SERPL-SCNC: 46 MG/DL (ref 9–20)
CALCIUM SPEC-MCNC: 9 MG/DL (ref 8.4–10.2)
CHLORIDE SERPL-SCNC: 108 MMOL/L (ref 98–107)
CO2 SERPL-SCNC: 25 MMOL/L (ref 22–30)
EOSINOPHIL # BLD AUTO: 0.1 K/UL (ref 0–0.7)
EOSINOPHIL NFR BLD AUTO: 2 %
ERYTHROCYTE [DISTWIDTH] IN BLOOD BY AUTOMATED COUNT: 3.87 M/UL (ref 4.3–5.9)
ERYTHROCYTE [DISTWIDTH] IN BLOOD: 13.3 % (ref 11.5–15.5)
GLUCOSE BLD-MCNC: 156 MG/DL (ref 75–99)
GLUCOSE BLD-MCNC: 260 MG/DL (ref 75–99)
GLUCOSE BLD-MCNC: 308 MG/DL (ref 75–99)
GLUCOSE BLD-MCNC: 321 MG/DL (ref 75–99)
GLUCOSE SERPL-MCNC: 179 MG/DL (ref 74–99)
HCT VFR BLD AUTO: 36.3 % (ref 39–53)
HGB BLD-MCNC: 11.5 GM/DL (ref 13–17.5)
LIPASE SERPL-CCNC: 1703 U/L (ref 23–300)
LYMPHOCYTES # SPEC AUTO: 1.3 K/UL (ref 1–4.8)
LYMPHOCYTES NFR SPEC AUTO: 20 %
MCH RBC QN AUTO: 29.8 PG (ref 25–35)
MCHC RBC AUTO-ENTMCNC: 31.7 G/DL (ref 31–37)
MCV RBC AUTO: 94 FL (ref 80–100)
MONOCYTES # BLD AUTO: 0.6 K/UL (ref 0–1)
MONOCYTES NFR BLD AUTO: 9 %
NEUTROPHILS # BLD AUTO: 4.3 K/UL (ref 1.3–7.7)
NEUTROPHILS NFR BLD AUTO: 66 %
PLATELET # BLD AUTO: 137 K/UL (ref 150–450)
POTASSIUM SERPL-SCNC: 5.4 MMOL/L (ref 3.5–5.1)
PROT SERPL-MCNC: 7 G/DL (ref 6.3–8.2)
SODIUM SERPL-SCNC: 140 MMOL/L (ref 137–145)
WBC # BLD AUTO: 6.5 K/UL (ref 3.8–10.6)

## 2018-03-11 RX ADMIN — INSULIN ASPART SCH UNIT: 100 INJECTION, SOLUTION INTRAVENOUS; SUBCUTANEOUS at 17:29

## 2018-03-11 RX ADMIN — HEPARIN SODIUM SCH UNIT: 5000 INJECTION, SOLUTION INTRAVENOUS; SUBCUTANEOUS at 07:28

## 2018-03-11 RX ADMIN — HEPARIN SODIUM SCH UNIT: 5000 INJECTION, SOLUTION INTRAVENOUS; SUBCUTANEOUS at 15:10

## 2018-03-11 RX ADMIN — CARVEDILOL SCH MG: 12.5 TABLET, FILM COATED ORAL at 04:38

## 2018-03-11 RX ADMIN — CARVEDILOL SCH MG: 12.5 TABLET, FILM COATED ORAL at 17:29

## 2018-03-11 RX ADMIN — ISOSORBIDE MONONITRATE SCH MG: 30 TABLET, EXTENDED RELEASE ORAL at 07:29

## 2018-03-11 RX ADMIN — LACTULOSE SCH: 20 SOLUTION ORAL at 07:28

## 2018-03-11 RX ADMIN — FUROSEMIDE SCH MG: 10 INJECTION, SOLUTION INTRAMUSCULAR; INTRAVENOUS at 07:29

## 2018-03-11 RX ADMIN — LACTULOSE SCH: 20 SOLUTION ORAL at 19:56

## 2018-03-11 RX ADMIN — INSULIN ASPART SCH UNIT: 100 INJECTION, SOLUTION INTRAVENOUS; SUBCUTANEOUS at 12:23

## 2018-03-11 RX ADMIN — INSULIN ASPART SCH UNIT: 100 INJECTION, SOLUTION INTRAVENOUS; SUBCUTANEOUS at 21:45

## 2018-03-11 RX ADMIN — INSULIN ASPART SCH UNIT: 100 INJECTION, SOLUTION INTRAVENOUS; SUBCUTANEOUS at 06:38

## 2018-03-11 RX ADMIN — FUROSEMIDE SCH MG: 10 INJECTION, SOLUTION INTRAMUSCULAR; INTRAVENOUS at 21:46

## 2018-03-11 RX ADMIN — INSULIN DETEMIR SCH UNIT: 100 INJECTION, SOLUTION SUBCUTANEOUS at 21:45

## 2018-03-11 RX ADMIN — PANTOPRAZOLE SODIUM SCH MG: 40 INJECTION, POWDER, FOR SOLUTION INTRAVENOUS at 07:30

## 2018-03-11 NOTE — P.PN
Subjective


Patient is a 73-year-old black male with multiple medical problems including 

acute pancreatitis, acute renal failure, and CHF exacerbation.  At this time he 

denies any abdominal pain.  A computed tomography scan on initial admission 

revealed a distal appendicolith with some questionable mild stranding.  Repeat 

CAT scan did not reveal any increase in inflammation at this site.  This is 

felt to be an incidental finding.  The patient is being treated medically and 

at this time it does not have an acute surgical problem.  Patient is tolerating 

his diet without difficulty.


Lipase is down to 1703 from 2185








Objective





- Vital Signs


Vital signs: 


 Vital Signs











Temp  97.2 F L  03/11/18 07:34


 


Pulse  64   03/11/18 07:34


 


Resp  21   03/11/18 07:34


 


BP  166/70   03/11/18 07:34


 


Pulse Ox  96   03/11/18 07:34








 Intake & Output











 03/10/18 03/11/18 03/11/18





 17:59 06:59 18:59


 


Intake Total   118


 


Output Total   


 


Balance   118


 


Weight   


 


Intake:   


 


  Oral   118


 


Output:   


 


  Urine   


 


  Urine/Stool Mix   


 


Other:   


 


  Voiding Method   Indwelling Catheter














- Constitutional


General appearance: Present: obese





- Respiratory


Details: 





Decreased breath sounds at the bases





- Cardiovascular


Rhythm: regular


Heart sounds: normal: S1, S2





- Gastrointestinal


Gastrointestinal Comment(s): 





Protuberant abdomen


No guarding or rebound


General gastrointestinal: Present: decreased bowel sounds, soft





- Psychiatric


Psychiatric Comment(s): 





Patient seems more alert today





- Labs


CBC & Chem 7: 


 03/11/18 05:37





 03/11/18 05:37


Labs: 


 Abnormal Lab Results - Last 24 Hours (Table)











  03/10/18 03/10/18 03/10/18 Range/Units





  11:31 17:06 20:58 


 


RBC     (4.30-5.90)  m/uL


 


Hgb     (13.0-17.5)  gm/dL


 


Hct     (39.0-53.0)  %


 


Plt Count     (150-450)  k/uL


 


Potassium     (3.5-5.1)  mmol/L


 


Chloride     ()  mmol/L


 


BUN     (9-20)  mg/dL


 


Glucose     (74-99)  mg/dL


 


POC Glucose (mg/dL)  288 H  231 H  222 H  (75-99)  mg/dL


 


Total Bilirubin     (0.2-1.3)  mg/dL


 


Alkaline Phosphatase     ()  U/L


 


Albumin     (3.5-5.0)  g/dL


 


Amylase     ()  U/L


 


Lipase     ()  U/L














  03/11/18 03/11/18 03/11/18 Range/Units





  05:37 05:37 06:35 


 


RBC  3.87 L    (4.30-5.90)  m/uL


 


Hgb  11.5 L    (13.0-17.5)  gm/dL


 


Hct  36.3 L    (39.0-53.0)  %


 


Plt Count  137 L    (150-450)  k/uL


 


Potassium   5.4 H   (3.5-5.1)  mmol/L


 


Chloride   108 H   ()  mmol/L


 


BUN   46 H   (9-20)  mg/dL


 


Glucose   179 H   (74-99)  mg/dL


 


POC Glucose (mg/dL)    156 H  (75-99)  mg/dL


 


Total Bilirubin   1.7 H   (0.2-1.3)  mg/dL


 


Alkaline Phosphatase   310 H   ()  U/L


 


Albumin   3.2 L   (3.5-5.0)  g/dL


 


Amylase   190 H   ()  U/L


 


Lipase   1703 H   ()  U/L








 Microbiology - Last 24 Hours (Table)











 03/05/18 13:20 Blood Culture - Preliminary





 Blood    No Growth after 120 hours














Assessment and Plan


Assessment: 


Impression/plan:


1.  73-year-old male with medical history of congestive heart failure left 

ventricular ejection fraction of 20%


2.  Acute pancreatitis patient cannot have an MRCP secondary to pacemaker, 

lipase decreased today  continue to follow.


3.  Acute kidney injury.





Plan:


1.  Medical management of medical problems


2.  Patient does not have an acute surgical problem at this time we'll continue 

to follow with particular attention to the CT findings of the incidental 

fecalith in the distal appendix

## 2018-03-11 NOTE — PN
PROGRESS NOTE



DATE OF SERVICE:

March 11, 2018



Patient is a 73-year-old white male admitted to hospital with acute pancreatitis.

Presented with abdominal pain, nausea, vomiting.  He is doing better this morning.

Still had some mild epigastric discomfort.  No nausea, vomiting. On a full liquid diet,

tolerating well.  No fever, chills, or night sweats.



PHYSICAL EXAMINATION:

He appears comfortable.  No apparent distress.  Vital signs stable.  Blood pressure

166/70, pulse rate 64, temperature 97.2.  HEENT examination unremarkable. Conjunctivae

pink.  Sclerae anicteric.  Oral cavity no lesions.  Neck: No jugular venous distention

or lymph node enlargement.  The chest was clear to auscultation.  HEART:  Regular rate

and rhythm.

ABDOMEN:  Obese, nontender.  Bowel sounds are positive.  Extremities:  No pedal edema.

Skin no rashes.  NEUROLOGIC:  Alert and oriented x3.  No focal deficits.



LAB:

From this morning, WBC 6.5, hemoglobin 11.5, platelets 137.  Sodium 140, potassium 5.4,

chloride 108, BUN 46, creatinine 1.2, T bilirubin is 1.7.  AST and ALT are 41 and 35

respectively, alkaline phosphatase 310.  Amylase is 190 and lipase is down to 1703.



IMPRESSION:

Acute pancreatitis, gradually improving, serum transaminases are within normal limits.

Total bilirubin as well as alkaline phosphatase is also improving.  He did have

ultrasound as well as CT scan of the abdomen and pelvis.  The patient did not show any

evidence of biliary ductal dilation or gallstones.



RECOMMENDATIONS:

1. Continue with symptomatic and supportive care.

2. Since LFTs are gradually improving.  No indication to proceed with an ERCP at the

    present time.

3. We will follow him closely.

Thank you for this consultation.





MMODL / IJN: [id number] / Job#: [id number]

## 2018-03-11 NOTE — P.PN
Subjective


Progress Note Date: 03/11/18


Principal diagnosis: 





patient is seen and examined in follow up for acute pancreatitis, acute renal 

failure, and CHF exacerbation





Patient seen and examined today, patient feels way better today, denies any 

chest pain or abdominal pain.  His breathing has improved however he reports 

shortness of breath during rest at baseline.  Tolerating diet denies any nausea 

or vomiting, passing bowel movements nonbloody no melena.  His urine is 

clearing currently yellow no evidence of any further hematuria.  





Objective





- Vital Signs


Vital signs: 


 Vital Signs











Temp  97.2 F L  03/11/18 07:34


 


Pulse  65   03/11/18 12:00


 


Resp  20   03/11/18 12:00


 


BP  150/77   03/11/18 12:00


 


Pulse Ox  95   03/11/18 12:00








 Intake & Output











 03/10/18 03/11/18 03/11/18





 17:59 06:59 18:59


 


Intake Total   236


 


Output Total   1800


 


Balance   -1564


 


Weight   


 


Intake:   


 


  Oral   236


 


Output:   


 


  Urine   1800


 


  Urine/Stool Mix   


 


Other:   


 


  Voiding Method   Indwelling Catheter














- Exam





Constitutional: Vital signs stable, showing improved systolic blood pressure, 

patient is conversant, not in any acute distress


Lungs: Clear to auscultation bilaterally  , clear to percussion, no wheezing no 

rales no rhonchi


Cardiovascular: Regular rate and rhythm, no murmurs, no gallops, no rubs, very 

slight edema over the left foot


Gastrointestinal: No tenderness palpation of the abdomen, mildly distended, 

bowel sounds are positive 


Extremities: No digital cyanosis or clubbing, peripheral pulses palpable and 

equal over bilateral radial arteries and dorsalis pedis artery,  no calf muscle 

tenderness, capillary refill is immediate over bilateral big toes


Psych: Alert, oriented to place, person, appropriate affect


Peters catheter in place showing yellow urine now no further hematuria














- Labs


CBC & Chem 7: 


 03/11/18 05:37





 03/11/18 05:37


Labs: 


 Abnormal Lab Results - Last 24 Hours (Table)











  03/10/18 03/10/18 03/11/18 Range/Units





  17:06 20:58 05:37 


 


RBC    3.87 L  (4.30-5.90)  m/uL


 


Hgb    11.5 L  (13.0-17.5)  gm/dL


 


Hct    36.3 L  (39.0-53.0)  %


 


Plt Count    137 L  (150-450)  k/uL


 


Potassium     (3.5-5.1)  mmol/L


 


Chloride     ()  mmol/L


 


BUN     (9-20)  mg/dL


 


Glucose     (74-99)  mg/dL


 


POC Glucose (mg/dL)  231 H  222 H   (75-99)  mg/dL


 


Total Bilirubin     (0.2-1.3)  mg/dL


 


Alkaline Phosphatase     ()  U/L


 


Albumin     (3.5-5.0)  g/dL


 


Amylase     ()  U/L


 


Lipase     ()  U/L














  03/11/18 03/11/18 03/11/18 Range/Units





  05:37 06:35 12:07 


 


RBC     (4.30-5.90)  m/uL


 


Hgb     (13.0-17.5)  gm/dL


 


Hct     (39.0-53.0)  %


 


Plt Count     (150-450)  k/uL


 


Potassium  5.4 H    (3.5-5.1)  mmol/L


 


Chloride  108 H    ()  mmol/L


 


BUN  46 H    (9-20)  mg/dL


 


Glucose  179 H    (74-99)  mg/dL


 


POC Glucose (mg/dL)   156 H  260 H  (75-99)  mg/dL


 


Total Bilirubin  1.7 H    (0.2-1.3)  mg/dL


 


Alkaline Phosphatase  310 H    ()  U/L


 


Albumin  3.2 L    (3.5-5.0)  g/dL


 


Amylase  190 H    ()  U/L


 


Lipase  1703 H    ()  U/L








 Microbiology - Last 24 Hours (Table)











 03/05/18 13:20 Blood Culture - Preliminary





 Blood    No Growth after 120 hours














Assessment and Plan


Assessment: 





73-year-old male with past medical history of congestive heart failure with 

left ventricular ejection fraction of 20%.  





 patient presented to the hospital with abdominal pain found to have acute 

pancreatitis, he was also suspected to have appendicitis however that was ruled 

out by general surgery team , and surgery team thought it's an incidental 

finding of fecalith in the appendix without any active acute  appendicitis, 

patient to continue to follow up with general surgery after discharge, patient 

received short course of Zosyn during this hospital stay.  





GI is assisting in management of his pancreatitis however patient cannot give 

MRCP due to having a pacemaker.  Lipase has been trending down initially .  

Patient denies any more abdominal pain nausea or vomiting.  patient tolerated 

PO intake, clinically his acute pancreatitis attack has resolved, however, his 

lipase today is trending up again.  





Patient also developed acute kidney injury and oliguria, which resulted in 

fluid overload and acute CHF exacerbation, ESTEVAN due to 3rd spacing with 

decreased intravascular volume initially due to acute pancreatitis resulting in 

prerenal hypoperfusion . peters catheter inserted, which he self removed with 

balloon inflated, resulting in gross hematuria later.  However, nephrology 

suggested aggressive diuresis before considering hemodialysis , and patient 

started making some urine. his breathing started to improve with aggressive 

diuresis, holidng IV fluids, and utilizing Bipap.  Peters catheter maintained  

for accurate ins and outs.  patient is currently being managed at [facility] unit

, however, he is stable enough to be moved back to [facility] with tele. Patient 

responded to aggressive diuresis , and is not oliguric anymore. renal function 

continues to improve.





Cardiology input was requested regarding his SOB at rest due to fluid overload 

and poor cardiac function, cardiology agreed with current management and will 

continue to follow up their recommendations





On 3/11, all his labs are improving, patient condition is stable now, he will 

be transferred back to [facility] with telemetry, patient continued to be 

monitored in the hospital awaiting placement at subacute rehab due to poor 

cardiac function and limited functional capacity


Plan: 





# Acute hypoxic respiratory failure , resolved


# Pulmonary edema due to acute CHF exacerbation (LVEF of 20% s/p ICD ) due to 

fluid overload, resolved


#. Acute kidney injury ,  non-oliguric, resolved


#. Metabolic acidosis secondary to acute renal failure, resolved


#. Malignant hypertension with pulmonary edema, resolved


Continue with IV diuresis for 1 more day, will be switched to by mouth Lasix in 

the morning per nephrology recommendations


strict Is and Os


continue cardiac meds , coreg, imdur, continue with hydralazine (this is a new 

medication)


Continue to monitor blood pressure closely with recent adjustment of his 

medications


Continue with BiPAP at bedside to help with work of breathing


Ok to transfer patient back to [facility] unit with tele





#Gross hematuria secondary to traumatic removal of the Peters catheter by the 

patient, resolved


Urology input noted


Continue with Peters catheter now and irrigation of the bladder as needed


Continue to monitor urine output


Hemoglobin stable





#Diabetes mellitus with peripheral neuropathy


Continue with current insulin management


Blood sugars controlled





#Acute pancreatitis, resolved


Lipase continues to trend down patient currently asymptomatic


Patient could not get MRCP done due to having pacemaker





#Hyperbilirubinemia, mostly of the conjugated type, liver enzymes are 

unremarkable except for elevated alkaline phosphatase, improving now


This is an obstructive jaundice picture, due to edema from acute pancreatitis


However patient is afebrile, no leukocytosis


GI and general surgery are following





DVT prophylaxis,  heparin sc TID





History of chronic hepatitis C





Thrombocytopenia most likely secondary to underlying hepatitis C, now improving 


No evidence of active bleeding at this time





 





Ok to transfer to med surg unit with tele


discharge planning, patient agrees to go to SNF for short period , due to 

overall debility and dyspnea at rest due to deconditioning and poor cardiac 

function


possible discharge on Monday 


follow up labs

## 2018-03-11 NOTE — P.PN
Subjective


Progress Note Date: 03/11/18


Principal diagnosis: 


Acute pancreatitis





Seen and examined for the follow-up of acute kidney injury. Denies any nausea 

vomiting or diarrhea.  Sleeping comfortably in the bed








Objective





- Vital Signs


Vital signs: 


 Vital Signs











Temp  97.2 F L  03/11/18 07:34


 


Pulse  64   03/11/18 07:34


 


Resp  21   03/11/18 07:34


 


BP  166/70   03/11/18 07:34


 


Pulse Ox  96   03/11/18 07:34








 Intake & Output











 03/10/18 03/11/18 03/11/18





 17:59 06:59 18:59


 


Intake Total   118


 


Output Total   


 


Balance   118


 


Weight   


 


Intake:   


 


  Oral   118


 


Output:   


 


  Urine   


 


  Urine/Stool Mix   


 


Other:   


 


  Voiding Method   Indwelling Catheter














- Exam


 no acute distress


S1-S2 heard


Lungs basal crackles


Castro dark urine


Edema








- Labs


CBC & Chem 7: 


 03/11/18 05:37





 03/11/18 05:37


Labs: 


 Abnormal Lab Results - Last 24 Hours (Table)











  03/10/18 03/10/18 03/10/18 Range/Units





  05:49 05:49 11:31 


 


RBC  3.86 L    (4.30-5.90)  m/uL


 


Hgb  11.7 L    (13.0-17.5)  gm/dL


 


Hct  36.7 L    (39.0-53.0)  %


 


Plt Count  131 L    (150-450)  k/uL


 


Lymphocytes #  0.9 L    (1.0-4.8)  k/uL


 


Potassium   5.2 H   (3.5-5.1)  mmol/L


 


Chloride     ()  mmol/L


 


Carbon Dioxide   21 L   (22-30)  mmol/L


 


BUN   59 H   (9-20)  mg/dL


 


Creatinine   2.00 H   (0.66-1.25)  mg/dL


 


Glucose   279 H   (74-99)  mg/dL


 


POC Glucose (mg/dL)    288 H  (75-99)  mg/dL


 


Total Bilirubin   2.0 H   (0.2-1.3)  mg/dL


 


Alkaline Phosphatase   355 H   ()  U/L


 


Albumin   3.1 L   (3.5-5.0)  g/dL


 


Amylase     ()  U/L


 


Lipase     ()  U/L














  03/10/18 03/10/18 03/11/18 Range/Units





  17:06 20:58 05:37 


 


RBC    3.87 L  (4.30-5.90)  m/uL


 


Hgb    11.5 L  (13.0-17.5)  gm/dL


 


Hct    36.3 L  (39.0-53.0)  %


 


Plt Count    137 L  (150-450)  k/uL


 


Lymphocytes #     (1.0-4.8)  k/uL


 


Potassium     (3.5-5.1)  mmol/L


 


Chloride     ()  mmol/L


 


Carbon Dioxide     (22-30)  mmol/L


 


BUN     (9-20)  mg/dL


 


Creatinine     (0.66-1.25)  mg/dL


 


Glucose     (74-99)  mg/dL


 


POC Glucose (mg/dL)  231 H  222 H   (75-99)  mg/dL


 


Total Bilirubin     (0.2-1.3)  mg/dL


 


Alkaline Phosphatase     ()  U/L


 


Albumin     (3.5-5.0)  g/dL


 


Amylase     ()  U/L


 


Lipase     ()  U/L














  03/11/18 03/11/18 Range/Units





  05:37 06:35 


 


RBC    (4.30-5.90)  m/uL


 


Hgb    (13.0-17.5)  gm/dL


 


Hct    (39.0-53.0)  %


 


Plt Count    (150-450)  k/uL


 


Lymphocytes #    (1.0-4.8)  k/uL


 


Potassium  5.4 H   (3.5-5.1)  mmol/L


 


Chloride  108 H   ()  mmol/L


 


Carbon Dioxide    (22-30)  mmol/L


 


BUN  46 H   (9-20)  mg/dL


 


Creatinine    (0.66-1.25)  mg/dL


 


Glucose  179 H   (74-99)  mg/dL


 


POC Glucose (mg/dL)   156 H  (75-99)  mg/dL


 


Total Bilirubin  1.7 H   (0.2-1.3)  mg/dL


 


Alkaline Phosphatase  310 H   ()  U/L


 


Albumin  3.2 L   (3.5-5.0)  g/dL


 


Amylase  190 H   ()  U/L


 


Lipase  1703 H   ()  U/L








 Microbiology - Last 24 Hours (Table)











 03/05/18 13:20 Blood Culture - Preliminary





 Blood    No Growth after 120 hours














Assessment and Plan


Assessment: 


Impression:


#1 nonoliguric acute kidney injury secondary to ischemic ATN.


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


#3 metabolic acidosis


#4 hypertension uncontrolled


#5 pancreatitis





Recommendations:


#1 continue with Lasix 60 mg IV twice a day.  Renal function stable and 

improving.


#2 low potassium diet.  Anticipate potassium getting better with Lasix.  By 

tomorrow consider changing to by mouth Lasix


#3 on sodium bicarbonate for metabolic acidosis


#4 adjusted antihypertensive medications yesterday so monitor closely.

## 2018-03-12 LAB
ALBUMIN SERPL-MCNC: 3.5 G/DL (ref 3.5–5)
ALP SERPL-CCNC: 349 U/L (ref 38–126)
ALT SERPL-CCNC: 33 U/L (ref 21–72)
ANION GAP SERPL CALC-SCNC: 8 MMOL/L
AST SERPL-CCNC: 32 U/L (ref 17–59)
BASOPHILS # BLD AUTO: 0 K/UL (ref 0–0.2)
BASOPHILS NFR BLD AUTO: 1 %
BUN SERPL-SCNC: 26 MG/DL (ref 9–20)
CALCIUM SPEC-MCNC: 9.4 MG/DL (ref 8.4–10.2)
CHLORIDE SERPL-SCNC: 104 MMOL/L (ref 98–107)
CO2 SERPL-SCNC: 30 MMOL/L (ref 22–30)
EOSINOPHIL # BLD AUTO: 0.2 K/UL (ref 0–0.7)
EOSINOPHIL NFR BLD AUTO: 2 %
ERYTHROCYTE [DISTWIDTH] IN BLOOD BY AUTOMATED COUNT: 4.09 M/UL (ref 4.3–5.9)
ERYTHROCYTE [DISTWIDTH] IN BLOOD: 13.2 % (ref 11.5–15.5)
GLUCOSE BLD-MCNC: 162 MG/DL (ref 75–99)
GLUCOSE BLD-MCNC: 190 MG/DL (ref 75–99)
GLUCOSE BLD-MCNC: 313 MG/DL (ref 75–99)
GLUCOSE BLD-MCNC: 331 MG/DL (ref 75–99)
GLUCOSE SERPL-MCNC: 301 MG/DL (ref 74–99)
HCT VFR BLD AUTO: 38.5 % (ref 39–53)
HGB BLD-MCNC: 12.4 GM/DL (ref 13–17.5)
LYMPHOCYTES # SPEC AUTO: 1.4 K/UL (ref 1–4.8)
LYMPHOCYTES NFR SPEC AUTO: 16 %
MCH RBC QN AUTO: 30.3 PG (ref 25–35)
MCHC RBC AUTO-ENTMCNC: 32.2 G/DL (ref 31–37)
MCV RBC AUTO: 94.1 FL (ref 80–100)
MONOCYTES # BLD AUTO: 0.6 K/UL (ref 0–1)
MONOCYTES NFR BLD AUTO: 7 %
NEUTROPHILS # BLD AUTO: 6.2 K/UL (ref 1.3–7.7)
NEUTROPHILS NFR BLD AUTO: 72 %
PLATELET # BLD AUTO: 166 K/UL (ref 150–450)
POTASSIUM SERPL-SCNC: 5 MMOL/L (ref 3.5–5.1)
PROT SERPL-MCNC: 7.2 G/DL (ref 6.3–8.2)
SODIUM SERPL-SCNC: 142 MMOL/L (ref 137–145)
WBC # BLD AUTO: 8.6 K/UL (ref 3.8–10.6)

## 2018-03-12 RX ADMIN — LACTULOSE SCH GM: 20 SOLUTION ORAL at 08:05

## 2018-03-12 RX ADMIN — PANTOPRAZOLE SODIUM SCH MG: 40 INJECTION, POWDER, FOR SOLUTION INTRAVENOUS at 08:05

## 2018-03-12 RX ADMIN — INSULIN ASPART SCH UNIT: 100 INJECTION, SOLUTION INTRAVENOUS; SUBCUTANEOUS at 08:05

## 2018-03-12 RX ADMIN — INSULIN ASPART SCH UNIT: 100 INJECTION, SOLUTION INTRAVENOUS; SUBCUTANEOUS at 18:03

## 2018-03-12 RX ADMIN — ISOSORBIDE MONONITRATE SCH MG: 30 TABLET, EXTENDED RELEASE ORAL at 08:05

## 2018-03-12 RX ADMIN — HEPARIN SODIUM SCH UNIT: 5000 INJECTION, SOLUTION INTRAVENOUS; SUBCUTANEOUS at 16:21

## 2018-03-12 RX ADMIN — HEPARIN SODIUM SCH UNIT: 5000 INJECTION, SOLUTION INTRAVENOUS; SUBCUTANEOUS at 00:03

## 2018-03-12 RX ADMIN — INSULIN DETEMIR SCH UNIT: 100 INJECTION, SOLUTION SUBCUTANEOUS at 21:38

## 2018-03-12 RX ADMIN — INSULIN ASPART SCH UNIT: 100 INJECTION, SOLUTION INTRAVENOUS; SUBCUTANEOUS at 12:39

## 2018-03-12 RX ADMIN — FUROSEMIDE SCH: 40 TABLET ORAL at 09:25

## 2018-03-12 RX ADMIN — HEPARIN SODIUM SCH UNIT: 5000 INJECTION, SOLUTION INTRAVENOUS; SUBCUTANEOUS at 08:04

## 2018-03-12 RX ADMIN — CARVEDILOL SCH MG: 12.5 TABLET, FILM COATED ORAL at 16:20

## 2018-03-12 RX ADMIN — FUROSEMIDE SCH MG: 10 INJECTION, SOLUTION INTRAMUSCULAR; INTRAVENOUS at 08:05

## 2018-03-12 RX ADMIN — FUROSEMIDE SCH MG: 40 TABLET ORAL at 16:20

## 2018-03-12 RX ADMIN — LACTULOSE SCH GM: 20 SOLUTION ORAL at 21:38

## 2018-03-12 RX ADMIN — INSULIN ASPART SCH UNIT: 100 INJECTION, SOLUTION INTRAVENOUS; SUBCUTANEOUS at 21:37

## 2018-03-12 RX ADMIN — CARVEDILOL SCH MG: 12.5 TABLET, FILM COATED ORAL at 08:05

## 2018-03-12 NOTE — ECHOF
Referral Reason:LV EF evaluation, SOB at rest



MEASUREMENTS

--------

HEIGHT: 172.7 cm

WEIGHT: 112.9 kg

BP: 170/81

RVIDd:   3.6 cm     (< 3.3)

IVSd:   1.4 cm     (0.6 - 1.1)

LVIDd:   5.3 cm     (3.9 - 5.3)

LVPWd:   1.4 cm     (0.6 - 1.1)

IVSs:   2.0 cm

LVIDs:   3.5 cm

LVPWs:   1.8 cm

LA Diam:   4.3 cm     (2.7 - 3.8)

LAESV Index (A-L):   35.63 ml/m

Ao Diam:   3.4 cm     (2.0 - 3.7)

AV Cusp:   2.2 cm     (1.5 - 2.6)

EPSS:   1.0 cm

MV E Fito:   1.00 m/s

MV DecT:   351 ms

MV A Fito:   1.21 m/s

MV E/A Ratio:   0.82 

AV maxP.92 mmHg

AV meanP.26 mmHg

AR PHT:   920 ms

RAP:   5.00 mmHg

RVSP:   45.41 mmHg

MV EF SLOPE:   67.24 mm/s     (70 - 150)

MV EXCURSION:   2.21 cm     (> 18.000)







FINDINGS

--------

Sinus rhythm.

This was a technically adequate study.

The left ventricular size is normal.   There is moderate concentric left ventricular hypertrophy.   O
verall left ventricular systolic function is normal with, an EF between 55 - 60 %.

The right ventricle is mildly enlarged.

LA is moderately dilated 34-39 ml/m2

The right atrium is normal in size.

Aortic valve is trileaflet and is mildly thickened.   There is mild aortic regurgitation.   There is 
mild aortic stenosis present.   Peak/mean gradient across the Aortic Valve is 18.92mmHg / 9.26mmHg.

Mild mitral annular calcification present.   There is trace to mild mitral regurgitation.

Mild tricuspid regurgitation present.   There is mild pulmonary hypertension.   The right ventricular
 systolic pressure, as measured by Doppler, is 45.41mmHg.

Trace/mild (physiologic)  pulmonic regurgitation.

The aortic root size is normal.

Normal inferior vena cava with normal inspiratory collapse consistent with estimated right atrial pre
ssure of  5 mmHg.   The inferior vena cava is mildly dilated.

There is no pericardial effusion.



CONCLUSIONS

--------

1. Sinus rhythm.

2. This was a technically adequate study.

3. The left ventricular size is normal.

4. There is moderate concentric left ventricular hypertrophy.

5. Overall left ventricular systolic function is normal with, an EF between 55 - 60 %.

6. The right ventricle is mildly enlarged.

7. LA is moderately dilated 34-39 ml/m2

8. The right atrium is normal in size.

9. Aortic valve is trileaflet and is mildly thickened.

10. There is mild aortic regurgitation.

11. There is mild aortic stenosis present.

12. Peak/mean gradient across the Aortic Valve is 18.92mmHg / 9.26mmHg.

13. Mild mitral annular calcification present.

14. There is trace to mild mitral regurgitation.

15. Mild tricuspid regurgitation present.

16. There is mild pulmonary hypertension.

17. The right ventricular systolic pressure, as measured by Doppler, is 45.41mmHg.

18. Trace/mild (physiologic)  pulmonic regurgitation.

19. The aortic root size is normal.

20. Normal inferior vena cava with normal inspiratory collapse consistent with estimated right atrial
 pressure of  5 mmHg.

21. The inferior vena cava is mildly dilated.

22. There is no pericardial effusion.





SONOGRAPHER: DAQUAN Coronel

## 2018-03-12 NOTE — PN
PROGRESS NOTE



Patient was seen this morning.  He was sitting in a bedside chair.  He is comfortable,

not in any acute distress.  The patient's renal function has improved significantly

over the weekend.  His serum creatinine is down to 1.03.  Lasix has been decreased and

switched to p.o.  The patient denies any significant complaints.  He did receive 1 dose

of IV Lasix early this morning.  The patient has an indwelling Castro catheter.  Urine

output for 24 hours was about 5 L.



EXAMINATION:

Blood pressure is 171/84.  Heart rate of 67 per minute.  Patient is afebrile.

Examination of the heart S1, S2.  Examination lungs bilateral breath sounds are heard.

Abdomen is soft, nontender.  Examination lower extremities shows 1+ edema bilaterally.

CNS exam is grossly intact.



LABS:

Hemoglobin 12.4.  Creatinine is 1.03, BUN 26, sodium 142, potassium 5.0.



ASSESSMENT:

1. Acute kidney injury secondary to acute tubular necrosis, currently nonoliguric and

    current significantly improved.

2. Volume overload, significantly improved.  The patient did receive IV Lasix this

    morning.  He has had a good urine output.  I agree with changing the diuretics to

    p.o.

3. Mild hyperkalemia, currently improved.

4. Metabolic acidosis from renal failure as well as underlying pancreatitis, now

    improved.

5. Hypertension with recent increase in medications including hydralazine and Coreg.

    Continue to monitor and continue off of sodium bicarb.





MMODL / IJN: [id number] / Job#: [id number]

## 2018-03-12 NOTE — P.PN
Subjective


Progress Note Date: 03/12/18





73-year-old male seen and examined at bedside patient's tolerating oral liquids 

less short of breath this morning currently denying abdominal pain nausea 

vomiting the discharge plan is in progress patients being worked up to go to 

[facility] subacute rehab when bed available discussed with the patient the need to 

follow-up in the outpatient setting with surgical service





Objective





- Vital Signs


Vital signs: 


 Vital Signs











Temp  98.5 F   03/12/18 06:36


 


Pulse  64   03/12/18 06:36


 


Resp  18   03/12/18 08:00


 


BP  181/88   03/12/18 06:36


 


Pulse Ox  99   03/12/18 06:36








 Intake & Output











 03/11/18 03/12/18 03/12/18





 18:59 06:59 18:59


 


Intake Total 666 15 


 


Output Total 1800 3375 1500


 


Balance -8032 -8392 -1500


 


Weight  98 kg 


 


Intake:   


 


  IV  15 


 


    0.9@5 mls/hr  15 


 


  Oral 666  


 


Output:   


 


  Urine 1800 3375 1500


 


    Uretheral (Castro)  250 


 


Other:   


 


  Voiding Method Indwelling Catheter Indwelling Catheter 


 


  # Bowel Movements   2














- Exam





Physical exam:


73-year-old male more awake and alert this morning cooperative oriented to self 

and place


Lungs posterior decreased at the bases on room air sats are 94%


Heart S1-S2 audible and regular


Abdomen softer less distended compared to prior assessment bowel tones active 

no nausea no vomiting indwelling Castro catheter urine clear no hematuria noted


Extremity no edema noted tenderness to the bilateral lower extreme





- Labs


CBC & Chem 7: 


 03/12/18 09:05





 03/12/18 09:05


Labs: 


 Abnormal Lab Results - Last 24 Hours (Table)











  03/11/18 03/11/18 03/12/18 Range/Units





  17:10 20:50 07:23 


 


RBC     (4.30-5.90)  m/uL


 


Hgb     (13.0-17.5)  gm/dL


 


Hct     (39.0-53.0)  %


 


BUN     (9-20)  mg/dL


 


Glucose     (74-99)  mg/dL


 


POC Glucose (mg/dL)  321 H  308 H  162 H  (75-99)  mg/dL


 


Total Bilirubin     (0.2-1.3)  mg/dL


 


Alkaline Phosphatase     ()  U/L














  03/12/18 03/12/18 03/12/18 Range/Units





  09:05 09:05 12:06 


 


RBC  4.09 L    (4.30-5.90)  m/uL


 


Hgb  12.4 L    (13.0-17.5)  gm/dL


 


Hct  38.5 L    (39.0-53.0)  %


 


BUN   26 H   (9-20)  mg/dL


 


Glucose   301 H   (74-99)  mg/dL


 


POC Glucose (mg/dL)    313 H  (75-99)  mg/dL


 


Total Bilirubin   1.7 H   (0.2-1.3)  mg/dL


 


Alkaline Phosphatase   349 H   ()  U/L








 Microbiology - Last 24 Hours (Table)











 03/05/18 13:20 Blood Culture - Final





 Blood    No Growth after 144 hours














Assessment and Plan


Assessment: 





Impression


Present on admission abdominal pain CAT scan abdomen pelvis reported 

appendicolith possible acute early appendicitis not ruled out no fever no white 

count


Hyperkalemia


Obesity BMI 36


Type 2 diabetes with episodes of hyperglycemia


Prior history of alcohol intake


Depressive disorder nonspecified


Present on admission elevated lipase and amylase consistent with acute 

pancreatitis


Acute kidney injury


Hyperkalemia


CAT scan abdomen and pelvis incidental finding appendicoth with no evidence of 

an acute surgical abdomen no fever no white count


Acute exacerbation of systolic heart failure EF 20% status post ICD likely due 

to fluid overload


Gross hematuria likely related to self removal of his indwelling Castro catheter 

with the balloon inflated


Echocardiogram moderate concentric left ventricular hypertrophy with an EF 

between 55 and 60%








Plan


We'll sign off re-eval as needed surgical perspective felt to be stable to be 

transferred to [facility] subacute rehab


Patient is felt to be at a moderate to high risk for any surgical intervention 

given patient's comorbidities given patient's systolic heart failure 

exacerbation with worsening of the renal status with fluid overload


No plan for a surgical intervention at this time


IV fluid for hydration


Will follow with you


DVT and GI prophylaxis


GI service indicates clinically ERCP is not indicated at this time total 

bilirubin slowly improving


Defer to the attending to address medical issues











Progress note dictated for 





The above impression and plan of care have been discussed and directed by 

signing physician. Kajal Burks nurse practitioner acting as scribe for signing 

physician.

## 2018-03-12 NOTE — P.PN
Subjective


Progress Note Date: 03/12/18


Principal diagnosis: 





patient is seen and examined in follow up for acute pancreatitis, acute renal 

failure, and CHF exacerbation





Patient seen and examined today, patient feels that he is back to his baseline 

today, tolerating PO intake, having minimal shortness of breath at rest but 

reports this is his baseline due to his advanced CHF, urine catheter shows 

clear yellow urine no hematuria , denies any abd pain, nausea or vomiting , 

tolerating PO intake. 











Objective





- Vital Signs


Vital signs: 


 Vital Signs











Temp  98.5 F   03/12/18 06:36


 


Pulse  64   03/12/18 06:36


 


Resp  18   03/12/18 06:36


 


BP  181/88   03/12/18 06:36


 


Pulse Ox  99   03/12/18 06:36








 Intake & Output











 03/11/18 03/12/18 03/12/18





 18:59 06:59 18:59


 


Intake Total 666 15 


 


Output Total 1800 3375 


 


Balance -1134 -3360 


 


Weight  98 kg 


 


Intake:   


 


  IV  15 


 


    0.9@5 mls/hr  15 


 


  Oral 666  


 


Output:   


 


  Urine 1800 3375 


 


    Uretheral (Peters)  250 


 


Other:   


 


  Voiding Method Indwelling Catheter Indwelling Catheter 














- Exam





Constitutional: Vital signs still showing elevated SBP readings, patient is 

conversant, not in any acute distress, oxygen via nasal canula 


Lungs: Good breath sounds bilaterally , minimal inspiratory rales at right lung 

base  , clear to percussion, no wheezing  no rhonchi


Cardiovascular: Regular rate and rhythm, no murmurs, no gallops, no rubs, very 

slight edema over the left foot


Gastrointestinal: No tenderness palpation of the abdomen,  soft, bowel sounds 

are positive 


Extremities: No digital cyanosis   peripheral pulses palpable and equal over 

bilateral radial arteries and dorsalis pedis artery,  no calf muscle tenderness

, capillary refill is immediate over bilateral big toes


Psych: Alert, oriented to place, person, appropriate affect


Peters catheter in place showing yellow urine now no further hematuria














- Labs


CBC & Chem 7: 


 03/11/18 05:37





 03/11/18 05:37


Labs: 


 Abnormal Lab Results - Last 24 Hours (Table)











  03/11/18 03/11/18 03/11/18 Range/Units





  12:07 17:10 20:50 


 


POC Glucose (mg/dL)  260 H  321 H  308 H  (75-99)  mg/dL














  03/12/18 Range/Units





  07:23 


 


POC Glucose (mg/dL)  162 H  (75-99)  mg/dL








 Microbiology - Last 24 Hours (Table)











 03/05/18 13:20 Blood Culture - Final





 Blood    No Growth after 144 hours














Assessment and Plan


Assessment: 





73-year-old male with past medical history of congestive heart failure with 

left ventricular ejection fraction of 20%.  





 patient presented to the hospital with abdominal pain found to have acute 

pancreatitis, he was also suspected to have appendicitis however that was ruled 

out by general surgery team , and surgery team thought it's an incidental 

finding of fecalith in the appendix without any active acute  appendicitis, 

patient to continue to follow up with general surgery after discharge, patient 

received short course of Zosyn during this hospital stay.  





GI is assisting in management of his pancreatitis however patient cannot give 

MRCP due to having a pacemaker.  Lipase has been trending down initially .  

Patient denies any more abdominal pain nausea or vomiting.  patient tolerated 

PO intake, clinically his acute pancreatitis attack has resolved, however, his 

lipase today is trending up again.  





Patient also developed acute kidney injury and oliguria, which resulted in 

fluid overload and acute CHF exacerbation, ESTEVAN due to 3rd spacing with 

decreased intravascular volume initially due to acute pancreatitis resulting in 

prerenal hypoperfusion . peters catheter inserted, which he self removed with 

balloon inflated, resulting in gross hematuria later.  However, nephrology 

suggested aggressive diuresis before considering hemodialysis , and patient 

started making some urine. his breathing started to improve with aggressive 

diuresis, holidng IV fluids, and utilizing Bipap.  Peters catheter maintained  

for accurate ins and outs.  [facility]

, however, he is stable enough to be moved back to [facility] with tele. Patient 

responded to aggressive diuresis , and is not oliguric anymore. renal function 

continues to improve.





Cardiology input was requested regarding his SOB at rest due to fluid overload 

and poor cardiac function, cardiology agreed with current management and will 

continue to follow up their recommendations





On 3/11, all his labs are improving, patient condition is stable now, he will 

be transferred back to [facility] with telemetry, patient continued to be 

monitored in the hospital awaiting placement at subacute rehab due to poor 

cardiac function and limited functional capacity





on 3/12 patient continues to improve , tolerating diet, plan for today to 

remove peters catheter and monitor for urinary retention, assess home oxygen need

, advance his diet , some adjustement in his cardiac meds to achieve better 

blood pressure control 


Plan: 








#. Debility and deconditioning , with advanced CHF


plans for placement at rehab 





#. Hypertension , accelerated


still not optimally controlled


made more adjustment to blood pressure medication s today 


increase hydralazin to 100 mg BID 


increased imdur to 60 mg daily 





#. Suspected sleep apnea


recommended that the patient get evaluated for sleep apnea 


and to consider OP sleep study 


he is at risk of both (obstructive and central sleep apnea)








# Acute hypoxic respiratory failure , resolved


# Pulmonary edema due to acute CHF exacerbation (LVEF of 20% s/p ICD ) due to 

fluid overload, resolved


#. Acute kidney injury ,  non-oliguric, resolved


#. Metabolic acidosis secondary to acute renal failure, resolved


#. Malignant hypertension with pulmonary edema


DC iv diuresis , switch to PO lasix


nephro following 


continue cardiac meds , coreg, imdur, continue with hydralazine (this is a new 

medication)


Continue to monitor blood pressure closely with recent adjustment of his 

medications


Continue with BiPAP at bedside to help with work of breathing








#Gross hematuria secondary to traumatic removal of the Peters catheter by the 

patient, resolved


Urology input noted


discontinue peters cath and monitor for signs of urinary retention 


check bladder scan for PVR


Hemoglobin stable





#Diabetes mellitus with peripheral neuropathy


Continue with current insulin management


Blood sugars controlled





#Acute pancreatitis, resolved


Lipase continues to trend down patient currently asymptomatic


Patient could not get MRCP done due to having pacemaker





#Hyperbilirubinemia, mostly of the conjugated type, liver enzymes are 

unremarkable except for elevated alkaline phosphatase, improving now


This is an obstructive jaundice picture, due to edema from acute pancreatitis


However patient is afebrile, no leukocytosis


GI and general surgery are following





DVT prophylaxis,  heparin sc TID





History of chronic hepatitis C





Thrombocytopenia most likely secondary to underlying hepatitis C, now improving 


No evidence of active bleeding at this time





 


 


discharge planning, patient agrees to go to SNF for short period , due to 

overall debility and dyspnea at rest due to deconditioning and poor cardiac 

function


possible discharge tomorrow 


follow up labs


assess for home oxygen need


Echo cardiogram report still pending

## 2018-03-13 LAB
GLUCOSE BLD-MCNC: 277 MG/DL (ref 75–99)
GLUCOSE BLD-MCNC: 309 MG/DL (ref 75–99)
GLUCOSE BLD-MCNC: 355 MG/DL (ref 75–99)
GLUCOSE BLD-MCNC: 425 MG/DL (ref 75–99)

## 2018-03-13 RX ADMIN — INSULIN ASPART SCH UNIT: 100 INJECTION, SOLUTION INTRAVENOUS; SUBCUTANEOUS at 18:26

## 2018-03-13 RX ADMIN — HEPARIN SODIUM SCH UNIT: 5000 INJECTION, SOLUTION INTRAVENOUS; SUBCUTANEOUS at 23:33

## 2018-03-13 RX ADMIN — HEPARIN SODIUM SCH UNIT: 5000 INJECTION, SOLUTION INTRAVENOUS; SUBCUTANEOUS at 15:44

## 2018-03-13 RX ADMIN — ISOSORBIDE MONONITRATE SCH MG: 60 TABLET, EXTENDED RELEASE ORAL at 08:02

## 2018-03-13 RX ADMIN — PANTOPRAZOLE SODIUM SCH: 40 INJECTION, POWDER, FOR SOLUTION INTRAVENOUS at 08:03

## 2018-03-13 RX ADMIN — CARVEDILOL SCH MG: 12.5 TABLET, FILM COATED ORAL at 18:26

## 2018-03-13 RX ADMIN — LOSARTAN POTASSIUM SCH MG: 25 TABLET, FILM COATED ORAL at 13:06

## 2018-03-13 RX ADMIN — INSULIN ASPART SCH UNIT: 100 INJECTION, SOLUTION INTRAVENOUS; SUBCUTANEOUS at 12:58

## 2018-03-13 RX ADMIN — INSULIN DETEMIR SCH UNIT: 100 INJECTION, SOLUTION SUBCUTANEOUS at 21:14

## 2018-03-13 RX ADMIN — HEPARIN SODIUM SCH UNIT: 5000 INJECTION, SOLUTION INTRAVENOUS; SUBCUTANEOUS at 08:02

## 2018-03-13 RX ADMIN — LACTULOSE SCH: 20 SOLUTION ORAL at 08:03

## 2018-03-13 RX ADMIN — HEPARIN SODIUM SCH UNIT: 5000 INJECTION, SOLUTION INTRAVENOUS; SUBCUTANEOUS at 00:10

## 2018-03-13 RX ADMIN — INSULIN ASPART SCH UNIT: 100 INJECTION, SOLUTION INTRAVENOUS; SUBCUTANEOUS at 21:16

## 2018-03-13 RX ADMIN — INSULIN ASPART SCH UNIT: 100 INJECTION, SOLUTION INTRAVENOUS; SUBCUTANEOUS at 08:02

## 2018-03-13 RX ADMIN — CARVEDILOL SCH MG: 12.5 TABLET, FILM COATED ORAL at 08:02

## 2018-03-13 RX ADMIN — FUROSEMIDE SCH MG: 40 TABLET ORAL at 08:02

## 2018-03-13 RX ADMIN — SPIRONOLACTONE SCH MG: 25 TABLET, FILM COATED ORAL at 13:06

## 2018-03-13 NOTE — P.DS
Providers


Date of admission: 


03/03/18 09:33





Attending physician: 


Titi Goldsmith MD





Consults: 





 





03/03/18 10:31


Consult Physician Routine 


   Consulting Provider: Eric Murcia


   Consult Reason/Comments: pancreatitis


   Do you want consulting provider notified?: Yes


Consult Physician Urgent 


   Consulting Provider: Malaika East


   Consult Reason/Comments: appy,pancreatitis


   Do you want consulting provider notified?: Yes





03/05/18 12:37


Consult Physician Routine 


   Consulting Provider: Gordon Cortez


   Consult Reason/Comments: ESTEVAN / metabolic acidosis


   Do you want consulting provider notified?: Yes





03/09/18 09:04


Consult Physician Routine 


   Consulting Provider: Jared Bhandari


   Consult Reason/Comments: hematuria , peters pulled out with inflated balloon


   Do you want consulting provider notified?: Yes





03/09/18 09:20


Consult Physician Routine 


   Consulting Provider: Zachariah Parker


   Consult Reason/Comments: SOB at rest, systolic CHF with LVEF 20%


   Do you want consulting provider notified?: Yes











Primary care physician: 


[facility] Course: 








Diagnosis at discharge








#. Debility and deconditioning , with advanced CHF


plans for placement at rehab 





#. Hypertension , accelerated, better controlled now  


restarted lozartan low dose , and spironolactone





#. Suspected sleep apnea


recommended that the patient get evaluated for sleep apnea 


and to consider OP sleep study 


he is at risk of both (obstructive and central sleep apnea)








# Acute hypoxic respiratory failure , resolved





# Pulmonary edema due to acute CHF exacerbation (LVEF of 20% s/p ICD ) due to 

fluid overload, resolved


2D echocardiogram showed LVEF of 55-60% 3/9/2018





#. Acute kidney injury ,  non-oliguric, resolved


#. Metabolic acidosis secondary to acute renal failure, resolved


#. Malignant hypertension with pulmonary edema 


continue PO diuresis 





#Gross hematuria secondary to traumatic removal of the Peters catheter by the 

patient, resolved 





#Diabetes mellitus with peripheral neuropathy 





#Acute pancreatitis, resolved 





#Hyperbilirubinemia, mostly of the conjugated type, liver enzymes are 

unremarkable except for elevated alkaline phosphatase, improving now 


 


History of chronic hepatitis C





Thrombocytopenia most likely secondary to underlying hepatitis C, now improving

  











73-year-old male with past medical history of congestive heart failure with 

left ventricular ejection fraction of 20%.  





 patient presented to the hospital with abdominal pain found to have acute 

pancreatitis, he was also suspected to have appendicitis however that was ruled 

out by general surgery team , and surgery team thought it's an incidental 

finding of fecalith in the appendix without any active acute  appendicitis, 

patient to continue to follow up with general surgery after discharge, patient 

received short course of Zosyn during this hospital stay.  





GI is assisting in management of his pancreatitis however patient cannot give 

MRCP due to having a pacemaker.  Lipase has been trending down initially .  

Patient denies any more abdominal pain nausea or vomiting.  patient tolerated 

PO intake, clinically his acute pancreatitis attack has resolved, however, his 

lipase today is trending up again.  





Patient also developed acute kidney injury and oliguria, which resulted in 

fluid overload and acute CHF exacerbation, ESTEVAN due to 3rd spacing with 

decreased intravascular volume initially due to acute pancreatitis resulting in 

prerenal hypoperfusion . peters catheter inserted, which he self removed with 

balloon inflated, resulting in gross hematuria later.  However, nephrology 

suggested aggressive diuresis before considering hemodialysis , and patient 

started making some urine. his breathing started to improve with aggressive 

diuresis, holidng IV fluids, and utilizing Bipap.  Peters catheter maintained  

for accurate ins and outs.  patient is currently being managed at [facility] unit

, however, he is stable enough to be moved back to [facility] with tele. Patient 

responded to aggressive diuresis , and is not oliguric anymore. renal function 

continues to improve.





Cardiology input was requested regarding his SOB at rest due to fluid overload 

and poor cardiac function, cardiology agreed with current management and will 

continue to follow up their recommendations





On 3/11, all his labs are improving, patient condition is stable now, he will 

be transferred back to [facility] with telemetry, patient continued to be 

monitored in the hospital awaiting placement at subacute rehab due to poor 

cardiac function and limited functional capacity





on 3/12 patient continues to improve , tolerating diet, plan for today to 

remove Peters catheter and monitor for urinary retention, assess home oxygen need

, advance his diet , some adjustement in his cardiac meds to achieve better 

blood pressure control 





3/13 patient was seen and examined , continues to be stable , however, elevated 

blood pressure but asymptomatic. denies any more diarrhea , denies abd pain, 

nausea or vomiting, denies any chest pain, denies trouble breathing at rest. 

Denies urinary retention .  








Constitutional: Vital signs, patient is conversant, not in any acute distress, 

oxygen via nasal canula 


Lungs: Good breath sounds bilaterally, minimal inspiratory rales at right lung 

base, clear to percussion, no wheezing  no rhonchi


Cardiovascular: Regular rate and rhythm, no murmurs, no gallops, no rubs, no 

peripheral edema


Gastrointestinal: No tenderness palpation of the abdomen,  soft, bowel sounds 

are positive 


Extremities: No digital cyanosis   peripheral pulses palpable and equal over 

bilateral radial arteries and dorsalis pedis artery,  no calf muscle tenderness

, capillary refill is immediate over bilateral big toes


Psych: Alert, oriented to place, person, appropriate affect


Peters catheter in place showing yellow urine now no further hematuria











Patient stable for discharge, he will be transferred to rehab. today 


medication reconciliation performed


 





40 minutes were spent discharging this patient, and more than 50% of the time 

was spent in counseling the patient and family and in coordinating care.


 











Pertinent Studies: 





2D echocardiogram showed LVEF of 55-60% 3/9/2018


Patient Condition at Discharge: Stable





Plan - Discharge Summary


New Discharge Prescriptions: 


New


   Furosemide [Lasix] 40 mg PO BID@0900,1600  tab


   hydrALAZINE HCL [Apresoline] 100 mg PO TID  tab


   Insulin Detemir [Levemir] 12 unit SQ HS  syr


   Isosorbide Mononitrate ER [Imdur] 60 mg PO DAILY  tab.er.24h


   Pantoprazole [Protonix] 40 mg PO AC-BRKFST  tablet.





Continue


   Nitroglycerin Sl Tabs [Nitrostat] 0.4 mg SUBLINGUAL Q5M PRN


     PRN Reason: Angina


   Carvedilol 25 mg PO AC-BID


   Docusate [Colace] 100 - 200 mg PO DAILY


   Spironolactone [Aldactone] 25 mg PO DAILY


   Losartan [Cozaar] 12.5 mg PO DAILY


   INSULIN LISPRO (humaLOG) [humaLOG] 8 - 21 units SQ AC-TID





Discontinued


   HYDROcodone/APAP 5-325MG [Norco 5-325] 1 tab PO Q6H PRN


     PRN Reason: Pain


   Isosorbide Mononitrate ER [Imdur] 30 mg PO DAILY #30 tab.er.24h


   Insulin Glargine [Lantus] 35 unit SQ QAM


   Ibuprofen [Motrin] 400 mg PO Q6HR PRN


     PRN Reason: Pain


Discharge Medication List





Nitroglycerin Sl Tabs [Nitrostat] 0.4 mg SUBLINGUAL Q5M PRN 05/18/14 [History]


Carvedilol 25 mg PO AC-BID 07/14/15 [History]


Docusate [Colace] 100 - 200 mg PO DAILY 11/25/15 [History]


INSULIN LISPRO (humaLOG) [humaLOG] 8 - 21 units SQ AC-TID 03/03/18 [History]


Losartan [Cozaar] 12.5 mg PO DAILY 03/03/18 [History]


Spironolactone [Aldactone] 25 mg PO DAILY 03/03/18 [History]


Furosemide [Lasix] 40 mg PO BID@0900,1600  tab 03/13/18 [Rx]


Insulin Detemir [Levemir] 12 unit SQ HS  syr 03/13/18 [Rx]


Isosorbide Mononitrate ER [Imdur] 60 mg PO DAILY  tab.er.24h 03/13/18 [Rx]


Pantoprazole [Protonix] 40 mg PO AC-BRKFST  tablet. 03/13/18 [Rx]


hydrALAZINE HCL [Apresoline] 100 mg PO TID  tab 03/13/18 [Rx]








Follow up Appointment(s)/Referral(s): 


Eric Murcia MD [STAFF PHYSICIAN] - 03/26/18 4:00 pm


Malaika East MD [STAFF PHYSICIAN] - 2 Weeks


Matti Adams DO [Primary Care Provider] - 1-2 days


[facility], [NON-STAFF] - 1 Week


Gordon Cortez DO [STAFF PHYSICIAN] - 1 Week


Patient Instructions/Handouts:  Pancreatitis (DC)


Activity/Diet/Wound Care/Special Instructions: 


Diabetic, cardiac diet with fluid restriction to 2 Liters daily





Heart failure booklet and diabetic education booklet given and reviewed. 

Refused [employer].





Activity as tolerated, up with assist.








Discharge Disposition: TRANSFER TO SNF/ECF

## 2018-03-13 NOTE — P.PN
Subjective


Progress Note Date: 03/13/18


Principal diagnosis: 


Acute pancreatitis





No morning chemistries to review.  Yesterday total bilirubin improved 1.7.  AST 

ALT within normal limits.  Alkaline phosphatase 349.  Possible discharge to 

rehab today.  Denies abdominal pain.








Objective





- Vital Signs


Vital signs: 


 Vital Signs











Temp  97.1 F L  03/13/18 07:00


 


Pulse  70   03/13/18 07:00


 


Resp  18   03/13/18 07:00


 


BP  189/95   03/13/18 07:00


 


Pulse Ox  96   03/13/18 07:00








 Intake & Output











 03/12/18 03/13/18 03/13/18





 18:59 06:59 18:59


 


Output Total 1500  


 


Balance -1500  


 


Weight 98 kg 105.5 kg 


 


Output:   


 


  Urine 1500  


 


Other:   


 


  Voiding Method  Bedside Commode 


 


  # Voids 1 2 


 


  # Bowel Movements 1 1 














- Exam


General appearance: The patient is alert, agitated in no acute distress. 


HET: Head is normocephalic and atraumatic.  Pupils are equal and reactive.  

Sclerae anicteric.  Oropharynx is clear without lesions.


Neck: Supple without lymphadenopathy.  Trachea midline.


Heart: S1 S2.  Regular rate and rhythm.


Lungs: Poor inspiratory effort.  Diminished in bases bilaterally.  No crackles 

or wheezes are heard.


Abdomen: Soft, nontender, with  bloatedness distention hypoactive bowel sounds.

  No peritoneal signs.  No palpable organomegaly or masses.


Extremities: Normal skin color and turgor.  No cyanosis, rash, ulceration, 

clubbing, or edema.  Radial and pedal pulses are 2/4 bilaterally.  


Neurological: No focal deficits.  Strength and sensation are grossly intact.








- Labs


CBC & Chem 7: 


 03/12/18 09:05





 03/12/18 09:05


Labs: 


 Abnormal Lab Results - Last 24 Hours (Table)











  03/12/18 03/12/18 03/12/18 Range/Units





  12:06 17:03 21:07 


 


POC Glucose (mg/dL)  313 H  190 H  331 H  (75-99)  mg/dL














  03/13/18 Range/Units





  07:01 


 


POC Glucose (mg/dL)  277 H  (75-99)  mg/dL














Assessment and Plan


(1) Acute pancreatitis


Narrative/Plan: 


73-year-old male admitted with acute abdominal pain elevated pancreatic and 

liver enzymes consistent with acute moderate  to severe pancreatitis.  No 

mentioning of gallstones on ultrasound or CAT scan with elevated 

hyperbilirubinemia and creatinine.


Current Visit: Yes   Status: Acute   Code(s): K85.90 - ACUTE PANCREATITIS 

WITHOUT NECROSIS OR INFECTION, UNSP   SNOMED Code(s): 989517676


   





(2) Elevated serum creatinine


Current Visit: Yes   Status: Acute   Code(s): R79.89 - OTHER SPECIFIED ABNORMAL 

FINDINGS OF BLOOD CHEMISTRY   SNOMED Code(s): 430408950


   





(3) Elevated liver enzymes


Current Visit: Yes   Status: Acute   Code(s): R74.8 - ABNORMAL LEVELS OF OTHER 

SERUM ENZYMES   SNOMED Code(s): 286220920


   





(4) Jaundice


Narrative/Plan: 


Improved bilirubin suspect from peripancreatic edema improving


Current Visit: Yes   Status: Acute   Code(s): R17 - UNSPECIFIED JAUNDICE   

SNOMED Code(s): 11068708


   





(5) Appendicolith


Narrative/Plan: 


Possible early acute appendicitis being monitored closely by general surgery 

presently without fever or abdominal pain.


Current Visit: Yes   Status: Acute   Code(s): K38.9 - DISEASE OF APPENDIX, 

UNSPECIFIED   SNOMED Code(s): 95595976


   





(6) Obesity (BMI 30-39.9)


Current Visit: Yes   Status: Chronic   Code(s): E66.9 - OBESITY, UNSPECIFIED   

SNOMED Code(s): 856124258


   





(7) Hyperkalemia


Current Visit: Yes   Status: Acute   Code(s): E87.5 - HYPERKALEMIA   SNOMED Code

(s): 39357838


   





(8) Diabetes mellitus


Current Visit: Yes   Status: Chronic   Code(s): E11.9 - TYPE 2 DIABETES 

MELLITUS WITHOUT COMPLICATIONS   SNOMED Code(s): 97944671


   





(9) Hepatitis C antibody test positive


Current Visit: Yes   Status: Acute   Code(s): R76.8 - OTHER SPECIFIED ABNORMAL 

IMMUNOLOGICAL FINDINGS IN SERUM   SNOMED Code(s): 189905671


   





(10) Hyperammonemia


Narrative/Plan: 


Possible underlying liver disease


Current Visit: Yes   Status: Acute   Code(s): E72.20 - DISORDER OF UREA CYCLE 

METABOLISM, UNSPECIFIED   SNOMED Code(s): 9696589


   


Plan: 


1.  Discharge per medicine.  Return to office after discharge for follow-up.





Assessment and plan of care discussed with Dr. Joseph.

## 2018-03-13 NOTE — PN
PROGRESS NOTE



Patient is seen for followup for acute kidney injury and volume overload.  He is

currently maintained on oral Lasix.  His volume status had improved significantly.

However, this morning  patient was again mildly short of breath.  He was not in any

acute distress.



EXAMINATION:

Blood pressure this morning was 143/70, heart rate is 72 per minute.  Patient is

afebrile.  Examination of the heart S1, S2.  Examination of the lungs bilateral breath

sounds are heard.  Abdomen is soft, nontender, distended.  Examination of lower

extremity shows edema 1+ bilaterally.



LABS:

Not available from today.  Serum creatinine was 1.03 yesterday.



ASSESSMENT:

1. Acute kidney injury, acute tubular necrosis, currently significantly improved.

2. Volume overload, also improved.  However, today patient appears to be mildly volume

    overloaded as compared to yesterday.  I will give a dose of IV Lasix and continue

    with the oral diuretics for now.  Will likely increase his oral Lasix tomorrow to

    40 b.i.d.

3. Acute pancreatitis with no evidence of biliary obstruction, currently improving.

4. Hyperkalemia associated with acute kidney injury, currently improved.

5. Hyperbilirubinemia, also improving.



PLAN:

Repeat IV Lasix today and the patient can be discharged on at least 40 mg b.i.d. Lasix

tomorrow.





MMODL / IJN: [id number] / Job#: [id number]

## 2018-03-14 LAB
ALBUMIN SERPL-MCNC: 3.3 G/DL (ref 3.5–5)
ALP SERPL-CCNC: 286 U/L (ref 38–126)
ALT SERPL-CCNC: 27 U/L (ref 21–72)
ANION GAP SERPL CALC-SCNC: 9 MMOL/L
AST SERPL-CCNC: 29 U/L (ref 17–59)
BASOPHILS # BLD AUTO: 0 K/UL (ref 0–0.2)
BASOPHILS NFR BLD AUTO: 0 %
BUN SERPL-SCNC: 20 MG/DL (ref 9–20)
CALCIUM SPEC-MCNC: 9.1 MG/DL (ref 8.4–10.2)
CHLORIDE SERPL-SCNC: 101 MMOL/L (ref 98–107)
CO2 SERPL-SCNC: 31 MMOL/L (ref 22–30)
EOSINOPHIL # BLD AUTO: 0.2 K/UL (ref 0–0.7)
EOSINOPHIL NFR BLD AUTO: 2 %
ERYTHROCYTE [DISTWIDTH] IN BLOOD BY AUTOMATED COUNT: 3.96 M/UL (ref 4.3–5.9)
ERYTHROCYTE [DISTWIDTH] IN BLOOD: 13.3 % (ref 11.5–15.5)
GLUCOSE BLD-MCNC: 198 MG/DL (ref 75–99)
GLUCOSE BLD-MCNC: 248 MG/DL (ref 75–99)
GLUCOSE BLD-MCNC: 342 MG/DL (ref 75–99)
GLUCOSE BLD-MCNC: 372 MG/DL (ref 75–99)
GLUCOSE BLD-MCNC: 420 MG/DL (ref 75–99)
GLUCOSE SERPL-MCNC: 249 MG/DL (ref 74–99)
HCT VFR BLD AUTO: 37.4 % (ref 39–53)
HGB BLD-MCNC: 11.6 GM/DL (ref 13–17.5)
LYMPHOCYTES # SPEC AUTO: 1.5 K/UL (ref 1–4.8)
LYMPHOCYTES NFR SPEC AUTO: 19 %
MCH RBC QN AUTO: 29.2 PG (ref 25–35)
MCHC RBC AUTO-ENTMCNC: 31 G/DL (ref 31–37)
MCV RBC AUTO: 94.2 FL (ref 80–100)
MONOCYTES # BLD AUTO: 0.5 K/UL (ref 0–1)
MONOCYTES NFR BLD AUTO: 7 %
NEUTROPHILS # BLD AUTO: 5.3 K/UL (ref 1.3–7.7)
NEUTROPHILS NFR BLD AUTO: 69 %
PLATELET # BLD AUTO: 162 K/UL (ref 150–450)
POTASSIUM SERPL-SCNC: 4.6 MMOL/L (ref 3.5–5.1)
PROT SERPL-MCNC: 7 G/DL (ref 6.3–8.2)
SODIUM SERPL-SCNC: 141 MMOL/L (ref 137–145)
WBC # BLD AUTO: 7.7 K/UL (ref 3.8–10.6)

## 2018-03-14 RX ADMIN — HEPARIN SODIUM SCH UNIT: 5000 INJECTION, SOLUTION INTRAVENOUS; SUBCUTANEOUS at 16:55

## 2018-03-14 RX ADMIN — HEPARIN SODIUM SCH UNIT: 5000 INJECTION, SOLUTION INTRAVENOUS; SUBCUTANEOUS at 08:20

## 2018-03-14 RX ADMIN — ACETAMINOPHEN PRN MG: 325 TABLET, FILM COATED ORAL at 22:39

## 2018-03-14 RX ADMIN — PANTOPRAZOLE SODIUM SCH MG: 40 TABLET, DELAYED RELEASE ORAL at 08:19

## 2018-03-14 RX ADMIN — INSULIN ASPART SCH UNIT: 100 INJECTION, SOLUTION INTRAVENOUS; SUBCUTANEOUS at 08:19

## 2018-03-14 RX ADMIN — FUROSEMIDE SCH MG: 40 TABLET ORAL at 16:55

## 2018-03-14 RX ADMIN — CARVEDILOL SCH MG: 12.5 TABLET, FILM COATED ORAL at 08:19

## 2018-03-14 RX ADMIN — INSULIN ASPART SCH UNIT: 100 INJECTION, SOLUTION INTRAVENOUS; SUBCUTANEOUS at 22:30

## 2018-03-14 RX ADMIN — INSULIN ASPART SCH UNIT: 100 INJECTION, SOLUTION INTRAVENOUS; SUBCUTANEOUS at 17:43

## 2018-03-14 RX ADMIN — INSULIN ASPART SCH UNIT: 100 INJECTION, SOLUTION INTRAVENOUS; SUBCUTANEOUS at 12:06

## 2018-03-14 RX ADMIN — CARVEDILOL SCH MG: 12.5 TABLET, FILM COATED ORAL at 16:55

## 2018-03-14 RX ADMIN — SPIRONOLACTONE SCH MG: 25 TABLET, FILM COATED ORAL at 08:22

## 2018-03-14 RX ADMIN — INSULIN DETEMIR SCH UNIT: 100 INJECTION, SOLUTION SUBCUTANEOUS at 09:11

## 2018-03-14 RX ADMIN — ACETAMINOPHEN PRN MG: 325 TABLET, FILM COATED ORAL at 07:00

## 2018-03-14 RX ADMIN — HEPARIN SODIUM SCH UNIT: 5000 INJECTION, SOLUTION INTRAVENOUS; SUBCUTANEOUS at 22:37

## 2018-03-14 RX ADMIN — INSULIN DETEMIR SCH UNIT: 100 INJECTION, SOLUTION SUBCUTANEOUS at 22:29

## 2018-03-14 RX ADMIN — INSULIN ASPART SCH UNIT: 100 INJECTION, SOLUTION INTRAVENOUS; SUBCUTANEOUS at 17:40

## 2018-03-14 RX ADMIN — ISOSORBIDE MONONITRATE SCH MG: 60 TABLET, EXTENDED RELEASE ORAL at 08:20

## 2018-03-14 RX ADMIN — FUROSEMIDE SCH MG: 40 TABLET ORAL at 08:20

## 2018-03-14 RX ADMIN — LOSARTAN POTASSIUM SCH MG: 25 TABLET, FILM COATED ORAL at 08:19

## 2018-03-14 NOTE — PN
PROGRESS NOTE



The patient is seen for followup for acute kidney injury and volume overload.  His

renal function has improved significantly.  He received another dose of IV Lasix

yesterday.  Patient's volume status is improved as well.  Currently, he is maintained

on 40 mg p.o. b.i.d., which I will increase to 60 mg b.i.d.  The patient may continue

with his angiotensin receptor blockers.  He will need close monitoring of his labs and

volume status as outpatient.



EXAMINATION:

Blood pressure is 149/73, heart rate of 68 per minute.  Patient did have a temp of

100.2.  He had a temp this morning of 101.4 degrees Fahrenheit.  Examination of the

heart S1, S2.  Examination of the lungs bilateral breath sounds are heard.  Abdomen is

soft, nontender.  Examination of lower extremities shows edema trace bilaterally.  CNS

exam is grossly intact.



LABS:

Reveals serum creatinine 1.1, sodium 141, potassium 4.6, hemoglobin 11.6 g/dL.



ASSESSMENT:

1. Acute kidney injury, acute tubular necrosis, currently significantly improved.

2. Acute pancreatitis with the improvement in lipase level with last level drawn on

    03/11/2018, being followed by Gastroenterology.  No evidence of biliary obstruction

    noted on CT scan.

3. Volume overload, currently improved,  increased oral Lasix to 60 mg b.i.d.

4. Hyperkalemia associated with acute kidney injury now resolved.

5. Cardiomyopathy, ejection fraction 20%, status post AICD.

6. New fever with no significant increase in white count.  Monitor for now.



PLAN:

Continue off of IV fluids.  Increase Lasix to 60 mg b.i.d. and monitor labs and volume

status closely as outpatient.  Repeat UA was not showing evidence of a urinary tract

infection.  Abdomen is quite soft.  I doubt any further complications in his abdomen

from the pancreatitis.  Continue to monitor for now.  Draw blood cultures if he spikes

a fever again.





MMODL / IJN: [id number] / Job#: [id number]

## 2018-03-14 NOTE — P.PN
Subjective


Progress Note Date: 03/13/18 (Patient did not end up getting discharged on 3/13 

)


Principal diagnosis: 





patient is seen and examined in follow up for acute pancreatitis, acute renal 

failure, and CHF exacerbation





Patient seen and examined today, patient feels that he is back to his baseline  

, tolerating PO intake, having minimal shortness of breath at rest but reports 

this is his baseline due to his advanced CHF, urine catheter was discontinued, 

patient urinating with no issues.  Denies any nausea vomiting, denies any 

abdominal pain.  His tolerating diet





Objective





- Vital Signs


Vital signs: 


 Vital Signs











Temp  100.0 F H  03/14/18 09:58


 


Pulse  72   03/14/18 06:27


 


Resp  28 H  03/14/18 06:27


 


BP  162/73   03/14/18 06:27


 


Pulse Ox  92 L  03/14/18 06:27








 Intake & Output











 03/13/18 03/14/18 03/14/18





 18:59 06:59 18:59


 


Intake Total   720


 


Balance   720


 


Weight  99 kg 


 


Intake:   


 


  Oral   720


 


Other:   


 


  Voiding Method Toilet  


 


  # Voids 2 5 


 


  # Bowel Movements  0 














- Exam





Constitutional: Vital signs still blood pressure is improving, patient is 

conversant, not in any acute distress, oxygen via nasal canula 


Lungs: Good breath sounds bilaterally , minimal inspiratory rales at right lung 

base  , clear to percussion, no wheezing  no rhonchi


Cardiovascular: Regular rate and rhythm, no murmurs, no gallops, no rubs  


Gastrointestinal: No tenderness palpation of the abdomen,  soft, bowel sounds 

are positive 


Extremities: No digital cyanosis   peripheral pulses palpable and equal over 

bilateral radial arteries and dorsalis pedis artery,  no calf muscle tenderness

, capillary refill is immediate over bilateral big toes


Psych: Alert, oriented to place, person, appropriate affect











- Labs


CBC & Chem 7: 


 03/14/18 09:05





 03/14/18 09:05


Labs: 


 Abnormal Lab Results - Last 24 Hours (Table)











  03/13/18 03/13/18 03/14/18 Range/Units





  17:25 20:36 01:33 


 


RBC     (4.30-5.90)  m/uL


 


Hgb     (13.0-17.5)  gm/dL


 


Hct     (39.0-53.0)  %


 


Carbon Dioxide     (22-30)  mmol/L


 


Glucose     (74-99)  mg/dL


 


POC Glucose (mg/dL)  355 H  425 H  248 H  (75-99)  mg/dL


 


Total Bilirubin     (0.2-1.3)  mg/dL


 


Alkaline Phosphatase     ()  U/L


 


Albumin     (3.5-5.0)  g/dL














  03/14/18 03/14/18 03/14/18 Range/Units





  06:45 09:05 09:05 


 


RBC   3.96 L   (4.30-5.90)  m/uL


 


Hgb   11.6 L   (13.0-17.5)  gm/dL


 


Hct   37.4 L   (39.0-53.0)  %


 


Carbon Dioxide    31 H  (22-30)  mmol/L


 


Glucose    249 H  (74-99)  mg/dL


 


POC Glucose (mg/dL)  198 H    (75-99)  mg/dL


 


Total Bilirubin    1.5 H  (0.2-1.3)  mg/dL


 


Alkaline Phosphatase    286 H  ()  U/L


 


Albumin    3.3 L  (3.5-5.0)  g/dL














  03/14/18 Range/Units





  12:04 


 


RBC   (4.30-5.90)  m/uL


 


Hgb   (13.0-17.5)  gm/dL


 


Hct   (39.0-53.0)  %


 


Carbon Dioxide   (22-30)  mmol/L


 


Glucose   (74-99)  mg/dL


 


POC Glucose (mg/dL)  420 H  (75-99)  mg/dL


 


Total Bilirubin   (0.2-1.3)  mg/dL


 


Alkaline Phosphatase   ()  U/L


 


Albumin   (3.5-5.0)  g/dL














Assessment and Plan


Assessment: 





73-year-old male with past medical history of congestive heart failure with 

left ventricular ejection fraction of 20%.  





 patient presented to the hospital with abdominal pain found to have acute 

pancreatitis, he was also suspected to have appendicitis however that was ruled 

out by general surgery team , and surgery team thought it's an incidental 

finding of fecalith in the appendix without any active acute  appendicitis, 

patient to continue to follow up with general surgery after discharge, patient 

received short course of Zosyn during this hospital stay.  





GI is assisting in management of his pancreatitis however patient cannot give 

MRCP due to having a pacemaker.  Lipase has been trending down initially .  

Patient denies any more abdominal pain nausea or vomiting.  patient tolerated 

PO intake, clinically his acute pancreatitis attack has resolved, however, his 

lipase today is trending up again.  





Patient also developed acute kidney injury and oliguria, which resulted in 

fluid overload and acute CHF exacerbation, ESTEVAN due to 3rd spacing with 

decreased intravascular volume initially due to acute pancreatitis resulting in 

prerenal hypoperfusion . peters catheter inserted, which he self removed with 

balloon inflated, resulting in gross hematuria later.  However, nephrology 

suggested aggressive diuresis before considering hemodialysis , and patient 

started making some urine. his breathing started to improve with aggressive 

diuresis, holidng IV fluids, and utilizing Bipap.  Peters catheter maintained  

for accurate ins and outs.  [facility]

, however, he is stable enough to be moved back to [facility] with tele. Patient 

responded to aggressive diuresis , and is not oliguric anymore. renal function 

continues to improve.





Cardiology input was requested regarding his SOB at rest due to fluid overload 

and poor cardiac function, cardiology agreed with current management and will 

continue to follow up their recommendations





On 3/11, all his labs are improving, patient condition is stable now, he will 

be transferred back to [facility] with telemetry, patient continued to be 

monitored in the hospital awaiting placement at subacute rehab due to poor 

cardiac function and limited functional capacity





on 3/12 patient continues to improve , tolerating diet, plan for today to 

remove peters catheter and monitor for urinary retention, assess home oxygen need

, advance his diet , some adjustment in his cardiac meds to achieve better 

blood pressure control 





3/13 patient was supposed to be discharged however did not end up leaving as 

his nursing home did not receive his BiPAP which was supposed to be delivered 

prior to receiving the patient.





Blood pressure now is better controlled, I resumed patient's home medications 

which were on hold due to acute kidney injury patient continued losartan and 

the spironolactone.  His diuretic was switched to oral.  Further adjustments 

are made to patient insulin regimen


Plan: 





#. Debility and deconditioning 


plans for placement at rehab 





#. Hypertension , accelerated


still not optimally controlled


made more adjustment to blood pressure medications today 


  hydralazin to 100 mg TID


  imdur to 60 mg daily 


Resume losartan and spironolactone





#. Suspected sleep apnea


recommended that the patient get evaluated for sleep apnea 


and to consider OP sleep study 


he is at risk of both (obstructive and central sleep apnea)





#Diabetes mellitus with peripheral neuropathy


Continue with current insulin management


Further adjustments made for patient insulin regimen due to hyperglycemia





# Acute hypoxic respiratory failure , resolved


# Pulmonary edema due to acute diastolic CHF exacerbation (LVEF of 20% s/p ICD 

, 2D echocardiogram 3/9/2018 showed LVEF of 55-60%  ) due to fluid overload, 

resolved


#. mild pulmonary hypertension 


#. Acute kidney injury ,  non-oliguric, resolved


#. Metabolic acidosis secondary to acute renal failure, resolved


#. Malignant hypertension with pulmonary edema


 PO lasix


nephro following 


continue cardiac meds , coreg, imdur, continue with hydralazine  


Continue to monitor blood pressure closely with recent adjustment of his 

medications, restarted losartan and spironolactone


Continue with BiPAP at bedside to help with work of breathing


2D echocardiogram showed LVEF of 55-60% 3/9/2018





#Gross hematuria secondary to traumatic removal of the Peters catheter by the 

patient, resolved


Urology input noted


Hemoglobin stable


peters cath successfully discontinued 








#Acute pancreatitis, resolved


Lipase continues to trend down patient currently asymptomatic


Patient could not get MRCP done due to having pacemaker





#Hyperbilirubinemia, mostly of the conjugated type, liver enzymes are 

unremarkable except for elevated alkaline phosphatase, improving now


This is an obstructive jaundice picture, due to edema from acute pancreatitis


However patient is afebrile, no leukocytosis


GI and general surgery are following





DVT prophylaxis,  heparin sc TID





History of chronic hepatitis C





Thrombocytopenia most likely secondary to underlying hepatitis C, now improving 


No evidence of active bleeding at this time





 


 Patient was supposed to be discharged however nursing home felt to receive his 

BiPAP so.  Patient discharge was canceled patient stayed hospitalized


Possible discharge tomorrow

## 2018-03-14 NOTE — P.PN
Subjective


Progress Note Date: 03/14/18


Principal diagnosis: 





patient is seen and examined in follow up for acute pancreatitis, acute renal 

failure, and fluid overload, hypertension 





Patient seen and examined today, discharge was not done yesterday , as patient 

equipments were not delivered to his NH


However, he spiked a fever today, he denies any cough, chest pain or phlegm 

production, he denies any hematuria, dysuria, or changes in his urine habits, 

except he is urinating alot due to diurestics. 


he denies any abd pain. 





laurel otherwise feels well, still short of breath but he claims that this is 

his baseline.  I think patient has a very poor insight about his overall well 

being . 





Objective





- Vital Signs


Vital signs: 


 Vital Signs











Temp  100.0 F H  03/14/18 09:58


 


Pulse  72   03/14/18 06:27


 


Resp  28 H  03/14/18 06:27


 


BP  162/73   03/14/18 06:27


 


Pulse Ox  92 L  03/14/18 06:27








 Intake & Output











 03/13/18 03/14/18 03/14/18





 18:59 06:59 18:59


 


Intake Total   720


 


Balance   720


 


Weight  99 kg 


 


Intake:   


 


  Oral   720


 


Other:   


 


  Voiding Method Toilet  


 


  # Voids 2 5 


 


  # Bowel Movements  0 














- Exam





Constitutional: Vital signs showing that his blood pressure is under better 

control and improving , patient is conversant, not in any acute distress, 

oxygen via nasal canula 


Lungs: decrease breath sounds bilaterally, with inspiratory rales at lung 

bases. 


Cardiovascular: Regular rate and rhythm, no murmurs, no gallops, no rubs  


Gastrointestinal: No tenderness palpation of the abdomen,  soft, bowel sounds 

are positive 


Extremities: No digital cyanosis   peripheral pulses palpable and equal over 

bilateral radial arteries and dorsalis pedis artery,  no calf muscle tenderness

, capillary refill is immediate over bilateral big toes


Psych: Alert, oriented to place, person, appropriate affect











- Labs


CBC & Chem 7: 


 03/14/18 09:05





 03/14/18 09:05


Labs: 


 Abnormal Lab Results - Last 24 Hours (Table)











  03/13/18 03/13/18 03/14/18 Range/Units





  17:25 20:36 01:33 


 


RBC     (4.30-5.90)  m/uL


 


Hgb     (13.0-17.5)  gm/dL


 


Hct     (39.0-53.0)  %


 


Carbon Dioxide     (22-30)  mmol/L


 


Glucose     (74-99)  mg/dL


 


POC Glucose (mg/dL)  355 H  425 H  248 H  (75-99)  mg/dL


 


Total Bilirubin     (0.2-1.3)  mg/dL


 


Alkaline Phosphatase     ()  U/L


 


Albumin     (3.5-5.0)  g/dL














  03/14/18 03/14/18 03/14/18 Range/Units





  06:45 09:05 09:05 


 


RBC   3.96 L   (4.30-5.90)  m/uL


 


Hgb   11.6 L   (13.0-17.5)  gm/dL


 


Hct   37.4 L   (39.0-53.0)  %


 


Carbon Dioxide    31 H  (22-30)  mmol/L


 


Glucose    249 H  (74-99)  mg/dL


 


POC Glucose (mg/dL)  198 H    (75-99)  mg/dL


 


Total Bilirubin    1.5 H  (0.2-1.3)  mg/dL


 


Alkaline Phosphatase    286 H  ()  U/L


 


Albumin    3.3 L  (3.5-5.0)  g/dL














  03/14/18 Range/Units





  12:04 


 


RBC   (4.30-5.90)  m/uL


 


Hgb   (13.0-17.5)  gm/dL


 


Hct   (39.0-53.0)  %


 


Carbon Dioxide   (22-30)  mmol/L


 


Glucose   (74-99)  mg/dL


 


POC Glucose (mg/dL)  420 H  (75-99)  mg/dL


 


Total Bilirubin   (0.2-1.3)  mg/dL


 


Alkaline Phosphatase   ()  U/L


 


Albumin   (3.5-5.0)  g/dL














Assessment and Plan


Assessment: 





73-year-old male with past medical history of congestive heart failure with 

left ventricular ejection fraction of 20% s/p ICD (recent 2 D echo 3/9/2018 

showed LVEF of 55-60%). with mild pulmonary hypertension .  





 patient presented to the hospital with abdominal pain found to have acute 

pancreatitis, he was also suspected to have appendicitis however that was ruled 

out by general surgery team , and surgery team thought it's an incidental 

finding of fecalith in the appendix without any active acute  appendicitis, 

patient to continue to follow up with general surgery after discharge, patient 

received short course of Zosyn during this hospital stay.  





GI is assisting in management of his pancreatitis however patient cannot give 

MRCP due to having a pacemaker.  Lipase has been trending down initially .  

Patient denies any more abdominal pain nausea or vomiting.  patient tolerated 

PO intake, clinically his acute pancreatitis attack has resolved, however, his 

lipase today is trending up again.  





Patient also developed acute kidney injury and oliguria, which resulted in 

fluid overload and acute CHF exacerbation, ESTEVAN due to 3rd spacing with 

decreased intravascular volume initially due to acute pancreatitis resulting in 

prerenal hypoperfusion . peters catheter inserted, which he self removed with 

balloon inflated, resulting in gross hematuria later.  However, nephrology 

suggested aggressive diuresis before considering hemodialysis , and patient 

started making some urine. his breathing started to improve with aggressive 

diuresis, holidng IV fluids, and utilizing Bipap.  Peters catheter maintained  

for accurate ins and outs.  patient is currently being managed at [facility] unit

, however, he is stable enough to be moved back to [facility] with tele. Patient 

responded to aggressive diuresis , and is not oliguric anymore. renal function 

continues to improve.





Cardiology input was requested regarding his SOB at rest due to fluid overload 

and poor cardiac function, cardiology agreed with current management and will 

continue to follow up their recommendations





On 3/11, all his labs are improving, patient condition is stable now, he will 

be transferred back to [facility] with telemetry, patient continued to be 

monitored in the hospital awaiting placement at subacute rehab due to poor 

cardiac function and limited functional capacity





on 3/12 patient continues to improve , tolerating diet, plan for today to 

remove peters catheter and monitor for urinary retention, assess home oxygen need

, advance his diet , some adjustment in his cardiac meds to achieve better 

blood pressure control 





3/13 patient was supposed to be discharged however did not end up leaving as 

his nursing home did not receive his BiPAP which was supposed to be delivered 

prior to receiving the patient.





Blood pressure now is better controlled, I resumed patient's home medications 

which were on hold due to acute kidney injury patient continued losartan and 

the spironolactone.  His diuretic was switched to oral.  Further adjustments 

are made to patient insulin regimen





3/14 patient ended up staying in the hospital today, due to spiking a fever, he 

denies any symptoms of coughing, dysuria, chest pain, productive cough, 

hematuria, diarrhea, or abd pain . source of infection remains to be ruled out, 

blood culture was sent, patient does not have any IV lines. blood sugar is 

elevated today, after getting permission to look into patient drawers and 

bassinet, he seemed to have bags of chips , and multiple bottles of ensures 

with high sugar content. patient was counseled to strictly eat what the 

cafeteria offers him , as he is on restricted carbohydrate diet. patient was 

also counseled to restrict his fluid intake to 1.5 L daily . will also check 

post void residual to rule out any urinary retention (which was ruled out 

earlier, but in light of new fevers, and patient denying urinary symptoms , 

this will give me a clue toward wether i need to test his urine or not in case 

he has urinary retention ). 


Plan: 








#Diabetes mellitus with peripheral neuropathy


adjustment made to insulin regimen


patient blood sugars has been trending up , as his appetite is improving and he 

is eating more


however, bags of chips and multiple high sugar ensures were found in patient 

possession , he was counseled to avoid those, and to strictly follow our 

hospitals diabetic diet


long and short acting insulin on board





#. Spikes of fever, rule out infection 


denies any coughing, chest pain , or productive cough. shortness of breath at 

baseline per patient report


No leukocytosis 


denies any urinary symptoms or abd pain 


patient has no IV lines, no evidence of phlebitis, 


possible atelactesis causing fevers


blood cultures ordered


check post void residual , if suggests urinary retention then I would test the 

urine for UTI








#. Malignant hypertension with pulmonary edema


blood pressure is better controlled now after further adjusting his blood 

pressure regimen, no changes made today  


  coreg 25 mg 


hydralazin to 100 mg TID


  imdur to 60 mg daily 


 losartan and spironolactone


blood pressure better controlled now , no adjustments made today 





#. Debility and deconditioning 


plans for placement at rehab 





#. Suspected sleep apnea


recommended that the patient get evaluated for sleep apnea 


and to consider OP sleep study 


he is at risk of both (obstructive and central sleep apnea)





# Pulmonary edema due to acute diastolic CHF exacerbation (LVEF of 20% s/p ICD 

however, , 2D echocardiogram 3/9/2018 showed LVEF of 55-60%  ) due to fluid 

overload


#. Acute / ? chronic hypoxic respiratory failure , continues to require 

supplemental oxygen 


continues to have fluid over load despite aggressive diuretics


continue with supplemental oxygen  


 Bipap PRN at night 


nephrology is following and assisting with diuresis 





#. mild pulmonary hypertension 


#. Acute kidney injury ,  non-oliguric, resolved


#. Metabolic acidosis secondary to acute renal failure, resolved


 


#Gross hematuria secondary to traumatic removal of the Peters catheter by the 

patient, resolved


Urology input noted


Hemoglobin stable


peters cath successfully discontinued 








#Acute pancreatitis, resolved


Lipase continues to trend down patient currently asymptomatic


Patient could not get MRCP done due to having pacemaker





#Hyperbilirubinemia, mostly of the conjugated type, liver enzymes are 

unremarkable except for elevated alkaline phosphatase, improving now


This is an obstructive jaundice picture, due to edema from acute pancreatitis


However patient is afebrile, no leukocytosis


GI and general surgery are following





DVT prophylaxis,  heparin sc TID





History of chronic hepatitis C





Thrombocytopenia most likely secondary to underlying hepatitis C, now improving 


No evidence of active bleeding at this time





 


resume aggressive diuresis 


patient educated again , regarding fluid restriction and diabetic diet


continue with current Blood pressure meds


insulin regimen adjusted again today 


monitor for another 24 hours , r/o infectious process


spikes of fever without leukocytosis and no clear source of infection 


follow up blood cultures


Patient does not have IV access, due to difficulties to get IV access now, can 

not utilize IV diuresis at this time, continue attempts to get IV access

## 2018-03-14 NOTE — P.PN
Subjective


Progress Note Date: 03/14/18





73-year-old male being seen at the request of the attending for surgical follow-

up progress note   [facility]  

The discharge has been held due to the patient developing a temp of 101.4 this 

morning the temp was rechecked at 10:00 this morning it was 100.    Patient is 

adamant that he does not have any abdominal pain.   With deep palpitation to 

the abdominal wall no facial grimacing noted.  Patient reports no nausea 

vomiting.  Patient reportedly is tolerating a diet   the white count this 

morning is 7.7 it was 8.7 the day before    electrolyte reviewed all within 

normal limits   a lactic acid is not elevated it's 1.  Blood sugars are noted 

to be elevated this morning   248,198 420 patient is resting in bed and is 

oriented to self and place





Objective





- Vital Signs


Vital signs: 


 Vital Signs











Temp  100.0 F H  03/14/18 09:58


 


Pulse  72   03/14/18 06:27


 


Resp  28 H  03/14/18 06:27


 


BP  162/73   03/14/18 06:27


 


Pulse Ox  92 L  03/14/18 06:27








 Intake & Output











 03/13/18 03/14/18 03/14/18





 18:59 06:59 18:59


 


Intake Total   720


 


Balance   720


 


Weight  99 kg 


 


Intake:   


 


  Oral   720


 


Other:   


 


  Voiding Method Toilet  


 


  # Voids 2 5 


 


  # Bowel Movements  0 














- Exam





Physical exam:


73-year-old male resting in bed cooperative oriented to self and place is 

adamant that he is not experiencing any abdominal pain becomes easily agitated 

when trying to asked patient about any discomfort


Lungs adequate air movement bilaterally on room air sats are 94% no cough noted 

no shortness of breath with conversation


Heart S1-S2 audible and regular


Abdomen firm no facial grimacing with palpitation to the abdominal wall no 

guarding denies any tenderness bowel tones active no nausea no stool reportedly 

tolerating a diet continues to be denying any abdominal pain


Extremity no edema noted tenderness to the bilateral lower extreme





- Labs


CBC & Chem 7: 


 03/15/18 07:50





 03/15/18 07:50


Labs: 


 Abnormal Lab Results - Last 24 Hours (Table)











  03/13/18 03/13/18 03/14/18 Range/Units





  17:25 20:36 01:33 


 


RBC     (4.30-5.90)  m/uL


 


Hgb     (13.0-17.5)  gm/dL


 


Hct     (39.0-53.0)  %


 


Carbon Dioxide     (22-30)  mmol/L


 


Glucose     (74-99)  mg/dL


 


POC Glucose (mg/dL)  355 H  425 H  248 H  (75-99)  mg/dL


 


Total Bilirubin     (0.2-1.3)  mg/dL


 


Alkaline Phosphatase     ()  U/L


 


Albumin     (3.5-5.0)  g/dL














  03/14/18 03/14/18 03/14/18 Range/Units





  06:45 09:05 09:05 


 


RBC   3.96 L   (4.30-5.90)  m/uL


 


Hgb   11.6 L   (13.0-17.5)  gm/dL


 


Hct   37.4 L   (39.0-53.0)  %


 


Carbon Dioxide    31 H  (22-30)  mmol/L


 


Glucose    249 H  (74-99)  mg/dL


 


POC Glucose (mg/dL)  198 H    (75-99)  mg/dL


 


Total Bilirubin    1.5 H  (0.2-1.3)  mg/dL


 


Alkaline Phosphatase    286 H  ()  U/L


 


Albumin    3.3 L  (3.5-5.0)  g/dL














  03/14/18 Range/Units





  12:04 


 


RBC   (4.30-5.90)  m/uL


 


Hgb   (13.0-17.5)  gm/dL


 


Hct   (39.0-53.0)  %


 


Carbon Dioxide   (22-30)  mmol/L


 


Glucose   (74-99)  mg/dL


 


POC Glucose (mg/dL)  420 H  (75-99)  mg/dL


 


Total Bilirubin   (0.2-1.3)  mg/dL


 


Alkaline Phosphatase   ()  U/L


 


Albumin   (3.5-5.0)  g/dL














Assessment and Plan


Assessment: 





Impression


Present on admission abdominal pain CAT scan abdomen pelvis reported 

appendicolith possible acute early appendicitis not ruled out no fever no white 

count


Hyperkalemia


Obesity BMI 36


Type 2 diabetes with episodes of hyperglycemia


Prior history of alcohol intake


Depressive disorder nonspecified


Present on admission elevated lipase and amylase consistent with acute 

pancreatitis


Acute kidney injury


Hyperkalemia


CAT scan abdomen and pelvis incidental finding appendicoth with no evidence of 

an acute surgical abdomen no fever no white count


Acute exacerbation of systolic heart failure EF 20% status post ICD likely due 

to fluid overload


Gross hematuria likely related to self removal of his indwelling Castro catheter 

with the balloon inflated


Echocardiogram moderate concentric left ventricular hypertrophy with an EF 

between 55 and 60%


Ultrasound of the abdomen and gallbladder no mention of gallstones





Plan


No evidence of an acute surgical abdomen at this time


Patient is felt to be at a moderate to high risk for any surgical intervention 

given patient's comorbidities given patient's systolic heart failure 

exacerbation with worsening of the renal status with fluid overload


Repeat labs in the morning


IV fluid for hydration


Will follow with you


DVT and GI prophylaxis


GI service indicates clinically ERCP is not indicated at this time total 

bilirubin improving


Defer to the attending to address medical issues











Progress note dictated for 





The above impression and plan of care have been discussed and directed by 

signing physician. Kajal Burks nurse practitioner acting as scribe for signing 

physician.

## 2018-03-14 NOTE — XR
EXAMINATION TYPE: XR chest 1V

 

DATE OF EXAM: 3/14/2018

 

COMPARISON: 3/8/2018

 

HISTORY: Shortness of breath

 

TECHNIQUE: Single frontal view of the chest is obtained.

 

FINDINGS:  There is a diffuse interstitial pattern with bilateral infiltrate and pleural effusion. Ca
rdiac device and cardiomegaly noted. No pneumothorax.

 

IMPRESSION:  

1. Bilateral infiltrate and pleural effusion correlate for CHF.

## 2018-03-15 LAB
ALBUMIN SERPL-MCNC: 3.1 G/DL (ref 3.5–5)
ALP SERPL-CCNC: 265 U/L (ref 38–126)
ALT SERPL-CCNC: 28 U/L (ref 21–72)
ANION GAP SERPL CALC-SCNC: 7 MMOL/L
AST SERPL-CCNC: 28 U/L (ref 17–59)
BASOPHILS # BLD AUTO: 0 K/UL (ref 0–0.2)
BASOPHILS NFR BLD AUTO: 0 %
BUN SERPL-SCNC: 17 MG/DL (ref 9–20)
CALCIUM SPEC-MCNC: 8.8 MG/DL (ref 8.4–10.2)
CHLORIDE SERPL-SCNC: 99 MMOL/L (ref 98–107)
CO2 SERPL-SCNC: 33 MMOL/L (ref 22–30)
EOSINOPHIL # BLD AUTO: 0.2 K/UL (ref 0–0.7)
EOSINOPHIL NFR BLD AUTO: 3 %
ERYTHROCYTE [DISTWIDTH] IN BLOOD BY AUTOMATED COUNT: 3.92 M/UL (ref 4.3–5.9)
ERYTHROCYTE [DISTWIDTH] IN BLOOD: 12.9 % (ref 11.5–15.5)
GLUCOSE BLD-MCNC: 224 MG/DL (ref 75–99)
GLUCOSE BLD-MCNC: 256 MG/DL (ref 75–99)
GLUCOSE BLD-MCNC: 267 MG/DL (ref 75–99)
GLUCOSE BLD-MCNC: 323 MG/DL (ref 75–99)
GLUCOSE SERPL-MCNC: 199 MG/DL (ref 74–99)
HCT VFR BLD AUTO: 35.9 % (ref 39–53)
HGB BLD-MCNC: 11.5 GM/DL (ref 13–17.5)
LYMPHOCYTES # SPEC AUTO: 1.7 K/UL (ref 1–4.8)
LYMPHOCYTES NFR SPEC AUTO: 25 %
MCH RBC QN AUTO: 29.3 PG (ref 25–35)
MCHC RBC AUTO-ENTMCNC: 32.1 G/DL (ref 31–37)
MCV RBC AUTO: 91.5 FL (ref 80–100)
MONOCYTES # BLD AUTO: 0.6 K/UL (ref 0–1)
MONOCYTES NFR BLD AUTO: 8 %
NEUTROPHILS # BLD AUTO: 4.2 K/UL (ref 1.3–7.7)
NEUTROPHILS NFR BLD AUTO: 62 %
PLATELET # BLD AUTO: 173 K/UL (ref 150–450)
POTASSIUM SERPL-SCNC: 4.2 MMOL/L (ref 3.5–5.1)
PROT SERPL-MCNC: 6.8 G/DL (ref 6.3–8.2)
SODIUM SERPL-SCNC: 139 MMOL/L (ref 137–145)
WBC # BLD AUTO: 6.8 K/UL (ref 3.8–10.6)

## 2018-03-15 RX ADMIN — INSULIN DETEMIR SCH UNIT: 100 INJECTION, SOLUTION SUBCUTANEOUS at 21:23

## 2018-03-15 RX ADMIN — INSULIN ASPART SCH UNIT: 100 INJECTION, SOLUTION INTRAVENOUS; SUBCUTANEOUS at 07:51

## 2018-03-15 RX ADMIN — CARVEDILOL SCH MG: 12.5 TABLET, FILM COATED ORAL at 07:53

## 2018-03-15 RX ADMIN — PANTOPRAZOLE SODIUM SCH MG: 40 TABLET, DELAYED RELEASE ORAL at 07:53

## 2018-03-15 RX ADMIN — HEPARIN SODIUM SCH UNIT: 5000 INJECTION, SOLUTION INTRAVENOUS; SUBCUTANEOUS at 07:51

## 2018-03-15 RX ADMIN — HEPARIN SODIUM SCH UNIT: 5000 INJECTION, SOLUTION INTRAVENOUS; SUBCUTANEOUS at 23:37

## 2018-03-15 RX ADMIN — INSULIN ASPART SCH UNIT: 100 INJECTION, SOLUTION INTRAVENOUS; SUBCUTANEOUS at 21:27

## 2018-03-15 RX ADMIN — LOSARTAN POTASSIUM SCH MG: 25 TABLET, FILM COATED ORAL at 07:54

## 2018-03-15 RX ADMIN — CARVEDILOL SCH MG: 12.5 TABLET, FILM COATED ORAL at 17:44

## 2018-03-15 RX ADMIN — FUROSEMIDE SCH MG: 40 TABLET ORAL at 15:56

## 2018-03-15 RX ADMIN — INSULIN ASPART SCH UNIT: 100 INJECTION, SOLUTION INTRAVENOUS; SUBCUTANEOUS at 07:52

## 2018-03-15 RX ADMIN — FUROSEMIDE SCH MG: 40 TABLET ORAL at 07:53

## 2018-03-15 RX ADMIN — HEPARIN SODIUM SCH UNIT: 5000 INJECTION, SOLUTION INTRAVENOUS; SUBCUTANEOUS at 15:56

## 2018-03-15 RX ADMIN — ISOSORBIDE MONONITRATE SCH MG: 60 TABLET, EXTENDED RELEASE ORAL at 07:53

## 2018-03-15 RX ADMIN — INSULIN ASPART SCH UNIT: 100 INJECTION, SOLUTION INTRAVENOUS; SUBCUTANEOUS at 17:44

## 2018-03-15 RX ADMIN — INSULIN ASPART SCH UNIT: 100 INJECTION, SOLUTION INTRAVENOUS; SUBCUTANEOUS at 13:17

## 2018-03-15 RX ADMIN — INSULIN DETEMIR SCH UNIT: 100 INJECTION, SOLUTION SUBCUTANEOUS at 08:15

## 2018-03-15 RX ADMIN — SPIRONOLACTONE SCH MG: 25 TABLET, FILM COATED ORAL at 07:53

## 2018-03-15 NOTE — P.CONS
History of Present Illness





- Reason for Consult


Consult date: 03/15/18


Fever





- History of Present Illness





This is a 73-year-old -American male who presented to [facility] emergency center on March 3 by ambulance complaining of abdominal pain 

and chest pain.  Abdominal pain was in the mid abdomen.  He did not have any 

nausea vomiting or diarrhea, no fever or chills.  At that time he underwent a 

CAT scan of the abdomen and pelvis without contrast that showed a distal 

appendix dilated to 9 mm containing an 8 mm appendicolith surrounded by small 

amount of stranding.  Chest x-ray showed prominent pulmonary vasculature likely 

due to underinflation rather than pulmonary edema.  Gallbladder ultrasound 

showed limitation due to bowel gas.  Right upper quadrant ultrasound appears 

without acute changes.  He had a repeat CAT scan of the abdomen and pelvis 

without contrast on March 6 that revealed similar findings.  Correlate to 

exclude appendicitis.  Abdominal x-rays on March 8 revealed left lower lobe 

pneumonia, left pleural effusion not excluded, cardiomegaly, unremarkable 

abdomen.  Most recent chest x-ray on March 14 revealed bilateral infiltrate and 

pleural effusion correlate for heart failure.  During this hospitalization, 

patient has been treated for acute pancreatitis and seen by GI.  Patient was 

unable to undergo MRCP due to pacemaker in place.  He did have improvement of 

his amylase and lipase.  Patient was treated for acute on chronic diastolic 

heart failure and followed by Dr. Reyes and has been on IV Lasix currently on 

oral.  Patient also has history of diabetes with hemoglobin A1c of 10.6 which 

is being managed by medicine.  Patient came back positive for hepatitis C 

antibody on the patient denies any known history of this in the past or been 

told during this admission that he had hepatitis C positive testing.  Patient 

was progressing and plan for discharge to [facility] for rehab but he developed a 

fever 101.4 on March 14.  His white count has been normal through his stay.  

Lactic acid was 1 liver function tests are improved as well as amylase and 

lipase.  Cetaphil toxin was negative.  Patient did have a Castro catheter and 

last week which he pulled out himself and it was reinserted this was removed by 

nursing on March 12 patient did have a bladder scan and did not show any 

significant results.  He had a blood culture obtained on admission that is 

showing no growth at 144 hours and urine cultures no growth after 18 hours.  A 

repeat blood culture was obtained on 3/14 and is showing no growth at 24 hours.

  Patient is a very poor historian.  He denies having any fever or chills or 

rigors.  He does not recall having a fever.  He denies any abdominal pain 

nausea or vomiting.  He does complain of shortness of breath which he states he 

has always.  He also states he has a cough is nonproductive.  He denies any 

problem urinating no pain or burning when he goes.  He states he had a bowel 

movement half hour before he was seen by me.  He denies any skin problems, 

rashes or wounds.  During evaluation, patient had one episode of right lower 

quadrant pain that lasted for approximately 2 seconds and was sharp in nature.





Review of Systems


All systems: negative


Constitutional: Reports fatigue, Denies chills, Denies fever


Eyes: denies blurred vision, denies pain


Ears, nose, mouth and throat: Denies dental pain, Denies headache, Denies mouth 

pain, Denies sore throat


Cardiovascular: Reports decreased exercise tolerance, Reports dyspnea on 

exertion, Reports shortness of breath, Denies chest pain, Denies lightheadedness

, Denies syncope


Respiratory: Reports cough, Reports dyspnea, Denies cough with sputum, Denies 

excessive sputum, Denies hemoptysis


Gastrointestinal: Denies abdominal pain, Denies diarrhea, Denies nausea, Denies 

vomiting


Musculoskeletal: Denies myalgias


Integumentary: Denies pruritus, Denies rash


Neurological: Denies numbness, Denies weakness


Psychiatric: Denies anxiety, Denies depression


Endocrine: Denies fatigue, Denies weight change





Past Medical History


Past Medical History: Chest Pain / Angina, Heart Failure, COPD, Diabetes 

Mellitus, Hyperlipidemia, Hypertension, Myocardial Infarction (MI), 

Osteoarthritis (OA)


Additional Past Medical History / Comment(s): 11/25/15  Pt presented to [facility] ER 

EMS feeling weak and having mild headache and anterior neck pain.  Symptoms 

began days ago.  Pt being admitted with clinical impression of weakness, acute 

renal failure.  PT last admitted to [facility] 7/14/15 with acute DKA, acute 

metabolic encephalitis from DKA, acute renal failure, severe hyperkalemia, 

pseudohyponatremia.  Other HX:  Chronic CHF systolic dysfunction with EF 20%,  

gout, gouty arthiritis to R great toe, nerve pain, ddd lumbar region, 

folliculitis, constipation, renal insufficiency, hep c 1-1-14, diabetes 

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 

liver bx tx with chest tube, cellulitis to lt foot/ankle, pt was born with R 

leg larger than L leg.


Last Myocardial Infarction Date:: 5/2014


History of Any Multi-Drug Resistant Organisms: None Reported


Past Surgical History: Heart Catheterization, Pacemaker


Additional Past Surgical History / Comment(s): CATARACTS ROCKY EYES REMOVED.  

liver biopsy on 4-7-14, heart cath 2005


Past Anesthesia/Blood Transfusion Reactions: No Reported Reaction


Type of Cardiac Device: Permanent Pacemaker


Device Placement Date:: UNK


Past Psychological History: Bipolar, Depression


Smoking Status: Never smoker


Past Alcohol Use History: Occasional


Past Drug Use History: None Reported





- Past Family History


  ** Mother


Family Medical History: Cancer





  ** Sister(s)


Family Medical History: Cancer





Medications and Allergies


 Home Medications











 Medication  Instructions  Recorded  Confirmed  Type


 


Nitroglycerin Sl Tabs [Nitrostat] 0.4 mg SUBLINGUAL Q5M PRN 05/18/14 03/03/18 

History


 


Carvedilol 25 mg PO AC-BID 07/14/15 03/03/18 History


 


Docusate [Colace] 100 - 200 mg PO DAILY 11/25/15 03/03/18 History


 


INSULIN LISPRO (humaLOG) [humaLOG] 8 - 21 units SQ AC-TID 03/03/18 03/03/18 

History


 


Losartan [Cozaar] 12.5 mg PO DAILY 03/03/18 03/03/18 History


 


Spironolactone [Aldactone] 25 mg PO DAILY 03/03/18 03/03/18 History


 


Furosemide [Lasix] 40 mg PO BID@0900,1600  tab 03/13/18  Rx


 


Insulin Detemir [Levemir] 12 unit SQ HS  syr 03/13/18  Rx


 


Isosorbide Mononitrate ER [Imdur] 60 mg PO DAILY  tab.er.24h 03/13/18  Rx


 


Pantoprazole [Protonix] 40 mg PO AC-BRKFST  tablet. 03/13/18  Rx


 


hydrALAZINE HCL [Apresoline] 100 mg PO TID  tab 03/13/18  Rx











 Allergies











Allergy/AdvReac Type Severity Reaction Status Date / Time


 


No Known Allergies Allergy   Verified 03/03/18 11:35














Physical Exam


Vitals: 


 Vital Signs











  Temp Pulse Resp BP BP Pulse Ox


 


 03/15/18 07:51       92 L


 


 03/15/18 07:00  99.3 F  63  18  176/77   100


 


 03/15/18 00:00    20   


 


 03/14/18 23:00  100.1 F H  75  20   153/71  96


 


 03/14/18 20:13   69  20   167/77  95


 


 03/14/18 19:17       97


 


 03/14/18 17:11  98.8 F  68  19   146/74  92 L


 


 03/14/18 16:19   68  19   


 


 03/14/18 15:00  100.2 F H  69  18  149/73   92 L








 Intake and Output











 03/14/18 03/15/18 03/15/18





 22:59 06:59 14:59


 


Intake Total 780 100 


 


Output Total 2275  200


 


Balance -1495 100 -200


 


Intake:   


 


  Oral 780 100 


 


Output:   


 


  Urine 2275  200


 


Other:   


 


  Voiding Method Urinal  


 


  # Voids 1 3 


 


  # Bowel Movements 0 0 


 


  Weight  113 kg 

















Gen: This is a 73-year-old morbidly obese F can American male.  He is sitting 

up in bed and appears to be dyspneic with tachypnea and mild accessory muscle 

usage.


HEENT: Head is atraumatic, normocephalic. Pupils equal, round. Sclerae is 

anicteric.  Mucous membranes of the mouth are slightly dry.  No thrush noted.


NECK: Supple. No JVD. No lymphadenopathy. No thyromegaly. 


LUNGS: Diminished at the bases with rales.  Tachypneic.  Mild intercostal 

retractions.


HEART: Regular rate and rhythm. No murmur. 


ABDOMEN: Soft. Bowel sounds are present. No masses.  No tenderness.  Patient 

denies any tenderness to the right lower quadrant.  No CVA tenderness 

bilaterally.


EXTREMITIES: 1+ bilateral pedal edema, slightly worse on the left.  No calf 

tenderness.  Onychomycosis to the toes bilaterally.


NEUROLOGICAL: Patient is awake, alert and oriented x3.  Generalized weakness 

noted. 








Results


Results: 





 Laboratory Results











WBC  6.8 k/uL (3.8-10.6)   03/15/18  07:50    


 


RBC  3.92 m/uL (4.30-5.90)  L  03/15/18  07:50    


 


Hgb  11.5 gm/dL (13.0-17.5)  L  03/15/18  07:50    


 


Hct  35.9 % (39.0-53.0)  L  03/15/18  07:50    


 


MCV  91.5 fL (80.0-100.0)   03/15/18  07:50    


 


MCH  29.3 pg (25.0-35.0)   03/15/18  07:50    


 


MCHC  32.1 g/dL (31.0-37.0)   03/15/18  07:50    


 


RDW  12.9 % (11.5-15.5)   03/15/18  07:50    


 


Plt Count  173 k/uL (150-450)   03/15/18  07:50    


 


Neutrophils %  62 %  03/15/18  07:50    


 


Lymphocytes %  25 %  03/15/18  07:50    


 


Monocytes %  8 %  03/15/18  07:50    


 


Eosinophils %  3 %  03/15/18  07:50    


 


Basophils %  0 %  03/15/18  07:50    


 


Neutrophils #  4.2 k/uL (1.3-7.7)   03/15/18  07:50    


 


Lymphocytes #  1.7 k/uL (1.0-4.8)   03/15/18  07:50    


 


Monocytes #  0.6 k/uL (0-1.0)   03/15/18  07:50    


 


Eosinophils #  0.2 k/uL (0-0.7)   03/15/18  07:50    


 


Basophils #  0.0 k/uL (0-0.2)   03/15/18  07:50    


 


Manual Slide Review  Performed   03/15/18  07:50    


 


Toxic Granulation  Present   03/11/18  05:37    


 


Large Platelets  Present   03/15/18  07:50    


 


RBC Morphology  Normal   03/15/18  07:50    


 


Polychromasia  Present   03/11/18  05:37    


 


Hypochromasia  Slight   03/08/18  04:06    


 


Poikilocytosis (manual  Present   03/07/18  08:33    


 


PT  10.1 sec (9.0-12.0)   03/08/18  04:06    


 


INR  1.0  (<1.2)   03/08/18  04:06    


 


Sample Site  rrad   03/08/18  07:47    


 


ABG pH  7.29  (7.35-7.45)  L  03/08/18  07:47    


 


ABG pCO2  46 mmHg (35-45)  H  03/08/18  07:47    


 


ABG pO2  75 mmHg ()  L  03/08/18  07:47    


 


ABG HCO3  22 mmol/L (21-25)   03/08/18  07:47    


 


ABG Total CO2  23 mmol/L (19-24)   03/08/18  07:47    


 


ABG O2 Saturation  95.6 % (94-97)   03/08/18  07:47    


 


ABG Base Excess  -5.0 mmol/L  03/08/18  07:47    


 


Tirso Test  Yes   03/08/18  07:47    


 


FiO2  32 %  03/08/18  07:47    


 


Sodium  139 mmol/L (137-145)   03/15/18  07:50    


 


Potassium  4.2 mmol/L (3.5-5.1)   03/15/18  07:50    


 


Chloride  99 mmol/L ()   03/15/18  07:50    


 


Carbon Dioxide  33 mmol/L (22-30)  H  03/15/18  07:50    


 


Anion Gap  7 mmol/L  03/15/18  07:50    


 


BUN  17 mg/dL (9-20)   03/15/18  07:50    


 


Creatinine  1.08 mg/dL (0.66-1.25)   03/15/18  07:50    


 


Est GFR (MDRD) Af Amer  37  (>60 ml/min/1.73 sqM)   03/06/18  07:05    


 


Est GFR (MDRD) Non-Af  31  (>60 ml/min/1.73 sqM)   03/06/18  07:05    


 


Est GFR (CKD-EPI)AfAm  79  (>60 ml/min/1.73 sqM)   03/15/18  07:50    


 


Est GFR (CKD-EPI)NonAf  68  (>60 ml/min/1.73 sqM)   03/15/18  07:50    


 


Glucose  199 mg/dL (74-99)  H  03/15/18  07:50    


 


POC Glucose (mg/dL)  256 mg/dL (75-99)  H  03/15/18  12:16    


 


POC Glu Operater ID  Laurence Moon   03/15/18  12:16    


 


Estimated Ave Glu mg/dL  258   03/03/18  06:23    


 


Hemoglobin A1c  10.6 % (4.0-6.0)  H  03/03/18  06:23    


 


Plasma Lactic Acid Buddy  1.0 mmol/L (0.7-2.0)   03/14/18  09:05    


 


Calcium  8.8 mg/dL (8.4-10.2)   03/15/18  07:50    


 


Phosphorus  3.8 mg/dL (2.5-4.5)   03/08/18  04:06    


 


Magnesium  2.2 mg/dL (1.6-2.3)   03/08/18  04:06    


 


Total Bilirubin  1.3 mg/dL (0.2-1.3)   03/15/18  07:50    


 


Conjugated Bilirubin  2.7 mg/dL (0.0-0.3)  H  03/07/18  08:28    


 


Unconjugated Bilirubin  0.4 mg/dL (0.0-1.1)   03/07/18  08:28    


 


Delta Bilirubin  2.0 mg/dL (0.0-0.2)  H  03/07/18  08:28    


 


AST  28 U/L (17-59)   03/15/18  07:50    


 


ALT  28 U/L (21-72)   03/15/18  07:50    


 


Alkaline Phosphatase  265 U/L ()  H  03/15/18  07:50    


 


Ammonia  61 umol/L (<30)  H  03/10/18  05:49    


 


Total Creatine Kinase  121 U/L ()   03/03/18  06:23    


 


CK-MB (CK-2)  2.9 ng/mL (0.0-2.4)  H*  03/03/18  06:23    


 


CK-MB (CK-2) Rel Index  2.4   03/03/18  06:23    


 


Troponin I  0.024 ng/mL (0.000-0.034)   03/03/18  06:23    


 


Total Protein  6.8 g/dL (6.3-8.2)   03/15/18  07:50    


 


Albumin  3.1 g/dL (3.5-5.0)  L  03/15/18  07:50    


 


Triglycerides  146 mg/dL (<150)   03/04/18  07:17    


 


Cholesterol  129 mg/dL (<200)   03/04/18  07:17    


 


LDL Cholesterol, Calc  30 mg/dL (0-99)   03/04/18  07:17    


 


HDL Cholesterol  70 mg/dL (40-60)  H  03/04/18  07:17    


 


Amylase  190 U/L ()  H  03/11/18  05:37    


 


Lipase  1703 U/L ()  H  03/11/18  05:37    


 


Tumor Marker AFP  3.2 ng/mL (0.0-7.9)   03/09/18  07:09    


 


CA 19-9 Antigen  50.6 U/mL (0.0-34.9)  H  03/07/18  07:41    


 


Urine Color  Yellow   03/08/18  13:45    


 


Urine Appearance  Clear  (Clear)   03/08/18  13:45    


 


Urine pH  5.0  (5.0-8.0)   03/08/18  13:45    


 


Ur Specific Gravity  1.007  (1.001-1.035)   03/08/18  13:45    


 


Urine Protein  Negative  (Negative)   03/08/18  13:45    


 


Urine Glucose (UA)  Negative  (Negative)   03/08/18  13:45    


 


Urine Ketones  Negative  (Negative)   03/08/18  13:45    


 


Urine Blood  Moderate  (Negative)  H  03/08/18  13:45    


 


Urine Nitrite  Negative  (Negative)   03/08/18  13:45    


 


Urine Bilirubin  Negative  (Negative)   03/08/18  13:45    


 


Urine Urobilinogen  <2.0 mg/dL (<2.0)   03/08/18  13:45    


 


Ur Leukocyte Esterase  Negative  (Negative)   03/08/18  13:45    


 


Urine RBC  19 /hpf (0-5)  H  03/08/18  13:45    


 


Urine WBC  1 /hpf (0-5)   03/08/18  13:45    


 


Ur Squamous Epith Cells  <1 /hpf (0-4)   03/06/18  01:40    


 


Amorphous Sediment  Rare /hpf (None)  H  03/03/18  17:17    


 


Urine Bacteria  Rare /hpf (None)  H  03/08/18  13:45    


 


Hyaline Casts  3 /lpf (0-2)  H  03/08/18  13:45    


 


Urine Mucus  Rare /hpf (None)  H  03/08/18  13:45    


 


Urine Eosinophils  0 %  03/08/18  13:45    


 


Serum Alcohol  <10 mg/dL  03/03/18  16:45    


 


C. difficile (EIA) Intrp  Negative  (Negative)   03/12/18  12:00    


 


Hepatitis A IgM Ab  Non-Reactive  (Non-Reactive)   03/06/18  10:20    


 


Hep Bs Antigen  Non-Reactive  (Non-Reactive)   03/06/18  10:20    


 


Hep B Core IgM Ab  Non-Reactive  (Non-Reactive)   03/06/18  10:20    


 


Hep C IgG Ab  Reactive  (Non-Reactive)  H  03/06/18  10:20    


 


HCV RNA Qual (PCR)  DETECTED  (Not detected)  H  03/07/18  07:41    


 


Hepatitis C RNA Quant  415,721 IU/mL (<12)  H  03/07/18  07:41    


 


HCV RNA PCR log /ml  5.62   (<1.08)  H  03/07/18  07:41    


 


Hepatitis C Genotype  1a  H  03/07/18  07:41    











CBC & Chem 7: 


 03/15/18 07:50





 03/15/18 07:50


Labs: 


 Abnormal Lab Results - Last 24 Hours (Table)











  03/14/18 03/14/18 03/14/18 Range/Units





  12:04 16:52 21:49 


 


RBC     (4.30-5.90)  m/uL


 


Hgb     (13.0-17.5)  gm/dL


 


Hct     (39.0-53.0)  %


 


Carbon Dioxide     (22-30)  mmol/L


 


Glucose     (74-99)  mg/dL


 


POC Glucose (mg/dL)  420 H  372 H  342 H  (75-99)  mg/dL


 


Alkaline Phosphatase     ()  U/L


 


Albumin     (3.5-5.0)  g/dL














  03/15/18 03/15/18 03/15/18 Range/Units





  07:14 07:50 07:50 


 


RBC   3.92 L   (4.30-5.90)  m/uL


 


Hgb   11.5 L   (13.0-17.5)  gm/dL


 


Hct   35.9 L   (39.0-53.0)  %


 


Carbon Dioxide    33 H  (22-30)  mmol/L


 


Glucose    199 H  (74-99)  mg/dL


 


POC Glucose (mg/dL)  224 H    (75-99)  mg/dL


 


Alkaline Phosphatase    265 H  ()  U/L


 


Albumin    3.1 L  (3.5-5.0)  g/dL














Assessment and Plan


Plan: 





This is a 73-year-old  male who came in the hospital with 

abdominal pain and treated for acute pancreatitis.  Patient developed a fever 

of 101.4 on March 14 possibly related to atelectasis.  Patient does not have a 

leukocytosis.  Blood cultures are showing no growth as well as the urine 

culture.  C. difficile toxin has been negative.  Continue supportive care.  

Further recommendations as patient progresses.





The above dictated assessment and findings were discussed with Dr. Padilla.  The 

impression and plan of care have been directed as dictated.  Loretta Carr nurse 

practitioner acting as scribe for Dr. Padilla.

## 2018-03-15 NOTE — P.CON
Consult Note





- .


Consult date: 03/15/18


Assessment/Plan:: 





This is a 73-year-old -American male who presented to [facility] emergency center on March 3 by ambulance complaining of abdominal pain 

and chest pain.  Abdominal pain was in the mid abdomen.  He did not have any 

nausea vomiting or diarrhea, no fever or chills.  At that time he underwent a 

CAT scan of the abdomen and pelvis without contrast that showed a distal 

appendix dilated to 9 mm containing an 8 mm appendicolith surrounded by small 

amount of stranding.  Chest x-ray showed prominent pulmonary vasculature likely 

due to underinflation rather than pulmonary edema.  Gallbladder ultrasound 

showed limitation due to bowel gas.  Right upper quadrant ultrasound appears 

without acute changes.  He had a repeat CAT scan of the abdomen and pelvis 

without contrast on March 6 that revealed similar findings.  Correlate to 

exclude appendicitis.  Abdominal x-rays on March 8 revealed left lower lobe 

pneumonia, left pleural effusion not excluded, cardiomegaly, unremarkable 

abdomen.  Most recent chest x-ray on March 14 revealed bilateral infiltrate and 

pleural effusion correlate for heart failure.  During this hospitalization, 

patient has been treated for acute pancreatitis and seen by GI.  Patient was 

unable to undergo MRCP due to pacemaker in place.  He did have improvement of 

his amylase and lipase.  Patient was treated for acute on chronic diastolic 

heart failure and followed by Dr. Reyes and has been on IV Lasix currently on 

oral.  Patient also has history of diabetes with hemoglobin A1c of 10.6 which 

is being managed by medicine.  Patient came back positive for hepatitis C 

antibody on the patient denies any known history of this in the past or been 

told during this admission that he had hepatitis C positive testing.  Patient 

was progressing and plan for discharge to [facility] for rehab but he developed a 

fever 101.4 on March 14.  His white count has been normal through his stay.  

Lactic acid was 1 liver function tests are improved as well as amylase and 

lipase.  Cetaphil toxin was negative.  Patient did have a Castro catheter and 

last week which he pulled out himself and it was reinserted this was removed by 

nursing on March 12 patient did have a bladder scan and did not show any 

significant results.  He had a blood culture obtained on admission that is 

showing no growth at 144 hours and urine cultures no growth after 18 hours.  A 

repeat blood culture was obtained on 3/14 and is showing no growth at 24 hours.

  Patient is a very poor historian.  He denies having any fever or chills or 

rigors.  He does not recall having a fever.  He denies any abdominal pain 

nausea or vomiting.  He does complain of shortness of breath which he states he 

has always.  He also states he has a cough is nonproductive.  He denies any 

problem urinating no pain or burning when he goes.  He states he had a bowel 

movement half hour before he was seen by me.  He denies any skin problems, 

rashes or wounds.  During evaluation, patient had one episode of right lower 

quadrant pain that lasted for approximately 2 seconds and was sharp in nature.  

Please see the consult note is dictated by nurse practitioner Mrs. Loretta Carr.


73-year-old male who's had a somewhat protracted hospital stay admitted with 

pancreatitis was having some improvement and then developed a fever.  With this 

the infectious diseases consultation was requested.  As noted the patient is a 

difficult historian in that he does not like to be bothered.  However on the 

exam is completely benign without evidence of any abdominal tenderness.  It is 

soft nontender no rigidity.  It is likely the patient's fevers and the basis of 

atelectasis in a sinus parameter has been requested.  Hopefully patient will 

cooperate with this.  Having him sit upright especially when eating meals is 

helpful.  The case discussed with surgery.  Improved likely will be discharged 

home tomorrow.  Follow in the outpatient setting if he has any acute changes 

with his appendix with an have an emergent appendectomy unlikely would be a 

candidate for elective procedure given his multiple underlying medical issues.  

No need for antibiotic therapy at this time.  His bit of volume overload is 

being managed with some increased diuresis will be further improved and ready 

for discharge to home soon.  I agree with evaluation, assessment and plan as 

dictated by nurse practitioner Mrs. Loretta Carr.

## 2018-03-15 NOTE — P.PN
Subjective


Progress Note Date: 03/15/18


Principal diagnosis: 





Abdominal pain.





Patient is a poor historian, he told me that he has some abdominal pain today 

but earlier in the day he told the surgeon that he was not having any pain.  He 

continued to have intermittent documented fevers.





Objective





- Vital Signs


Vital signs: 


 Vital Signs











Temp  99.3 F   03/15/18 07:00


 


Pulse  63   03/15/18 07:00


 


Resp  18   03/15/18 07:00


 


BP  176/77   03/15/18 07:00


 


Pulse Ox  92 L  03/15/18 07:51








 Intake & Output











 03/14/18 03/15/18 03/15/18





 18:59 06:59 18:59


 


Intake Total 1140 700 


 


Output Total 1800 475 200


 


Balance -660 225 -200


 


Weight  113 kg 


 


Intake:   


 


  Oral 1140 700 


 


Output:   


 


  Urine 1800 475 200


 


Other:   


 


  Voiding Method Urinal  


 


  # Voids 3 3 


 


  # Bowel Movements 2 0 














- Exam





Constitutional: Vital signs showing that his blood pressure is under better 

control and improving , patient is conversant, not in any acute distress, 

oxygen via nasal canula 


Lungs: decrease breath sounds bilaterally, with inspiratory rales at lung 

bases. 


Cardiovascular: Regular rate and rhythm, no murmurs, no gallops, no rubs  


Gastrointestinal: No tenderness palpation of the abdomen,  soft, bowel sounds 

are positive 


Extremities: No digital cyanosis   peripheral pulses palpable and equal over 

bilateral radial arteries and dorsalis pedis artery,  no calf muscle tenderness

, capillary refill is immediate over bilateral big toes


Psych: Alert, oriented to place, person, appropriate affect





- Labs


CBC & Chem 7: 


 03/15/18 07:50





 03/15/18 07:50


Labs: 


 Abnormal Lab Results - Last 24 Hours (Table)











  03/14/18 03/14/18 03/15/18 Range/Units





  16:52 21:49 07:14 


 


RBC     (4.30-5.90)  m/uL


 


Hgb     (13.0-17.5)  gm/dL


 


Hct     (39.0-53.0)  %


 


Carbon Dioxide     (22-30)  mmol/L


 


Glucose     (74-99)  mg/dL


 


POC Glucose (mg/dL)  372 H  342 H  224 H  (75-99)  mg/dL


 


Alkaline Phosphatase     ()  U/L


 


Albumin     (3.5-5.0)  g/dL














  03/15/18 03/15/18 03/15/18 Range/Units





  07:50 07:50 12:16 


 


RBC  3.92 L    (4.30-5.90)  m/uL


 


Hgb  11.5 L    (13.0-17.5)  gm/dL


 


Hct  35.9 L    (39.0-53.0)  %


 


Carbon Dioxide   33 H   (22-30)  mmol/L


 


Glucose   199 H   (74-99)  mg/dL


 


POC Glucose (mg/dL)    256 H  (75-99)  mg/dL


 


Alkaline Phosphatase   265 H   ()  U/L


 


Albumin   3.1 L   (3.5-5.0)  g/dL








 Microbiology - Last 24 Hours (Table)











 03/14/18 09:05 Blood Culture - Preliminary





 Blood    No Growth after 24 hours














Assessment and Plan


Plan: 


# Acute pancreatitis/Hyperbilirubinemia


Resolved, patient could not have MRCP due to pacemaker in place.





# Diabetes mellitus with peripheral neuropathy


Blood sugars slightly uncontrolled patient has history of noncompliance with 

his diabetic diet 


Counseled to strictly follow diabetic diet


Long and short acting insulin on board





# Fever of unknown origin


Discussed with general surgeon today, we'll need to consult ID for possible WBC 

scan 


No leukocytosis, no obvious source of infection





# Accelerated hypertension with pulmonary edema


Continue coreg 25 mg, hydralazin to 100 mg TID,  imdur to 60 mg daily,  

losartan and spironolactone





# Debility and deconditioning 


plans for placement at rehab 





# Suspected sleep apnea


Recommended that the patient get evaluated for sleep apnea upon discharge with 

sleep study 





# Pulmonary edema due to acute diastolic CHF exacerbation (LVEF of 20% s/p ICD 

however, , 2D echocardiogram 3/9/2018 showed LVEF of 55-60%  )/ Acute on 

chronic hypoxic respiratory failure 


Continues to require supplemental oxygen 


Currently on Lasix, we'll continue


Bipap PRN at night  





# Acute kidney injury ,  non-oliguric, resolved


Metabolic acidosis secondary to acute renal failure, resolved


 


# Gross hematuria secondary to traumatic removal of the Castro catheter by the 

patient, resolved


Resolved 


Urology input noted


Hemoglobin stable

## 2018-03-15 NOTE — P.PN
Subjective


Progress Note Date: 03/15/18





73-year-old male seen and examined at the bedside current temp this morning 

99.3.  At midnight 100.1.  The white count this morning is 6.8.  Patient is 

adamant that he has not experiencing any abdominal pain there is no nausea no 

vomiting.  States no difficulty in urinating and no frequent stooling with 

palpitation to the abdominal wall no facial grimacing no guarding patient 

denies pain with palpitations when questioning





Objective





- Vital Signs


Vital signs: 


 Vital Signs











Temp  99.3 F   03/15/18 07:00


 


Pulse  63   03/15/18 07:00


 


Resp  18   03/15/18 07:00


 


BP  176/77   03/15/18 07:00


 


Pulse Ox  92 L  03/15/18 07:51








 Intake & Output











 03/14/18 03/15/18 03/15/18





 18:59 06:59 18:59


 


Intake Total 1140 700 


 


Output Total 1800 475 


 


Balance -660 225 


 


Weight  113 kg 


 


Intake:   


 


  Oral 1140 700 


 


Output:   


 


  Urine 1800 475 


 


Other:   


 


  Voiding Method Urinal  


 


  # Voids 3 3 


 


  # Bowel Movements 2 0 














- Exam





Physical exam:


73-year-old male resting in bed cooperative oriented to self and place is 

adamant that he is not experiencing any abdominal pain becomes easily agitated 

when trying to asked patient about any discomfort


Lungs adequate air movement bilaterally on room air no cough noted no shortness 

of breath with conversation


Heart S1-S2 audible and regular


Abdomen firm no facial grimacing with palpitation to the abdominal wall no 

guarding denies any tenderness bowel tones active no nausea no stool reportedly 

tolerating a diet continues to be denying any abdominal pain stop asking me why 

I'm having abdominal pain when I am not having any pain


Extremity no edema noted tenderness to the bilateral lower extreme





- Labs


CBC & Chem 7: 


 03/15/18 07:50





 03/15/18 07:50


Labs: 


 Abnormal Lab Results - Last 24 Hours (Table)











  03/14/18 03/14/18 03/14/18 Range/Units





  09:05 09:05 12:04 


 


RBC  3.96 L    (4.30-5.90)  m/uL


 


Hgb  11.6 L    (13.0-17.5)  gm/dL


 


Hct  37.4 L    (39.0-53.0)  %


 


Carbon Dioxide   31 H   (22-30)  mmol/L


 


Glucose   249 H   (74-99)  mg/dL


 


POC Glucose (mg/dL)    420 H  (75-99)  mg/dL


 


Total Bilirubin   1.5 H   (0.2-1.3)  mg/dL


 


Alkaline Phosphatase   286 H   ()  U/L


 


Albumin   3.3 L   (3.5-5.0)  g/dL














  03/14/18 03/14/18 03/15/18 Range/Units





  16:52 21:49 07:14 


 


RBC     (4.30-5.90)  m/uL


 


Hgb     (13.0-17.5)  gm/dL


 


Hct     (39.0-53.0)  %


 


Carbon Dioxide     (22-30)  mmol/L


 


Glucose     (74-99)  mg/dL


 


POC Glucose (mg/dL)  372 H  342 H  224 H  (75-99)  mg/dL


 


Total Bilirubin     (0.2-1.3)  mg/dL


 


Alkaline Phosphatase     ()  U/L


 


Albumin     (3.5-5.0)  g/dL














  03/15/18 03/15/18 Range/Units





  07:50 07:50 


 


RBC  3.92 L   (4.30-5.90)  m/uL


 


Hgb  11.5 L   (13.0-17.5)  gm/dL


 


Hct  35.9 L   (39.0-53.0)  %


 


Carbon Dioxide   33 H  (22-30)  mmol/L


 


Glucose   199 H  (74-99)  mg/dL


 


POC Glucose (mg/dL)    (75-99)  mg/dL


 


Total Bilirubin    (0.2-1.3)  mg/dL


 


Alkaline Phosphatase   265 H  ()  U/L


 


Albumin   3.1 L  (3.5-5.0)  g/dL














Assessment and Plan


Assessment: 





Impression


Present on admission abdominal pain CAT scan abdomen pelvis reported 

appendicolith possible acute early appendicitis not ruled out no fever no white 

count


Hyperkalemia


Obesity BMI 36


Type 2 diabetes with episodes of hyperglycemia


Prior history of alcohol intake


Depressive disorder nonspecified


Present on admission elevated lipase and amylase consistent with acute 

pancreatitis


Acute kidney injury


Hyperkalemia


CAT scan abdomen and pelvis incidental finding appendicoth with no evidence of 

an acute surgical abdomen no fever no white count


Acute exacerbation of systolic heart failure EF 20% status post ICD likely due 

to fluid overload


Gross hematuria likely related to self removal of his indwelling Castro catheter 

with the balloon inflated


Echocardiogram moderate concentric left ventricular hypertrophy with an EF 

between 55 and 60%


Ultrasound of the abdomen and gallbladder no mention of gallstones





Plan


Will not repeat his CAT scan of the abdomen pelvis at this time


Would recommend infectious disease consult Dr. Padilla  to workup etiology of 

fever


No evidence of an acute surgical abdomen at this time


Patient is felt to be at a moderate to high risk for any surgical intervention 

given patient's comorbidities given patient's systolic heart failure 

exacerbation with worsening of the renal status with fluid overload


Repeat labs in the morning


IV fluid for hydration


Will follow with you


DVT and GI prophylaxis


GI service indicates clinically ERCP is not indicated at this time total 

bilirubin improving


Defer to the attending to address medical issues











Progress note dictated for 





The above impression and plan of care have been discussed and directed by 

signing physician. Kajal Burks nurse practitioner acting as scribe for signing 

physician.

## 2018-03-16 VITALS — RESPIRATION RATE: 18 BRPM

## 2018-03-16 VITALS — SYSTOLIC BLOOD PRESSURE: 139 MMHG | DIASTOLIC BLOOD PRESSURE: 76 MMHG

## 2018-03-16 VITALS — TEMPERATURE: 98.4 F | HEART RATE: 75 BPM

## 2018-03-16 LAB
ALBUMIN SERPL-MCNC: 3.1 G/DL (ref 3.5–5)
ALP SERPL-CCNC: 268 U/L (ref 38–126)
ALT SERPL-CCNC: 24 U/L (ref 21–72)
AMYLASE SERPL-CCNC: 147 U/L (ref 30–110)
ANION GAP SERPL CALC-SCNC: 7 MMOL/L
AST SERPL-CCNC: 33 U/L (ref 17–59)
BASOPHILS # BLD AUTO: 0 K/UL (ref 0–0.2)
BASOPHILS NFR BLD AUTO: 0 %
BUN SERPL-SCNC: 19 MG/DL (ref 9–20)
CALCIUM SPEC-MCNC: 8.7 MG/DL (ref 8.4–10.2)
CHLORIDE SERPL-SCNC: 97 MMOL/L (ref 98–107)
CO2 SERPL-SCNC: 30 MMOL/L (ref 22–30)
EOSINOPHIL # BLD AUTO: 0.2 K/UL (ref 0–0.7)
EOSINOPHIL NFR BLD AUTO: 3 %
ERYTHROCYTE [DISTWIDTH] IN BLOOD BY AUTOMATED COUNT: 3.9 M/UL (ref 4.3–5.9)
ERYTHROCYTE [DISTWIDTH] IN BLOOD: 13 % (ref 11.5–15.5)
GLUCOSE BLD-MCNC: 237 MG/DL (ref 75–99)
GLUCOSE SERPL-MCNC: 264 MG/DL (ref 74–99)
HCT VFR BLD AUTO: 36.2 % (ref 39–53)
HGB BLD-MCNC: 11.5 GM/DL (ref 13–17.5)
LIPASE SERPL-CCNC: 1001 U/L (ref 23–300)
LYMPHOCYTES # SPEC AUTO: 1.5 K/UL (ref 1–4.8)
LYMPHOCYTES NFR SPEC AUTO: 23 %
MAGNESIUM SPEC-SCNC: 1.5 MG/DL (ref 1.6–2.3)
MCH RBC QN AUTO: 29.5 PG (ref 25–35)
MCHC RBC AUTO-ENTMCNC: 31.8 G/DL (ref 31–37)
MCV RBC AUTO: 92.8 FL (ref 80–100)
MONOCYTES # BLD AUTO: 0.5 K/UL (ref 0–1)
MONOCYTES NFR BLD AUTO: 7 %
NEUTROPHILS # BLD AUTO: 4.1 K/UL (ref 1.3–7.7)
NEUTROPHILS NFR BLD AUTO: 64 %
PLATELET # BLD AUTO: 169 K/UL (ref 150–450)
POTASSIUM SERPL-SCNC: 4.5 MMOL/L (ref 3.5–5.1)
PROT SERPL-MCNC: 6.9 G/DL (ref 6.3–8.2)
SODIUM SERPL-SCNC: 134 MMOL/L (ref 137–145)
WBC # BLD AUTO: 6.5 K/UL (ref 3.8–10.6)

## 2018-03-16 RX ADMIN — INSULIN ASPART SCH UNIT: 100 INJECTION, SOLUTION INTRAVENOUS; SUBCUTANEOUS at 08:03

## 2018-03-16 RX ADMIN — SPIRONOLACTONE SCH MG: 25 TABLET, FILM COATED ORAL at 08:03

## 2018-03-16 RX ADMIN — ISOSORBIDE MONONITRATE SCH MG: 60 TABLET, EXTENDED RELEASE ORAL at 08:02

## 2018-03-16 RX ADMIN — MAGNESIUM SULFATE IN DEXTROSE SCH MLS/HR: 10 INJECTION, SOLUTION INTRAVENOUS at 10:05

## 2018-03-16 RX ADMIN — CARVEDILOL SCH MG: 12.5 TABLET, FILM COATED ORAL at 08:02

## 2018-03-16 RX ADMIN — HEPARIN SODIUM SCH UNIT: 5000 INJECTION, SOLUTION INTRAVENOUS; SUBCUTANEOUS at 08:03

## 2018-03-16 RX ADMIN — LOSARTAN POTASSIUM SCH MG: 25 TABLET, FILM COATED ORAL at 08:03

## 2018-03-16 RX ADMIN — MAGNESIUM SULFATE IN DEXTROSE SCH MLS/HR: 10 INJECTION, SOLUTION INTRAVENOUS at 11:09

## 2018-03-16 RX ADMIN — INSULIN ASPART SCH UNIT: 100 INJECTION, SOLUTION INTRAVENOUS; SUBCUTANEOUS at 08:04

## 2018-03-16 RX ADMIN — INSULIN DETEMIR SCH UNIT: 100 INJECTION, SOLUTION SUBCUTANEOUS at 08:04

## 2018-03-16 RX ADMIN — PANTOPRAZOLE SODIUM SCH MG: 40 TABLET, DELAYED RELEASE ORAL at 08:03

## 2018-03-16 RX ADMIN — FUROSEMIDE SCH MG: 40 TABLET ORAL at 08:03

## 2018-03-16 NOTE — P.PN
Subjective


Progress Note Date: 03/16/18


Principal diagnosis: 


Acute pancreatitis





73-year-old male limited with acute pancreatitis with radiographic imaging 

reported appendicolith.  Resting comfortably.  No complaints.  Denies abdominal 

pain.     LFTs within normal limits except alkaline phosphatase 268.  Lipase 

1001.  T-max 99.3.  Low-grade fevers 2 days ago 100.1-101.4; infectious disease 

general surgery following.





Objective





- Vital Signs


Vital signs: 


 Vital Signs











Temp  98.4 F   03/16/18 07:00


 


Pulse  75   03/16/18 07:00


 


Resp  20   03/16/18 07:00


 


BP  139/76   03/16/18 07:00


 


Pulse Ox  92 L  03/16/18 07:00








 Intake & Output











 03/15/18 03/16/18 03/16/18





 18:59 06:59 18:59


 


Intake Total  680 


 


Output Total 600  


 


Balance -600 680 


 


Weight  113.5 kg 


 


Intake:   


 


  Oral  680 


 


Output:   


 


  Urine 600  


 


Other:   


 


  # Voids 1 3 


 


  # Bowel Movements 1 1 














- Exam


General appearance: The patient is alert, in no acute distress. 


HET: Head is normocephalic and atraumatic.  Pupils are equal and reactive.  

Sclerae anicteric.  Oropharynx is clear without lesions.


Neck: Supple without lymphadenopathy.  Trachea midline.


Heart: S1 S2.  Regular rate and rhythm.


Lungs:  Slight diminishment in bases bilaterally.  No crackles or wheezes are 

heard.


Abdomen: Soft, nontender, with  bloatedness distention hypoactive bowel sounds.

  No peritoneal signs.  No palpable organomegaly or masses.


Extremities: Normal skin color and turgor.  No cyanosis, rash, ulceration, 

clubbing, or edema.  Radial and pedal pulses are 2/4 bilaterally.  


Neurological: No focal deficits.  Strength and sensation are grossly intact.








- Labs


CBC & Chem 7: 


 03/16/18 07:54





 03/16/18 07:54


Labs: 


 Abnormal Lab Results - Last 24 Hours (Table)











  03/15/18 03/15/18 03/15/18 Range/Units





  07:50 12:16 16:50 


 


RBC  3.92 L    (4.30-5.90)  m/uL


 


Hgb  11.5 L    (13.0-17.5)  gm/dL


 


Hct  35.9 L    (39.0-53.0)  %


 


Sodium     (137-145)  mmol/L


 


Chloride     ()  mmol/L


 


Glucose     (74-99)  mg/dL


 


POC Glucose (mg/dL)   256 H  323 H  (75-99)  mg/dL


 


Magnesium     (1.6-2.3)  mg/dL


 


Alkaline Phosphatase     ()  U/L


 


Albumin     (3.5-5.0)  g/dL


 


Amylase     ()  U/L


 


Lipase     ()  U/L














  03/15/18 03/16/18 03/16/18 Range/Units





  20:54 07:11 07:54 


 


RBC    3.90 L  (4.30-5.90)  m/uL


 


Hgb    11.5 L  (13.0-17.5)  gm/dL


 


Hct    36.2 L  (39.0-53.0)  %


 


Sodium     (137-145)  mmol/L


 


Chloride     ()  mmol/L


 


Glucose     (74-99)  mg/dL


 


POC Glucose (mg/dL)  267 H  237 H   (75-99)  mg/dL


 


Magnesium     (1.6-2.3)  mg/dL


 


Alkaline Phosphatase     ()  U/L


 


Albumin     (3.5-5.0)  g/dL


 


Amylase     ()  U/L


 


Lipase     ()  U/L














  03/16/18 Range/Units





  07:54 


 


RBC   (4.30-5.90)  m/uL


 


Hgb   (13.0-17.5)  gm/dL


 


Hct   (39.0-53.0)  %


 


Sodium  134 L  (137-145)  mmol/L


 


Chloride  97 L  ()  mmol/L


 


Glucose  264 H  (74-99)  mg/dL


 


POC Glucose (mg/dL)   (75-99)  mg/dL


 


Magnesium  1.5 L  (1.6-2.3)  mg/dL


 


Alkaline Phosphatase  268 H  ()  U/L


 


Albumin  3.1 L  (3.5-5.0)  g/dL


 


Amylase  147 H  ()  U/L


 


Lipase  1001 H  ()  U/L








 Microbiology - Last 24 Hours (Table)











 03/14/18 09:05 Blood Culture - Preliminary





 Blood    No Growth after 24 hours














Assessment and Plan


(1) Acute pancreatitis


Narrative/Plan: 


73-year-old male admitted with acute abdominal pain elevated pancreatic and 

liver enzymes consistent with acute moderate to severe pancreatitis with 

biochemical improvement of liver function tests; persistent elevation of lipase 

asymptomatic.  No mentioning of gallstones on ultrasound or CAT scan.


Current Visit: Yes   Status: Acute   Code(s): K85.90 - ACUTE PANCREATITIS 

WITHOUT NECROSIS OR INFECTION, UNSP   SNOMED Code(s): 290714514


   





(2) Elevated serum creatinine


Narrative/Plan: 


Acute kidney injury most likely acute tubular necrosis nonoliguric


Current Visit: Yes   Status: Acute   Code(s): R79.89 - OTHER SPECIFIED ABNORMAL 

FINDINGS OF BLOOD CHEMISTRY   SNOMED Code(s): 055655080


   





(3) Elevated liver enzymes


Current Visit: Yes   Status: Acute   Code(s): R74.8 - ABNORMAL LEVELS OF OTHER 

SERUM ENZYMES   SNOMED Code(s): 214501896


   





(4) Jaundice


Narrative/Plan: 


Improved bilirubin suspect from peripancreatic edema improving


Current Visit: Yes   Status: Acute   Code(s): R17 - UNSPECIFIED JAUNDICE   

SNOMED Code(s): 08359822


   





(5) Appendicolith


Narrative/Plan: 


Possible early acute appendicitis being monitored closely by general surgery 

presently without fever or abdominal pain.


Current Visit: Yes   Status: Acute   Code(s): K38.9 - DISEASE OF APPENDIX, 

UNSPECIFIED   SNOMED Code(s): 03830831


   





(6) Obesity (BMI 30-39.9)


Current Visit: Yes   Status: Chronic   Code(s): E66.9 - OBESITY, UNSPECIFIED   

SNOMED Code(s): 873288602


   





(7) Hyperkalemia


Current Visit: Yes   Status: Acute   Code(s): E87.5 - HYPERKALEMIA   SNOMED Code

(s): 67641872


   





(8) Diabetes mellitus


Current Visit: Yes   Status: Chronic   Code(s): E11.9 - TYPE 2 DIABETES 

MELLITUS WITHOUT COMPLICATIONS   SNOMED Code(s): 05912170


   





(9) Hepatitis C antibody test positive


Narrative/Plan: 


Suspect chronic HCV with positive quantitative measurement 415,721.  Genotype 

1A.


Current Visit: Yes   Status: Acute   Code(s): R76.8 - OTHER SPECIFIED ABNORMAL 

IMMUNOLOGICAL FINDINGS IN SERUM   SNOMED Code(s): 546362081


   





(10) Hyperammonemia


Narrative/Plan: 


Possible underlying liver disease


Current Visit: Yes   Status: Acute   Code(s): E72.20 - DISORDER OF UREA CYCLE 

METABOLISM, UNSPECIFIED   SNOMED Code(s): 2203505


   


Plan: 


1.  Patient is asymptomatic despite persistent elevation of lipase.  Patient is 

not a candidate for inpatient MRCP secondary to pacemaker insertion.  

Consideration for outpatient MRCP after discharge with follow-up in office 

discussion of HCV treatment.  Multiple consultants following including 

infectious disease general surgery.  





Assessment and plan of care discussed with Dr. Murcia.

## 2018-03-16 NOTE — P.DS
Providers


Date of admission: 


03/03/18 09:33





Expected date of discharge: 03/16/18


Attending physician: 


Titi Goldsmith MD





Consults: 





 





03/03/18 10:31


Consult Physician Urgent 


   Consulting Provider: Malaika East


   Consult Reason/Comments: appy,pancreatitis


   Do you want consulting provider notified?: Yes





03/05/18 12:37


Consult Physician Routine 


   Consulting Provider: Gordon Cortez


   Consult Reason/Comments: ESTEVAN / metabolic acidosis


   Do you want consulting provider notified?: Yes





03/09/18 09:04


Consult Physician Routine 


   Consulting Provider: Jared Bhandari


   Consult Reason/Comments: hematuria , peters pulled out with inflated balloon


   Do you want consulting provider notified?: Yes





03/09/18 09:20


Consult Physician Routine 


   Consulting Provider: Zachariah Parker


   Consult Reason/Comments: SOB at rest, systolic CHF with LVEF 20%


   Do you want consulting provider notified?: Yes





03/14/18 10:23


Consult Physician Routine 


   Consulting Provider: Malaika East


   Consult Reason/Comments: appendix fecalith


   Do you want consulting provider notified?: Already Contacted





03/15/18 09:09


Consult Physician Urgent 


   Consulting Provider: Edward Padilla


   Consult Reason/Comments: Persistent febrile unclear source


   Do you want consulting provider notified?: Yes











Primary care physician: 


[facility] Course: 





73-year-old male with past medical history of congestive heart failure with 

left ventricular ejection fraction of 20% presented to the hospital with 

abdominal pain, was found to have acute pancreatitis, he was also suspected to 

have appendicitis based on CT abd view of an appendicolith however that was 

ruled out by general surgery team, and surgery team thought it was an 

incidental finding. Patient received short course of Zosyn during this hospital 

stay. Patient has an appointment to follow up with general surgery after 

discharge.





Regarding the acute pancreatitis, no obvious cause was found, GI did not 

recommend ERCP because his lipase and bilirubin were all improving with 

conservative management. MRCP could not be done due to having a pacemaker.  

Symptoms of abdominal pain, nausea and vomiting have resolved.  





Patient also developed acute kidney injury and oliguria, which resulted in 

fluid overload and acute CHF exacerbation, ESTEVAN was thought to be due to 3rd 

spacing/CHF diastolic type. Petres catheter was inserted, which he self removed 

with balloon inflated, resulting in gross hematuria later.  However, nephrology 

suggested aggressive diuresis with lasix IV, and patient started making some 

urine. Then his breathing started to improve, he did briefly require Bipap. 

Cardiology input was requested regarding his SOB at rest due to fluid overload 

and poor cardiac function, cardiology agreed with management.





He was going to be discharged on 3/13 but he spiked a fever so the discharge 

was held and he was monitored. He continues to deny any more diarrhea , denies 

abd pain, nausea or vomiting, denies any chest pain, denies trouble breathing 

at rest. Denies urinary retention. ID was consulted, they thought process was 

that the fever was coming from some lung atelectasis, ID did not recommend 

treating him with any antibiotics and did not recommend any further workup.  I 

repeated his lipase this morning and it was still elevated at 1000 but this 

values lower than previous values.  I discussed that with Dr. Joseph from GI who 

did not recommend any further workup or management. Patient stable for discharge

, he will be transferred to rehab.  Case was discussed with care management, 

social work as well as nursing.





Due to the prolonged hospitalization patient was weak, he was evaluated by 

physical and occupational therapy who advised rehab, referrals were made to 

rehab and patient was accepted for transfer.





Time for discharge 35 minutes.





Patient Condition at Discharge: Stable





Plan - Discharge Summary


New Discharge Prescriptions: 


New


   Furosemide [Lasix] 40 mg PO BID@0900,1600  tab


   hydrALAZINE HCL [Apresoline] 100 mg PO TID  tab


   Isosorbide Mononitrate ER [Imdur] 60 mg PO DAILY  tab.er.24h


   Pantoprazole [Protonix] 40 mg PO AC-BRKFST  tablet.


   Insulin Detemir [Levemir] 15 unit SQ HS #10 syr


   Insulin Detemir [Levemir] 10 unit SQ DAILY #10 syr





Continue


   Nitroglycerin Sl Tabs [Nitrostat] 0.4 mg SUBLINGUAL Q5M PRN


     PRN Reason: Angina


   Carvedilol 25 mg PO AC-BID


   Docusate [Colace] 100 - 200 mg PO DAILY


   Spironolactone [Aldactone] 25 mg PO DAILY


   Losartan [Cozaar] 12.5 mg PO DAILY


   INSULIN LISPRO (humaLOG) [humaLOG] 8 - 21 units SQ AC-TID





Discontinued


   HYDROcodone/APAP 5-325MG [Norco 5-325] 1 tab PO Q6H PRN


     PRN Reason: Pain


   Isosorbide Mononitrate ER [Imdur] 30 mg PO DAILY #30 tab.er.24h


   Insulin Glargine [Lantus] 35 unit SQ QAM


   Ibuprofen [Motrin] 400 mg PO Q6HR PRN


     PRN Reason: Pain


Discharge Medication List





Nitroglycerin Sl Tabs [Nitrostat] 0.4 mg SUBLINGUAL Q5M PRN 05/18/14 [History]


Carvedilol 25 mg PO AC-BID 07/14/15 [History]


Docusate [Colace] 100 - 200 mg PO DAILY 11/25/15 [History]


INSULIN LISPRO (humaLOG) [humaLOG] 8 - 21 units SQ AC-TID 03/03/18 [History]


Losartan [Cozaar] 12.5 mg PO DAILY 03/03/18 [History]


Spironolactone [Aldactone] 25 mg PO DAILY 03/03/18 [History]


Furosemide [Lasix] 40 mg PO BID@0900,1600  tab 03/13/18 [Rx]


Isosorbide Mononitrate ER [Imdur] 60 mg PO DAILY  tab.er.24h 03/13/18 [Rx]


Pantoprazole [Protonix] 40 mg PO AC-BRKFST  tablet. 03/13/18 [Rx]


hydrALAZINE HCL [Apresoline] 100 mg PO TID  tab 03/13/18 [Rx]


Insulin Detemir [Levemir] 10 unit SQ DAILY #10 syr 03/16/18 [Rx]


Insulin Detemir [Levemir] 15 unit SQ HS #10 syr 03/16/18 [Rx]








Follow up Appointment(s)/Referral(s): 


Eric Murcia MD [STAFF PHYSICIAN] - 03/26/18 4:00 pm


Malaika East MD [STAFF PHYSICIAN] - 2 Weeks


Matti Adams DO [Primary Care Provider] - 1-2 days


[facility], [NON-STAFF] - 1 Week


Gordon Cortez DO [STAFF PHYSICIAN] - 1 Week


Patient Instructions/Handouts:  Pancreatitis (DC)


Activity/Diet/Wound Care/Special Instructions: 


Diabetic, cardiac diet with fluid restriction to 2 Liters daily





Heart failure booklet and diabetic education booklet given and reviewed. 

Refused [employer].





Activity as tolerated, up with assist.








Discharge Disposition: TRANSFER TO SNF/ECF

## 2018-06-26 ENCOUNTER — HOSPITAL ENCOUNTER (INPATIENT)
Dept: HOSPITAL 47 - EC | Age: 73
LOS: 20 days | Discharge: NURSING, REHAB OR LONG-TERM CARE | DRG: 438 | End: 2018-07-16
Attending: INTERNAL MEDICINE | Admitting: INTERNAL MEDICINE
Payer: MEDICARE

## 2018-06-26 VITALS — BODY MASS INDEX: 37 KG/M2

## 2018-06-26 DIAGNOSIS — Z98.42: ICD-10-CM

## 2018-06-26 DIAGNOSIS — N18.6: ICD-10-CM

## 2018-06-26 DIAGNOSIS — I16.1: ICD-10-CM

## 2018-06-26 DIAGNOSIS — D63.8: ICD-10-CM

## 2018-06-26 DIAGNOSIS — R01.1: ICD-10-CM

## 2018-06-26 DIAGNOSIS — E78.5: ICD-10-CM

## 2018-06-26 DIAGNOSIS — I25.2: ICD-10-CM

## 2018-06-26 DIAGNOSIS — Z82.49: ICD-10-CM

## 2018-06-26 DIAGNOSIS — I50.33: ICD-10-CM

## 2018-06-26 DIAGNOSIS — Z98.41: ICD-10-CM

## 2018-06-26 DIAGNOSIS — E11.22: ICD-10-CM

## 2018-06-26 DIAGNOSIS — E11.42: ICD-10-CM

## 2018-06-26 DIAGNOSIS — Z96.1: ICD-10-CM

## 2018-06-26 DIAGNOSIS — E66.9: ICD-10-CM

## 2018-06-26 DIAGNOSIS — K85.20: Primary | ICD-10-CM

## 2018-06-26 DIAGNOSIS — J18.9: ICD-10-CM

## 2018-06-26 DIAGNOSIS — E87.1: ICD-10-CM

## 2018-06-26 DIAGNOSIS — F32.9: ICD-10-CM

## 2018-06-26 DIAGNOSIS — F10.10: ICD-10-CM

## 2018-06-26 DIAGNOSIS — E83.42: ICD-10-CM

## 2018-06-26 DIAGNOSIS — I13.2: ICD-10-CM

## 2018-06-26 DIAGNOSIS — B18.2: ICD-10-CM

## 2018-06-26 DIAGNOSIS — K38.1: ICD-10-CM

## 2018-06-26 DIAGNOSIS — T50.2X5A: ICD-10-CM

## 2018-06-26 DIAGNOSIS — R07.89: ICD-10-CM

## 2018-06-26 DIAGNOSIS — M10.9: ICD-10-CM

## 2018-06-26 DIAGNOSIS — I27.20: ICD-10-CM

## 2018-06-26 DIAGNOSIS — Z95.0: ICD-10-CM

## 2018-06-26 DIAGNOSIS — E86.0: ICD-10-CM

## 2018-06-26 DIAGNOSIS — N39.0: ICD-10-CM

## 2018-06-26 DIAGNOSIS — Z79.4: ICD-10-CM

## 2018-06-26 DIAGNOSIS — E83.39: ICD-10-CM

## 2018-06-26 DIAGNOSIS — K70.10: ICD-10-CM

## 2018-06-26 DIAGNOSIS — M19.90: ICD-10-CM

## 2018-06-26 DIAGNOSIS — E11.65: ICD-10-CM

## 2018-06-26 DIAGNOSIS — R77.9: ICD-10-CM

## 2018-06-26 DIAGNOSIS — E87.5: ICD-10-CM

## 2018-06-26 DIAGNOSIS — J98.11: ICD-10-CM

## 2018-06-26 DIAGNOSIS — D50.9: ICD-10-CM

## 2018-06-26 DIAGNOSIS — J44.9: ICD-10-CM

## 2018-06-26 DIAGNOSIS — Z80.9: ICD-10-CM

## 2018-06-26 DIAGNOSIS — J96.11: ICD-10-CM

## 2018-06-26 DIAGNOSIS — M51.36: ICD-10-CM

## 2018-06-26 DIAGNOSIS — I25.10: ICD-10-CM

## 2018-06-26 DIAGNOSIS — E87.2: ICD-10-CM

## 2018-06-26 DIAGNOSIS — N17.0: ICD-10-CM

## 2018-06-26 DIAGNOSIS — Z79.899: ICD-10-CM

## 2018-06-26 LAB
ALBUMIN SERPL-MCNC: 3.5 G/DL (ref 3.5–5)
ALBUMIN SERPL-MCNC: 4.2 G/DL (ref 3.5–5)
ALP SERPL-CCNC: 139 U/L (ref 38–126)
ALP SERPL-CCNC: 174 U/L (ref 38–126)
ALT SERPL-CCNC: 39 U/L (ref 21–72)
ALT SERPL-CCNC: 67 U/L (ref 21–72)
AMYLASE SERPL-CCNC: 316 U/L (ref 30–110)
ANION GAP SERPL CALC-SCNC: 11 MMOL/L
ANION GAP SERPL CALC-SCNC: 11 MMOL/L
ANION GAP SERPL CALC-SCNC: 13 MMOL/L
APTT BLD: 21.6 SEC (ref 22–30)
AST SERPL-CCNC: 114 U/L (ref 17–59)
AST SERPL-CCNC: 129 U/L (ref 17–59)
BASOPHILS # BLD AUTO: 0 K/UL (ref 0–0.2)
BASOPHILS NFR BLD AUTO: 0 %
BUN SERPL-SCNC: 38 MG/DL (ref 9–20)
BUN SERPL-SCNC: 49 MG/DL (ref 9–20)
BUN SERPL-SCNC: 52 MG/DL (ref 9–20)
CALCIUM SPEC-MCNC: 8.1 MG/DL (ref 8.4–10.2)
CALCIUM SPEC-MCNC: 8.4 MG/DL (ref 8.4–10.2)
CALCIUM SPEC-MCNC: 9.3 MG/DL (ref 8.4–10.2)
CHLORIDE SERPL-SCNC: 101 MMOL/L (ref 98–107)
CHLORIDE SERPL-SCNC: 101 MMOL/L (ref 98–107)
CHLORIDE SERPL-SCNC: 102 MMOL/L (ref 98–107)
CK SERPL-CCNC: 64 U/L (ref 55–170)
CO2 BLDA-SCNC: 22 MMOL/L (ref 19–24)
CO2 SERPL-SCNC: 21 MMOL/L (ref 22–30)
CO2 SERPL-SCNC: 21 MMOL/L (ref 22–30)
CO2 SERPL-SCNC: 27 MMOL/L (ref 22–30)
D DIMER PPP FEU-MCNC: 3.71 MG/L FEU (ref ?–0.6)
EOSINOPHIL # BLD AUTO: 0.1 K/UL (ref 0–0.7)
EOSINOPHIL NFR BLD AUTO: 1 %
ERYTHROCYTE [DISTWIDTH] IN BLOOD BY AUTOMATED COUNT: 4.89 M/UL (ref 4.3–5.9)
ERYTHROCYTE [DISTWIDTH] IN BLOOD: 13.6 % (ref 11.5–15.5)
GLUCOSE BLD-MCNC: 205 MG/DL (ref 75–99)
GLUCOSE BLD-MCNC: 271 MG/DL (ref 75–99)
GLUCOSE BLD-MCNC: 325 MG/DL (ref 75–99)
GLUCOSE BLD-MCNC: 406 MG/DL (ref 75–99)
GLUCOSE BLD-MCNC: 469 MG/DL (ref 75–99)
GLUCOSE SERPL-MCNC: 238 MG/DL (ref 74–99)
GLUCOSE SERPL-MCNC: 397 MG/DL (ref 74–99)
GLUCOSE SERPL-MCNC: 531 MG/DL (ref 74–99)
HCO3 BLDA-SCNC: 21 MMOL/L (ref 21–25)
HCT VFR BLD AUTO: 44.2 % (ref 39–53)
HGB BLD-MCNC: 14.1 GM/DL (ref 13–17.5)
INR PPP: 1.1 (ref ?–1.2)
LYMPHOCYTES # SPEC AUTO: 0.4 K/UL (ref 1–4.8)
LYMPHOCYTES NFR SPEC AUTO: 4 %
MAGNESIUM SPEC-SCNC: 1 MG/DL (ref 1.6–2.3)
MAGNESIUM SPEC-SCNC: 1.4 MG/DL (ref 1.6–2.3)
MCH RBC QN AUTO: 28.7 PG (ref 25–35)
MCHC RBC AUTO-ENTMCNC: 31.8 G/DL (ref 31–37)
MCV RBC AUTO: 90.2 FL (ref 80–100)
MONOCYTES # BLD AUTO: 0.2 K/UL (ref 0–1)
MONOCYTES NFR BLD AUTO: 2 %
NEUTROPHILS # BLD AUTO: 8 K/UL (ref 1.3–7.7)
NEUTROPHILS NFR BLD AUTO: 92 %
PCO2 BLDA: 45 MMHG (ref 35–45)
PH BLDA: 7.28 [PH] (ref 7.35–7.45)
PLATELET # BLD AUTO: 160 K/UL (ref 150–450)
PO2 BLDA: 84 MMHG (ref 83–108)
POTASSIUM SERPL-SCNC: 4.9 MMOL/L (ref 3.5–5.1)
POTASSIUM SERPL-SCNC: 5.9 MMOL/L (ref 3.5–5.1)
POTASSIUM SERPL-SCNC: 6.6 MMOL/L (ref 3.5–5.1)
PROT SERPL-MCNC: 6.6 G/DL (ref 6.3–8.2)
PROT SERPL-MCNC: 7.8 G/DL (ref 6.3–8.2)
PT BLD: 10.5 SEC (ref 9–12)
SODIUM SERPL-SCNC: 134 MMOL/L (ref 137–145)
SODIUM SERPL-SCNC: 135 MMOL/L (ref 137–145)
SODIUM SERPL-SCNC: 139 MMOL/L (ref 137–145)
TROPONIN I SERPL-MCNC: 0.04 NG/ML (ref 0–0.03)
WBC # BLD AUTO: 8.6 K/UL (ref 3.8–10.6)

## 2018-06-26 PROCEDURE — 87186 SC STD MICRODIL/AGAR DIL: CPT

## 2018-06-26 PROCEDURE — 80202 ASSAY OF VANCOMYCIN: CPT

## 2018-06-26 PROCEDURE — 71046 X-RAY EXAM CHEST 2 VIEWS: CPT

## 2018-06-26 PROCEDURE — 81001 URINALYSIS AUTO W/SCOPE: CPT

## 2018-06-26 PROCEDURE — 74176 CT ABD & PELVIS W/O CONTRAST: CPT

## 2018-06-26 PROCEDURE — 90935 HEMODIALYSIS ONE EVALUATION: CPT

## 2018-06-26 PROCEDURE — 36556 INSERT NON-TUNNEL CV CATH: CPT

## 2018-06-26 PROCEDURE — 93005 ELECTROCARDIOGRAM TRACING: CPT

## 2018-06-26 PROCEDURE — 86301 IMMUNOASSAY TUMOR CA 19-9: CPT

## 2018-06-26 PROCEDURE — 82805 BLOOD GASES W/O2 SATURATION: CPT

## 2018-06-26 PROCEDURE — 87086 URINE CULTURE/COLONY COUNT: CPT

## 2018-06-26 PROCEDURE — 96361 HYDRATE IV INFUSION ADD-ON: CPT

## 2018-06-26 PROCEDURE — 83880 ASSAY OF NATRIURETIC PEPTIDE: CPT

## 2018-06-26 PROCEDURE — 96374 THER/PROPH/DIAG INJ IV PUSH: CPT

## 2018-06-26 PROCEDURE — 82150 ASSAY OF AMYLASE: CPT

## 2018-06-26 PROCEDURE — 94760 N-INVAS EAR/PLS OXIMETRY 1: CPT

## 2018-06-26 PROCEDURE — 94640 AIRWAY INHALATION TREATMENT: CPT

## 2018-06-26 PROCEDURE — 85730 THROMBOPLASTIN TIME PARTIAL: CPT

## 2018-06-26 PROCEDURE — 85379 FIBRIN DEGRADATION QUANT: CPT

## 2018-06-26 PROCEDURE — 71045 X-RAY EXAM CHEST 1 VIEW: CPT

## 2018-06-26 PROCEDURE — 83690 ASSAY OF LIPASE: CPT

## 2018-06-26 PROCEDURE — 84100 ASSAY OF PHOSPHORUS: CPT

## 2018-06-26 PROCEDURE — 99285 EMERGENCY DEPT VISIT HI MDM: CPT

## 2018-06-26 PROCEDURE — 36415 COLL VENOUS BLD VENIPUNCTURE: CPT

## 2018-06-26 PROCEDURE — 80074 ACUTE HEPATITIS PANEL: CPT

## 2018-06-26 PROCEDURE — 83605 ASSAY OF LACTIC ACID: CPT

## 2018-06-26 PROCEDURE — 85027 COMPLETE CBC AUTOMATED: CPT

## 2018-06-26 PROCEDURE — 83540 ASSAY OF IRON: CPT

## 2018-06-26 PROCEDURE — 76705 ECHO EXAM OF ABDOMEN: CPT

## 2018-06-26 PROCEDURE — 87040 BLOOD CULTURE FOR BACTERIA: CPT

## 2018-06-26 PROCEDURE — 80053 COMPREHEN METABOLIC PANEL: CPT

## 2018-06-26 PROCEDURE — 81003 URINALYSIS AUTO W/O SCOPE: CPT

## 2018-06-26 PROCEDURE — 82553 CREATINE MB FRACTION: CPT

## 2018-06-26 PROCEDURE — 80048 BASIC METABOLIC PNL TOTAL CA: CPT

## 2018-06-26 PROCEDURE — 76937 US GUIDE VASCULAR ACCESS: CPT

## 2018-06-26 PROCEDURE — 87522 HEPATITIS C REVRS TRNSCRPJ: CPT

## 2018-06-26 PROCEDURE — 82105 ALPHA-FETOPROTEIN SERUM: CPT

## 2018-06-26 PROCEDURE — 85025 COMPLETE CBC W/AUTO DIFF WBC: CPT

## 2018-06-26 PROCEDURE — 82977 ASSAY OF GGT: CPT

## 2018-06-26 PROCEDURE — 93306 TTE W/DOPPLER COMPLETE: CPT

## 2018-06-26 PROCEDURE — 78582 LUNG VENTILAT&PERFUS IMAGING: CPT

## 2018-06-26 PROCEDURE — 84484 ASSAY OF TROPONIN QUANT: CPT

## 2018-06-26 PROCEDURE — 87077 CULTURE AEROBIC IDENTIFY: CPT

## 2018-06-26 PROCEDURE — 77001 FLUOROGUIDE FOR VEIN DEVICE: CPT

## 2018-06-26 PROCEDURE — 83735 ASSAY OF MAGNESIUM: CPT

## 2018-06-26 PROCEDURE — 83550 IRON BINDING TEST: CPT

## 2018-06-26 PROCEDURE — 82550 ASSAY OF CK (CPK): CPT

## 2018-06-26 PROCEDURE — 85610 PROTHROMBIN TIME: CPT

## 2018-06-26 RX ADMIN — INSULIN ASPART SCH UNIT: 100 INJECTION, SOLUTION INTRAVENOUS; SUBCUTANEOUS at 23:30

## 2018-06-26 RX ADMIN — Medication SCH MG: at 17:05

## 2018-06-26 RX ADMIN — CARVEDILOL SCH MG: 12.5 TABLET, FILM COATED ORAL at 16:59

## 2018-06-26 RX ADMIN — HYDROCODONE BITARTRATE AND ACETAMINOPHEN PRN EACH: 7.5; 325 TABLET ORAL at 18:46

## 2018-06-26 RX ADMIN — NITROGLYCERIN STA: 20 OINTMENT TOPICAL at 08:49

## 2018-06-26 RX ADMIN — HEPARIN SODIUM SCH UNIT: 5000 INJECTION, SOLUTION INTRAVENOUS; SUBCUTANEOUS at 23:30

## 2018-06-26 RX ADMIN — IPRATROPIUM BROMIDE AND ALBUTEROL SULFATE SCH ML: .5; 3 SOLUTION RESPIRATORY (INHALATION) at 19:27

## 2018-06-26 RX ADMIN — CEFAZOLIN SCH MLS/HR: 330 INJECTION, POWDER, FOR SOLUTION INTRAMUSCULAR; INTRAVENOUS at 19:59

## 2018-06-26 RX ADMIN — GABAPENTIN SCH MG: 400 CAPSULE ORAL at 23:29

## 2018-06-26 RX ADMIN — IPRATROPIUM BROMIDE AND ALBUTEROL SULFATE SCH: .5; 3 SOLUTION RESPIRATORY (INHALATION) at 19:27

## 2018-06-26 RX ADMIN — MAGNESIUM SULFATE IN DEXTROSE SCH MLS/HR: 10 INJECTION, SOLUTION INTRAVENOUS at 19:58

## 2018-06-26 RX ADMIN — MAGNESIUM SULFATE IN DEXTROSE SCH MLS/HR: 10 INJECTION, SOLUTION INTRAVENOUS at 18:34

## 2018-06-26 RX ADMIN — ATORVASTATIN CALCIUM SCH: 20 TABLET, FILM COATED ORAL at 20:05

## 2018-06-26 RX ADMIN — INSULIN ASPART SCH UNIT: 100 INJECTION, SOLUTION INTRAVENOUS; SUBCUTANEOUS at 17:00

## 2018-06-26 RX ADMIN — NITROGLYCERIN STA INCH: 20 OINTMENT TOPICAL at 09:37

## 2018-06-26 RX ADMIN — PANTOPRAZOLE SODIUM SCH MG: 40 TABLET, DELAYED RELEASE ORAL at 17:05

## 2018-06-26 RX ADMIN — INSULIN DETEMIR SCH UNIT: 100 INJECTION, SOLUTION SUBCUTANEOUS at 16:59

## 2018-06-26 NOTE — ED
General Adult HPI





- General


Source: EMS, RN notes reviewed, old records reviewed


Mode of arrival: EMS


Limitations: no limitations





<Stephen Orta - Last Filed: 06/26/18 06:52>





<Claire Mane - Last Filed: 06/26/18 12:24>





- General


Chief complaint: Chest Pain


Stated complaint: Chest pain


Time Seen by Provider: 06/26/18 03:11





- History of Present Illness


Initial comments: 





This is a 71-year-old male the ER for evaluation.  Patient does say for evasive 

chest pain abdominal pain.  Patient is a complex recent medical history is 

recent hospital admission.  Patient's poor historian.  History obtained by EMS (

Stephen Orta)





- Related Data


 Home Medications











 Medication  Instructions  Recorded  Confirmed


 


Nitroglycerin Sl Tabs [Nitrostat] 0.4 mg SUBLINGUAL Q5M PRN 05/18/14 06/26/18


 


Carvedilol 25 mg PO AC-BID 07/14/15 06/26/18


 


Docusate [Colace] 100 mg PO HS PRN 11/25/15 06/26/18


 


INSULIN LISPRO (humaLOG) [humaLOG] See Protocol SQ AC-TID 03/03/18 06/26/18


 


Losartan [Cozaar] 12.5 mg PO DAILY 03/03/18 06/26/18


 


Spironolactone [Aldactone] 25 mg PO DAILY 03/03/18 06/26/18


 


Atorvastatin [Lipitor] 20 mg PO HS 06/26/18 06/26/18


 


Gabapentin [Neurontin] 400 mg PO TID 06/26/18 06/26/18


 


Glecaprevir/Pibrentasvir [Mavyret 3 tab PO DAILY 06/26/18 06/26/18





100-40 mg Tablet]   


 


HYDROcodone/APAP 7.5-325MG [Norco 1 tab PO Q6HR PRN 06/26/18 06/26/18





7.5-325]   


 


Insulin Detemir [Levemir] 35 unit SQ DAILY 06/26/18 06/26/18


 


Magnesium Oxide [Mag-Ox] 400 mg PO DAILY 06/26/18 06/26/18








 Previous Rx's











 Medication  Instructions  Recorded


 


Furosemide [Lasix] 40 mg PO BID@0900,1600  tab 03/13/18


 


Isosorbide Mononitrate ER [Imdur] 60 mg PO DAILY  tab.er.24h 03/13/18


 


Pantoprazole [Protonix] 40 mg PO AC-BRKFST  tablet. 03/13/18


 


hydrALAZINE HCL [Apresoline] 100 mg PO TID  tab 03/13/18











 Allergies











Allergy/AdvReac Type Severity Reaction Status Date / Time


 


No Known Allergies Allergy   Verified 06/26/18 07:39














Review of Systems


ROS Other: All systems not noted in ROS Statement are negative.





<Stephen Orta - Last Filed: 06/26/18 06:52>


ROS Other: All systems not noted in ROS Statement are negative.





<Claire Mane - Last Filed: 06/26/18 12:24>


ROS Statement: 


Those systems with pertinent positive or pertinent negative responses have been 

documented in the HPI.








Past Medical History


Past Medical History: Chest Pain / Angina, Heart Failure, COPD, Diabetes 

Mellitus, Hyperlipidemia, Hypertension, Myocardial Infarction (MI), 

Osteoarthritis (OA)


Additional Past Medical History / Comment(s): 11/25/15  Pt presented to [facility] ER 

EMS feeling weak and having mild headache and anterior neck pain.  Symptoms 

began days ago.  Pt being admitted with clinical impression of weakness, acute 

renal failure.  PT last admitted to [facility] 7/14/15 with acute DKA, acute 

metabolic encephalitis from DKA, acute renal failure, severe hyperkalemia, 

pseudohyponatremia.  Other HX:  Chronic CHF systolic dysfunction with EF 20%,  

gout, gouty arthiritis to R great toe, nerve pain, ddd lumbar region, 

folliculitis, constipation, renal insufficiency, hep c 1-1-14, diabetes 

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 

liver bx tx with chest tube, cellulitis to lt foot/ankle, pt was born with R 

leg larger than L leg.


Last Myocardial Infarction Date:: 5/2014


History of Any Multi-Drug Resistant Organisms: None Reported


Past Surgical History: Heart Catheterization, Pacemaker


Additional Past Surgical History / Comment(s): CATARACTS ROCKY EYES REMOVED.  

liver biopsy on 4-7-14, heart cath 2005


Past Anesthesia/Blood Transfusion Reactions: No Reported Reaction


Type of Cardiac Device: Permanent Pacemaker


Device Placement Date:: UNK


Past Psychological History: Bipolar, Depression


Smoking Status: Never smoker


Past Alcohol Use History: Occasional


Past Drug Use History: None Reported





- Past Family History


  ** Mother


Family Medical History: Cancer





  ** Sister(s)


Family Medical History: Cancer





<Stephen Orta - Last Filed: 06/26/18 06:52>





General Exam


Limitations: no limitations


General appearance: alert, in no apparent distress


Head exam: Present: atraumatic, normocephalic, normal inspection


Eye exam: Present: normal appearance, PERRL, EOMI.  Absent: scleral icterus, 

conjunctival injection, periorbital swelling


ENT exam: Present: normal exam, mucous membranes moist


Neck exam: Present: normal inspection.  Absent: tenderness, meningismus, 

lymphadenopathy


Respiratory exam: Present: normal lung sounds bilaterally.  Absent: respiratory 

distress, wheezes, rales, rhonchi, stridor


Cardiovascular Exam: Present: regular rate, normal rhythm, normal heart sounds.

  Absent: systolic murmur, diastolic murmur, rubs, gallop, clicks


GI/Abdominal exam: Present: soft, normal bowel sounds.  Absent: distended, 

tenderness, guarding, rebound, rigid


Extremities exam: Present: normal inspection, full ROM, normal capillary 

refill.  Absent: tenderness, pedal edema, joint swelling, calf tenderness


Back exam: Present: normal inspection


Neurological exam: Present: alert, oriented X3, CN II-XII intact


Psychiatric exam: Present: normal affect, normal mood


Skin exam: Present: warm, dry, intact, normal color.  Absent: rash





<Stephen Orta - Last Filed: 06/26/18 06:52>





Course





<Stephen Orta - Last Filed: 06/26/18 06:52>





<Claire Mane - Last Filed: 06/26/18 12:24>


 Vital Signs











  06/26/18 06/26/18 06/26/18





  03:15 04:44 05:37


 


Temperature 98.3 F 98.4 F 


 


Pulse Rate 79 78 82


 


Respiratory 18 18 





Rate   


 


Blood Pressure 202/110 170/74 


 


O2 Sat by Pulse 93 L 95 





Oximetry   














  06/26/18 06/26/18 06/26/18





  05:40 05:56 08:50


 


Temperature   


 


Pulse Rate 70 80 74


 


Respiratory 18  18





Rate   


 


Blood Pressure 170/98  96/56


 


O2 Sat by Pulse 95  96





Oximetry   














  06/26/18





  09:39


 


Temperature 


 


Pulse Rate 77


 


Respiratory 18





Rate 


 


Blood Pressure 119/70


 


O2 Sat by Pulse 99





Oximetry 











Is in was reassessed on arrival 70 the morning, patient had no IV access 

nursing staff was trying to establish an IV access.  Labs were reviewed CBC is 

unremarkable compress metabolic panel was reviewed noticed that creatinine is 

1.8 potassium is 5.9 troponin is elevated as well patient will get some 

Kayexalate for potassium cardiology Dr. Lopez was down here to see him magnesium 

is quite low patient was given 4 mg of magnesium by mouth and 1 g IV 

considering his creatinine is high is not a candidate for CT angiogram I 

changed that to the VQ scan and noticed that his troponin is concerned 

cardiology is aware of the chest x-ray showed mild congestion probably will 

continue some Lasix for help with the potassium as well had a cross Were 

ordered CT abdomen and pelvis with the contrast to have a change in without the 

contrast considering his creatinine is high.


CT of the abdomen and pelvis was reviewed at 1059 it's unremark VQ scan is 

still pending,VQ scan is still pending








At 1157 his pulmonary embolism studies are still pending, disposition be done 

as soon as pulmonary embolism studies are completed (Claire Mane)





- Reevaluation(s)


Reevaluation #1: 





06/26/18 05:36


Patient multiple times have lab draws and IV start by both EMS, and nursing 

staff here in the ER which were unsuccessful





Myself attempted both peripheral ultrasound IV as well as right IJ central line

, which were unable to advance or pass. (Stephen Orta)


Nuclear medicine report was available at 12:15 it shows low probability for PE





06/26/18 12:23


 (Claire Mane)





EKG Findings





- EKG Comments:


EKG Findings:: EKG shows paced rhythm at 79, , , 





<Stephen Orta - Last Filed: 06/26/18 06:52>





Medical Decision Making





- Lab Data


Result diagrams: 


 06/26/18 06:35





 06/26/18 06:35





<Claire Mane - Last Filed: 06/26/18 12:24>





- Lab Data


 Lab Results











  06/26/18 06/26/18 06/26/18 Range/Units





  05:49 06:35 06:35 


 


WBC   8.6   (3.8-10.6)  k/uL


 


RBC   4.89   (4.30-5.90)  m/uL


 


Hgb   14.1   (13.0-17.5)  gm/dL


 


Hct   44.2   (39.0-53.0)  %


 


MCV   90.2   (80.0-100.0)  fL


 


MCH   28.7   (25.0-35.0)  pg


 


MCHC   31.8   (31.0-37.0)  g/dL


 


RDW   13.6   (11.5-15.5)  %


 


Plt Count   160   (150-450)  k/uL


 


Neutrophils %   92   %


 


Lymphocytes %   4   %


 


Monocytes %   2   %


 


Eosinophils %   1   %


 


Basophils %   0   %


 


Neutrophils #   8.0 H   (1.3-7.7)  k/uL


 


Lymphocytes #   0.4 L   (1.0-4.8)  k/uL


 


Monocytes #   0.2   (0-1.0)  k/uL


 


Eosinophils #   0.1   (0-0.7)  k/uL


 


Basophils #   0.0   (0-0.2)  k/uL


 


PT    10.5  (9.0-12.0)  sec


 


INR    1.1  (<1.2)  


 


APTT    21.6 L  (22.0-30.0)  sec


 


D-Dimer    3.71 H  (<0.60)  mg/L FEU


 


Sodium     (137-145)  mmol/L


 


Potassium     (3.5-5.1)  mmol/L


 


Chloride     ()  mmol/L


 


Carbon Dioxide     (22-30)  mmol/L


 


Anion Gap     mmol/L


 


BUN     (9-20)  mg/dL


 


Creatinine     (0.66-1.25)  mg/dL


 


Est GFR (CKD-EPI)AfAm     (>60 ml/min/1.73 sqM)  


 


Est GFR (CKD-EPI)NonAf     (>60 ml/min/1.73 sqM)  


 


Glucose     (74-99)  mg/dL


 


POC Glucose (mg/dL)  205 H    (75-99)  mg/dL


 


POC Glu Operater ID  Chelle Archer    


 


Calcium     (8.4-10.2)  mg/dL


 


Phosphorus     (2.5-4.5)  mg/dL


 


Magnesium     (1.6-2.3)  mg/dL


 


Total Bilirubin     (0.2-1.3)  mg/dL


 


AST     (17-59)  U/L


 


ALT     (21-72)  U/L


 


Alkaline Phosphatase     ()  U/L


 


Total Creatine Kinase     ()  U/L


 


CK-MB (CK-2)     (0.0-2.4)  ng/mL


 


CK-MB (CK-2) Rel Index     


 


Troponin I     (0.000-0.034)  ng/mL


 


NT-Pro-B Natriuret Pep     pg/mL


 


Total Protein     (6.3-8.2)  g/dL


 


Albumin     (3.5-5.0)  g/dL














  06/26/18 06/26/18 06/26/18 Range/Units





  06:35 06:35 06:35 


 


WBC     (3.8-10.6)  k/uL


 


RBC     (4.30-5.90)  m/uL


 


Hgb     (13.0-17.5)  gm/dL


 


Hct     (39.0-53.0)  %


 


MCV     (80.0-100.0)  fL


 


MCH     (25.0-35.0)  pg


 


MCHC     (31.0-37.0)  g/dL


 


RDW     (11.5-15.5)  %


 


Plt Count     (150-450)  k/uL


 


Neutrophils %     %


 


Lymphocytes %     %


 


Monocytes %     %


 


Eosinophils %     %


 


Basophils %     %


 


Neutrophils #     (1.3-7.7)  k/uL


 


Lymphocytes #     (1.0-4.8)  k/uL


 


Monocytes #     (0-1.0)  k/uL


 


Eosinophils #     (0-0.7)  k/uL


 


Basophils #     (0-0.2)  k/uL


 


PT     (9.0-12.0)  sec


 


INR     (<1.2)  


 


APTT     (22.0-30.0)  sec


 


D-Dimer     (<0.60)  mg/L FEU


 


Sodium  139    (137-145)  mmol/L


 


Potassium  5.9 H    (3.5-5.1)  mmol/L


 


Chloride  101    ()  mmol/L


 


Carbon Dioxide  27    (22-30)  mmol/L


 


Anion Gap  11    mmol/L


 


BUN  38 H    (9-20)  mg/dL


 


Creatinine  1.85 H    (0.66-1.25)  mg/dL


 


Est GFR (CKD-EPI)AfAm  41    (>60 ml/min/1.73 sqM)  


 


Est GFR (CKD-EPI)NonAf  36    (>60 ml/min/1.73 sqM)  


 


Glucose  238 H    (74-99)  mg/dL


 


POC Glucose (mg/dL)     (75-99)  mg/dL


 


POC Glu Operater ID     


 


Calcium  9.3    (8.4-10.2)  mg/dL


 


Phosphorus  2.3 L    (2.5-4.5)  mg/dL


 


Magnesium  1.0 L*    (1.6-2.3)  mg/dL


 


Total Bilirubin  2.8 H    (0.2-1.3)  mg/dL


 


AST  114 H    (17-59)  U/L


 


ALT  39    (21-72)  U/L


 


Alkaline Phosphatase  174 H    ()  U/L


 


Total Creatine Kinase   64   ()  U/L


 


CK-MB (CK-2)   1.2   (0.0-2.4)  ng/mL


 


CK-MB (CK-2) Rel Index   1.9   


 


Troponin I   0.038 H*   (0.000-0.034)  ng/mL


 


NT-Pro-B Natriuret Pep    4610  pg/mL


 


Total Protein  7.8    (6.3-8.2)  g/dL


 


Albumin  4.2    (3.5-5.0)  g/dL














Disposition


Is patient prescribed a controlled substance at d/c from ED?: No





<Stephen Orta - Last Filed: 06/26/18 06:52>





<Claire Mane - Last Filed: 06/26/18 12:24>


Clinical Impression: 


 Atypical chest pain, Chest pain, Abdominal pain





Disposition: ADMITTED AS IP TO THIS HOSP


Condition: Serious

## 2018-06-26 NOTE — CONS
CONSULTATION



CHIEF COMPLAINT:

Elevated troponin.



Mr. Lipscomb is a 73-year-old gentleman with history of diabetes, hypertension,

dyslipidemia, COPD and congestive heart failure, who presented to hospital with

symptoms of not feeling well, vague abdominal discomfort and vague chest pain.

Cardiology had been consulted because of elevated troponin.  At the time of my

evaluation, he is chest pain-free.  He had elevated D-dimer and had a V/Q scan that was

low probability for pulmonary embolism.  His hemoglobin is normal at 14.1.  Potassium

is elevated at 5.9, BUN and creatinine are elevated.  Magnesium was low at 1, which has

been supplemented.  The first set of troponin is high at 0.038 and the BNP is elevated.

The patient had an echocardiogram in March of 2018 that showed normal LV function.



PAST MEDICAL HISTORY:

Significant for hypertension, insulin-requiring diabetes, dyslipidemia.



CURRENT MEDICATIONS:

Include hydralazine 100 t.i.d., Aldactone 25 mg daily, Protonix, magnesium, Cozaar,

Imdur, Levemir insulin, Norco, Neurontin, Lasix, Colace, carvedilol, and Lipitor.



ALLERGIES:

There are no known drug allergies.



FAMILY HISTORY:

Negative for premature coronary artery disease.



SOCIAL HISTORY:

Negative for current smoking, EtOH abuse, or drug abuse.



REVIEW OF SYSTEMS:

The patient is a poor historian.  His main symptoms seem to be vague abdominal pain and

vague chest pain and then not feeling well.



EXAM:

Comfortable at rest.  Vital signs are stable.  Blood pressure is 101/50, respirations

18. Chest exam reveals diminished air entry at the bases.  Heart exam reveals first and

second heart sounds with systolic murmur at the apex.  Abdomen is soft.  Exam of the

extremities reveal trace edema.



The patient had a chest x-ray that showed cardiomegaly and a possible pacer

defibrillator with bilateral pleural effusions. CT scan of the chest and abdomen was

negative for any changes of acute abdomen.  EKG shows sinus paced rhythm with

nonspecific ST-T wave changes.

Labs show a hemoglobin of 14.1, white cell count is normal, potassium is 5.9, BUN is

38, creatinine is 1.8, magnesium is 1.  Troponin is 0.038.  BNP is 4610.



ASSESSMENT:

1. Chest pain.

2. Acute onset diastolic heart failure.

3. Hypertension.

4. Renal failure.

5. Elevated D-dimer.



PLAN:

The elevated troponin is probably related to the underlying renal insufficiency.  I am

going to obtain a 2D echo to document his LV function.  I am going to review his old

records.  I am going to stop the Aldactone that the patient was on at home due to

elevated potassium,  treat him with IV Lasix and resume beta blockers at a lower dose

and hold the ARB's at this time.





LADNEN / ALBERTO: [id number] / Job#: [id number]

## 2018-06-26 NOTE — XR
EXAM:

  XR Chest, 2 Views

 

CLINICAL HISTORY:

  Weakness

 

TECHNIQUE:

  Frontal and lateral views of the chest.

 

COMPARISON:

  Chest x-ray dated 5/31/2018

 

FINDINGS:

  Lungs:  Moderate pulmonary vascular congestion.

  Pleural space:  Moderate to large bilateral pleural effusions.  No 

pneumothorax.

  Heart:  Unremarkable.  No cardiomegaly.

  Mediastinum:  Unremarkable.

  Bones/joints:  No acute osseous abnormality.

  Tubes, lines and devices:  Enlarged cardiomediastinal silhouette with 

biventricular cardiac pacemaker/AICD.

 

IMPRESSION:     

1.  Moderate pulmonary vascular congestion.

2.  Moderate to large bilateral pleural effusions.

## 2018-06-26 NOTE — P.PN
Progress Note - Text


Progress Note Date: 06/26/18





patient was seen and examined, most recent 2D echo showed LVEF of 55-60% (

previousely he had LVEF of 20% s/p ICD.)


currently he presented due ot chest pain and abd pain, now he reports being 

chest pain free, he was fouind to have ESTEVAN and Acute recurrent pancreatitis, he 

denies any alcohol intake 


clinically on physical exam, he seems to be on the dry side, with clear lungs, 

no distended JVD, and no leg edema. 





Plan 


Acute pancreatits


acute hyperkalemia due to ESTEVAN and patient on aldactone and ACEi (both on hold 

now)


acute Kidney injury most likely due to dehydration 


acute hyperglycemia without DKA , normal anion gap





aggressive IVF hydration


give 1 L bolus , and continue on NS 0.9% at 125cc/hr (patient has history of 

acute diastolic heart failure) , need close monitoring 


patient received Insulin SC at 5 pm , repeat blood sugar 


patient given bicarb at 1800, received kayexalate in the ED, now i will give 

him IV insulin with d50 to help lower his K 


repeat labs in 4 hours





will continue close monitoring in the step down unit

## 2018-06-26 NOTE — NM
EXAMINATION TYPE: NM pul vent and perfuse

 

DATE OF EXAM: 6/26/2018

 

COMPARISON: Chest x-ray from earlier today.

 

HISTORY: Weakness rule out pulmonary embolism

 

TECHNIQUE:  Utilizing inhalation of 39.5 mCi Tc 99m DTPA aerosol and intravenous injection of 5.12 mC
i of Tc 99m MAA, ventilation and perfusion images are acquired post injection in multiple projections
.

 

FINDINGS: 

There is central clumping and poor uptake on ventilation images likely reflecting product of underlyi
ng COPD. Some small to moderate-sized matching defects are present most prominent in the left lung. N
o mismatch defects are however seen. 

 

IMPRESSION: 

Low scintigraphic evidence for pulmonary embolism

## 2018-06-26 NOTE — P.HPIM
History of Present Illness


H&P Date: 06/26/18


Chief Complaint: Abdomen and chest pain





73-year-old male with history of multiple medical conditions as detailed below 

presented to emergency department because of 2 days history of abdominal and 

chest pain.  Patient is a poor historian and when I saw him he was lethargic 

and sleepy.  He denied having any fevers or chills, no chest pain or shortness 

of breath, no diarrhea, no hematemesis or hematochezia.  He did admit to nausea 

and vomiting 4.  In the emergency department he was found to have high 

potassium at 5.9, low magnesium at 1 and elevated troponin at 0.038.





Review of Systems





12 point review of system performed, negative except HPI





Past Medical History


Past Medical History: Coronary Artery Disease (CAD), Chest Pain / Angina, Heart 

Failure, COPD, Diabetes Mellitus, Hyperlipidemia, Hypertension, Liver Disease, 

Osteoarthritis (OA)


Additional Past Medical History / Comment(s): Chronic CHF systolic dysfunction 

with EF 20%, murmur, home oxygen, 3/3/18 acute pancreatitis, gout several joints

, gouty arthiritis to R great toe, ddd lumbar region, folliculitis, constipation

, renal insufficiency, chronic anemia, thrombocytopenia, hep c 1-1-14, diabetes 

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 

peripheral neuropathy hands, legs and feet, hemothorax associated with liver bx 

tx with chest tube, cellulitis to lt foot/ankle, pt was born with R leg larger 

than L leg.


Last Myocardial Infarction Date:: 5/2014


History of Any Multi-Drug Resistant Organisms: None Reported


Past Surgical History: Heart Catheterization, Pacemaker


Additional Past Surgical History / Comment(s): PTCA 2011 in [place], CATARACTS 

ROCKY EYES REMOVED.  liver biopsy on 4-7-14,


Past Anesthesia/Blood Transfusion Reactions: No Reported Reaction


Type of Cardiac Device: Permanent Pacemaker


Device Placement Date:: 2014? pt unsure


Smoking Status: Never smoker





- Past Family History


  ** Mother


Family Medical History: Hypertension





  ** Sister(s)


Family Medical History: Cancer





  ** Father


History Unknown: Yes





Medications and Allergies


 Home Medications











 Medication  Instructions  Recorded  Confirmed  Type


 


Nitroglycerin Sl Tabs [Nitrostat] 0.4 mg SUBLINGUAL Q5M PRN 05/18/14 06/26/18 

History


 


Carvedilol 25 mg PO AC-BID 07/14/15 06/26/18 History


 


Docusate [Colace] 100 mg PO HS PRN 11/25/15 06/26/18 History


 


INSULIN LISPRO (humaLOG) [humaLOG] See Protocol SQ AC-TID 03/03/18 06/26/18 

History


 


Losartan [Cozaar] 12.5 mg PO DAILY 03/03/18 06/26/18 History


 


Spironolactone [Aldactone] 25 mg PO DAILY 03/03/18 06/26/18 History


 


Furosemide [Lasix] 40 mg PO BID@0900,1600  tab 03/13/18 06/26/18 Rx


 


Isosorbide Mononitrate ER [Imdur] 60 mg PO DAILY  tab.er.24h 03/13/18 06/26/18 

Rx


 


Pantoprazole [Protonix] 40 mg PO AC-BRKFST  tablet. 03/13/18 06/26/18 Rx


 


hydrALAZINE HCL [Apresoline] 100 mg PO TID  tab 03/13/18 06/26/18 Rx


 


Atorvastatin [Lipitor] 20 mg PO HS 06/26/18 06/26/18 History


 


Gabapentin [Neurontin] 400 mg PO TID 06/26/18 06/26/18 History


 


Glecaprevir/Pibrentasvir [Mavyret 3 tab PO DAILY 06/26/18 06/26/18 History





100-40 mg Tablet]    


 


HYDROcodone/APAP 7.5-325MG [Norco 1 tab PO Q6HR PRN 06/26/18 06/26/18 History





7.5-325]    


 


Insulin Detemir [Levemir] 35 unit SQ DAILY 06/26/18 06/26/18 History


 


Magnesium Oxide [Mag-Ox] 400 mg PO DAILY 06/26/18 06/26/18 History











 Allergies











Allergy/AdvReac Type Severity Reaction Status Date / Time


 


No Known Allergies Allergy   Verified 06/26/18 07:39














Physical Exam


Vitals: 


 Vital Signs











  Temp Pulse Pulse Resp BP BP Pulse Ox


 


 06/26/18 16:06  96.8 F L   66  18   148/67  92 L


 


 06/26/18 14:58  98.4 F  80   18  105/68   95


 


 06/26/18 13:43   65   18  90/55   95


 


 06/26/18 12:40      101/56  


 


 06/26/18 12:38   65   18  89/54   96


 


 06/26/18 09:39   77   18  119/70   99


 


 06/26/18 08:50   74   18  96/56   96


 


 06/26/18 05:56   80     


 


 06/26/18 05:40   70   18  170/98   95


 


 06/26/18 05:37   82     


 


 06/26/18 04:44  98.4 F  78   18  170/74   95


 


 06/26/18 03:15  98.3 F  79   18  202/110   93 L








 Intake and Output











 06/26/18 06/26/18 06/26/18





 06:59 14:59 22:59


 


Other:   


 


  Weight 102.058 kg  














Constitutional: Lethargic, briefly answers questions with soft toned voice and 

drifts back to sleep.


Eyes:Anicteric sclerae, moist conjunctiva, no lid-lag, PERRLA, 


ENMT: Oropharynx clear, no erythema, exudates


Neck: Supple, FROM, no masses, or JVD, No carotid bruits, No thyromegaly


Lungs: Clear to auscultation, Clear to percussion, Normal respiratory effort, 

no accessory muscle use 


Cardiovascular: Heart regular in rate and rhythm, No murmurs, gallops, or rubs, 

No peripheral edema


Abdominal: Soft, Nontender, no guarding, rebound or rigidity, Normoactive bowel 

sounds, No hepatomegaly, No splenomegaly, No palpable mass 


Skin: Normal temperature, tone, texture, turgor, no induration, No subcutaneous 

nodules, No rash, lesions, No ulcers


Extremities: No digital cyanosis, No clubbing, Pedal pulses intact and 

symmetrical, Radial pulses intact and symmetrical, No calf tenderness 


Psychiatric: Alert and oriented to person, place and time, appropriate affect, 

intact judgement         


Neuro: Muscles Strength 5/5 in all 4 extremities, Sensation to light touch 

grossly present throughout, Cranial nerves II-XII grossly intact, no focal 

sensory deficits








Results


CBC & Chem 7: 


 06/26/18 06:35





 06/26/18 06:35


Labs: 


 Abnormal Lab Results - Last 24 Hours (Table)











  06/26/18 06/26/18 06/26/18 Range/Units





  05:49 06:35 06:35 


 


Neutrophils #   8.0 H   (1.3-7.7)  k/uL


 


Lymphocytes #   0.4 L   (1.0-4.8)  k/uL


 


APTT    21.6 L  (22.0-30.0)  sec


 


D-Dimer    3.71 H  (<0.60)  mg/L FEU


 


Potassium     (3.5-5.1)  mmol/L


 


BUN     (9-20)  mg/dL


 


Creatinine     (0.66-1.25)  mg/dL


 


Glucose     (74-99)  mg/dL


 


POC Glucose (mg/dL)  205 H    (75-99)  mg/dL


 


Phosphorus     (2.5-4.5)  mg/dL


 


Magnesium     (1.6-2.3)  mg/dL


 


Total Bilirubin     (0.2-1.3)  mg/dL


 


AST     (17-59)  U/L


 


Alkaline Phosphatase     ()  U/L


 


Troponin I     (0.000-0.034)  ng/mL














  06/26/18 06/26/18 06/26/18 Range/Units





  06:35 06:35 16:16 


 


Neutrophils #     (1.3-7.7)  k/uL


 


Lymphocytes #     (1.0-4.8)  k/uL


 


APTT     (22.0-30.0)  sec


 


D-Dimer     (<0.60)  mg/L FEU


 


Potassium  5.9 H    (3.5-5.1)  mmol/L


 


BUN  38 H    (9-20)  mg/dL


 


Creatinine  1.85 H    (0.66-1.25)  mg/dL


 


Glucose  238 H    (74-99)  mg/dL


 


POC Glucose (mg/dL)    502 H  (75-99)  mg/dL


 


Phosphorus  2.3 L    (2.5-4.5)  mg/dL


 


Magnesium  1.0 L*    (1.6-2.3)  mg/dL


 


Total Bilirubin  2.8 H    (0.2-1.3)  mg/dL


 


AST  114 H    (17-59)  U/L


 


Alkaline Phosphatase  174 H    ()  U/L


 


Troponin I   0.038 H*   (0.000-0.034)  ng/mL














  06/26/18 Range/Units





  16:17 


 


Neutrophils #   (1.3-7.7)  k/uL


 


Lymphocytes #   (1.0-4.8)  k/uL


 


APTT   (22.0-30.0)  sec


 


D-Dimer   (<0.60)  mg/L FEU


 


Potassium   (3.5-5.1)  mmol/L


 


BUN   (9-20)  mg/dL


 


Creatinine   (0.66-1.25)  mg/dL


 


Glucose   (74-99)  mg/dL


 


POC Glucose (mg/dL)  469 H  (75-99)  mg/dL


 


Phosphorus   (2.5-4.5)  mg/dL


 


Magnesium   (1.6-2.3)  mg/dL


 


Total Bilirubin   (0.2-1.3)  mg/dL


 


AST   (17-59)  U/L


 


Alkaline Phosphatase   ()  U/L


 


Troponin I   (0.000-0.034)  ng/mL














Thrombosis Risk Factor Assmnt





- Choose All That Apply


Any of the Below Risk Factors Present?: Yes


Each Factor Represents 1 point: Obesity (BMI >25)


Other Risk Factors: Yes


Each Risk Factor Represents 2 Points: Age 61-74 years


Other congenital or acquired thrombophilia - If yes, enter type in comment: No


Thrombosis Risk Factor Assessment Total Risk Factor Score: 3


Thrombosis Risk Factor Assessment Level: Moderate Risk





Assessment and Plan


Plan: 





Abdominal and chest pain


Check amylase, lipase, patient was recently discharged last March after an 

admission for acute pancreatitis


EKG reviewed, no acute ST or T-wave changes. Repeat troponin


Seen by cardiology


He states his pain is better now.





Acute exacerbation of chronic systolic congestive heart failure


Lasix IV


Check echo


Continue Coreg, hold ACE inhibitor because of renal failure


Seen by cardio





Acute renal failure with hyperkalemia


Check UA


Hold ACE inhibitor


Recheck electrolytes and renal function now, patient was given 1 dose of 

Kayexalate in the ER


Can't be hydrated as he will need diuresis with Lasix due to CHF exacerbation





Diabetes mellitus type 2 with hyperglycemia


Check stat electrolytes to rule out DKA


Resume home insulin regimen


Sliding scale insulin moderate dose


Blood sugar check CBC and at bedtime





Hypomagnesemia


Replaced with 2 gm


Recheck now





Essential hypertension, COPD, hyperlipidemia, osteoarthritis


Stable


Resume home meds





DVT prophylaxis


Heparin subcu

## 2018-06-26 NOTE — CT
EXAMINATION TYPE: CT abdomen pelvis wo con

 

DATE OF EXAM: 6/26/2018

 

HISTORY: Chest and stomach pains

 

CT DLP: 1448 mGycm.  Automated Exposure Control for Dose Reduction was Utilized.

 

TECHNIQUE:  CT scan of the abdomen and pelvis is performed without oral or IV contrast.

 

COMPARISON: CT scan of abdomen and pelvis March 6, 2018

 

FINDINGS:  Within the limitations of a non-contrast study, the following observations are made.

 

LUNG BASES: There is redemonstration of cardiomegaly with multi lead pacemaker/AICD with tiny bilater
al pleural effusions improved from prior in scattered areas of scarring and/or atelectasis posteriorl
y in both bases redemonstrated.

 

LIVER/GB: No significant abnormality is appreciated.

 

PANCREAS: Persistent heterogeneous fullness pancreatic head without ductal dilatation likely normal v
ariant.

 

SPLEEN: No significant abnormality is seen.

 

ADRENALS: No significant abnormality is seen.

 

KIDNEYS: There is 1 cm simple appearing cyst laterally mid pole level left kidney axial image 29. No 
renal stones or hydronephrosis is present bilaterally. Bladder is poorly distended and thus suboptima
lly evaluated. Bladder wall thickness measures up to 9 mm, a cystitis cannot be excluded in appropria
te clinical setting, correlate clinically.

 

BOWEL: Appendix is not dilated, there is focal 8 mm hyperdensity coronal image 48 suspicious for appe
ndicolith. No surrounding inflammatory change is present. No significant change from prior CT. No glen
picious small or large bowel dilatation. Mild prominence of duodenal sweep is noted without suspiciou
s stomach dilatation.

 

GENITAL ORGANS: Prostate gland is felt upper limits of normal in size.

 

LYMPH NODES: No greater than 1cm abdominal or pelvic lymph nodes are appreciated.

 

OSSEOUS STRUCTURES: There is vacuum disc phenomenon with mild to moderate disc space narrowing L3-L4 
and L4-L5 levels. Posterior disc herniations are redemonstrated at these levels.

 

OTHER: No significant additional abnormality is seen.

 

IMPRESSION: No significant new or acute finding is seen to account for patient's symptoms. Persistent
 appendicolith without inflammatory change to suggest acute appendicitis. Possible cystitis, correlat
e clinically.

## 2018-06-27 LAB
ALBUMIN SERPL-MCNC: 3.2 G/DL (ref 3.5–5)
ALP SERPL-CCNC: 133 U/L (ref 38–126)
ALT SERPL-CCNC: 56 U/L (ref 21–72)
AMYLASE SERPL-CCNC: 183 U/L (ref 30–110)
ANION GAP SERPL CALC-SCNC: 11 MMOL/L
ANION GAP SERPL CALC-SCNC: 9 MMOL/L
AST SERPL-CCNC: 73 U/L (ref 17–59)
BILIRUB UR QL STRIP.AUTO: (no result)
BUN SERPL-SCNC: 54 MG/DL (ref 9–20)
BUN SERPL-SCNC: 56 MG/DL (ref 9–20)
CALCIUM SPEC-MCNC: 8.1 MG/DL (ref 8.4–10.2)
CALCIUM SPEC-MCNC: 8.3 MG/DL (ref 8.4–10.2)
CHLORIDE SERPL-SCNC: 104 MMOL/L (ref 98–107)
CHLORIDE SERPL-SCNC: 104 MMOL/L (ref 98–107)
CO2 SERPL-SCNC: 21 MMOL/L (ref 22–30)
CO2 SERPL-SCNC: 24 MMOL/L (ref 22–30)
GLUCOSE BLD-MCNC: 181 MG/DL (ref 75–99)
GLUCOSE BLD-MCNC: 194 MG/DL (ref 75–99)
GLUCOSE BLD-MCNC: 75 MG/DL (ref 75–99)
GLUCOSE BLD-MCNC: 77 MG/DL (ref 75–99)
GLUCOSE BLD-MCNC: 99 MG/DL (ref 75–99)
GLUCOSE SERPL-MCNC: 184 MG/DL (ref 74–99)
GLUCOSE SERPL-MCNC: 91 MG/DL (ref 74–99)
GLUCOSE UR QL: (no result)
INR PPP: 1.1 (ref ?–1.2)
LIPASE SERPL-CCNC: 2480 U/L (ref 23–300)
PH UR: 5 [PH] (ref 5–8)
POTASSIUM SERPL-SCNC: 5.3 MMOL/L (ref 3.5–5.1)
POTASSIUM SERPL-SCNC: 5.5 MMOL/L (ref 3.5–5.1)
PROT SERPL-MCNC: 6.3 G/DL (ref 6.3–8.2)
PT BLD: 10.5 SEC (ref 9–12)
SODIUM SERPL-SCNC: 136 MMOL/L (ref 137–145)
SODIUM SERPL-SCNC: 137 MMOL/L (ref 137–145)
SP GR UR: 1.01 (ref 1–1.03)
UROBILINOGEN UR QL STRIP: <2 MG/DL (ref ?–2)

## 2018-06-27 RX ADMIN — HEPARIN SODIUM SCH UNIT: 5000 INJECTION, SOLUTION INTRAVENOUS; SUBCUTANEOUS at 23:22

## 2018-06-27 RX ADMIN — GABAPENTIN SCH MG: 400 CAPSULE ORAL at 21:24

## 2018-06-27 RX ADMIN — INSULIN ASPART SCH: 100 INJECTION, SOLUTION INTRAVENOUS; SUBCUTANEOUS at 12:20

## 2018-06-27 RX ADMIN — ATORVASTATIN CALCIUM SCH MG: 20 TABLET, FILM COATED ORAL at 21:24

## 2018-06-27 RX ADMIN — IPRATROPIUM BROMIDE AND ALBUTEROL SULFATE SCH ML: .5; 3 SOLUTION RESPIRATORY (INHALATION) at 12:49

## 2018-06-27 RX ADMIN — GABAPENTIN SCH MG: 400 CAPSULE ORAL at 12:18

## 2018-06-27 RX ADMIN — INSULIN DETEMIR SCH UNIT: 100 INJECTION, SOLUTION SUBCUTANEOUS at 17:35

## 2018-06-27 RX ADMIN — INSULIN ASPART SCH UNIT: 100 INJECTION, SOLUTION INTRAVENOUS; SUBCUTANEOUS at 17:35

## 2018-06-27 RX ADMIN — Medication SCH MG: at 12:20

## 2018-06-27 RX ADMIN — IPRATROPIUM BROMIDE AND ALBUTEROL SULFATE SCH: .5; 3 SOLUTION RESPIRATORY (INHALATION) at 08:15

## 2018-06-27 RX ADMIN — ASPIRIN SCH: 325 TABLET ORAL at 11:53

## 2018-06-27 RX ADMIN — IPRATROPIUM BROMIDE AND ALBUTEROL SULFATE SCH ML: .5; 3 SOLUTION RESPIRATORY (INHALATION) at 19:18

## 2018-06-27 RX ADMIN — CEFAZOLIN SCH: 330 INJECTION, POWDER, FOR SOLUTION INTRAMUSCULAR; INTRAVENOUS at 21:28

## 2018-06-27 RX ADMIN — CEFAZOLIN SCH MLS/HR: 330 INJECTION, POWDER, FOR SOLUTION INTRAMUSCULAR; INTRAVENOUS at 12:44

## 2018-06-27 RX ADMIN — PANTOPRAZOLE SODIUM SCH MG: 40 TABLET, DELAYED RELEASE ORAL at 12:19

## 2018-06-27 RX ADMIN — INSULIN ASPART SCH: 100 INJECTION, SOLUTION INTRAVENOUS; SUBCUTANEOUS at 10:38

## 2018-06-27 RX ADMIN — CEFAZOLIN SCH MLS/HR: 330 INJECTION, POWDER, FOR SOLUTION INTRAMUSCULAR; INTRAVENOUS at 21:24

## 2018-06-27 RX ADMIN — CARVEDILOL SCH: 12.5 TABLET, FILM COATED ORAL at 20:55

## 2018-06-27 RX ADMIN — CARVEDILOL SCH MG: 12.5 TABLET, FILM COATED ORAL at 12:19

## 2018-06-27 RX ADMIN — HEPARIN SODIUM SCH UNIT: 5000 INJECTION, SOLUTION INTRAVENOUS; SUBCUTANEOUS at 12:18

## 2018-06-27 RX ADMIN — INSULIN ASPART SCH UNIT: 100 INJECTION, SOLUTION INTRAVENOUS; SUBCUTANEOUS at 21:24

## 2018-06-27 RX ADMIN — HEPARIN SODIUM SCH UNIT: 5000 INJECTION, SOLUTION INTRAVENOUS; SUBCUTANEOUS at 17:40

## 2018-06-27 RX ADMIN — GABAPENTIN SCH MG: 400 CAPSULE ORAL at 17:40

## 2018-06-27 NOTE — XR
EXAMINATION TYPE: XR chest 1V

 

DATE OF EXAM: 6/27/2018

 

COMPARISON: Prior chest x-ray 6/26/2018

 

HISTORY: Congestive heart failure

 

TECHNIQUE: Single frontal view of the chest is obtained.

 

FINDINGS:  The heart is enlarged. Central vascularity and interstitium are increased. No evident pneu
mothorax. Intracardiac defibrillator leads are stable. Generator is in left pectoral region. No evide
nt pneumothorax. Patchy basilar density persists.

 

IMPRESSION:  Findings suggest pulmonary venous hypertension and interstitial edema. There may be basi
lar pneumonia versus edema or atelectasis, difficult to exclude small effusion due to patient body ha
bitus. Follow-up suggested.

## 2018-06-27 NOTE — ECHOF
Referral Reason:chf



MEASUREMENTS

--------

HEIGHT: 170.2 cm

WEIGHT: 102.1 kg

BP: 101/56

RVIDd:   3.5 cm     (< 3.3)

IVSd:   1.3 cm     (0.6 - 1.1)

LVIDd:   5.4 cm     (3.9 - 5.3)

LVPWd:   1.3 cm     (0.6 - 1.1)

IVSs:   2.0 cm

LVIDs:   3.0 cm

LVPWs:   1.7 cm

LA Diam:   4.6 cm     (2.7 - 3.8)

LAESV Index (A-L):   48.40 ml/m

Ao Diam:   3.5 cm     (2.0 - 3.7)

AV Cusp:   1.6 cm     (1.5 - 2.6)

EPSS:   0.7 cm

MV E Fito:   0.85 m/s

MV DecT:   241 ms

MV A Fito:   0.99 m/s

MV E/A Ratio:   0.86 

AV maxP.06 mmHg

AV meanP.87 mmHg

AR PHT:   719 ms

RAP:   5.00 mmHg

RVSP:   46.35 mmHg

MV EF SLOPE:   43.51 mm/s     (70 - 150)

MV EXCURSION:   1.44 cm     (> 18.000)







FINDINGS

--------

Sinus rhythm with extra systolic beats.

This was a technically adequate study.

The left ventricular size is normal.   There is mild concentric left ventricular hypertrophy.   Overa
ll left ventricular systolic function is normal with, an EF between 55 - 60 %.

The right ventricle is mildly enlarged.

LA is severely dilated >40 ml/m2

The right atrium is normal in size.

There is mild aortic valve sclerosis.   There is mild aortic regurgitation.   There is mild aortic st
enosis present.

The mitral valve is normal.

Mild tricuspid regurgitation present.   There is moderate pulmonary hypertension.   The right ventric
ular systolic pressure, as measured by Doppler, is 46.35mmHg.

Trace/mild (physiologic)  pulmonic regurgitation.

The aortic root size is normal.

Normal inferior vena cava with normal inspiratory collapse consistent with estimated right atrial pre
ssure of  5 mmHg.

There is no pericardial effusion.



CONCLUSIONS

--------

1. Sinus rhythm with extra systolic beats.

2. This was a technically adequate study.

3. The left ventricular size is normal.

4. There is mild concentric left ventricular hypertrophy.

5. Overall left ventricular systolic function is normal with, an EF between 55 - 60 %.

6. The right ventricle is mildly enlarged.

7. LA is severely dilated >40 ml/m2

8. The right atrium is normal in size.

9. There is mild aortic valve sclerosis.

10. There is mild aortic regurgitation.

11. There is mild aortic stenosis present.

12. The mitral valve is normal.

13. Mild tricuspid regurgitation present.

14. There is moderate pulmonary hypertension.

15. The right ventricular systolic pressure, as measured by Doppler, is 46.35mmHg.

16. Trace/mild (physiologic)  pulmonic regurgitation.

17. The aortic root size is normal.

18. Normal inferior vena cava with normal inspiratory collapse consistent with estimated right atrial
 pressure of  5 mmHg.

19. There is no pericardial effusion.





SONOGRAPHER: DAQUAN Coronel

## 2018-06-27 NOTE — P.PN
Subjective


Progress Note Date: 06/27/18


Principal diagnosis: 





CHF


This is 73-year-old gentleman with known history of diabetes, hypertension, 

hyperlipidemia, COPD, congestive heart failure, history of EtOH abuse who 

presented to the hospital mainly with symptoms of generally not feeling well 

with associated vague abdominal and chest discomfort.  Cardiology consultation 

was initially requested because of abnormal troponins.  At the time of our 

consultation yesterday patient was chest pain-free and today denies any chest 

pain.  They did perform a VQ scan which was low probability for pulmonary 

embolism.  Patient had an echocardiogram with Doppler study performed in March 

of this year which revealed a normal left ventricular systolic function.  Chest 

x-ray performed here suggested pulmonary venous hypertension and interstitial 

edema.  There may be some basilar pneumonia versus edema or atelectasis, 

difficult to exclude small effusion.  Patient was given a one-time dose of IV 

Lasix in the emergency room.  At pressure this morning and 10/56 with a heart 

rate in the 60s, 97% on 2 L of oxygen.  Sodium 137, potassium 5.3, BUN 54, 

creatinine 3.08.  Calcium 8.3, total bilirubin 4.0, AST 73, ALT 56, alk phos 

133.  Albumin 3.2, amylase 183 and lipase 2480.








Objective





- Vital Signs


Vital signs: 


 Vital Signs











Temp  98.8 F   06/27/18 11:51


 


Pulse  60   06/27/18 11:51


 


Resp  20   06/27/18 11:51


 


BP  111/55   06/27/18 11:51


 


Pulse Ox  97   06/27/18 11:51








 Intake & Output











 06/26/18 06/27/18 06/27/18





 18:59 06:59 18:59


 


Intake Total 300 1625 1100


 


Output Total  260 


 


Balance 300 1365 1100


 


Intake:   


 


  IV  1625 1000


 


    Sodium Chloride 0.9% 1,  625 1000





    000 ml @ 125 mls/hr IV .   





    Q8[facility] Rx#:[id number]   


 


    Sodium Chloride 0.9% 1,  1000 





    000 ml @ 999 mls/hr IV .   





    Q1H1M [facility] Rx#:[id number]   


 


  Oral 300  100


 


Output:   


 


  Post Void Residual  260 














- Exam


Constitutional: Lethargic, briefly answers questions with soft toned voice and 

drifts back to sleep.


Eyes:Anicteric sclerae, moist conjunctiva, no lid-lag, PERRLA, 


ENMT: Oropharynx clear, no erythema, exudates


Neck: Supple, FROM, no masses, or JVD, No carotid bruits, No thyromegaly


Lungs: Clear to auscultation, Clear to percussion, Normal respiratory effort, 

no accessory muscle use 


Cardiovascular: Heart regular in rate and rhythm, No murmurs, gallops, or rubs, 

No peripheral edema


Abdominal: Soft, Nontender, no guarding, rebound or rigidity, Normoactive bowel 

sounds, No hepatomegaly, No splenomegaly, No palpable mass 


Skin: Normal temperature, tone, texture, turgor, no induration, No subcutaneous 

nodules, No rash, lesions, No ulcers


Extremities: No digital cyanosis, No clubbing, Pedal pulses intact and 

symmetrical, Radial pulses intact and symmetrical, No calf tenderness 


Psychiatric: Alert and oriented to person, place and time, appropriate affect, 

intact judgement         


Neuro: Muscles Strength 5/5 in all 4 extremities, Sensation to light touch 

grossly present throughout, Cranial nerves II-XII grossly intact, no focal 

sensory deficits











- Labs


CBC & Chem 7: 


 06/26/18 06:35





 06/27/18 10:45


Labs: 


 Abnormal Lab Results - Last 24 Hours (Table)











  06/26/18 06/26/18 06/26/18 Range/Units





  16:16 16:17 16:35 


 


ABG pH     (7.35-7.45)  


 


ABG O2 Saturation     (94-97)  %


 


Sodium    134 L  (137-145)  mmol/L


 


Potassium    6.6 H*  (3.5-5.1)  mmol/L


 


Carbon Dioxide    21 L  (22-30)  mmol/L


 


BUN    49 H  (9-20)  mg/dL


 


Creatinine    2.40 H  (0.66-1.25)  mg/dL


 


Glucose    531 H*  (74-99)  mg/dL


 


POC Glucose (mg/dL)  502 H  469 H   (75-99)  mg/dL


 


Plasma Lactic Acid Buddy     (0.7-2.0)  mmol/L


 


Calcium     (8.4-10.2)  mg/dL


 


Magnesium    1.4 L  (1.6-2.3)  mg/dL


 


Total Bilirubin    4.0 H  (0.2-1.3)  mg/dL


 


AST    129 H  (17-59)  U/L


 


Alkaline Phosphatase    139 H  ()  U/L


 


Albumin     (3.5-5.0)  g/dL


 


Amylase    316 H*  ()  U/L


 


Lipase    4295 H  ()  U/L


 


Urine Glucose (UA)     (Negative)  


 


Urine Bilirubin     (Negative)  














  06/26/18 06/26/18 06/26/18 Range/Units





  18:29 19:50 20:26 


 


ABG pH  7.28 L    (7.35-7.45)  


 


ABG O2 Saturation  97.2 H    (94-97)  %


 


Sodium    135 L  (137-145)  mmol/L


 


Potassium     (3.5-5.1)  mmol/L


 


Carbon Dioxide    21 L  (22-30)  mmol/L


 


BUN    52 H  (9-20)  mg/dL


 


Creatinine    2.40 H  (0.66-1.25)  mg/dL


 


Glucose    397 H  (74-99)  mg/dL


 


POC Glucose (mg/dL)   406 H   (75-99)  mg/dL


 


Plasma Lactic Acid Buddy     (0.7-2.0)  mmol/L


 


Calcium    8.1 L  (8.4-10.2)  mg/dL


 


Magnesium     (1.6-2.3)  mg/dL


 


Total Bilirubin     (0.2-1.3)  mg/dL


 


AST     (17-59)  U/L


 


Alkaline Phosphatase     ()  U/L


 


Albumin     (3.5-5.0)  g/dL


 


Amylase     ()  U/L


 


Lipase     ()  U/L


 


Urine Glucose (UA)     (Negative)  


 


Urine Bilirubin     (Negative)  














  06/26/18 06/26/18 06/26/18 Range/Units





  20:26 20:49 22:07 


 


ABG pH     (7.35-7.45)  


 


ABG O2 Saturation     (94-97)  %


 


Sodium     (137-145)  mmol/L


 


Potassium     (3.5-5.1)  mmol/L


 


Carbon Dioxide     (22-30)  mmol/L


 


BUN     (9-20)  mg/dL


 


Creatinine     (0.66-1.25)  mg/dL


 


Glucose     (74-99)  mg/dL


 


POC Glucose (mg/dL)   325 H  271 H  (75-99)  mg/dL


 


Plasma Lactic Acid Buddy  2.4 H*    (0.7-2.0)  mmol/L


 


Calcium     (8.4-10.2)  mg/dL


 


Magnesium     (1.6-2.3)  mg/dL


 


Total Bilirubin     (0.2-1.3)  mg/dL


 


AST     (17-59)  U/L


 


Alkaline Phosphatase     ()  U/L


 


Albumin     (3.5-5.0)  g/dL


 


Amylase     ()  U/L


 


Lipase     ()  U/L


 


Urine Glucose (UA)     (Negative)  


 


Urine Bilirubin     (Negative)  














  06/27/18 06/27/18 Range/Units





  06:00 10:45 


 


ABG pH    (7.35-7.45)  


 


ABG O2 Saturation    (94-97)  %


 


Sodium    (137-145)  mmol/L


 


Potassium   5.3 H  (3.5-5.1)  mmol/L


 


Carbon Dioxide    (22-30)  mmol/L


 


BUN   54 H  (9-20)  mg/dL


 


Creatinine   3.08 H  (0.66-1.25)  mg/dL


 


Glucose    (74-99)  mg/dL


 


POC Glucose (mg/dL)    (75-99)  mg/dL


 


Plasma Lactic Acid Buddy    (0.7-2.0)  mmol/L


 


Calcium   8.3 L  (8.4-10.2)  mg/dL


 


Magnesium    (1.6-2.3)  mg/dL


 


Total Bilirubin   4.0 H  (0.2-1.3)  mg/dL


 


AST   73 H  (17-59)  U/L


 


Alkaline Phosphatase   133 H  ()  U/L


 


Albumin   3.2 L  (3.5-5.0)  g/dL


 


Amylase   183 H  ()  U/L


 


Lipase   2480 H  ()  U/L


 


Urine Glucose (UA)  1+ H   (Negative)  


 


Urine Bilirubin  1+ H   (Negative)  














Assessment and Plan


Plan: 


Assessment and plan


#1 abdominal discomfort, patient had recent admission to the hospital in March 

of this year with acute pancreatitis, GI service on consultation.


#2 diastolic congestive heart failure acute on chronic


#3 hypertension


#4 hyperlipidemia


#5 diabetes


#6 abnormal troponins, likely secondary to renal insufficiency








Plan


From cardiology's perspective, we'll recommend to continue current medications.

  Echocardiogram with Doppler study revealed a normal ejection fraction.  LA 

severely dilated. We will continue to follow .








DNP note has been reviewed, I agree with a documented findings and plan of 

care.  Patient was seen and examined.

## 2018-06-27 NOTE — P.CONS
History of Present Illness





- Reason for Consult


Consult date: 06/27/18


pancreatitis


Requesting physician: Santi Braga





- History of Present Illness


73 male PMH ETOH abuse, chronic hepatitis C, alcohol pancreatitis in March 2018 

admitted with chest upper abdominal pain electrolyte abnormalities; K+ 6.6, 

Magnesium 1.0.  Patient is a poor historian. Consult requested for 

pancreatitis.  HCV RNA detected in March 2018 genotype 1A maintained on Mavyret.





Admission lipase 4295. Amylase 316. Tbili 2.8. . ALT 39. . Today 

pancreatic enzymes have improved; lipase 2480. Amylase 183. TB 4.0. AST 73. ALT 

56. . Unsure if patient was drinking alcohol prior to admission he would 

not say but in March 2018 he was drinking 3-4 beers daily average 3-5 days a 

week. Abdominal imaging in March did not identify abnormalities of the 

gallbladder liver spleen or pancreas. CA 19-9 50.6 but can be mildly elevated 

in the setting of acute pancreatitis. AFP normal. Triglycerides 146. 





Creatinine 1.8 on admission presently 3.0. BUN 38-54. Received fluid boluses 

upon admission. 





CT abdomen this admission reported fullness near pancreatic head otherwise no 

appreciable abnormalities of GB, spleen or liver. 





No fever, chills, hematemesis, hematochezia or melena. 








Review of Systems


Constitutional: Denies fever, chills, sweats, weight gain, or loss. Admitted 

with weakness.


HEENT: Negative for migraines, blurred vision or loss, earaches, drainage, 

tinnitus, oral mucosal lesions, dysphagia, or odynophagia.


Cardiac: Admitted with chest pain, arrhythmias, or palpitation.


Respiratory: Negative for shortness of breath, hemoptysis, cough, or sputum 

production.


Gastrointestinal: See HPI for pertinent findings.


Genitourinary: Negative for hematuria, urgency, frequency, polyuria, dysuria, 

or penile discharge.


Musculoskeletal: Negative for muscle aches, swelling, arthritis, and 

arthralgias.  


Neurologic: Negative for stroke or TIA.


Endocrine: Negative for thyroid problems.


Skin: Negative for rash or itching.


Psychiatric: Negative history for depression and anxiety








Constitutional: Reports weakness


Gastrointestinal: Reports as per HPI, Reports abdominal pain





Past Medical History


Past Medical History: Coronary Artery Disease (CAD), Chest Pain / Angina, Heart 

Failure, COPD, Diabetes Mellitus, Hyperlipidemia, Hypertension, Liver Disease, 

Osteoarthritis (OA)


Additional Past Medical History / Comment(s): Chronic CHF systolic dysfunction 

with EF 20%, murmur, home oxygen, 3/3/18 acute pancreatitis, gout several joints

, gouty arthiritis to R great toe, ddd lumbar region, folliculitis, constipation

, renal insufficiency, chronic anemia, thrombocytopenia, hep c 1-1-14, diabetes 

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 

peripheral neuropathy hands, legs and feet, hemothorax associated with liver bx 

tx with chest tube, cellulitis to lt foot/ankle, pt was born with R leg larger 

than L leg.


Last Myocardial Infarction Date:: 5/2014


History of Any Multi-Drug Resistant Organisms: None Reported


Past Surgical History: Heart Catheterization, Pacemaker


Additional Past Surgical History / Comment(s): PTCA 2011 in [place], CATARACTS 

ROCKY EYES REMOVED.  liver biopsy on 4-7-14,


Past Anesthesia/Blood Transfusion Reactions: No Reported Reaction


Type of Cardiac Device: Permanent Pacemaker


Device Placement Date:: 2014? pt unsure


Smoking Status: Never smoker





- Past Family History


  ** Mother


Family Medical History: Hypertension





  ** Sister(s)


Family Medical History: Cancer





  ** Father


History Unknown: Yes





Medications and Allergies


 Home Medications











 Medication  Instructions  Recorded  Confirmed  Type


 


Nitroglycerin Sl Tabs [Nitrostat] 0.4 mg SUBLINGUAL Q5M PRN 05/18/14 06/26/18 

History


 


Carvedilol 25 mg PO AC-BID 07/14/15 06/26/18 History


 


Docusate [Colace] 100 mg PO HS PRN 11/25/15 06/26/18 History


 


INSULIN LISPRO (humaLOG) [humaLOG] See Protocol SQ AC-TID 03/03/18 06/26/18 

History


 


Losartan [Cozaar] 12.5 mg PO DAILY 03/03/18 06/26/18 History


 


Spironolactone [Aldactone] 25 mg PO DAILY 03/03/18 06/26/18 History


 


Furosemide [Lasix] 40 mg PO BID@0900,1600  tab 03/13/18 06/26/18 Rx


 


Isosorbide Mononitrate ER [Imdur] 60 mg PO DAILY  tab.er.24h 03/13/18 06/26/18 

Rx


 


Pantoprazole [Protonix] 40 mg PO AC-BRKFST  tablet. 03/13/18 06/26/18 Rx


 


hydrALAZINE HCL [Apresoline] 100 mg PO TID  tab 03/13/18 06/26/18 Rx


 


Atorvastatin [Lipitor] 20 mg PO HS 06/26/18 06/26/18 History


 


Gabapentin [Neurontin] 400 mg PO TID 06/26/18 06/26/18 History


 


Glecaprevir/Pibrentasvir [Mavyret 3 tab PO DAILY 06/26/18 06/26/18 History





100-40 mg Tablet]    


 


HYDROcodone/APAP 7.5-325MG [Norco 1 tab PO Q6HR PRN 06/26/18 06/26/18 History





7.5-325]    


 


Insulin Detemir [Levemir] 35 unit SQ DAILY 06/26/18 06/26/18 History


 


Magnesium Oxide [Mag-Ox] 400 mg PO DAILY 06/26/18 06/26/18 History











 Allergies











Allergy/AdvReac Type Severity Reaction Status Date / Time


 


No Known Allergies Allergy   Verified 06/26/18 07:39














Physical Exam


Vitals: 


 Vital Signs











  Temp Pulse Pulse Resp BP BP Pulse Ox


 


 06/26/18 23:56    65  18   


 


 06/26/18 23:15  97.4 F L   65  18   108/56  96


 


 06/26/18 20:00     16   


 


 06/26/18 19:50  97.4 F L   65  16   128/63  96


 


 06/26/18 19:38   80     


 


 06/26/18 19:29   78     


 


 06/26/18 16:06  96.8 F L   66  18   148/67  92 L


 


 06/26/18 14:58  98.4 F  80   18  105/68   95


 


 06/26/18 13:43   65   18  90/55   95


 


 06/26/18 12:40      101/56  


 


 06/26/18 12:38   65   18  89/54   96








 Intake and Output











 06/26/18 06/27/18 06/27/18





 22:59 06:59 14:59


 


Intake Total 300 1625 


 


Output Total  260 


 


Balance 300 1365 


 


Intake:   


 


  IV  1625 


 


    Sodium Chloride 0.9% 1,  625 





    000 ml @ 125 mls/hr IV .   





    Q8H ANTOLIN Rx#:[id number]   


 


    Sodium Chloride 0.9% 1,  1000 





    000 ml @ 999 mls/hr IV .   





    Q1H1M ONE Rx#:[id number]   


 


  Oral 300  


 


Output:   


 


  Post Void Residual  260 














- Constitutional


General appearance: average body habitus





- EENT


sclera dull





Eyes: normal appearance





- Respiratory


Respiratory: bilateral: CTA





- Cardiovascular


Rhythm: regular


Heart sounds: normal: S1, S2





- Gastrointestinal


very mild midepigastric tenderness





General gastrointestinal: soft





Results


CBC & Chem 7: 


 06/28/18 06:25





 06/28/18 06:25


Labs: 


 Abnormal Lab Results - Last 24 Hours (Table)











  06/26/18 06/26/18 06/26/18 Range/Units





  16:16 16:17 16:35 


 


ABG pH     (7.35-7.45)  


 


ABG O2 Saturation     (94-97)  %


 


Sodium    134 L  (137-145)  mmol/L


 


Potassium    6.6 H*  (3.5-5.1)  mmol/L


 


Carbon Dioxide    21 L  (22-30)  mmol/L


 


BUN    49 H  (9-20)  mg/dL


 


Creatinine    2.40 H  (0.66-1.25)  mg/dL


 


Glucose    531 H*  (74-99)  mg/dL


 


POC Glucose (mg/dL)  502 H  469 H   (75-99)  mg/dL


 


Plasma Lactic Acid Buddy     (0.7-2.0)  mmol/L


 


Calcium     (8.4-10.2)  mg/dL


 


Magnesium    1.4 L  (1.6-2.3)  mg/dL


 


Total Bilirubin    4.0 H  (0.2-1.3)  mg/dL


 


AST    129 H  (17-59)  U/L


 


Alkaline Phosphatase    139 H  ()  U/L


 


Amylase    316 H*  ()  U/L


 


Lipase    4295 H  ()  U/L


 


Urine Glucose (UA)     (Negative)  


 


Urine Bilirubin     (Negative)  














  06/26/18 06/26/18 06/26/18 Range/Units





  18:29 19:50 20:26 


 


ABG pH  7.28 L    (7.35-7.45)  


 


ABG O2 Saturation  97.2 H    (94-97)  %


 


Sodium    135 L  (137-145)  mmol/L


 


Potassium     (3.5-5.1)  mmol/L


 


Carbon Dioxide    21 L  (22-30)  mmol/L


 


BUN    52 H  (9-20)  mg/dL


 


Creatinine    2.40 H  (0.66-1.25)  mg/dL


 


Glucose    397 H  (74-99)  mg/dL


 


POC Glucose (mg/dL)   406 H   (75-99)  mg/dL


 


Plasma Lactic Acid Buddy     (0.7-2.0)  mmol/L


 


Calcium    8.1 L  (8.4-10.2)  mg/dL


 


Magnesium     (1.6-2.3)  mg/dL


 


Total Bilirubin     (0.2-1.3)  mg/dL


 


AST     (17-59)  U/L


 


Alkaline Phosphatase     ()  U/L


 


Amylase     ()  U/L


 


Lipase     ()  U/L


 


Urine Glucose (UA)     (Negative)  


 


Urine Bilirubin     (Negative)  














  06/26/18 06/26/18 06/26/18 Range/Units





  20:26 20:49 22:07 


 


ABG pH     (7.35-7.45)  


 


ABG O2 Saturation     (94-97)  %


 


Sodium     (137-145)  mmol/L


 


Potassium     (3.5-5.1)  mmol/L


 


Carbon Dioxide     (22-30)  mmol/L


 


BUN     (9-20)  mg/dL


 


Creatinine     (0.66-1.25)  mg/dL


 


Glucose     (74-99)  mg/dL


 


POC Glucose (mg/dL)   325 H  271 H  (75-99)  mg/dL


 


Plasma Lactic Acid Buddy  2.4 H*    (0.7-2.0)  mmol/L


 


Calcium     (8.4-10.2)  mg/dL


 


Magnesium     (1.6-2.3)  mg/dL


 


Total Bilirubin     (0.2-1.3)  mg/dL


 


AST     (17-59)  U/L


 


Alkaline Phosphatase     ()  U/L


 


Amylase     ()  U/L


 


Lipase     ()  U/L


 


Urine Glucose (UA)     (Negative)  


 


Urine Bilirubin     (Negative)  














  06/27/18 Range/Units





  06:00 


 


ABG pH   (7.35-7.45)  


 


ABG O2 Saturation   (94-97)  %


 


Sodium   (137-145)  mmol/L


 


Potassium   (3.5-5.1)  mmol/L


 


Carbon Dioxide   (22-30)  mmol/L


 


BUN   (9-20)  mg/dL


 


Creatinine   (0.66-1.25)  mg/dL


 


Glucose   (74-99)  mg/dL


 


POC Glucose (mg/dL)   (75-99)  mg/dL


 


Plasma Lactic Acid Buddy   (0.7-2.0)  mmol/L


 


Calcium   (8.4-10.2)  mg/dL


 


Magnesium   (1.6-2.3)  mg/dL


 


Total Bilirubin   (0.2-1.3)  mg/dL


 


AST   (17-59)  U/L


 


Alkaline Phosphatase   ()  U/L


 


Amylase   ()  U/L


 


Lipase   ()  U/L


 


Urine Glucose (UA)  1+ H  (Negative)  


 


Urine Bilirubin  1+ H  (Negative)  











CT scan - abdomen: report reviewed (Dr. Joseph)





Assessment and Plan


Assessment: 


Impression:


1. Acute pancreatitis possible alcohol related possible alcohol hepatitis.


2. History of ETOH.  History of chronic hepatitis C presently maintained on 

antiviral therapy.


3. History of pancreatitis last documented episode March 2018.


4. Acute kidney injury.








Plan:


1. Clear liquids. IV hydration.  US abdomen.


2. Daily CMP, lipase, amylase.


3. Will check hepatitis panel, GGT, AFP, and CA 19-9. 


4. Will follow with you.





Thank you for this kind referral and the opportunity to participate in the care 

of your patient.  This consultation was discussed with Dr. Joseph.  The 

impression and plan of care have been directed as dictated.

## 2018-06-27 NOTE — P.PN
Subjective


Progress Note Date: 06/27/18


Principal diagnosis: 





Abdominal and chest pain.





Patient denied any abdominal pain or chest pain today, no sob. No bm yet. 

Currently NPO. No fevers or chills. 





Objective





- Vital Signs


Vital signs: 


 Vital Signs











Temp  98.8 F   06/27/18 11:51


 


Pulse  68   06/27/18 13:02


 


Resp  20   06/27/18 11:51


 


BP  111/55   06/27/18 11:51


 


Pulse Ox  97   06/27/18 11:51








 Intake & Output











 06/26/18 06/27/18 06/27/18





 18:59 06:59 18:59


 


Intake Total 300 1625 1780


 


Output Total  260 


 


Balance 300 1365 1780


 


Intake:   


 


  IV  1625 1000


 


    Sodium Chloride 0.9% 1,  625 1000





    000 ml @ 125 mls/hr IV .   





    Q8H [facility] Rx#:[id number]   


 


    Sodium Chloride 0.9% 1,  1000 





    000 ml @ 999 mls/hr IV .   





    Q1H1M ONE Rx#:[id number]   


 


  Oral 300  780


 


Output:   


 


  Post Void Residual  260 














- Exam





Constitutional: No acute distress, conversant, pleasant


Eyes:Anicteric sclerae, moist conjunctiva, no lid-lag, PERRLA, 


ENMT: Oropharynx clear, no erythema, exudates


Neck: Supple, FROM, no masses, or JVD, No carotid bruits, No thyromegaly


Lungs: Clear to auscultation, Clear to percussion, Normal respiratory effort, 

no accessory muscle use 


Cardiovascular: Heart regular in rate and rhythm, No murmurs, gallops, or rubs, 

No peripheral edema


Abdominal: Soft, Nontender, no guarding, rebound or rigidity, Normoactive bowel 

sounds, No hepatomegaly, No splenomegaly, No palpable mass 


Skin: Normal temperature, tone, texture, turgor, no induration, No subcutaneous 

nodules, No rash, lesions, No ulcers


Extremities: No digital cyanosis, No clubbing, Pedal pulses intact and 

symmetrical, Radial pulses intact and symmetrical, No calf tenderness 


Psychiatric: Alert and oriented to person, place and time, appropriate affect, 

intact judgement         


Neuro: Muscles Strength 5/5 in all 4 extremities, Sensation to light touch 

grossly present throughout, Cranial nerves II-XII grossly intact, no focal 

sensory deficits








- Labs


CBC & Chem 7: 


 06/26/18 06:35





 06/27/18 10:45


Labs: 


 Abnormal Lab Results - Last 24 Hours (Table)











  06/26/18 06/26/18 06/26/18 Range/Units





  16:16 16:17 16:35 


 


ABG pH     (7.35-7.45)  


 


ABG O2 Saturation     (94-97)  %


 


Sodium    134 L  (137-145)  mmol/L


 


Potassium    6.6 H*  (3.5-5.1)  mmol/L


 


Carbon Dioxide    21 L  (22-30)  mmol/L


 


BUN    49 H  (9-20)  mg/dL


 


Creatinine    2.40 H  (0.66-1.25)  mg/dL


 


Glucose    531 H*  (74-99)  mg/dL


 


POC Glucose (mg/dL)  502 H  469 H   (75-99)  mg/dL


 


Plasma Lactic Acid Buddy     (0.7-2.0)  mmol/L


 


Calcium     (8.4-10.2)  mg/dL


 


Magnesium    1.4 L  (1.6-2.3)  mg/dL


 


Total Bilirubin    4.0 H  (0.2-1.3)  mg/dL


 


GGT     (15-73)  U/L


 


AST    129 H  (17-59)  U/L


 


Alkaline Phosphatase    139 H  ()  U/L


 


Albumin     (3.5-5.0)  g/dL


 


Amylase    316 H*  ()  U/L


 


Lipase    4295 H  ()  U/L


 


Urine Glucose (UA)     (Negative)  


 


Urine Bilirubin     (Negative)  














  06/26/18 06/26/18 06/26/18 Range/Units





  18:29 19:50 20:26 


 


ABG pH  7.28 L    (7.35-7.45)  


 


ABG O2 Saturation  97.2 H    (94-97)  %


 


Sodium    135 L  (137-145)  mmol/L


 


Potassium     (3.5-5.1)  mmol/L


 


Carbon Dioxide    21 L  (22-30)  mmol/L


 


BUN    52 H  (9-20)  mg/dL


 


Creatinine    2.40 H  (0.66-1.25)  mg/dL


 


Glucose    397 H  (74-99)  mg/dL


 


POC Glucose (mg/dL)   406 H   (75-99)  mg/dL


 


Plasma Lactic Acid Buddy     (0.7-2.0)  mmol/L


 


Calcium    8.1 L  (8.4-10.2)  mg/dL


 


Magnesium     (1.6-2.3)  mg/dL


 


Total Bilirubin     (0.2-1.3)  mg/dL


 


GGT     (15-73)  U/L


 


AST     (17-59)  U/L


 


Alkaline Phosphatase     ()  U/L


 


Albumin     (3.5-5.0)  g/dL


 


Amylase     ()  U/L


 


Lipase     ()  U/L


 


Urine Glucose (UA)     (Negative)  


 


Urine Bilirubin     (Negative)  














  06/26/18 06/26/18 06/26/18 Range/Units





  20:26 20:49 22:07 


 


ABG pH     (7.35-7.45)  


 


ABG O2 Saturation     (94-97)  %


 


Sodium     (137-145)  mmol/L


 


Potassium     (3.5-5.1)  mmol/L


 


Carbon Dioxide     (22-30)  mmol/L


 


BUN     (9-20)  mg/dL


 


Creatinine     (0.66-1.25)  mg/dL


 


Glucose     (74-99)  mg/dL


 


POC Glucose (mg/dL)   325 H  271 H  (75-99)  mg/dL


 


Plasma Lactic Acid Buddy  2.4 H*    (0.7-2.0)  mmol/L


 


Calcium     (8.4-10.2)  mg/dL


 


Magnesium     (1.6-2.3)  mg/dL


 


Total Bilirubin     (0.2-1.3)  mg/dL


 


GGT     (15-73)  U/L


 


AST     (17-59)  U/L


 


Alkaline Phosphatase     ()  U/L


 


Albumin     (3.5-5.0)  g/dL


 


Amylase     ()  U/L


 


Lipase     ()  U/L


 


Urine Glucose (UA)     (Negative)  


 


Urine Bilirubin     (Negative)  














  06/27/18 06/27/18 06/27/18 Range/Units





  06:00 10:45 10:45 


 


ABG pH     (7.35-7.45)  


 


ABG O2 Saturation     (94-97)  %


 


Sodium     (137-145)  mmol/L


 


Potassium   5.3 H   (3.5-5.1)  mmol/L


 


Carbon Dioxide     (22-30)  mmol/L


 


BUN   54 H   (9-20)  mg/dL


 


Creatinine   3.08 H   (0.66-1.25)  mg/dL


 


Glucose     (74-99)  mg/dL


 


POC Glucose (mg/dL)     (75-99)  mg/dL


 


Plasma Lactic Acid Buddy     (0.7-2.0)  mmol/L


 


Calcium   8.3 L   (8.4-10.2)  mg/dL


 


Magnesium     (1.6-2.3)  mg/dL


 


Total Bilirubin   4.0 H   (0.2-1.3)  mg/dL


 


GGT    708 H  (15-73)  U/L


 


AST   73 H   (17-59)  U/L


 


Alkaline Phosphatase   133 H   ()  U/L


 


Albumin   3.2 L   (3.5-5.0)  g/dL


 


Amylase   183 H   ()  U/L


 


Lipase   2480 H   ()  U/L


 


Urine Glucose (UA)  1+ H    (Negative)  


 


Urine Bilirubin  1+ H    (Negative)  














Assessment and Plan


Plan: 





Abdominal and chest pain likely acute pancreatitis 


Amylase, lipase, patient was recently discharged last March after an admission 

for acute pancreatitis


Etiology unclear possibly ETOH related?


Repeat labs in am


IV fluids


GI consult





Elevated trops


Likely non-specific, sec to above


Echo checked, positive for moderate pulm hypertension, no motion abnormalities 

to suggest acute event.


Continue Coreg, hold ACE inhibitor because of renal failure


Seen by cardio





Acute renal failure with hyperkalemia


Check UA


Hold ACE inhibitor


Recheck electrolytes and renal function now, patient was given 1 dose of 

Kayexalate in the ER


IV fluids as above


Nephro consult.





Diabetes mellitus type 2 with hyperglycemia


Resume home insulin regimen


Sliding scale insulin moderate dose


Blood sugar check CBC and at bedtime


Sugars currently controlled





Essential hypertension, COPD, hyperlipidemia, osteoarthritis


Stable


Resume home meds





DVT prophylaxis


Heparin subcu

## 2018-06-28 LAB
ALBUMIN SERPL-MCNC: 3.1 G/DL (ref 3.5–5)
ALP SERPL-CCNC: 134 U/L (ref 38–126)
ALT SERPL-CCNC: 52 U/L (ref 21–72)
AMYLASE SERPL-CCNC: 193 U/L (ref 30–110)
ANION GAP SERPL CALC-SCNC: 10 MMOL/L
AST SERPL-CCNC: 73 U/L (ref 17–59)
BUN SERPL-SCNC: 58 MG/DL (ref 9–20)
CALCIUM SPEC-MCNC: 8.2 MG/DL (ref 8.4–10.2)
CHLORIDE SERPL-SCNC: 105 MMOL/L (ref 98–107)
CO2 SERPL-SCNC: 19 MMOL/L (ref 22–30)
ERYTHROCYTE [DISTWIDTH] IN BLOOD BY AUTOMATED COUNT: 3.93 M/UL (ref 4.3–5.9)
ERYTHROCYTE [DISTWIDTH] IN BLOOD: 13.6 % (ref 11.5–15.5)
GLUCOSE BLD-MCNC: 119 MG/DL (ref 75–99)
GLUCOSE BLD-MCNC: 129 MG/DL (ref 75–99)
GLUCOSE BLD-MCNC: 156 MG/DL (ref 75–99)
GLUCOSE BLD-MCNC: 255 MG/DL (ref 75–99)
GLUCOSE SERPL-MCNC: 136 MG/DL (ref 74–99)
HCT VFR BLD AUTO: 36.4 % (ref 39–53)
HGB BLD-MCNC: 11.3 GM/DL (ref 13–17.5)
INR PPP: 1 (ref ?–1.2)
LIPASE SERPL-CCNC: 2949 U/L (ref 23–300)
MCH RBC QN AUTO: 28.7 PG (ref 25–35)
MCHC RBC AUTO-ENTMCNC: 31 G/DL (ref 31–37)
MCV RBC AUTO: 92.6 FL (ref 80–100)
POTASSIUM SERPL-SCNC: 5.6 MMOL/L (ref 3.5–5.1)
PROT SERPL-MCNC: 6.5 G/DL (ref 6.3–8.2)
PT BLD: 9.7 SEC (ref 9–12)
SODIUM SERPL-SCNC: 134 MMOL/L (ref 137–145)
WBC # BLD AUTO: 8.9 K/UL (ref 3.8–10.6)

## 2018-06-28 RX ADMIN — INSULIN DETEMIR SCH UNIT: 100 INJECTION, SOLUTION SUBCUTANEOUS at 09:31

## 2018-06-28 RX ADMIN — PANTOPRAZOLE SODIUM SCH MG: 40 TABLET, DELAYED RELEASE ORAL at 09:30

## 2018-06-28 RX ADMIN — CEFAZOLIN SCH MLS/HR: 330 INJECTION, POWDER, FOR SOLUTION INTRAMUSCULAR; INTRAVENOUS at 04:00

## 2018-06-28 RX ADMIN — GABAPENTIN SCH MG: 400 CAPSULE ORAL at 09:30

## 2018-06-28 RX ADMIN — IPRATROPIUM BROMIDE AND ALBUTEROL SULFATE SCH ML: .5; 3 SOLUTION RESPIRATORY (INHALATION) at 08:52

## 2018-06-28 RX ADMIN — INSULIN ASPART SCH: 100 INJECTION, SOLUTION INTRAVENOUS; SUBCUTANEOUS at 17:53

## 2018-06-28 RX ADMIN — GABAPENTIN SCH: 400 CAPSULE ORAL at 16:10

## 2018-06-28 RX ADMIN — ASPIRIN SCH: 325 TABLET ORAL at 09:30

## 2018-06-28 RX ADMIN — INSULIN ASPART SCH: 100 INJECTION, SOLUTION INTRAVENOUS; SUBCUTANEOUS at 05:58

## 2018-06-28 RX ADMIN — CEFAZOLIN SCH MLS/HR: 330 INJECTION, POWDER, FOR SOLUTION INTRAMUSCULAR; INTRAVENOUS at 06:54

## 2018-06-28 RX ADMIN — INSULIN ASPART SCH UNIT: 100 INJECTION, SOLUTION INTRAVENOUS; SUBCUTANEOUS at 12:11

## 2018-06-28 RX ADMIN — HEPARIN SODIUM SCH UNIT: 5000 INJECTION, SOLUTION INTRAVENOUS; SUBCUTANEOUS at 23:01

## 2018-06-28 RX ADMIN — FUROSEMIDE SCH MLS/HR: 10 INJECTION, SOLUTION INTRAMUSCULAR; INTRAVENOUS at 14:50

## 2018-06-28 RX ADMIN — HEPARIN SODIUM SCH UNIT: 5000 INJECTION, SOLUTION INTRAVENOUS; SUBCUTANEOUS at 16:10

## 2018-06-28 RX ADMIN — ATORVASTATIN CALCIUM SCH MG: 20 TABLET, FILM COATED ORAL at 21:30

## 2018-06-28 RX ADMIN — CEFAZOLIN SCH MLS/HR: 330 INJECTION, POWDER, FOR SOLUTION INTRAMUSCULAR; INTRAVENOUS at 23:01

## 2018-06-28 RX ADMIN — GABAPENTIN SCH MG: 400 CAPSULE ORAL at 21:30

## 2018-06-28 RX ADMIN — IPRATROPIUM BROMIDE AND ALBUTEROL SULFATE SCH ML: .5; 3 SOLUTION RESPIRATORY (INHALATION) at 13:17

## 2018-06-28 RX ADMIN — INSULIN ASPART SCH UNIT: 100 INJECTION, SOLUTION INTRAVENOUS; SUBCUTANEOUS at 21:30

## 2018-06-28 RX ADMIN — HEPARIN SODIUM SCH UNIT: 5000 INJECTION, SOLUTION INTRAVENOUS; SUBCUTANEOUS at 09:31

## 2018-06-28 RX ADMIN — Medication SCH MG: at 09:30

## 2018-06-28 RX ADMIN — IPRATROPIUM BROMIDE AND ALBUTEROL SULFATE SCH ML: .5; 3 SOLUTION RESPIRATORY (INHALATION) at 19:14

## 2018-06-28 NOTE — P.PN
Subjective


Progress Note Date: 06/28/18


Principal diagnosis: 





Abdominal and chest pain.





Patient is feeling okay now,.  He denied having any pain in the chest or 

abdomen.





Objective





- Vital Signs


Vital signs: 


 Vital Signs











Temp  97.7 F   06/28/18 08:00


 


Pulse  75   06/28/18 09:01


 


Resp  18   06/28/18 08:00


 


BP  122/57   06/28/18 08:00


 


Pulse Ox  95   06/28/18 08:51








 Intake & Output











 06/27/18 06/28/18 06/28/18





 18:59 06:59 18:59


 


Intake Total 3710 1000 


 


Output Total 150  


 


Balance 3560 1000 


 


Weight  103.1 kg 


 


Intake:   


 


  IV 2000 1000 


 


    Sodium Chloride 0.9% 1, 2000 1000 





    000 ml @ 125 mls/hr IV .   





    Q8H [facility] Rx#:[id number]   


 


  Oral 1710  


 


Output:   


 


  Urine 150  


 


Other:   


 


  Voiding Method Urinal  Urinal


 


  # Voids  0 














- Exam





Constitutional: No acute distress, conversant, pleasant


Eyes: Positive for edema around the eyelids and eyes.  Anicteric sclerae, moist 

conjunctiva, no lid-lag, PERRLA, 


ENMT: Oropharynx clear, no erythema, exudates


Neck: Supple, FROM, no masses, or JVD, No carotid bruits, No thyromegaly


Lungs: Clear to auscultation, Clear to percussion, Normal respiratory effort, 

no accessory muscle use 


Cardiovascular: Heart regular in rate and rhythm, No murmurs, gallops, or rubs, 

1+ peripheral edema


Abdominal: Soft, Nontender, no guarding, rebound or rigidity, Normoactive bowel 

sounds, No hepatomegaly, No splenomegaly, No palpable mass 


Skin: Normal temperature, tone, texture, turgor, no induration, No subcutaneous 

nodules, No rash, lesions, No ulcers


Extremities: No digital cyanosis, No clubbing, Pedal pulses intact and 

symmetrical, Radial pulses intact and symmetrical, No calf tenderness 


Psychiatric: Alert and oriented to person, place and time, appropriate affect, 

intact judgement         


Neuro: Muscles Strength 5/5 in all 4 extremities, Sensation to light touch 

grossly present throughout, Cranial nerves II-XII grossly intact, no focal 

sensory deficits








- Labs


CBC & Chem 7: 


 06/28/18 06:25





 06/28/18 06:25


Labs: 


 Abnormal Lab Results - Last 24 Hours (Table)











  06/27/18 06/27/18 06/27/18 Range/Units





  10:45 10:45 10:45 


 


RBC     (4.30-5.90)  m/uL


 


Hgb     (13.0-17.5)  gm/dL


 


Hct     (39.0-53.0)  %


 


Sodium     (137-145)  mmol/L


 


Potassium  5.3 H    (3.5-5.1)  mmol/L


 


Carbon Dioxide     (22-30)  mmol/L


 


BUN  54 H    (9-20)  mg/dL


 


Creatinine  3.08 H    (0.66-1.25)  mg/dL


 


Glucose     (74-99)  mg/dL


 


POC Glucose (mg/dL)     (75-99)  mg/dL


 


Calcium  8.3 L    (8.4-10.2)  mg/dL


 


Total Bilirubin  4.0 H    (0.2-1.3)  mg/dL


 


GGT   708 H   (15-73)  U/L


 


AST  73 H    (17-59)  U/L


 


Alkaline Phosphatase  133 H    ()  U/L


 


Albumin  3.2 L    (3.5-5.0)  g/dL


 


Amylase  183 H    ()  U/L


 


Lipase  2480 H    ()  U/L


 


CA 19-9 Antigen     (0.0-34.9)  U/mL


 


Hep C IgG Ab    Reactive H  (Non-Reactive)  














  06/27/18 06/27/18 06/27/18 Range/Units





  10:45 16:05 16:50 


 


RBC     (4.30-5.90)  m/uL


 


Hgb     (13.0-17.5)  gm/dL


 


Hct     (39.0-53.0)  %


 


Sodium   136 L   (137-145)  mmol/L


 


Potassium   5.5 H   (3.5-5.1)  mmol/L


 


Carbon Dioxide   21 L   (22-30)  mmol/L


 


BUN   56 H   (9-20)  mg/dL


 


Creatinine   3.10 H   (0.66-1.25)  mg/dL


 


Glucose   184 H   (74-99)  mg/dL


 


POC Glucose (mg/dL)    181 H  (75-99)  mg/dL


 


Calcium   8.1 L   (8.4-10.2)  mg/dL


 


Total Bilirubin     (0.2-1.3)  mg/dL


 


GGT     (15-73)  U/L


 


AST     (17-59)  U/L


 


Alkaline Phosphatase     ()  U/L


 


Albumin     (3.5-5.0)  g/dL


 


Amylase     ()  U/L


 


Lipase     ()  U/L


 


CA 19-9 Antigen  50.8 H    (0.0-34.9)  U/mL


 


Hep C IgG Ab     (Non-Reactive)  














  06/27/18 06/28/18 06/28/18 Range/Units





  21:09 05:56 06:25 


 


RBC    3.93 L  (4.30-5.90)  m/uL


 


Hgb    11.3 L  (13.0-17.5)  gm/dL


 


Hct    36.4 L  (39.0-53.0)  %


 


Sodium     (137-145)  mmol/L


 


Potassium     (3.5-5.1)  mmol/L


 


Carbon Dioxide     (22-30)  mmol/L


 


BUN     (9-20)  mg/dL


 


Creatinine     (0.66-1.25)  mg/dL


 


Glucose     (74-99)  mg/dL


 


POC Glucose (mg/dL)  194 H  129 H   (75-99)  mg/dL


 


Calcium     (8.4-10.2)  mg/dL


 


Total Bilirubin     (0.2-1.3)  mg/dL


 


GGT     (15-73)  U/L


 


AST     (17-59)  U/L


 


Alkaline Phosphatase     ()  U/L


 


Albumin     (3.5-5.0)  g/dL


 


Amylase     ()  U/L


 


Lipase     ()  U/L


 


CA 19-9 Antigen     (0.0-34.9)  U/mL


 


Hep C IgG Ab     (Non-Reactive)  














  06/28/18 06/28/18 Range/Units





  06:25 06:25 


 


RBC    (4.30-5.90)  m/uL


 


Hgb    (13.0-17.5)  gm/dL


 


Hct    (39.0-53.0)  %


 


Sodium  134 L   (137-145)  mmol/L


 


Potassium  5.6 H   (3.5-5.1)  mmol/L


 


Carbon Dioxide  19 L   (22-30)  mmol/L


 


BUN  58 H   (9-20)  mg/dL


 


Creatinine  3.90 H   (0.66-1.25)  mg/dL


 


Glucose  136 H   (74-99)  mg/dL


 


POC Glucose (mg/dL)    (75-99)  mg/dL


 


Calcium  8.2 L   (8.4-10.2)  mg/dL


 


Total Bilirubin  3.5 H   (0.2-1.3)  mg/dL


 


GGT    (15-73)  U/L


 


AST  73 H   (17-59)  U/L


 


Alkaline Phosphatase  134 H   ()  U/L


 


Albumin  3.1 L   (3.5-5.0)  g/dL


 


Amylase   193 H  ()  U/L


 


Lipase   2949 H  ()  U/L


 


CA 19-9 Antigen    (0.0-34.9)  U/mL


 


Hep C IgG Ab    (Non-Reactive)  








 Microbiology - Last 24 Hours (Table)











 06/27/18 05:30 Urine Culture - Preliminary





 Urine,Voided 














Assessment and Plan


Plan: 





Abdominal and chest pain likely acute pancreatitis 


Amylase, lipase, patient was recently discharged last March after an admission 

for acute pancreatitis


Etiology unclear possibly ETOH related?


Ultrasound of the abdomen pending


Repeat labs in am


IV fluids


GI following





Elevated trops


Likely non-specific, sec to above


Echo checked, positive for moderate pulm hypertension, no motion abnormalities 

to suggest acute event.


Continue Coreg, hold ACE inhibitor because of renal failure


Seen by cardio





Acute renal failure with hyperkalemia


UA checked


Hold ACE inhibitor


Recheck electrolytes and renal function now, patient was given 1 dose of 

Kayexalate in the ER


IV fluids as above


D/W Nephro.


Lasix, Kayexalate, insulin with D50 for hyperkalemia.


Might need dialysis if urine output didn't  in response to Lasix and IV 

fluids





Diabetes mellitus type 2 with hyperglycemia


Resume home insulin regimen


Sliding scale insulin moderate dose


Blood sugar check CBC and at bedtime


Sugars currently controlled





Essential hypertension, COPD, hyperlipidemia, osteoarthritis


Stable


Resume home meds





DVT prophylaxis


Heparin subcu





Discussed with nurse

## 2018-06-28 NOTE — P.PN
Subjective


Progress Note Date: 06/28/18


Principal diagnosis: 





CHF


This is 73-year-old gentleman with known history of diabetes, hypertension, 

hyperlipidemia, COPD, congestive heart failure, history of EtOH abuse who 

presented to the hospital mainly with symptoms of generally not feeling well 

with associated vague abdominal and chest discomfort.  Cardiology consultation 

was initially requested because of abnormal troponins.  At the time of our 

consultation yesterday patient was chest pain-free and today denies any chest 

pain.  They did perform a VQ scan which was low probability for pulmonary 

embolism.  Patient had an echocardiogram with Doppler study performed in March 

of this year which revealed a normal left ventricular systolic function.  Chest 

x-ray performed here suggested pulmonary venous hypertension and interstitial 

edema.  There may be some basilar pneumonia versus edema or atelectasis, 

difficult to exclude small effusion.  Patient was given a one-time dose of IV 

Lasix in the emergency room.  At pressure this morning and 10/56 with a heart 

rate in the 60s, 97% on 2 L of oxygen.  Sodium 137, potassium 5.3, BUN 54, 

creatinine 3.08.  Calcium 8.3, total bilirubin 4.0, AST 73, ALT 56, alk phos 

133.  Albumin 3.2, amylase 183 and lipase 2480.








06/28/2018


Patient seen and examined today, lying flat in bed with no difficulty 

breathing.  Denies any chest discomfort, complaining of mild abdominal 

discomfort off and on, abdominal ultrasound ordered today.  Blood pressure 100/

60 with a heart rate in the 70s.  Hemoglobin 11.3, platelet count not 

calculated.  Sodium 134, potassium 5.6, BUN 58, creatinine 3.9.  AST 73, ALT 52

, alk phos 134, albumin 3.1, amylase 193, and lipase 2949.  Normal ejection 

fraction, on oral diuretics.





Objective





- Vital Signs


Vital signs: 


 Vital Signs











Temp  97.3 F L  06/28/18 12:00


 


Pulse  71   06/28/18 13:31


 


Resp  18   06/28/18 12:00


 


BP  101/56   06/28/18 12:00


 


Pulse Ox  98   06/28/18 12:00








 Intake & Output











 06/27/18 06/28/18 06/28/18





 18:59 06:59 18:59


 


Intake Total 3710 1000 480


 


Output Total 150  


 


Balance 3560 1000 480


 


Weight  103.1 kg 


 


Intake:   


 


  IV 2000 1000 480


 


    Sodium Chloride 0.9% 1, 2000 1000 





    000 ml @ 125 mls/hr IV .   





    Q8H ANTOLIN Rx#:[id number]   


 


    Sodium Chloride 0.9% 1,   480





    000 ml @ 60 mls/hr IV .   





    [id number] ANTOLIN Rx#:[id number]   


 


  Oral 1710  


 


Output:   


 


  Urine 150  


 


Other:   


 


  Voiding Method Urinal  Urinal


 


  # Voids  0 














- Exam


Constitutional: Lethargic, briefly answers questions with soft toned voice and 

drifts back to sleep.


Eyes:Anicteric sclerae, moist conjunctiva, no lid-lag, PERRLA, 


ENMT: Oropharynx clear, no erythema, exudates


Neck: Supple, FROM, no masses, or JVD, No carotid bruits, No thyromegaly


Lungs: Clear to auscultation, Clear to percussion, Normal respiratory effort, 

no accessory muscle use 


Cardiovascular: Heart regular in rate and rhythm, No murmurs, gallops, or rubs, 

No peripheral edema


Abdominal: Soft, Nontender, no guarding, rebound or rigidity, Normoactive bowel 

sounds, No hepatomegaly, No splenomegaly, No palpable mass 


Skin: Normal temperature, tone, texture, turgor, no induration, No subcutaneous 

nodules, No rash, lesions, No ulcers


Extremities: No digital cyanosis, No clubbing, Pedal pulses intact and 

symmetrical, Radial pulses intact and symmetrical, No calf tenderness 


Psychiatric: Alert and oriented to person, place and time, appropriate affect, 

intact judgement         


Neuro: Muscles Strength 5/5 in all 4 extremities, Sensation to light touch 

grossly present throughout, Cranial nerves II-XII grossly intact, no focal 

sensory deficits











- Labs


CBC & Chem 7: 


 06/28/18 06:25





 06/28/18 06:25


Labs: 


 Abnormal Lab Results - Last 24 Hours (Table)











  06/27/18 06/27/18 06/27/18 Range/Units





  10:45 10:45 10:45 


 


RBC     (4.30-5.90)  m/uL


 


Hgb     (13.0-17.5)  gm/dL


 


Hct     (39.0-53.0)  %


 


Sodium     (137-145)  mmol/L


 


Potassium     (3.5-5.1)  mmol/L


 


Carbon Dioxide     (22-30)  mmol/L


 


BUN     (9-20)  mg/dL


 


Creatinine     (0.66-1.25)  mg/dL


 


Glucose     (74-99)  mg/dL


 


POC Glucose (mg/dL)     (75-99)  mg/dL


 


Calcium     (8.4-10.2)  mg/dL


 


Total Bilirubin     (0.2-1.3)  mg/dL


 


GGT  708 H    (15-73)  U/L


 


AST     (17-59)  U/L


 


Alkaline Phosphatase     ()  U/L


 


Albumin     (3.5-5.0)  g/dL


 


Amylase     ()  U/L


 


Lipase     ()  U/L


 


CA 19-9 Antigen    50.8 H  (0.0-34.9)  U/mL


 


Hep C IgG Ab   Reactive H   (Non-Reactive)  














  06/27/18 06/27/18 06/27/18 Range/Units





  16:05 16:50 21:09 


 


RBC     (4.30-5.90)  m/uL


 


Hgb     (13.0-17.5)  gm/dL


 


Hct     (39.0-53.0)  %


 


Sodium  136 L    (137-145)  mmol/L


 


Potassium  5.5 H    (3.5-5.1)  mmol/L


 


Carbon Dioxide  21 L    (22-30)  mmol/L


 


BUN  56 H    (9-20)  mg/dL


 


Creatinine  3.10 H    (0.66-1.25)  mg/dL


 


Glucose  184 H    (74-99)  mg/dL


 


POC Glucose (mg/dL)   181 H  194 H  (75-99)  mg/dL


 


Calcium  8.1 L    (8.4-10.2)  mg/dL


 


Total Bilirubin     (0.2-1.3)  mg/dL


 


GGT     (15-73)  U/L


 


AST     (17-59)  U/L


 


Alkaline Phosphatase     ()  U/L


 


Albumin     (3.5-5.0)  g/dL


 


Amylase     ()  U/L


 


Lipase     ()  U/L


 


CA 19-9 Antigen     (0.0-34.9)  U/mL


 


Hep C IgG Ab     (Non-Reactive)  














  06/28/18 06/28/18 06/28/18 Range/Units





  05:56 06:25 06:25 


 


RBC   3.93 L   (4.30-5.90)  m/uL


 


Hgb   11.3 L   (13.0-17.5)  gm/dL


 


Hct   36.4 L   (39.0-53.0)  %


 


Sodium    134 L  (137-145)  mmol/L


 


Potassium    5.6 H  (3.5-5.1)  mmol/L


 


Carbon Dioxide    19 L  (22-30)  mmol/L


 


BUN    58 H  (9-20)  mg/dL


 


Creatinine    3.90 H  (0.66-1.25)  mg/dL


 


Glucose    136 H  (74-99)  mg/dL


 


POC Glucose (mg/dL)  129 H    (75-99)  mg/dL


 


Calcium    8.2 L  (8.4-10.2)  mg/dL


 


Total Bilirubin    3.5 H  (0.2-1.3)  mg/dL


 


GGT     (15-73)  U/L


 


AST    73 H  (17-59)  U/L


 


Alkaline Phosphatase    134 H  ()  U/L


 


Albumin    3.1 L  (3.5-5.0)  g/dL


 


Amylase     ()  U/L


 


Lipase     ()  U/L


 


CA 19-9 Antigen     (0.0-34.9)  U/mL


 


Hep C IgG Ab     (Non-Reactive)  














  06/28/18 06/28/18 Range/Units





  06:25 11:54 


 


RBC    (4.30-5.90)  m/uL


 


Hgb    (13.0-17.5)  gm/dL


 


Hct    (39.0-53.0)  %


 


Sodium    (137-145)  mmol/L


 


Potassium    (3.5-5.1)  mmol/L


 


Carbon Dioxide    (22-30)  mmol/L


 


BUN    (9-20)  mg/dL


 


Creatinine    (0.66-1.25)  mg/dL


 


Glucose    (74-99)  mg/dL


 


POC Glucose (mg/dL)   156 H  (75-99)  mg/dL


 


Calcium    (8.4-10.2)  mg/dL


 


Total Bilirubin    (0.2-1.3)  mg/dL


 


GGT    (15-73)  U/L


 


AST    (17-59)  U/L


 


Alkaline Phosphatase    ()  U/L


 


Albumin    (3.5-5.0)  g/dL


 


Amylase  193 H   ()  U/L


 


Lipase  2949 H   ()  U/L


 


CA 19-9 Antigen    (0.0-34.9)  U/mL


 


Hep C IgG Ab    (Non-Reactive)  








 Microbiology - Last 24 Hours (Table)











 06/27/18 05:30 Urine Culture - Preliminary





 Urine,Voided 














Assessment and Plan


Plan: 


Assessment and plan


#1 abdominal discomfort, patient had recent admission to the hospital in March 

of this year with acute pancreatitis, GI service on consultation.


#2 diastolic congestive heart failure acute on chronic


#3 hypertension


#4 hyperlipidemia


#5 diabetes


#6 abnormal troponins, likely secondary to renal insufficiency








Plan


From cardiology's perspective, we'll recommend to continue current medications.

  Echocardiogram with Doppler study revealed a normal ejection fraction.  LA 

severely dilated.  We will follow now on an as-needed basis only, please don't 

hesitate to call with any questions.


DNP note has been reviewed, I agree with a documented findings and plan of 

care.  Patient was seen and examined.

## 2018-06-28 NOTE — CONS
CONSULTATION



REASON FOR CONSULTATION:

Renal failure.



PRESENT ILLNESS:

Patient is a 73-year-old  male who was admitted to the hospital on

06/26/2018 with complaints of chest pain and abdominal pain.  His creatinine was 1.85

on initial admission.  It has gone up to 3.9 today.  The serum creatinine was as low as

1.15 and 1.01 in March of 2018.  The patient's blood pressure has been low with

systolic around 106-107 mmHg.  The patient has not received any IV contrast.  At home

he was on Cozaar, which is currently on hold.  The patient has also been hyperkalemic,

serum potassium was as high as 6.6 and is now seeing about 5.5 from 5.6.  The patient

has had low urine output with only about 150 mL overnight.  There was no urine

retention noted on bladder scan.  This morning patient states he is feeling short of

breath.  He denies any chest pain.  He has some abdominal pain.  His lipase level is

2949, which is down from 4295 on initial admission.  An echocardiogram was done this

admission which showed ejection fraction 50-55%.  The left atrium was severely dilated.



PAST MEDICAL HISTORY:

Previous episode of acute kidney injury in March with serum creatinine as high as 2.1

mg/dL.  Previously it had been 3.7 in November of 2016.  The patient's renal function

improved significantly on his last admission.  He has been oliguric and he turned

around with initiation of Lasix drip.



Blood pressure is currently on the lower side.  I will hold off on any ACE inhibitors

and angiotensin receptor blockers.  He has been given a dose of IV Lasix this morning

and if he does not respond I will start him on Lasix drip.  The ejection fraction is

not low and therefore no real indication for dobutamine at this time.



IMPRESSION:

1. Acute pancreatitis.  The patient had an abdominal CT which did not reveal any duct

    dilatation in the pancreas.  Surgery is on consult.

2. Hyperkalemia associated with acute kidney injury.

3. Diabetes type 2.  Maintained on insulin.



PLAN:

1. Hep-Lock IV fluids.  Start Lasix drip if the patient does not respond to the IV

    push Lasix.

2. Continue to avoid ACE inhibitors and angiotensin receptor blockers.  We will try to

    avoid hypotension as well and repeat labs in a.m.

3. If the patient does not respond and urine output is not improved, he will need to

    be dialyzed.

4.

Thank you for this consultation.  We will continue to follow the patient with you

during his hospitalization.





LANDEN / ALMAN: [id number] / Job#: [id number]

## 2018-06-28 NOTE — P.PN
Subjective


Progress Note Date: 06/28/18


Principal diagnosis: 


Pancreatitis





Admitted with acute kidney injury, abdominal chest pain elevated pancreatic 

liver enzymes consistent with acute pancreatitis with a history of EtOH abuse.  

White count 8.9.  Hemoglobin 11.3.  BUN 58.  Creatinine 3.9.  .  Total 

bilirubin improving 3.5.  Transaminases mildly elevated AST 73.  ALT 52.  AP 

134.  CA-19-9 50.8 unchanged from 3 months ago.  Lipase pending.  Afebrile.  

Denies abdominal pain.





Objective





- Vital Signs


Vital signs: 


 Vital Signs











Temp  97.2 F L  06/27/18 15:35


 


Pulse  75   06/28/18 09:01


 


Resp  20   06/28/18 04:00


 


BP  106/59   06/28/18 04:00


 


Pulse Ox  95   06/28/18 08:51








 Intake & Output











 06/27/18 06/28/18 06/28/18





 18:59 06:59 18:59


 


Intake Total 3710 1000 


 


Output Total 150  


 


Balance 3560 1000 


 


Weight  103.1 kg 


 


Intake:   


 


  IV 2000 1000 


 


    Sodium Chloride 0.9% 1, 2000 1000 





    000 ml @ 125 mls/hr IV .   





    Q8H [facility] Rx#:[id number]   


 


  Oral 1710  


 


Output:   


 


  Urine 150  


 


Other:   


 


  Voiding Method Urinal  


 


  # Voids  0 














- Exam


General appearance: The patient is alert, oriented, in no acute distress.


HET: Head is normocephalic and atraumatic.  Pupils are equal and reactive.  

Oropharynx is clear without lesions.


Neck: Supple without lymphadenopathy.  Trachea midline.


Heart: S1 S2.  Regular rate and rhythm.


Lungs: No crackles or wheezes are heard.


Abdomen: Soft, nontender, nondistended with  bowel sounds.  No peritoneal 

signs.  No palpable organomegaly or masses.


Extremities: Normal skin color and turgor.  No cyanosis, rash, ulceration, 

clubbing, or edema.  Radial and pedal pulses are 2/4 bilaterally.


Neurological: No focal deficits.  Strength and sensation are grossly intact.








- Labs


CBC & Chem 7: 


 06/28/18 06:25





 06/28/18 06:25


Labs: 


 Abnormal Lab Results - Last 24 Hours (Table)











  06/27/18 06/27/18 06/27/18 Range/Units





  06:00 10:45 10:45 


 


RBC     (4.30-5.90)  m/uL


 


Hgb     (13.0-17.5)  gm/dL


 


Hct     (39.0-53.0)  %


 


Sodium     (137-145)  mmol/L


 


Potassium   5.3 H   (3.5-5.1)  mmol/L


 


Carbon Dioxide     (22-30)  mmol/L


 


BUN   54 H   (9-20)  mg/dL


 


Creatinine   3.08 H   (0.66-1.25)  mg/dL


 


Glucose     (74-99)  mg/dL


 


POC Glucose (mg/dL)     (75-99)  mg/dL


 


Calcium   8.3 L   (8.4-10.2)  mg/dL


 


Total Bilirubin   4.0 H   (0.2-1.3)  mg/dL


 


GGT    708 H  (15-73)  U/L


 


AST   73 H   (17-59)  U/L


 


Alkaline Phosphatase   133 H   ()  U/L


 


Albumin   3.2 L   (3.5-5.0)  g/dL


 


Amylase   183 H   ()  U/L


 


Lipase   2480 H   ()  U/L


 


CA 19-9 Antigen     (0.0-34.9)  U/mL


 


Urine Glucose (UA)  1+ H    (Negative)  


 


Urine Bilirubin  1+ H    (Negative)  


 


Hep C IgG Ab     (Non-Reactive)  














  06/27/18 06/27/18 06/27/18 Range/Units





  10:45 10:45 16:05 


 


RBC     (4.30-5.90)  m/uL


 


Hgb     (13.0-17.5)  gm/dL


 


Hct     (39.0-53.0)  %


 


Sodium    136 L  (137-145)  mmol/L


 


Potassium    5.5 H  (3.5-5.1)  mmol/L


 


Carbon Dioxide    21 L  (22-30)  mmol/L


 


BUN    56 H  (9-20)  mg/dL


 


Creatinine    3.10 H  (0.66-1.25)  mg/dL


 


Glucose    184 H  (74-99)  mg/dL


 


POC Glucose (mg/dL)     (75-99)  mg/dL


 


Calcium    8.1 L  (8.4-10.2)  mg/dL


 


Total Bilirubin     (0.2-1.3)  mg/dL


 


GGT     (15-73)  U/L


 


AST     (17-59)  U/L


 


Alkaline Phosphatase     ()  U/L


 


Albumin     (3.5-5.0)  g/dL


 


Amylase     ()  U/L


 


Lipase     ()  U/L


 


CA 19-9 Antigen   50.8 H   (0.0-34.9)  U/mL


 


Urine Glucose (UA)     (Negative)  


 


Urine Bilirubin     (Negative)  


 


Hep C IgG Ab  Reactive H    (Non-Reactive)  














  06/27/18 06/27/18 06/28/18 Range/Units





  16:50 21:09 05:56 


 


RBC     (4.30-5.90)  m/uL


 


Hgb     (13.0-17.5)  gm/dL


 


Hct     (39.0-53.0)  %


 


Sodium     (137-145)  mmol/L


 


Potassium     (3.5-5.1)  mmol/L


 


Carbon Dioxide     (22-30)  mmol/L


 


BUN     (9-20)  mg/dL


 


Creatinine     (0.66-1.25)  mg/dL


 


Glucose     (74-99)  mg/dL


 


POC Glucose (mg/dL)  181 H  194 H  129 H  (75-99)  mg/dL


 


Calcium     (8.4-10.2)  mg/dL


 


Total Bilirubin     (0.2-1.3)  mg/dL


 


GGT     (15-73)  U/L


 


AST     (17-59)  U/L


 


Alkaline Phosphatase     ()  U/L


 


Albumin     (3.5-5.0)  g/dL


 


Amylase     ()  U/L


 


Lipase     ()  U/L


 


CA 19-9 Antigen     (0.0-34.9)  U/mL


 


Urine Glucose (UA)     (Negative)  


 


Urine Bilirubin     (Negative)  


 


Hep C IgG Ab     (Non-Reactive)  














  06/28/18 06/28/18 Range/Units





  06:25 06:25 


 


RBC  3.93 L   (4.30-5.90)  m/uL


 


Hgb  11.3 L   (13.0-17.5)  gm/dL


 


Hct  36.4 L   (39.0-53.0)  %


 


Sodium   134 L  (137-145)  mmol/L


 


Potassium   5.6 H  (3.5-5.1)  mmol/L


 


Carbon Dioxide   19 L  (22-30)  mmol/L


 


BUN   58 H  (9-20)  mg/dL


 


Creatinine   3.90 H  (0.66-1.25)  mg/dL


 


Glucose   136 H  (74-99)  mg/dL


 


POC Glucose (mg/dL)    (75-99)  mg/dL


 


Calcium   8.2 L  (8.4-10.2)  mg/dL


 


Total Bilirubin   3.5 H  (0.2-1.3)  mg/dL


 


GGT    (15-73)  U/L


 


AST   73 H  (17-59)  U/L


 


Alkaline Phosphatase   134 H  ()  U/L


 


Albumin   3.1 L  (3.5-5.0)  g/dL


 


Amylase    ()  U/L


 


Lipase    ()  U/L


 


CA 19-9 Antigen    (0.0-34.9)  U/mL


 


Urine Glucose (UA)    (Negative)  


 


Urine Bilirubin    (Negative)  


 


Hep C IgG Ab    (Non-Reactive)  








 Microbiology - Last 24 Hours (Table)











 06/27/18 05:30 Urine Culture - Preliminary





 Urine,Voided 














Assessment and Plan


Assessment: 


Impression:


1. Acute pancreatitis possible alcohol related possible alcohol hepatitis.  

Elevated GGT.


2. History of ETOH.


3. History of pancreatitis last documented episode March 2018.


4. Acute kidney injury.


5.  Positive hepatitis C IgG antibody.








Plan:


1. Clear liquids. IV hydration.  Nephrology consult.  US abdomen pending.


2. Daily CMP, lipase, amylase.


3.  Quantitative HCV to assess effectiveness of current antiviral therapy.


4. Will follow with you.





Assessment and plan a care discussed with Dr. Joseph

## 2018-06-28 NOTE — US
EXAMINATION TYPE: US abdomen limited

 

DATE OF EXAM: 6/28/2018

 

COMPARISON: 3/14/2014

 

CLINICAL HISTORY: pancreatitis elevated liver enzymes.

 

EXAM MEASUREMENTS:

 

Liver Length:  16.2 cm   

Gallbladder Wall:  0.2 cm   

CBD:  0.5 cm

Right Kidney:  12.1 x 6.6 x 6.7  cm

 

There is no ascites.

 

Pancreas:  Obscured by bowel gas

Liver:  wnl  

Gallbladder:  No stones seen

**Evidence for sonographic Miller's sign:  no

CBD:  wnl 

Right Kidney:  No hydronephrosis or masses seen 

 

Patient body habitus is such that intercostal images of liver provide best views.

 

IMPRESSION: Exam is somewhat limited.

## 2018-06-29 LAB
ANION GAP SERPL CALC-SCNC: 13 MMOL/L
BASOPHILS # BLD AUTO: 0 K/UL (ref 0–0.2)
BASOPHILS NFR BLD AUTO: 0 %
BUN SERPL-SCNC: 63 MG/DL (ref 9–20)
CALCIUM SPEC-MCNC: 8.7 MG/DL (ref 8.4–10.2)
CHLORIDE SERPL-SCNC: 101 MMOL/L (ref 98–107)
CO2 SERPL-SCNC: 20 MMOL/L (ref 22–30)
EOSINOPHIL # BLD AUTO: 0.1 K/UL (ref 0–0.7)
EOSINOPHIL NFR BLD AUTO: 2 %
ERYTHROCYTE [DISTWIDTH] IN BLOOD BY AUTOMATED COUNT: 4.18 M/UL (ref 4.3–5.9)
ERYTHROCYTE [DISTWIDTH] IN BLOOD: 13.6 % (ref 11.5–15.5)
GLUCOSE BLD-MCNC: 125 MG/DL (ref 75–99)
GLUCOSE BLD-MCNC: 146 MG/DL (ref 75–99)
GLUCOSE BLD-MCNC: 146 MG/DL (ref 75–99)
GLUCOSE BLD-MCNC: 151 MG/DL (ref 75–99)
GLUCOSE SERPL-MCNC: 145 MG/DL (ref 74–99)
HCT VFR BLD AUTO: 38.4 % (ref 39–53)
HGB BLD-MCNC: 11.9 GM/DL (ref 13–17.5)
HYALINE CASTS UR QL AUTO: 22 /LPF (ref 0–2)
LYMPHOCYTES # SPEC AUTO: 0.7 K/UL (ref 1–4.8)
LYMPHOCYTES NFR SPEC AUTO: 8 %
MAGNESIUM SPEC-SCNC: 1.8 MG/DL (ref 1.6–2.3)
MCH RBC QN AUTO: 28.5 PG (ref 25–35)
MCHC RBC AUTO-ENTMCNC: 31.1 G/DL (ref 31–37)
MCV RBC AUTO: 91.8 FL (ref 80–100)
MONOCYTES # BLD AUTO: 0.6 K/UL (ref 0–1)
MONOCYTES NFR BLD AUTO: 7 %
NEUTROPHILS # BLD AUTO: 6.6 K/UL (ref 1.3–7.7)
NEUTROPHILS NFR BLD AUTO: 82 %
PH UR: 5 [PH] (ref 5–8)
PLATELET # BLD AUTO: 117 K/UL (ref 150–450)
POTASSIUM SERPL-SCNC: 5.1 MMOL/L (ref 3.5–5.1)
RBC UR QL: 9 /HPF (ref 0–5)
SODIUM SERPL-SCNC: 134 MMOL/L (ref 137–145)
SP GR UR: 1.01 (ref 1–1.03)
SQUAMOUS UR QL AUTO: <1 /HPF (ref 0–4)
UROBILINOGEN UR QL STRIP: <2 MG/DL (ref ?–2)
WBC # BLD AUTO: 8.1 K/UL (ref 3.8–10.6)
WBC #/AREA URNS HPF: 1 /HPF (ref 0–5)

## 2018-06-29 RX ADMIN — Medication SCH MG: at 15:59

## 2018-06-29 RX ADMIN — INSULIN ASPART SCH: 100 INJECTION, SOLUTION INTRAVENOUS; SUBCUTANEOUS at 20:35

## 2018-06-29 RX ADMIN — IPRATROPIUM BROMIDE AND ALBUTEROL SULFATE SCH ML: .5; 3 SOLUTION RESPIRATORY (INHALATION) at 08:05

## 2018-06-29 RX ADMIN — HEPARIN SODIUM SCH UNIT: 5000 INJECTION, SOLUTION INTRAVENOUS; SUBCUTANEOUS at 22:59

## 2018-06-29 RX ADMIN — GABAPENTIN SCH: 400 CAPSULE ORAL at 11:12

## 2018-06-29 RX ADMIN — HEPARIN SODIUM SCH UNIT: 5000 INJECTION, SOLUTION INTRAVENOUS; SUBCUTANEOUS at 16:00

## 2018-06-29 RX ADMIN — INSULIN ASPART SCH UNIT: 100 INJECTION, SOLUTION INTRAVENOUS; SUBCUTANEOUS at 21:34

## 2018-06-29 RX ADMIN — FUROSEMIDE SCH MLS/HR: 10 INJECTION, SOLUTION INTRAMUSCULAR; INTRAVENOUS at 15:59

## 2018-06-29 RX ADMIN — ACETAMINOPHEN PRN MG: 325 TABLET, FILM COATED ORAL at 18:25

## 2018-06-29 RX ADMIN — ASPIRIN SCH: 325 TABLET ORAL at 08:46

## 2018-06-29 RX ADMIN — GABAPENTIN SCH: 400 CAPSULE ORAL at 15:46

## 2018-06-29 RX ADMIN — GABAPENTIN SCH MG: 400 CAPSULE ORAL at 21:34

## 2018-06-29 RX ADMIN — GABAPENTIN SCH MG: 400 CAPSULE ORAL at 08:49

## 2018-06-29 RX ADMIN — ATORVASTATIN CALCIUM SCH MG: 20 TABLET, FILM COATED ORAL at 21:34

## 2018-06-29 RX ADMIN — INSULIN DETEMIR SCH UNIT: 100 INJECTION, SOLUTION SUBCUTANEOUS at 08:48

## 2018-06-29 RX ADMIN — INSULIN ASPART SCH: 100 INJECTION, SOLUTION INTRAVENOUS; SUBCUTANEOUS at 06:16

## 2018-06-29 RX ADMIN — HEPARIN SODIUM SCH UNIT: 5000 INJECTION, SOLUTION INTRAVENOUS; SUBCUTANEOUS at 08:49

## 2018-06-29 RX ADMIN — IPRATROPIUM BROMIDE AND ALBUTEROL SULFATE SCH ML: .5; 3 SOLUTION RESPIRATORY (INHALATION) at 20:49

## 2018-06-29 RX ADMIN — INSULIN ASPART SCH: 100 INJECTION, SOLUTION INTRAVENOUS; SUBCUTANEOUS at 15:46

## 2018-06-29 RX ADMIN — IPRATROPIUM BROMIDE AND ALBUTEROL SULFATE SCH ML: .5; 3 SOLUTION RESPIRATORY (INHALATION) at 12:57

## 2018-06-29 RX ADMIN — ACETAMINOPHEN PRN MG: 325 TABLET, FILM COATED ORAL at 11:22

## 2018-06-29 RX ADMIN — PANTOPRAZOLE SODIUM SCH MG: 40 TABLET, DELAYED RELEASE ORAL at 06:39

## 2018-06-29 NOTE — P.PN
Subjective


Progress Note Date: 06/29/18


Principal diagnosis: 





Abdominal and chest pain.





Patient was very lethargic last night, this morning is more awake.  However he 

started having fever as high as 101.6.  He denied having shortness of breath, 

abdominal pain or chest pain.





Objective





- Vital Signs


Vital signs: 


 Vital Signs











Temp  101.6 F H  06/29/18 08:00


 


Pulse  76   06/29/18 08:16


 


Resp  18   06/29/18 08:00


 


BP  116/87   06/29/18 08:00


 


Pulse Ox  95   06/29/18 08:06








 Intake & Output











 06/28/18 06/29/18 06/29/18





 18:59 06:59 18:59


 


Intake Total 480 120 200


 


Output Total 100 525 


 


Balance 380 -405 200


 


Weight  105 kg 


 


Intake:   


 


    80


 


    Sodium Chloride 0.9% 1, 480  80





    000 ml @ 10 mls/hr IV .   





    Q24H [facility] Rx#:[id number]   


 


  Oral  120 120


 


Output:   


 


  Urine 100 525 


 


    Uretheral (Castro) 100  


 


Other:   


 


  Voiding Method Indwelling Catheter Indwelling Catheter Indwelling Catheter














- Exam





Constitutional: No acute distress, conversant, pleasant


Eyes: Positive for edema around the eyelids and eyes.  Anicteric sclerae, moist 

conjunctiva, no lid-lag, PERRLA, 


ENMT: Oropharynx clear, no erythema, exudates


Neck: Supple, FROM, no masses, or JVD, No carotid bruits, No thyromegaly


Lungs: Clear to auscultation, Clear to percussion, Normal respiratory effort, 

no accessory muscle use 


Cardiovascular: Heart regular in rate and rhythm, No murmurs, gallops, or rubs, 

1+ peripheral edema


Abdominal: Soft, Nontender, no guarding, rebound or rigidity, Normoactive bowel 

sounds, No hepatomegaly, No splenomegaly, No palpable mass 


Skin: Normal temperature, tone, texture, turgor, no induration, No subcutaneous 

nodules, No rash, lesions, No ulcers


Extremities: No digital cyanosis, No clubbing, Pedal pulses intact and 

symmetrical, Radial pulses intact and symmetrical, No calf tenderness 


Psychiatric: Alert and oriented to person, place and time, appropriate affect, 

intact judgement         


Neuro: Muscles Strength 5/5 in all 4 extremities, Sensation to light touch 

grossly present throughout, Cranial nerves II-XII grossly intact, no focal 

sensory deficits








- Labs


CBC & Chem 7: 


 06/29/18 06:30





 06/29/18 06:30


Labs: 


 Abnormal Lab Results - Last 24 Hours (Table)











  06/28/18 06/28/18 06/29/18 Range/Units





  16:57 21:23 05:56 


 


RBC     (4.30-5.90)  m/uL


 


Hgb     (13.0-17.5)  gm/dL


 


Hct     (39.0-53.0)  %


 


Plt Count     (150-450)  k/uL


 


Lymphocytes #     (1.0-4.8)  k/uL


 


Sodium     (137-145)  mmol/L


 


Carbon Dioxide     (22-30)  mmol/L


 


BUN     (9-20)  mg/dL


 


Creatinine     (0.66-1.25)  mg/dL


 


Glucose     (74-99)  mg/dL


 


POC Glucose (mg/dL)  119 H  255 H  125 H  (75-99)  mg/dL


 


Phosphorus     (2.5-4.5)  mg/dL


 


Lipase     ()  U/L














  06/29/18 06/29/18 06/29/18 Range/Units





  06:30 06:30 11:23 


 


RBC  4.18 L    (4.30-5.90)  m/uL


 


Hgb  11.9 L    (13.0-17.5)  gm/dL


 


Hct  38.4 L    (39.0-53.0)  %


 


Plt Count  117 L    (150-450)  k/uL


 


Lymphocytes #  0.7 L    (1.0-4.8)  k/uL


 


Sodium   134 L   (137-145)  mmol/L


 


Carbon Dioxide   20 L   (22-30)  mmol/L


 


BUN   63 H   (9-20)  mg/dL


 


Creatinine   4.30 H   (0.66-1.25)  mg/dL


 


Glucose   145 H   (74-99)  mg/dL


 


POC Glucose (mg/dL)    146 H  (75-99)  mg/dL


 


Phosphorus   6.2 H   (2.5-4.5)  mg/dL


 


Lipase   1337 H   ()  U/L








 Microbiology - Last 24 Hours (Table)











 06/27/18 05:30 Urine Culture - Final





 Urine,Voided 














Assessment and Plan


Plan: 





Abdominal and chest pain likely acute pancreatitis 


Etiology unclear possibly ETOH related?


Ultrasound of the abdomen, nonrevealing


Lipase trending down


GI following





Elevated trops


Likely non-specific, sec to above


Echo checked, positive for moderate pulm hypertension, no motion abnormalities 

to suggest acute event.


Continue Coreg, hold ACE inhibitor because of renal failure


Seen by cardio





Acute renal failure with hyperkalemia


Lasix and dopamine drips


He made 350 mL of urine since last night.


Continue to hold ACE inhibitor


Recheck electrolytes and renal function in a.m.


Status post treatment with Kayexalate, insulin with D50 for hyperkalemia.


Might need dialysis if urine output didn't  in response to Lasix 





Diabetes mellitus type 2 with hyperglycemia


Resume home insulin regimen


Sliding scale insulin moderate dose


Blood sugar check CBC and at bedtime


Sugars currently controlled





Essential hypertension, COPD, hyperlipidemia, osteoarthritis


Stable


Resume home meds





DVT prophylaxis


Heparin subcu





Discussed with nurse

## 2018-06-29 NOTE — XR
EXAMINATION TYPE: XR chest 1V portable

 

DATE OF EXAM: 6/29/2018

 

CLINICAL HISTORY: Fever rule out pneumonia

 

TECHNIQUE: Single AP portable frontal view of the chest is obtained.

 

COMPARISON: Chest x-ray from 2 days earlier and older studies

 

FINDINGS:  Exam is suboptimal due to portable technique and patient's large body habitus. There is de
generative change at bilateral glenohumeral joints. There is marked cardiomegaly with multi lead pace
maker. There is left mid to lower lung opacity silhouetting left heart border and hemidiaphragm that 
remains present. Overall low lung volumes are present. There is diminished inspiration from older funmi
dies.

 

IMPRESSION: Persistent left mid to basilar opacity silhouetting heart border and hemidiaphragm new fr
om May 31 study could reflect infiltrate and/or atelectasis, cannot exclude underlying effusion. Ther
e is background cardiomegaly with suspected central vascular congestion. Poor inspiration is noted.

## 2018-06-29 NOTE — CONS
CONSULTATION



DATE OF SERVICE:

06/28/2018



REASON FOR CONSULT:

Renal failure.



HISTORY OF PRESENT ILLNESS:

The patient is a 73-year-old -American male who was admitted to the hospital on

6/26/2018 with complaints of chest pain and abdominal pain.  His serum creatinine was

1.85 on initial admission. It has gone up to 3.9 today.  Serum creatinine was as low as

1.15 and 1.0 in March of 2018.  The patient's blood pressure has been low, with

systolic pressure around 106 to 107 mmHg.  The patient has not received any IV

contrast.  At home he was on Cozaar, which is currently on hold.  Patient is also

mildly hyperkalemic with serum potassium as high as 6.6. It is down to 5.5 and 5.6 now.

Urine output has been low with only about 150 mL noted overnight.  There was no

evidence of urine retention on a bladder scan.  Currently patient is being treated for

pancreatitis, and this morning he states he is feeling short of breath.  He denies any

chest pain.  He does have some abdominal pain.  Lipase level was as high as 2949, which

is down from 4295 on initial admission.  An echocardiogram done on admission showed

ejection fraction 50% to 55% with dilated left atrium.



PAST MEDICAL HISTORY:

1. Previous episode of acute kidney injury in March with creatinine as high as 2.1,

    which had completely resolved.  It had been as high as 3.7 in November of 2016.

2. Coronary artery disease.

3. COPD.

4. Type 2 diabetes.

5. Hyperlipidemia.

6. Hypertension.

7. Chronic liver disease.

8. History of EtOH abuse.

9. Previous ejection fraction noted at 20%.

10.Gouty arthritis.

11.Previous history of hemothorax.

12.History of liver biopsy.

13.Chest tube placement.



PAST SURGICAL HISTORY:

1. Cardiac catheterization.

2. Permanent pacemaker placement.

3. Cataract surgeries.

4. PTCA stent in 2011.

5. Previous liver biopsy in 2014.



SOCIAL HISTORY:

Negative for smoking, drug abuse or alcohol abuse.



MEDICATIONS AT HOME:

1. Coreg.

2. Colace.

3. Insulin.

4. Cozaar.

5. Lasix ..

6. Aldactone.

7. Protonix.

8. Imdur.

9. Hydralazine.

10.Neurontin.

11.Lipitor.

12.Norco.

13.Insulin.

14.Magnesium.



ALLERGIES:

NONE.



REVIEW OF SYSTEMS:

As per HPI.  Other systems negative.



PHYSICAL EXAMINATION:

Patient is comfortable, awake.  He is not in any acute distress.  He is alert and

oriented x3.  He is sleepy but arousable.



LABS:

Sodium 134, potassium 5.6, chloride 105, BUN 58, serum creatinine 3.9, lipase 2949,

amylase 193, hemoglobin 11.3 g/dL.



ASSESSMENT:

1. Acute kidney injury, acute tubular necrosis, currently oliguric secondary to

    hypotension and hypoperfusion.  Patient will be given a dose of Lasix at 60 mg.  If

    he does not diurese, I will start him on Lasix drip.

2. Hyperkalemia associated with acute kidney injury.  Angiotensin receptor blockers

    are currently on hold.  Expect improvement with improving renal function.  I will

    hold off on Kayexalate, given his pancreatitis and abdominal pain.

3. Acute pancreatitis, possibly alcohol-related.  No evidence of gallstones on

    abdominal imaging.

4. Previous history of acute kidney injury in March with significant resolution and

    improvement in renal function with creatinine back down to 1.1 and 1.0 mg/dL.

5. Type 2 diabetes.



PLAN:

Hep-Lock IV fluids.  Start Lasix drip if the patient does not respond to this dose of

IV Lasix.  Continue to hold off on angiotensin receptor blockers and ACE inhibitors.

Try to avoid hypotension.  If patient's urine output does not improve, he will need to

be dialyzed.



Thank you for this consultation.  We will continue to follow the patient with you

during his hospitalization.





MMODL / IJN: [id number] / Job#: [id number]

## 2018-06-29 NOTE — P.PN
Subjective


Progress Note Date: 06/29/18


Principal diagnosis: 


Pancreatitis





Admitted with acute kidney injury, abdominal chest pain elevated pancreatic 

liver enzymes consistent with acute pancreatitis with a history of EtOH abuse.  

White count 8.1.  Hemoglobin 11.9.  BUN 63.  Creatinine 4.3.  .  Lipase 

.  Afebrile.  Mild midepigastric abdominal pain.  Ultrasound limited exam CBD 

0.5 cm.  Liver within normal limits.  No stones.  Pancreas obscured by bowel 

gas.





Objective





- Vital Signs


Vital signs: 


 Vital Signs











Temp  101.6 F H  06/29/18 08:00


 


Pulse  76   06/29/18 08:16


 


Resp  18   06/29/18 08:00


 


BP  116/87   06/29/18 08:00


 


Pulse Ox  95   06/29/18 08:06








 Intake & Output











 06/28/18 06/29/18 06/29/18





 18:59 06:59 18:59


 


Intake Total 480 120 


 


Output Total 100 525 


 


Balance 380 -405 


 


Weight  105 kg 


 


Intake:   


 


    


 


    Sodium Chloride 0.9% 1, 480  





    000 ml @ 10 mls/hr IV .   





    Q24H [facility] Rx#:[id number]   


 


  Oral  120 


 


Output:   


 


  Urine 100 525 


 


    Uretheral (Castro) 100  


 


Other:   


 


  Voiding Method Indwelling Catheter Indwelling Catheter 














- Exam


General appearance: The patient is alert, oriented, in no acute distress.


HET: Head is normocephalic and atraumatic.  Pupils are equal and reactive.  

Oropharynx is clear without lesions.


Neck: Supple without lymphadenopathy.  Trachea midline.


Heart: S1 S2.  Regular rate and rhythm.


Lungs: No crackles or wheezes are heard.


Abdomen: Soft, nontender, nondistended with  bowel sounds.  No peritoneal 

signs.  No palpable organomegaly or masses.


Extremities: Normal skin color and turgor.  No cyanosis, rash, ulceration, 

clubbing, or edema.  Radial and pedal pulses are 2/4 bilaterally.  Castro with 

clear yellow urine.


Neurological: No focal deficits.  Strength and sensation are grossly intact.








- Labs


CBC & Chem 7: 


 06/29/18 06:30





 06/29/18 06:30


Labs: 


 Abnormal Lab Results - Last 24 Hours (Table)











  06/28/18 06/28/18 06/28/18 Range/Units





  06:25 11:54 16:57 


 


RBC     (4.30-5.90)  m/uL


 


Hgb     (13.0-17.5)  gm/dL


 


Hct     (39.0-53.0)  %


 


Plt Count     (150-450)  k/uL


 


Lymphocytes #     (1.0-4.8)  k/uL


 


Sodium     (137-145)  mmol/L


 


Carbon Dioxide     (22-30)  mmol/L


 


BUN     (9-20)  mg/dL


 


Creatinine     (0.66-1.25)  mg/dL


 


Glucose     (74-99)  mg/dL


 


POC Glucose (mg/dL)   156 H  119 H  (75-99)  mg/dL


 


Phosphorus     (2.5-4.5)  mg/dL


 


Amylase  193 H    ()  U/L


 


Lipase  2949 H    ()  U/L














  06/28/18 06/29/18 06/29/18 Range/Units





  21:23 05:56 06:30 


 


RBC    4.18 L  (4.30-5.90)  m/uL


 


Hgb    11.9 L  (13.0-17.5)  gm/dL


 


Hct    38.4 L  (39.0-53.0)  %


 


Plt Count    117 L  (150-450)  k/uL


 


Lymphocytes #    0.7 L  (1.0-4.8)  k/uL


 


Sodium     (137-145)  mmol/L


 


Carbon Dioxide     (22-30)  mmol/L


 


BUN     (9-20)  mg/dL


 


Creatinine     (0.66-1.25)  mg/dL


 


Glucose     (74-99)  mg/dL


 


POC Glucose (mg/dL)  255 H  125 H   (75-99)  mg/dL


 


Phosphorus     (2.5-4.5)  mg/dL


 


Amylase     ()  U/L


 


Lipase     ()  U/L














  06/29/18 Range/Units





  06:30 


 


RBC   (4.30-5.90)  m/uL


 


Hgb   (13.0-17.5)  gm/dL


 


Hct   (39.0-53.0)  %


 


Plt Count   (150-450)  k/uL


 


Lymphocytes #   (1.0-4.8)  k/uL


 


Sodium  134 L  (137-145)  mmol/L


 


Carbon Dioxide  20 L  (22-30)  mmol/L


 


BUN  63 H  (9-20)  mg/dL


 


Creatinine  4.30 H  (0.66-1.25)  mg/dL


 


Glucose  145 H  (74-99)  mg/dL


 


POC Glucose (mg/dL)   (75-99)  mg/dL


 


Phosphorus  6.2 H  (2.5-4.5)  mg/dL


 


Amylase   ()  U/L


 


Lipase  1337 H  ()  U/L








 Microbiology - Last 24 Hours (Table)











 06/27/18 05:30 Urine Culture - Final





 Urine,Voided 














- Imaging and Cardiology


US - abdomen: report reviewed (Dr. Joseph)





Assessment and Plan


Assessment: 


Impression:


1. Acute pancreatitis possible alcohol related possible alcohol hepatitis.  

Elevated GGT.


2. History of ETOH.


3. History of pancreatitis last documented episode March 2018.


4. Acute kidney injury; worsening BUN/creatinine.


5.  Positive hepatitis C IgG antibody.








Plan:


1.  Full liquids. IV hydration.  Nephrology consult.  US abdomen reviewed.


2. Daily CMP, lipase, amylase.


3.  Quantitative HCV to assess effectiveness of current antiviral therapy.


4. Will follow with you.





Assessment and plan a care discussed with Dr. Joseph

## 2018-06-29 NOTE — PN
PROGRESS NOTE



Patient is seen for followup for acute kidney injury.  He was oliguric yesterday.

Patient's blood pressure was also low.  He had minimal urine output with 60 mg of Lasix

IV push.  However, he was started on Lasix drip and dopamine drip as blood pressure was

low.  His urine output has picked up overnight and patient has had about 600 mL over

the last 12 hours.  Currently, patient has an indwelling Castro catheter with about 300

mL noted in his bag.  Overall, he states he is feeling slightly better.



PHYSICAL EXAMINATION:

This morning blood pressure was 116/87.  He had a temp of 101.6.  Examination of the

heart, S1, S2.  Examination of the lungs, bilateral breath sounds are heard.  Decreased

breath sounds at the bases.  Abdomen is soft, nontender.  Examination of the lower

extremities shows edema 1+ bilaterally.  CNS exam is grossly intact. Patient is moving

all 4 extremities.



LABS:

Show sodium of 134, potassium 5.1, BUN 63, serum creatinine 4.3, phosphorus of 6.2.

Lipase is 1337, which is down from 2949 yesterday.  UA is fairly benign with small

blood noted.



ASSESSMENT:

1. Acute kidney injury, ATN currently nonoliguric with improving urine output.  Serum

    creatinine is higher than yesterday; however.  Expect improvement over the next few

    days as the urine output has picked up now.  Blood pressure is now running a bit on

    the high side.  I will discontinue the dopamine and continue with the Lasix drip at

    10 mg an hour.

2. Acute pancreatitis.  Lipase is decreasing.  Patient is tolerating oral intake.

3. Hyperphosphatemia associated with renal failure.  We will hold off on the phosphate

    binders for now, unless renal function does not improve over the next few days.

4. Hyperkalemia associated with acute kidney injury.

5. Type 2 diabetes, currently on insulin.

6. History of positive hepatitis C antibody.

7. History of EtOH abuse.

8. Fever.  UA is unremarkable.



PLAN:

Blood cultures will be ordered.  May need further imaging of the abdomen if the patient

has new or worsening abdominal pain.  ALDA dopamine.





MMODL / IJN: [id number] / Job#: [id number]

## 2018-06-30 LAB
ALBUMIN SERPL-MCNC: 3.4 G/DL (ref 3.5–5)
ALP SERPL-CCNC: 172 U/L (ref 38–126)
ALT SERPL-CCNC: 45 U/L (ref 21–72)
ANION GAP SERPL CALC-SCNC: 15 MMOL/L
AST SERPL-CCNC: 77 U/L (ref 17–59)
BUN SERPL-SCNC: 70 MG/DL (ref 9–20)
CALCIUM SPEC-MCNC: 8.8 MG/DL (ref 8.4–10.2)
CHLORIDE SERPL-SCNC: 103 MMOL/L (ref 98–107)
CO2 SERPL-SCNC: 18 MMOL/L (ref 22–30)
GLUCOSE BLD-MCNC: 132 MG/DL (ref 75–99)
GLUCOSE BLD-MCNC: 143 MG/DL (ref 75–99)
GLUCOSE BLD-MCNC: 228 MG/DL (ref 75–99)
GLUCOSE BLD-MCNC: 236 MG/DL (ref 75–99)
GLUCOSE SERPL-MCNC: 125 MG/DL (ref 74–99)
LIPASE SERPL-CCNC: 1324 U/L (ref 23–300)
POTASSIUM SERPL-SCNC: 5.1 MMOL/L (ref 3.5–5.1)
PROT SERPL-MCNC: 7.2 G/DL (ref 6.3–8.2)
SODIUM SERPL-SCNC: 136 MMOL/L (ref 137–145)

## 2018-06-30 RX ADMIN — ACETAMINOPHEN PRN MG: 325 TABLET, FILM COATED ORAL at 00:08

## 2018-06-30 RX ADMIN — INSULIN ASPART SCH UNIT: 100 INJECTION, SOLUTION INTRAVENOUS; SUBCUTANEOUS at 06:43

## 2018-06-30 RX ADMIN — INSULIN ASPART SCH UNIT: 100 INJECTION, SOLUTION INTRAVENOUS; SUBCUTANEOUS at 20:55

## 2018-06-30 RX ADMIN — ATORVASTATIN CALCIUM SCH MG: 20 TABLET, FILM COATED ORAL at 20:55

## 2018-06-30 RX ADMIN — IPRATROPIUM BROMIDE AND ALBUTEROL SULFATE SCH: .5; 3 SOLUTION RESPIRATORY (INHALATION) at 20:20

## 2018-06-30 RX ADMIN — ASPIRIN SCH: 325 TABLET ORAL at 09:06

## 2018-06-30 RX ADMIN — INSULIN ASPART SCH UNIT: 100 INJECTION, SOLUTION INTRAVENOUS; SUBCUTANEOUS at 16:59

## 2018-06-30 RX ADMIN — CEFAZOLIN SCH MLS/HR: 330 INJECTION, POWDER, FOR SOLUTION INTRAMUSCULAR; INTRAVENOUS at 00:13

## 2018-06-30 RX ADMIN — HEPARIN SODIUM SCH UNIT: 5000 INJECTION, SOLUTION INTRAVENOUS; SUBCUTANEOUS at 09:16

## 2018-06-30 RX ADMIN — ACETAMINOPHEN PRN MG: 325 TABLET, FILM COATED ORAL at 17:04

## 2018-06-30 RX ADMIN — IPRATROPIUM BROMIDE AND ALBUTEROL SULFATE SCH ML: .5; 3 SOLUTION RESPIRATORY (INHALATION) at 14:02

## 2018-06-30 RX ADMIN — IPRATROPIUM BROMIDE AND ALBUTEROL SULFATE SCH ML: .5; 3 SOLUTION RESPIRATORY (INHALATION) at 08:07

## 2018-06-30 RX ADMIN — FUROSEMIDE SCH MLS/HR: 10 INJECTION, SOLUTION INTRAMUSCULAR; INTRAVENOUS at 16:59

## 2018-06-30 RX ADMIN — INSULIN ASPART SCH UNIT: 100 INJECTION, SOLUTION INTRAVENOUS; SUBCUTANEOUS at 12:05

## 2018-06-30 RX ADMIN — HEPARIN SODIUM SCH UNIT: 5000 INJECTION, SOLUTION INTRAVENOUS; SUBCUTANEOUS at 16:58

## 2018-06-30 RX ADMIN — INSULIN DETEMIR SCH UNIT: 100 INJECTION, SOLUTION SUBCUTANEOUS at 09:16

## 2018-06-30 RX ADMIN — PANTOPRAZOLE SODIUM SCH MG: 40 TABLET, DELAYED RELEASE ORAL at 06:43

## 2018-06-30 RX ADMIN — GABAPENTIN SCH MG: 400 CAPSULE ORAL at 16:59

## 2018-06-30 RX ADMIN — ISOSORBIDE MONONITRATE SCH MG: 60 TABLET, EXTENDED RELEASE ORAL at 09:16

## 2018-06-30 RX ADMIN — GABAPENTIN SCH MG: 400 CAPSULE ORAL at 20:55

## 2018-06-30 RX ADMIN — ACETAMINOPHEN PRN MG: 325 TABLET, FILM COATED ORAL at 09:15

## 2018-06-30 RX ADMIN — GABAPENTIN SCH MG: 400 CAPSULE ORAL at 09:16

## 2018-06-30 RX ADMIN — Medication SCH MG: at 09:16

## 2018-06-30 NOTE — PN
PROGRESS NOTE



Patient is seen for followup for acute kidney injury.  He is currently resting in bed.

He states he overall is feeling better.  Blood pressure has been running high.  The

patient remains on Lasix drip with significant improvement in urine output.  His 24

hour urine output was 4.6 L.



PHYSICAL EXAMINATION:

On examination, blood pressure is 178/88 this morning, heart rate 74 per minute.

Patient had a temp of 100 degrees Fahrenheit.

EXAMINATION OF THE HEART: S1, S2.

EXAMINATION OF THE LUNGS: Bilateral wheezing is heard and basal crackles are heard.

Abdomen is soft, nontender, distended, obese.

Examination of the lower extremities shows edema 1+ bilaterally.



LABS:

Labs show sodium of 136, potassium 5.1, chloride 103, CO2 is 18, BUN 70, serum

creatinine 2.95.  Lipase 1324.



ASSESSMENT:

1. Acute kidney injury, acute tubular necrosis, nonoliguric, initially oliguric,

    currently improving.

2. Volume overload maintained on Lasix drip, which I will continue for now.

3. Acute pancreatitis, no evidence of gallstones.

4. Currently improving, patient is able to tolerate oral intake.

5. Hypertension, partly volume sensitive.  We will resume Norvasc and Imdur and expect

    further improvement in the blood pressure with improving volume status.

6. Hyperkalemia associated with acute kidney injury, now improving.

7. Metabolic acidosis.  Expect improvement with improving renal function. CO2 slightly

    lower today.  We will repeat in a.m. I want to hold off on starting sodium bicarb

    to avoid significant sodium load.



PLAN:

Continue with Lasix drip.  Resume Norvasc and Imdur.  Repeat labs in a.m..





MMODL / IJN: [id number] / Job#: [id number]

## 2018-06-30 NOTE — P.PN
Subjective


Progress Note Date: 06/30/18


Principal diagnosis: 





Abdominal and chest pain.





Doing better. 





Objective





- Vital Signs


Vital signs: 


 Vital Signs











Temp  101.5 F H  06/30/18 09:13


 


Pulse  76   06/30/18 08:19


 


Resp  20   06/30/18 07:12


 


BP  178/88   06/30/18 07:12


 


Pulse Ox  96   06/30/18 07:12








 Intake & Output











 06/29/18 06/30/18 06/30/18





 18:59 06:59 18:59


 


Intake Total 510 240 740


 


Output Total 1900 2725 


 


Balance -1390 -2485 740


 


Weight 105 kg 104.5 kg 


 


Intake:   


 


  IV 80  


 


    Sodium Chloride 0.9% 1, 80  





    000 ml @ 10 mls/hr IV .   





    Q24H [facility] Rx#:[id number]   


 


  Intake, IV Titration 250  





  Amount   


 


    Furosemide 250 mg In 250  





    Sodium Chloride 0.9% 225   





    ml @ 10 MG/HR 10 mls/hr   





    IVP .Q24H [facility] Rx#:   





    [id number]   


 


  Oral 180 240 740


 


Output:   


 


  Urine 1900 2725 


 


Other:   


 


  Voiding Method Indwelling Catheter Indwelling Catheter Indwelling Catheter














- Exam





Constitutional: No acute distress, conversant, pleasant


Eyes: Positive for edema around the eyelids and eyes.  Anicteric sclerae, moist 

conjunctiva, no lid-lag, PERRLA, 


ENMT: Oropharynx clear, no erythema, exudates


Neck: Supple, FROM, no masses, or JVD, No carotid bruits, No thyromegaly


Lungs: Clear to auscultation, Clear to percussion, Normal respiratory effort, 

no accessory muscle use 


Cardiovascular: Heart regular in rate and rhythm, No murmurs, gallops, or rubs, 

1+ peripheral edema


Abdominal: Soft, Nontender, no guarding, rebound or rigidity, Normoactive bowel 

sounds, No hepatomegaly, No splenomegaly, No palpable mass 


Skin: Normal temperature, tone, texture, turgor, no induration, No subcutaneous 

nodules, No rash, lesions, No ulcers


Extremities: No digital cyanosis, No clubbing, Pedal pulses intact and 

symmetrical, Radial pulses intact and symmetrical, No calf tenderness 


Psychiatric: Alert and oriented to person, place and time, appropriate affect, 

intact judgement         


Neuro: Muscles Strength 5/5 in all 4 extremities, Sensation to light touch 

grossly present throughout, Cranial nerves II-XII grossly intact, no focal 

sensory deficits








- Labs


CBC & Chem 7: 


 06/29/18 06:30





 06/30/18 06:16


Labs: 


 Abnormal Lab Results - Last 24 Hours (Table)











  06/29/18 06/29/18 06/29/18 Range/Units





  12:00 16:38 20:27 


 


Sodium     (137-145)  mmol/L


 


Carbon Dioxide     (22-30)  mmol/L


 


BUN     (9-20)  mg/dL


 


Creatinine     (0.66-1.25)  mg/dL


 


Glucose     (74-99)  mg/dL


 


POC Glucose (mg/dL)   151 H  146 H  (75-99)  mg/dL


 


Total Bilirubin     (0.2-1.3)  mg/dL


 


AST     (17-59)  U/L


 


Alkaline Phosphatase     ()  U/L


 


Albumin     (3.5-5.0)  g/dL


 


Lipase     ()  U/L


 


Urine Blood  Small H    (Negative)  


 


Urine RBC  9 H    (0-5)  /hpf


 


Urine Bacteria  Rare H    (None)  /hpf


 


Hyaline Casts  22 H    (0-2)  /lpf


 


Urine Mucus  Rare H    (None)  /hpf














  06/30/18 06/30/18 06/30/18 Range/Units





  06:06 06:16 11:31 


 


Sodium   136 L   (137-145)  mmol/L


 


Carbon Dioxide   18 L   (22-30)  mmol/L


 


BUN   70 H   (9-20)  mg/dL


 


Creatinine   2.95 H   (0.66-1.25)  mg/dL


 


Glucose   125 H   (74-99)  mg/dL


 


POC Glucose (mg/dL)  132 H   236 H  (75-99)  mg/dL


 


Total Bilirubin   2.0 H   (0.2-1.3)  mg/dL


 


AST   77 H   (17-59)  U/L


 


Alkaline Phosphatase   172 H   ()  U/L


 


Albumin   3.4 L   (3.5-5.0)  g/dL


 


Lipase   1324 H   ()  U/L


 


Urine Blood     (Negative)  


 


Urine RBC     (0-5)  /hpf


 


Urine Bacteria     (None)  /hpf


 


Hyaline Casts     (0-2)  /lpf


 


Urine Mucus     (None)  /hpf








 Microbiology - Last 24 Hours (Table)











 06/29/18 12:00 Urine Culture - Preliminary





 Urine,Catheterized 














Assessment and Plan


Plan: 





Abdominal and chest pain likely acute pancreatitis 


Etiology unclear possibly ETOH related?


Ultrasound of the abdomen, nonrevealing


Lipase trending down


GI following





Elevated trops


Likely non-specific, sec to above


Echo checked, positive for moderate pulm hypertension, no motion abnormalities 

to suggest acute event.


Continue Coreg, hold ACE inhibitor because of renal failure


Seen by cardio





Acute renal failure with hyperkalemia


Improving


Lasix and dopamine drips


UOP improving.


Continue to hold ACE inhibitor


Recheck electrolytes and renal function in a.m.


Status post treatment with Kayexalate, insulin with D50 for hyperkalemia.





Diabetes mellitus type 2 with hyperglycemia


Resume home insulin regimen


Sliding scale insulin moderate dose


Blood sugar check CBC and at bedtime


Sugars currently controlled





Essential hypertension, COPD, hyperlipidemia, osteoarthritis


Stable


Resume home meds





DVT prophylaxis


Heparin subcu

## 2018-06-30 NOTE — PN
PROGRESS NOTE



DATE OF SERVICE:

06/30/2018



Patient is a 73-year-old  male admitted to hospital with abdominal pain

and chest pain.  Subsequently diagnosed with acute pancreatitis, probably related to

alcohol use and he was also noted to have elevated serum transaminases consistent with

chronic alcoholic liver disease.  The patient this morning is feeling better.  He still

has some abdominal discomfort.  He reports no nausea, vomiting.  He denies any diarrhea

or constipation.  No fever, chills, night sweats.



PHYSICAL EXAMINATION:

On physical examination, he appears comfortable, in no apparent distress. Vital signs

are stable. Blood pressure is 151/81, pulse 60, temperature 99.

HEENT examination is unremarkable. Conjunctivae pink. Sclerae anicteric. Oral cavity,

no lesion.

NECK: No JVD or lymph node enlargement.

CHEST:  Clear to auscultation.

HEART:  Regular rate and rhythm.

Abdomen was slightly distended.  There was mild tenderness in the epigastric area.

Bowel sounds are positive.  No organomegaly.

EXTREMITIES: No pedal edema.

SKIN: No rashes.

NEURO: He is alert and oriented x3, but somewhat confused.



LABS:

The lab from this morning, CBC is not available but yesterday WBC 8.1, hemoglobin 11.9

platelets.  The BUN is 70, creatinine is 2.97. Lipase is down to 1324. T bilirubin is

down to 2, AST 77, ALT is 45, and alkaline phosphatase is 174.



IMPRESSION:

1. Acute pancreatitis, probably related to alcohol use.  Labs are gradually improving.

2. Elevated AST more than ALT and slight elevation of T bilirubin up to 2.  All of

    this is consistent with chronic alcoholic liver disease with a component of acute

    alcoholic hepatitis.



RECOMMENDATION:

1. Continue with a clear liquid diet.

2. Pain medications as needed.

3. Repeat labs in the morning and will follow the patient closely during his hospital

    stay.

Thank you for this consultation.





MMODL / IJN: [id number] / Job#: [id number]

## 2018-07-01 LAB
ANION GAP SERPL CALC-SCNC: 15 MMOL/L
BUN SERPL-SCNC: 67 MG/DL (ref 9–20)
CALCIUM SPEC-MCNC: 9.2 MG/DL (ref 8.4–10.2)
CHLORIDE SERPL-SCNC: 100 MMOL/L (ref 98–107)
CO2 SERPL-SCNC: 24 MMOL/L (ref 22–30)
GLUCOSE BLD-MCNC: 108 MG/DL (ref 75–99)
GLUCOSE BLD-MCNC: 135 MG/DL (ref 75–99)
GLUCOSE BLD-MCNC: 182 MG/DL (ref 75–99)
GLUCOSE BLD-MCNC: 97 MG/DL (ref 75–99)
GLUCOSE SERPL-MCNC: 131 MG/DL (ref 74–99)
POTASSIUM SERPL-SCNC: 4.2 MMOL/L (ref 3.5–5.1)
SODIUM SERPL-SCNC: 139 MMOL/L (ref 137–145)

## 2018-07-01 RX ADMIN — HEPARIN SODIUM SCH UNIT: 5000 INJECTION, SOLUTION INTRAVENOUS; SUBCUTANEOUS at 09:00

## 2018-07-01 RX ADMIN — ATORVASTATIN CALCIUM SCH MG: 20 TABLET, FILM COATED ORAL at 19:50

## 2018-07-01 RX ADMIN — HEPARIN SODIUM SCH UNIT: 5000 INJECTION, SOLUTION INTRAVENOUS; SUBCUTANEOUS at 00:11

## 2018-07-01 RX ADMIN — HEPARIN SODIUM SCH UNIT: 5000 INJECTION, SOLUTION INTRAVENOUS; SUBCUTANEOUS at 15:46

## 2018-07-01 RX ADMIN — IPRATROPIUM BROMIDE AND ALBUTEROL SULFATE SCH ML: .5; 3 SOLUTION RESPIRATORY (INHALATION) at 07:56

## 2018-07-01 RX ADMIN — Medication SCH MG: at 09:00

## 2018-07-01 RX ADMIN — GABAPENTIN SCH MG: 400 CAPSULE ORAL at 09:00

## 2018-07-01 RX ADMIN — ISOSORBIDE MONONITRATE SCH MG: 60 TABLET, EXTENDED RELEASE ORAL at 09:00

## 2018-07-01 RX ADMIN — IPRATROPIUM BROMIDE AND ALBUTEROL SULFATE SCH ML: .5; 3 SOLUTION RESPIRATORY (INHALATION) at 13:41

## 2018-07-01 RX ADMIN — PANTOPRAZOLE SODIUM SCH MG: 40 TABLET, DELAYED RELEASE ORAL at 06:28

## 2018-07-01 RX ADMIN — HEPARIN SODIUM SCH UNIT: 5000 INJECTION, SOLUTION INTRAVENOUS; SUBCUTANEOUS at 23:32

## 2018-07-01 RX ADMIN — FUROSEMIDE SCH MLS/HR: 10 INJECTION, SOLUTION INTRAMUSCULAR; INTRAVENOUS at 15:46

## 2018-07-01 RX ADMIN — HYDROCODONE BITARTRATE AND ACETAMINOPHEN PRN EACH: 7.5; 325 TABLET ORAL at 09:09

## 2018-07-01 RX ADMIN — INSULIN DETEMIR SCH UNIT: 100 INJECTION, SOLUTION SUBCUTANEOUS at 09:00

## 2018-07-01 RX ADMIN — IPRATROPIUM BROMIDE AND ALBUTEROL SULFATE SCH ML: .5; 3 SOLUTION RESPIRATORY (INHALATION) at 20:12

## 2018-07-01 RX ADMIN — ACETAMINOPHEN PRN MG: 325 TABLET, FILM COATED ORAL at 00:12

## 2018-07-01 RX ADMIN — INSULIN ASPART SCH: 100 INJECTION, SOLUTION INTRAVENOUS; SUBCUTANEOUS at 21:13

## 2018-07-01 RX ADMIN — GABAPENTIN SCH MG: 400 CAPSULE ORAL at 19:50

## 2018-07-01 RX ADMIN — INSULIN ASPART SCH UNIT: 100 INJECTION, SOLUTION INTRAVENOUS; SUBCUTANEOUS at 17:32

## 2018-07-01 RX ADMIN — INSULIN ASPART SCH UNIT: 100 INJECTION, SOLUTION INTRAVENOUS; SUBCUTANEOUS at 12:28

## 2018-07-01 RX ADMIN — CEFAZOLIN SCH: 330 INJECTION, POWDER, FOR SOLUTION INTRAMUSCULAR; INTRAVENOUS at 00:13

## 2018-07-01 RX ADMIN — INSULIN ASPART SCH: 100 INJECTION, SOLUTION INTRAVENOUS; SUBCUTANEOUS at 06:09

## 2018-07-01 RX ADMIN — GABAPENTIN SCH MG: 400 CAPSULE ORAL at 15:46

## 2018-07-01 RX ADMIN — ASPIRIN SCH: 325 TABLET ORAL at 09:01

## 2018-07-01 RX ADMIN — CEFAZOLIN SCH: 330 INJECTION, POWDER, FOR SOLUTION INTRAMUSCULAR; INTRAVENOUS at 21:15

## 2018-07-01 NOTE — PN
PROGRESS NOTE



Patient is seen for followup for acute kidney injury.  He has had good urine output

with 24 hour output of about 3.8 L.  The patient is doing well.  His weight is down.

He still remains on Lasix drip with evidence of volume overload.



PHYSICAL EXAMINATION:

Blood pressure is 167/79, heart rate 79 per minute.  He is afebrile.  Examination of

the heart S1, S2.  Examination of the lungs bilateral breath sounds are heard.

Decreased breath sounds at bases.

ABDOMEN:  Soft, distended, nontender.  Examination lower extremity shows edema 1+

bilaterally.



LABS:

Not available from today.



ASSESSMENT:

1. Acute kidney injury, acute tubular necrosis, currently nonoliguric with improving

    renal function.  Continue with the Lasix drip for now.

2. Hypertension, partly volume sensitive.  Initially blood pressure was low and now

    the blood pressure has been running high.  Patient is maintained on Norvasc and

    Imdur that he had been taking at home.  His Cozaar is on hold.  I will add

    hydralazine to but at a lower dose.  The patient was on it at home.

3. Acute pancreatitis with no evidence of gallstones.  Clinically improving.  The

    patient is tolerating oral intake.

4. Volume overload, currently improving.



PLAN:

Resume low-dose hydralazine.  Continue with Lasix drip.  Check labs today.





MMODL / IJN: [id number] / Job#: [id number]

## 2018-07-01 NOTE — PN
PROGRESS NOTE



DATE OF SERVICE:

07/01/2018



The patient is a 73-year-old, pleasant  male admitted to the hospital

with abdominal pain and chest pain and acute pancreatitis/elevated LFTs.  He is doing

better.  He remains on a clear liquid diet.  No new symptoms today.  He reports no

nausea, vomiting.  No fever, chills, night sweats.



PHYSICAL EXAMINATION:

He appears comfortable.  No apparent distress.  VITAL SIGNS:  Stable.  Blood pressure

is 167/79, pulse is 79, temperature 99.  HEENT examination unremarkable. Conjunctivae

pink. Sclerae anicteric. Oral cavity no lesions.

NECK: No JVD or lymph node enlargement.  Chest was clear to auscultation.  HEART:

Regular rate and rhythm.

ABDOMEN:  Soft.  There was mild tenderness in the epigastric area.  Bowel sounds are

positive.  No organomegaly.  Extremities no pedal edema.  Skin no rashes.  Neuro:  He

is alert and oriented x3.  No focal deficits.



LAB:

From today, basic metabolic panel, BUN is 67, creatinine 1.86.  The rest of the labs

are still pending.  Yesterday lipase was 1324.



IMPRESSION:

1. Acute pancreatitis related to alcohol use, gradually improving.

2. Elevated LFTs.  The patient may have a component of alcoholic hepatitis.

3. Acute kidney injury for which Dr. Reyes is following the patient closely.

4. Diabetes mellitus.



RECOMMENDATION:

Continue with clear liquid diet.  Await rest of the labs from today, encourage him to

remain abstinent from alcohol and we will follow him closely during his hospital stay.





MMMARYJOL / IJN: [id number] / Job#: [id number]

## 2018-07-02 LAB
ANION GAP SERPL CALC-SCNC: 15 MMOL/L
BASOPHILS # BLD AUTO: 0 K/UL (ref 0–0.2)
BASOPHILS NFR BLD AUTO: 0 %
BUN SERPL-SCNC: 66 MG/DL (ref 9–20)
CALCIUM SPEC-MCNC: 9.1 MG/DL (ref 8.4–10.2)
CHLORIDE SERPL-SCNC: 96 MMOL/L (ref 98–107)
CO2 SERPL-SCNC: 27 MMOL/L (ref 22–30)
EOSINOPHIL # BLD AUTO: 0.1 K/UL (ref 0–0.7)
EOSINOPHIL NFR BLD AUTO: 1 %
ERYTHROCYTE [DISTWIDTH] IN BLOOD BY AUTOMATED COUNT: 3.99 M/UL (ref 4.3–5.9)
ERYTHROCYTE [DISTWIDTH] IN BLOOD: 13.8 % (ref 11.5–15.5)
GLUCOSE BLD-MCNC: 180 MG/DL (ref 75–99)
GLUCOSE BLD-MCNC: 182 MG/DL (ref 75–99)
GLUCOSE BLD-MCNC: 228 MG/DL (ref 75–99)
GLUCOSE BLD-MCNC: 85 MG/DL (ref 75–99)
GLUCOSE SERPL-MCNC: 75 MG/DL (ref 74–99)
HCT VFR BLD AUTO: 35.6 % (ref 39–53)
HCV RNA SPEC NAA+PROBE-ACNC: 32 IU/ML (ref ?–12)
HCV RNA SPEC NAA+PROBE-ACNC: DETECTED IU/ML
HCV RNA SPEC NAA+PROBE-LOG#: 1.51 {LOG_COPIES}/ML (ref ?–1.08)
HGB BLD-MCNC: 11.4 GM/DL (ref 13–17.5)
LIPASE SERPL-CCNC: 1195 U/L (ref 23–300)
LYMPHOCYTES # SPEC AUTO: 1.3 K/UL (ref 1–4.8)
LYMPHOCYTES NFR SPEC AUTO: 9 %
MAGNESIUM SPEC-SCNC: 1.6 MG/DL (ref 1.6–2.3)
MCH RBC QN AUTO: 28.5 PG (ref 25–35)
MCHC RBC AUTO-ENTMCNC: 32.1 G/DL (ref 31–37)
MCV RBC AUTO: 89 FL (ref 80–100)
MONOCYTES # BLD AUTO: 1.3 K/UL (ref 0–1)
MONOCYTES NFR BLD AUTO: 9 %
NEUTROPHILS # BLD AUTO: 10.9 K/UL (ref 1.3–7.7)
NEUTROPHILS NFR BLD AUTO: 78 %
PLATELET # BLD AUTO: 111 K/UL (ref 150–450)
POTASSIUM SERPL-SCNC: 4.2 MMOL/L (ref 3.5–5.1)
SODIUM SERPL-SCNC: 138 MMOL/L (ref 137–145)
WBC # BLD AUTO: 13.9 K/UL (ref 3.8–10.6)

## 2018-07-02 RX ADMIN — MAGNESIUM SULFATE IN DEXTROSE SCH MLS/HR: 10 INJECTION, SOLUTION INTRAVENOUS at 10:40

## 2018-07-02 RX ADMIN — ISOSORBIDE MONONITRATE SCH MG: 60 TABLET, EXTENDED RELEASE ORAL at 09:43

## 2018-07-02 RX ADMIN — ATORVASTATIN CALCIUM SCH MG: 20 TABLET, FILM COATED ORAL at 21:27

## 2018-07-02 RX ADMIN — INSULIN ASPART SCH UNIT: 100 INJECTION, SOLUTION INTRAVENOUS; SUBCUTANEOUS at 17:39

## 2018-07-02 RX ADMIN — CEFAZOLIN SCH: 330 INJECTION, POWDER, FOR SOLUTION INTRAMUSCULAR; INTRAVENOUS at 21:47

## 2018-07-02 RX ADMIN — ACETAMINOPHEN PRN MG: 325 TABLET, FILM COATED ORAL at 09:45

## 2018-07-02 RX ADMIN — GABAPENTIN SCH MG: 400 CAPSULE ORAL at 17:40

## 2018-07-02 RX ADMIN — INSULIN ASPART SCH UNIT: 100 INJECTION, SOLUTION INTRAVENOUS; SUBCUTANEOUS at 12:28

## 2018-07-02 RX ADMIN — INSULIN ASPART SCH UNIT: 100 INJECTION, SOLUTION INTRAVENOUS; SUBCUTANEOUS at 21:27

## 2018-07-02 RX ADMIN — CARVEDILOL SCH MG: 12.5 TABLET, FILM COATED ORAL at 17:40

## 2018-07-02 RX ADMIN — INSULIN ASPART SCH: 100 INJECTION, SOLUTION INTRAVENOUS; SUBCUTANEOUS at 06:05

## 2018-07-02 RX ADMIN — CARVEDILOL SCH MG: 12.5 TABLET, FILM COATED ORAL at 12:29

## 2018-07-02 RX ADMIN — FUROSEMIDE SCH MG: 10 INJECTION, SOLUTION INTRAMUSCULAR; INTRAVENOUS at 21:36

## 2018-07-02 RX ADMIN — Medication SCH MG: at 09:44

## 2018-07-02 RX ADMIN — PANTOPRAZOLE SODIUM SCH MG: 40 TABLET, DELAYED RELEASE ORAL at 06:22

## 2018-07-02 RX ADMIN — INSULIN DETEMIR SCH UNIT: 100 INJECTION, SOLUTION SUBCUTANEOUS at 09:42

## 2018-07-02 RX ADMIN — IPRATROPIUM BROMIDE AND ALBUTEROL SULFATE SCH ML: .5; 3 SOLUTION RESPIRATORY (INHALATION) at 08:13

## 2018-07-02 RX ADMIN — GABAPENTIN SCH MG: 400 CAPSULE ORAL at 09:43

## 2018-07-02 RX ADMIN — ASPIRIN SCH: 325 TABLET ORAL at 09:43

## 2018-07-02 RX ADMIN — GABAPENTIN SCH MG: 400 CAPSULE ORAL at 21:27

## 2018-07-02 RX ADMIN — HEPARIN SODIUM SCH UNIT: 5000 INJECTION, SOLUTION INTRAVENOUS; SUBCUTANEOUS at 17:39

## 2018-07-02 RX ADMIN — IPRATROPIUM BROMIDE AND ALBUTEROL SULFATE SCH ML: .5; 3 SOLUTION RESPIRATORY (INHALATION) at 19:31

## 2018-07-02 RX ADMIN — MAGNESIUM SULFATE IN DEXTROSE SCH MLS/HR: 10 INJECTION, SOLUTION INTRAVENOUS at 12:28

## 2018-07-02 RX ADMIN — IPRATROPIUM BROMIDE AND ALBUTEROL SULFATE SCH ML: .5; 3 SOLUTION RESPIRATORY (INHALATION) at 13:14

## 2018-07-02 RX ADMIN — ACETAMINOPHEN PRN MG: 325 TABLET, FILM COATED ORAL at 17:39

## 2018-07-02 RX ADMIN — HEPARIN SODIUM SCH UNIT: 5000 INJECTION, SOLUTION INTRAVENOUS; SUBCUTANEOUS at 09:43

## 2018-07-02 NOTE — P.PN
Subjective


Progress Note Date: 07/02/18


Principal diagnosis: 





Abdominal and chest pain.





Patient denied having any abdominal pain, no nausea or vomiting.  Patient just 

had a fever of 101.





Objective





- Vital Signs


Vital signs: 


 Vital Signs











Temp  101 F H  07/02/18 08:59


 


Pulse  80   07/02/18 08:59


 


Resp  18   07/02/18 09:00


 


BP  162/85   07/02/18 08:59


 


Pulse Ox  96   07/02/18 08:59








 Intake & Output











 07/01/18 07/02/18 07/02/18





 18:59 06:59 18:59


 


Intake Total 1107.833 60 300


 


Output Total 1350 1200 500


 


Balance -242.167 -1140 -200


 


Weight  102 kg 


 


Intake:   


 


  IV 80 60 


 


    Sodium Chloride 0.9% 1, 80 60 





    000 ml @ 10 mls/hr IV .   





    Q24H [facility] Rx#:[id number]   


 


  Intake, IV Titration 307.833  





  Amount   


 


    Furosemide 250 mg In 227.833  





    Sodium Chloride 0.9% 225   





    ml @ 10 MG/HR 10 mls/hr   





    IVP .Q24H [facility] Rx#:   





    [id number]   


 


    Sodium Chloride 0.9% 1, 80  





    000 ml @ 10 mls/hr IV .   





    Q24H [facility] Rx#:[id number]   


 


  Oral 720  300


 


Output:   


 


  Urine 1350 1200 500


 


Other:   


 


  Voiding Method Indwelling Catheter Indwelling Catheter Indwelling Catheter


 


  # Bowel Movements 1  














- Exam





Constitutional: No acute distress, conversant, pleasant


Eyes: Positive for edema around the eyelids and eyes.  Anicteric sclerae, moist 

conjunctiva, no lid-lag, PERRLA, 


ENMT: Oropharynx clear, no erythema, exudates


Neck: Supple, FROM, no masses, or JVD, No carotid bruits, No thyromegaly


Lungs: Clear to auscultation, Clear to percussion, Normal respiratory effort, 

no accessory muscle use 


Cardiovascular: Heart regular in rate and rhythm, No murmurs, gallops, or rubs, 

1+ peripheral edema


Abdominal: Soft, Nontender, no guarding, rebound or rigidity, Normoactive bowel 

sounds, No hepatomegaly, No splenomegaly, No palpable mass 


Skin: Normal temperature, tone, texture, turgor, no induration, No subcutaneous 

nodules, No rash, lesions, No ulcers


Extremities: No digital cyanosis, No clubbing, Pedal pulses intact and 

symmetrical, Radial pulses intact and symmetrical, No calf tenderness 


Psychiatric: Alert and oriented to person, place and time, appropriate affect, 

intact judgement         


Neuro: Muscles Strength 5/5 in all 4 extremities, Sensation to light touch 

grossly present throughout, Cranial nerves II-XII grossly intact, no focal 

sensory deficits








- Labs


CBC & Chem 7: 


 07/02/18 06:07





 07/02/18 06:07


Labs: 


 Abnormal Lab Results - Last 24 Hours (Table)











  07/01/18 07/02/18 07/02/18 Range/Units





  16:51 06:07 06:07 


 


WBC   13.9 H   (3.8-10.6)  k/uL


 


RBC   3.99 L   (4.30-5.90)  m/uL


 


Hgb   11.4 L   (13.0-17.5)  gm/dL


 


Hct   35.6 L   (39.0-53.0)  %


 


Plt Count   111 L   (150-450)  k/uL


 


Neutrophils #   10.9 H   (1.3-7.7)  k/uL


 


Monocytes #   1.3 H   (0-1.0)  k/uL


 


Chloride    96 L  ()  mmol/L


 


BUN    66 H  (9-20)  mg/dL


 


Creatinine    1.66 H  (0.66-1.25)  mg/dL


 


POC Glucose (mg/dL)  135 H    (75-99)  mg/dL


 


Lipase    1195 H  ()  U/L














  07/02/18 Range/Units





  11:52 


 


WBC   (3.8-10.6)  k/uL


 


RBC   (4.30-5.90)  m/uL


 


Hgb   (13.0-17.5)  gm/dL


 


Hct   (39.0-53.0)  %


 


Plt Count   (150-450)  k/uL


 


Neutrophils #   (1.3-7.7)  k/uL


 


Monocytes #   (0-1.0)  k/uL


 


Chloride   ()  mmol/L


 


BUN   (9-20)  mg/dL


 


Creatinine   (0.66-1.25)  mg/dL


 


POC Glucose (mg/dL)  228 H  (75-99)  mg/dL


 


Lipase   ()  U/L








 Microbiology - Last 24 Hours (Table)











 06/29/18 11:03 Blood Culture - Preliminary





 Blood    No Growth after 48 hours


 


 06/29/18 11:17 Blood Culture - Preliminary





 Blood    No Growth after 48 hours














Assessment and Plan


Plan: 





Abdominal and chest pain likely acute pancreatitis, likely alcoholic


Etiology unclear possibly ETOH related?  Patient doesn't admit to drinking


Ultrasound of the abdomen, nonrevealing


Lipase trending down--- recheck in a.m.


D/W GI





Recurrent fevers:


Workup for fevers was done 2 days ago.  Urinalysis was negative for pyuria.  

Blood cultures are negative.  Chest x-ray showed possible atelectasis versus 

infiltrate.  Because patient wasn't having shortness of breath and leukocytosis 

he was not treated with antibiotics.


Tylenol when necessary for fever


Consult ID





Elevated trops


Likely non-specific, sec to above, seen by cardiology


Echo checked, positive for moderate pulm hypertension, no motion abnormalities 

to suggest acute event.


Continue Coreg, hold ACE inhibitor because of renal failure





Acute renal failure with hyperkalemia


Improving


Lasix drip switched to IV lasix push by nephrology. 


UOP improving.


Continue to hold ACE inhibitor


Recheck electrolytes and renal function in a.m.


Status post treatment with Kayexalate, insulin with D50 for hyperkalemia.





Diabetes mellitus type 2 with hyperglycemia


Resume home insulin regimen


Sliding scale insulin moderate dose


Blood sugar check CBC and at bedtime


Sugars currently controlled





Essential hypertension, 


Blood pressure has been elevated throughout the admission,


Resume home doses of his BP meds


Follow-up BP





COPD, hyperlipidemia, osteoarthritis


Stable


Resume home meds





DVT prophylaxis


Heparin subcu

## 2018-07-02 NOTE — PN
PROGRESS NOTE



The patient is seen for followup for acute kidney injury and volume overload.  He is

maintained on Lasix drip.  He has had significant improvement in his renal function as

well as volume status.  I will discontinue the Lasix drip today and we can switch him

to IV push Lasix.  The patient wants to go home.  He has been tolerating oral intake

without any abdominal pain.  His lipase level has been decreasing.



PHYSICAL EXAMINATION:

On examination, blood pressure was 162/85.  The patient is still febrile.  Temperature

101, heart rate 80 per minute.

EXAMINATION OF THE HEART: S1, S2.

EXAMINATION OF THE LUNGS: Decreased breath sounds at bases.

Abdomen is soft, nontender.

Examination of the lower extremities shows edema trace bilaterally.

CNS exam is grossly intact. Patient moving all 4 extremities.



LABS:

Labs show sodium of 138, potassium 4.2, BUN 66, serum creatinine 1.6.  Lipase is 1195

which is down from 1324. Hemoglobin at 11.4 g/dL.



ASSESSMENT:

1. Acute kidney injury, acute tubular necrosis, nonoliguric and improving.

2. Volume overload maintained on Lasix drip with significant improvement in volume

    status.

3. I will switch to IV push Lasix and discontinue the Lasix drip.

4. Hypertension, partly volume sensitive, currently slightly better controlled.  The

    patient is maintained on hydralazine, Norvasc, Imdur.

5. Acute pancreatitis with decreasing lipase levels, tolerating oral intake.

6. Elevated liver function tests secondary to component of alcohol hepatitis.

7. Type 2 diabetes.



PLAN:

Discontinue Lasix drip. Switch to IV push Lasix.  Continue current antihypertensive

medications.





MMODL / IJN: [id number] / Job#: [id number]

## 2018-07-03 LAB
ANION GAP SERPL CALC-SCNC: 13 MMOL/L
BUN SERPL-SCNC: 70 MG/DL (ref 9–20)
CALCIUM SPEC-MCNC: 8.8 MG/DL (ref 8.4–10.2)
CELLS COUNTED: 100
CHLORIDE SERPL-SCNC: 92 MMOL/L (ref 98–107)
CO2 SERPL-SCNC: 28 MMOL/L (ref 22–30)
EOSINOPHIL # BLD MANUAL: 0.42 K/UL (ref 0–0.7)
ERYTHROCYTE [DISTWIDTH] IN BLOOD BY AUTOMATED COUNT: 3.75 M/UL (ref 4.3–5.9)
ERYTHROCYTE [DISTWIDTH] IN BLOOD: 13.6 % (ref 11.5–15.5)
GLUCOSE BLD-MCNC: 119 MG/DL (ref 75–99)
GLUCOSE BLD-MCNC: 149 MG/DL (ref 75–99)
GLUCOSE BLD-MCNC: 183 MG/DL (ref 75–99)
GLUCOSE BLD-MCNC: 218 MG/DL (ref 75–99)
GLUCOSE SERPL-MCNC: 117 MG/DL (ref 74–99)
HCT VFR BLD AUTO: 33.1 % (ref 39–53)
HGB BLD-MCNC: 11 GM/DL (ref 13–17.5)
LYMPHOCYTES # BLD MANUAL: 2.54 K/UL (ref 1–4.8)
MAGNESIUM SPEC-SCNC: 2.2 MG/DL (ref 1.6–2.3)
MCH RBC QN AUTO: 29.3 PG (ref 25–35)
MCHC RBC AUTO-ENTMCNC: 33.2 G/DL (ref 31–37)
MCV RBC AUTO: 88.3 FL (ref 80–100)
MONOCYTES # BLD MANUAL: 0.64 K/UL (ref 0–1)
NEUTROPHILS NFR BLD MANUAL: 66 %
NEUTS SEG # BLD MANUAL: 7 K/UL (ref 1.3–7.7)
PLATELET # BLD AUTO: 103 K/UL (ref 150–450)
POTASSIUM SERPL-SCNC: 4.1 MMOL/L (ref 3.5–5.1)
SODIUM SERPL-SCNC: 133 MMOL/L (ref 137–145)
WBC # BLD AUTO: 10.6 K/UL (ref 3.8–10.6)

## 2018-07-03 RX ADMIN — FUROSEMIDE SCH MG: 10 INJECTION, SOLUTION INTRAMUSCULAR; INTRAVENOUS at 08:11

## 2018-07-03 RX ADMIN — Medication SCH MG: at 08:12

## 2018-07-03 RX ADMIN — HEPARIN SODIUM SCH UNIT: 5000 INJECTION, SOLUTION INTRAVENOUS; SUBCUTANEOUS at 00:32

## 2018-07-03 RX ADMIN — ATORVASTATIN CALCIUM SCH MG: 20 TABLET, FILM COATED ORAL at 20:53

## 2018-07-03 RX ADMIN — GABAPENTIN SCH MG: 400 CAPSULE ORAL at 08:12

## 2018-07-03 RX ADMIN — HEPARIN SODIUM SCH UNIT: 5000 INJECTION, SOLUTION INTRAVENOUS; SUBCUTANEOUS at 18:10

## 2018-07-03 RX ADMIN — FUROSEMIDE SCH MG: 10 INJECTION, SOLUTION INTRAMUSCULAR; INTRAVENOUS at 20:53

## 2018-07-03 RX ADMIN — CARVEDILOL SCH MG: 12.5 TABLET, FILM COATED ORAL at 18:11

## 2018-07-03 RX ADMIN — INSULIN ASPART SCH UNIT: 100 INJECTION, SOLUTION INTRAVENOUS; SUBCUTANEOUS at 20:53

## 2018-07-03 RX ADMIN — INSULIN DETEMIR SCH UNIT: 100 INJECTION, SOLUTION SUBCUTANEOUS at 08:10

## 2018-07-03 RX ADMIN — IOPAMIDOL PRN ML: 612 INJECTION, SOLUTION INTRAVENOUS at 19:20

## 2018-07-03 RX ADMIN — DEXTROSE MONOHYDRATE SCH MLS/HR: 5 INJECTION, SOLUTION INTRAVENOUS at 19:30

## 2018-07-03 RX ADMIN — HYDROCODONE BITARTRATE AND ACETAMINOPHEN PRN EACH: 7.5; 325 TABLET ORAL at 12:14

## 2018-07-03 RX ADMIN — HYDROCODONE BITARTRATE AND ACETAMINOPHEN PRN EACH: 7.5; 325 TABLET ORAL at 06:18

## 2018-07-03 RX ADMIN — INSULIN ASPART SCH UNIT: 100 INJECTION, SOLUTION INTRAVENOUS; SUBCUTANEOUS at 18:10

## 2018-07-03 RX ADMIN — IPRATROPIUM BROMIDE AND ALBUTEROL SULFATE SCH ML: .5; 3 SOLUTION RESPIRATORY (INHALATION) at 11:16

## 2018-07-03 RX ADMIN — GABAPENTIN SCH MG: 400 CAPSULE ORAL at 18:11

## 2018-07-03 RX ADMIN — HEPARIN SODIUM SCH UNIT: 5000 INJECTION, SOLUTION INTRAVENOUS; SUBCUTANEOUS at 08:11

## 2018-07-03 RX ADMIN — PANTOPRAZOLE SODIUM SCH MG: 40 TABLET, DELAYED RELEASE ORAL at 06:10

## 2018-07-03 RX ADMIN — ACETAMINOPHEN PRN MG: 325 TABLET, FILM COATED ORAL at 21:51

## 2018-07-03 RX ADMIN — INSULIN ASPART SCH UNIT: 100 INJECTION, SOLUTION INTRAVENOUS; SUBCUTANEOUS at 12:14

## 2018-07-03 RX ADMIN — INSULIN ASPART SCH: 100 INJECTION, SOLUTION INTRAVENOUS; SUBCUTANEOUS at 06:09

## 2018-07-03 RX ADMIN — ISOSORBIDE MONONITRATE SCH MG: 60 TABLET, EXTENDED RELEASE ORAL at 08:12

## 2018-07-03 RX ADMIN — IPRATROPIUM BROMIDE AND ALBUTEROL SULFATE SCH ML: .5; 3 SOLUTION RESPIRATORY (INHALATION) at 07:21

## 2018-07-03 RX ADMIN — CARVEDILOL SCH MG: 12.5 TABLET, FILM COATED ORAL at 06:10

## 2018-07-03 RX ADMIN — IOPAMIDOL PRN ML: 612 INJECTION, SOLUTION INTRAVENOUS at 18:10

## 2018-07-03 RX ADMIN — ASPIRIN SCH: 325 TABLET ORAL at 08:12

## 2018-07-03 RX ADMIN — GABAPENTIN SCH MG: 400 CAPSULE ORAL at 21:22

## 2018-07-03 RX ADMIN — IPRATROPIUM BROMIDE AND ALBUTEROL SULFATE SCH: .5; 3 SOLUTION RESPIRATORY (INHALATION) at 19:56

## 2018-07-03 NOTE — XR
EXAMINATION TYPE: XR chest 1V

 

DATE OF EXAM: 7/3/2018

 

COMPARISON: 6/29/2018

 

HISTORY: Heart failure chest pain

 

TECHNIQUE: Single frontal view of the chest is obtained.

 

FINDINGS:  Heart is enlarged. There is pulmonary vascular congestion. There is slight blunting of cos
tophrenic angles. There is left axillary pacemaker with the lead tips in the right ventricle.

 

IMPRESSION:  Mild heart failure that is not significantly different than last exam. Cardiomegaly.

## 2018-07-03 NOTE — CT
EXAMINATION TYPE: CT abdomen pelvis wo con

 

DATE OF EXAM: 7/3/2018

 

COMPARISON: 6/26/2018

 

HISTORY: Abdominal pain

 

CT DLP: 1326.2 mGycm

Automated exposure control for dose reduction was used.

 

TECHNIQUE:  Helical acquisition of images was performed from the lung bases through the pelvis.

 

FINDINGS: 

 

There is bilateral infiltrate and atelectasis at the lung bases. Heart is enlarged.

 

Liver and spleen appear normal. Bile ducts are not dilated. Gallbladder appears normal. There is no s
ign of a pancreatic mass.

 

There is no adrenal mass. Kidneys have normal size and contour. There is no hydronephrosis. There is 
oral contrast. I see no intestinal wall thickening. There are no dilated loops. There is no evidence 
of a bowel obstruction. Appendix appears normal. There is no ascites. There is no sign of free air. T
here is Castro catheter in the urinary bladder. There are spondylotic changes in the lumbar spine. I s
ee no bony destructive process. There are small posterior disc bulging at L3-4 L4-5. There is vacuum 
disc at L3-4 and L4-5. There is no compression fracture. There is air in the urinary bladder. There i
s no free fluid in the pelvis.

IMPRESSION: 

THERE IS INFILTRATE AND ATELECTASIS AT THE LUNG BASES THAT IS SIMILAR TO OLD EXAM. CARDIOMEGALY. NO A
CUTE ABNORMALITY SEEN WITHIN THE ABDOMEN AND PELVIS.

## 2018-07-03 NOTE — CONS
CONSULTATION



DATE OF SERVICE:

07/03/2018



REASON FOR CONSULTATION:

Fever.



HISTORY OF PRESENT ILLNESS:

The patient is a 73-year-old -American male who was brought into the ER at

[facility] on 6/26/2018, about a week ago, with symptoms predominantly

abdominal pain.  The patient's symptom had been going on for a day or two prior to

presentation to the hospital.  The pain was described to be mostly in the epigastric

and the left lower abdominal area. When asked about intensity and severity, he says it

was severe. He felt nauseated with it but had no vomiting or any diarrhea. With these

symptoms the patient was evaluated by the ER physician. The patient did have a CT of

abdomen and pelvis completed which showed no significant new or acute findings to

account for patient's symptoms; persistent appendicolith without inflammatory changes

to suggest acute appendicitis, possible cystitis; correlate clinically. The patient has

been treated by admitting cardiology services in addition to the GI. The patient did

have a fever.  The patient was afebrile for about 48 hours; subsequently started

running a fever on 6/29 with a fever of 101.6 degrees Fahrenheit and he did have a

daily spike of fever for the last 3 days. That prompted this infectious disease

consultation. Patient had a normal white count of 8.6 on admission. It was up to 13.9

yesterday but is down to 10.6 today.  The patient did mention that his abdominal pain

has improved. No nausea. No vomiting. He denies having any diarrhea.  Overall the

patient is not a very good historian, though.



REVIEW OF SYSTEMS:

CONSTITUTIONAL: Positive for weakness along with a fever.

EYES: No complaint.

ENT: No complaint.

RESPIRATORY: No complaint.

CARDIOVASCULAR: No complaint.

GENITOURINARY: No complaint.

GASTROINTESTINAL: As per HPI.

MUSCULOSKELETAL:  No complaint.

INTEGUMENTARY: No complaint.

PSYCHOLOGICAL: No complaint.

ENDOCRINE: No complaint.

NEUROLOGICAL: No complaint.



PAST MEDICAL HISTORY:

1. Coronary artery disease.

2. Heart failure.

3. COPD.

4. Diabetes mellitus.

5. Hypertension.

6. Hyperlipidemia.

7. Liver disease.

8. Osteoarthritis.



PAST SURGICAL HISTORY:

1. PTCA.

2. Cataract bilateral surgery.

3. Liver biopsy.

4. Permanent pacemaker placement.



SOCIAL HISTORY:

Denies smoking, drinking or drug use.



FAMILY HISTORY:

Sister with history of cancer.  Mother with history of hypertension.



ALLERGIES:

NO KNOWN DRUG ALLERGIES.



CURRENT MEDICATIONS:

1. Tylenol.

2. Norco.

3. DuoNeb.

4. Lipitor.

5. Coreg.

6. Colace.

7. Lasix.

8. Neurontin.

9. Heparin.

10.Hydralazine.

11.NovoLog.

12.Levemir.

13.Imdur.

14.Mag oxide.

15.Nitrostat.

16.Protonix.



PHYSICAL EXAMINATION:

Blood pressure 142/70 with a pulse of 67, temperature 98, T-max 101.  He is 94% on 2 L

nasal cannula.

General description is an elderly male lying in bed in no distress.  No tachypnea or

accessory muscle of respiration use.

HEENT examination shows no pallor or scleral icterus.  Oral mucosa membrane is moist.

No pharyngeal erythema or thrush.

NECK: Trachea is central. No thyromegaly.

LUNGS: Unlabored breathing. Clear to auscultation anteriorly with decreased breath

sounds in the base. No wheeze.

HEART: S1, S2.  Regular rate and rhythm.

ABDOMEN: Soft. Mildly distended.  No significant guarding or rigidity EXTREMITIES: No

edema of the feet.

SKIN EXAMINATION: No rash or mass palpable.

Neurologically the patient is awake, alert, oriented x2.  Mood and affect normal.



LABS:

Hemoglobin is 11, white count 10.6, BUN of 70, creatinine 2.41.  Blood culture from

6/29 has been negative.  Blood culture not repeated since then.



DIAGNOSTIC IMPRESSION AND PLAN:

Patient with a fever and elevated white count. Source is more likely abdominal in this

patient who presented to hospital with abdominal pain, some nausea but no vomiting,

with elevated lipase, though initial CT scan was reported to be negative.  Patient

currently has no other clinical focus of infection responsible for his symptomatology.



PLAN:

1. Blood cultures will be repeated.

2. We will obtain a CT of abdomen and pelvis with oral contrast only in view of his

    elevated creatinine.

3. We will add Zosyn 3.375 q.8 while waiting for the workup to be completed and

    culture to finalize.

4. We will follow up on clinical condition and culture to further adjust medication if

    needed.

Thank you for this consultation. Will follow this patient along with you.





MMODL / IJN: [id number] / Job#: [id number]

## 2018-07-03 NOTE — P.PN
Subjective


Progress Note Date: 07/03/18


Principal diagnosis: 





Abdominal and chest pain.





Patient is significantly weak, he is supposed to eat his first regular meal 

today at lunch.  I called his cousin who's number is listed in the computer to 

try to get more information about the patient.  He told me that he occasionally 

drinks beer but he wasn't sure how much in a day he drinks.  The line was cut 

off when I asked him if anybody was supposed to be the patient's decision 

maker.  I called back again and there was no answer.





Objective





- Vital Signs


Vital signs: 


 Vital Signs











Temp  99.3 F   07/03/18 08:00


 


Pulse  72   07/03/18 11:16


 


Resp  18   07/03/18 08:00


 


BP  142/67   07/03/18 08:00


 


Pulse Ox  96   07/03/18 08:00








 Intake & Output











 07/02/18 07/03/18 07/03/18





 18:59 06:59 18:59


 


Intake Total 300 800 


 


Output Total 500 400 


 


Balance -200 400 


 


Weight 102 kg 102 kg 


 


Intake:   


 


  IV  0 


 


    Sodium Chloride 0.9% 1,  0 





    000 ml @ 10 mls/hr IV .   





    Q24H [facility] Rx#:[id number]   


 


  Oral 300 800 


 


Output:   


 


  Urine 500 400 


 


    Uretheral (Castro)  400 


 


Other:   


 


  Voiding Method Indwelling Catheter Indwelling Catheter Indwelling Catheter














- Exam





Constitutional: No acute distress, conversant, pleasant


Eyes: Positive for edema around the eyelids and eyes.  Anicteric sclerae, moist 

conjunctiva, no lid-lag, PERRLA, 


ENMT: Oropharynx clear, no erythema, exudates


Neck: Supple, FROM, no masses, or JVD, No carotid bruits, No thyromegaly


Lungs: Clear to auscultation, Clear to percussion, Normal respiratory effort, 

no accessory muscle use 


Cardiovascular: Heart regular in rate and rhythm, No murmurs, gallops, or rubs, 

1+ peripheral edema


Abdominal: Soft, Nontender, no guarding, rebound or rigidity, Normoactive bowel 

sounds, No hepatomegaly, No splenomegaly, No palpable mass 


Skin: Normal temperature, tone, texture, turgor, no induration, No subcutaneous 

nodules, No rash, lesions, No ulcers


Extremities: No digital cyanosis, No clubbing, Pedal pulses intact and 

symmetrical, Radial pulses intact and symmetrical, No calf tenderness 


Psychiatric: Alert and oriented to person, place and time, appropriate affect, 

intact judgement         


Neuro: Muscles Strength 5/5 in all 4 extremities, Sensation to light touch 

grossly present throughout, Cranial nerves II-XII grossly intact, no focal 

sensory deficits








- Labs


CBC & Chem 7: 


 07/03/18 06:08





 07/03/18 06:08


Labs: 


 Abnormal Lab Results - Last 24 Hours (Table)











  06/29/18 07/02/18 07/02/18 Range/Units





  06:30 11:52 16:42 


 


RBC     (4.30-5.90)  m/uL


 


Hgb     (13.0-17.5)  gm/dL


 


Hct     (39.0-53.0)  %


 


Plt Count     (150-450)  k/uL


 


Sodium     (137-145)  mmol/L


 


Chloride     ()  mmol/L


 


BUN     (9-20)  mg/dL


 


Creatinine     (0.66-1.25)  mg/dL


 


Glucose     (74-99)  mg/dL


 


POC Glucose (mg/dL)   228 H  180 H  (75-99)  mg/dL


 


Hepatitis C Viral RNA  DETECTED H    (Not detected)  IU/mL


 


Hepatitis C RNA Quant  32 H    (<12)  IU/mL


 


HCV RNA PCR log IUs/ml  1.51 H    (<1.08)   














  07/02/18 07/03/18 07/03/18 Range/Units





  21:09 05:52 06:08 


 


RBC     (4.30-5.90)  m/uL


 


Hgb     (13.0-17.5)  gm/dL


 


Hct     (39.0-53.0)  %


 


Plt Count     (150-450)  k/uL


 


Sodium    133 L  (137-145)  mmol/L


 


Chloride    92 L  ()  mmol/L


 


BUN    70 H  (9-20)  mg/dL


 


Creatinine    2.41 H  (0.66-1.25)  mg/dL


 


Glucose    117 H  (74-99)  mg/dL


 


POC Glucose (mg/dL)  182 H  119 H   (75-99)  mg/dL


 


Hepatitis C Viral RNA     (Not detected)  IU/mL


 


Hepatitis C RNA Quant     (<12)  IU/mL


 


HCV RNA PCR log IUs/ml     (<1.08)   














  07/03/18 Range/Units





  06:08 


 


RBC  3.75 L  (4.30-5.90)  m/uL


 


Hgb  11.0 L  (13.0-17.5)  gm/dL


 


Hct  33.1 L  (39.0-53.0)  %


 


Plt Count  103 L  (150-450)  k/uL


 


Sodium   (137-145)  mmol/L


 


Chloride   ()  mmol/L


 


BUN   (9-20)  mg/dL


 


Creatinine   (0.66-1.25)  mg/dL


 


Glucose   (74-99)  mg/dL


 


POC Glucose (mg/dL)   (75-99)  mg/dL


 


Hepatitis C Viral RNA   (Not detected)  IU/mL


 


Hepatitis C RNA Quant   (<12)  IU/mL


 


HCV RNA PCR log IUs/ml   (<1.08)   








 Microbiology - Last 24 Hours (Table)











 06/29/18 11:03 Blood Culture - Preliminary





 Blood    No Growth after 72 hours


 


 06/29/18 11:17 Blood Culture - Preliminary





 Blood    No Growth after 72 hours














Assessment and Plan


Plan: 





Abdominal and chest pain likely acute pancreatitis, likely alcoholic


Etiology unclear possibly ETOH related?  Patient doesn't admit to drinking


Ultrasound of the abdomen, nonrevealing


Lipase trending down--- recheck in a.m.


D/W GI


Advanced diet to regular





Recurrent fevers:


Workup for fevers was done 2 days ago.  Urinalysis was negative for pyuria.  

Blood cultures are negative.  Chest x-ray showed possible atelectasis versus 

infiltrate.  Because patient wasn't having shortness of breath and leukocytosis 

he was not treated with antibiotics.


Could be secondary to acute pancreatitis


Tylenol when necessary for fever


Consult ID pending





Elevated trops


Likely non-specific, sec to above, seen by cardiology


Echo checked, positive for moderate pulm hypertension, no motion abnormalities 

to suggest acute event.


Continue Coreg, hold ACE inhibitor because of renal failure





Acute renal failure with hyperkalemia


Worse today.


Continue IV lasix 


Continue to hold ACE inhibitor


Recheck electrolytes and renal function in a.m.


Status post treatment with Kayexalate, insulin with D50 for hyperkalemia.





Diabetes mellitus type 2 with hyperglycemia


Resume home insulin regimen


Sliding scale insulin moderate dose


Blood sugar check CBC and at bedtime


Sugars currently controlled





Essential hypertension, 


Blood pressure has been elevated throughout the admission,


Resume home doses of his BP meds


Follow-up BP





COPD, hyperlipidemia, osteoarthritis


Stable


Resume home meds





General Weakness:


PT/OT


Will likely need rehab





DVT prophylaxis


Heparin subcu

## 2018-07-03 NOTE — PN
PROGRESS NOTE



Patient is seen for followup for acute kidney injury and volume overload.  He was

maintained on Lasix drip, which was discontinued yesterday.  Serum creatinine has gone

up from 1.6 to 2.41.  Overall, the patient says he is feeling fair.  He wants to go

home.  He has been eating and tolerating oral intake without any episodes of abdominal

pain.  He has; however, been having fevers with temperature yesterday of 101.9 degrees

Fahrenheit.  All cultures are currently negative thus far.



On examination today, blood pressure 142/67, heart rate is 72 per minute, patient is

afebrile.

Examination of the heart, S1, S2.  Examination of the lungs, bilateral breath sounds

are heard.  Abdomen is soft, nontender.  Examination of the lower extremities shows

edema 1+ bilaterally.  CNS exam is grossly intact. Patient is moving all 4 extremities.



LABS:

Show sodium 133, potassium 4.1, chloride 92, BUN 70, serum creatinine 2.41, hemoglobin

11.1 g/dL.



ASSESSMENT:

1. Acute kidney injury, acute tubular necrosis.  Renal function had improved; however,

    creatinine is higher today, likely secondary to diuresis.  patient is currently on

    60 mg of Lasix IV q.12 hours.  I will repeat a chest x-ray and decrease the Lasix.

    His blood pressure is not significantly low.

2. Acute pancreatitis with no evidence of gallstones, currently tolerating oral

    intake.

3. Fever, maintained on empiric antibiotics with cultures currently negative.  No

    abdominal pain.  Patient is afebrile today.

4. Volume overload, status post Lasix drip and improved.

5. Hypertension, currently controlled.

6. Elevated liver enzymes secondary to alcoholic hepatitis.

7. Type 2 diabetes.



PLAN:

Decrease Lasix.  Repeat chest x-ray.  Repeat labs in a.m.





MMODL / IJN: [id number] / Job#: [id number]

## 2018-07-04 LAB
ALBUMIN SERPL-MCNC: 3.4 G/DL (ref 3.5–5)
ALP SERPL-CCNC: 219 U/L (ref 38–126)
ALT SERPL-CCNC: 29 U/L (ref 21–72)
AMYLASE SERPL-CCNC: 278 U/L (ref 30–110)
ANION GAP SERPL CALC-SCNC: 16 MMOL/L
AST SERPL-CCNC: 48 U/L (ref 17–59)
BASOPHILS # BLD AUTO: 0 K/UL (ref 0–0.2)
BASOPHILS NFR BLD AUTO: 0 %
BUN SERPL-SCNC: 87 MG/DL (ref 9–20)
CALCIUM SPEC-MCNC: 9.2 MG/DL (ref 8.4–10.2)
CHLORIDE SERPL-SCNC: 89 MMOL/L (ref 98–107)
CO2 SERPL-SCNC: 24 MMOL/L (ref 22–30)
EOSINOPHIL # BLD AUTO: 0.3 K/UL (ref 0–0.7)
EOSINOPHIL NFR BLD AUTO: 3 %
ERYTHROCYTE [DISTWIDTH] IN BLOOD BY AUTOMATED COUNT: 3.78 M/UL (ref 4.3–5.9)
ERYTHROCYTE [DISTWIDTH] IN BLOOD: 13.6 % (ref 11.5–15.5)
GLUCOSE BLD-MCNC: 118 MG/DL (ref 75–99)
GLUCOSE BLD-MCNC: 211 MG/DL (ref 75–99)
GLUCOSE BLD-MCNC: 243 MG/DL (ref 75–99)
GLUCOSE BLD-MCNC: 299 MG/DL (ref 75–99)
GLUCOSE SERPL-MCNC: 265 MG/DL (ref 74–99)
HCT VFR BLD AUTO: 33.3 % (ref 39–53)
HGB BLD-MCNC: 10.8 GM/DL (ref 13–17.5)
LYMPHOCYTES # SPEC AUTO: 1 K/UL (ref 1–4.8)
LYMPHOCYTES NFR SPEC AUTO: 9 %
MAGNESIUM SPEC-SCNC: 2.2 MG/DL (ref 1.6–2.3)
MCH RBC QN AUTO: 28.5 PG (ref 25–35)
MCHC RBC AUTO-ENTMCNC: 32.4 G/DL (ref 31–37)
MCV RBC AUTO: 88.1 FL (ref 80–100)
MONOCYTES # BLD AUTO: 0.5 K/UL (ref 0–1)
MONOCYTES NFR BLD AUTO: 5 %
NEUTROPHILS # BLD AUTO: 8.7 K/UL (ref 1.3–7.7)
NEUTROPHILS NFR BLD AUTO: 81 %
PLATELET # BLD AUTO: 119 K/UL (ref 150–450)
POTASSIUM SERPL-SCNC: 4.9 MMOL/L (ref 3.5–5.1)
PROT SERPL-MCNC: 7.5 G/DL (ref 6.3–8.2)
SODIUM SERPL-SCNC: 129 MMOL/L (ref 137–145)
WBC # BLD AUTO: 10.7 K/UL (ref 3.8–10.6)

## 2018-07-04 RX ADMIN — ISOSORBIDE MONONITRATE SCH MG: 60 TABLET, EXTENDED RELEASE ORAL at 08:03

## 2018-07-04 RX ADMIN — INSULIN ASPART SCH UNIT: 100 INJECTION, SOLUTION INTRAVENOUS; SUBCUTANEOUS at 12:36

## 2018-07-04 RX ADMIN — ATORVASTATIN CALCIUM SCH MG: 20 TABLET, FILM COATED ORAL at 20:45

## 2018-07-04 RX ADMIN — FUROSEMIDE SCH MG: 10 INJECTION, SOLUTION INTRAMUSCULAR; INTRAVENOUS at 08:02

## 2018-07-04 RX ADMIN — Medication SCH MG: at 08:03

## 2018-07-04 RX ADMIN — INSULIN ASPART SCH: 100 INJECTION, SOLUTION INTRAVENOUS; SUBCUTANEOUS at 07:35

## 2018-07-04 RX ADMIN — HEPARIN SODIUM SCH: 5000 INJECTION, SOLUTION INTRAVENOUS; SUBCUTANEOUS at 15:37

## 2018-07-04 RX ADMIN — CARVEDILOL SCH MG: 12.5 TABLET, FILM COATED ORAL at 20:45

## 2018-07-04 RX ADMIN — CARVEDILOL SCH MG: 12.5 TABLET, FILM COATED ORAL at 08:03

## 2018-07-04 RX ADMIN — DEXTROSE MONOHYDRATE SCH MLS/HR: 5 INJECTION, SOLUTION INTRAVENOUS at 00:35

## 2018-07-04 RX ADMIN — IPRATROPIUM BROMIDE AND ALBUTEROL SULFATE SCH ML: .5; 3 SOLUTION RESPIRATORY (INHALATION) at 12:16

## 2018-07-04 RX ADMIN — DEXTROSE MONOHYDRATE SCH MLS/HR: 5 INJECTION, SOLUTION INTRAVENOUS at 23:06

## 2018-07-04 RX ADMIN — GABAPENTIN SCH MG: 400 CAPSULE ORAL at 23:04

## 2018-07-04 RX ADMIN — IPRATROPIUM BROMIDE AND ALBUTEROL SULFATE SCH ML: .5; 3 SOLUTION RESPIRATORY (INHALATION) at 19:26

## 2018-07-04 RX ADMIN — ASPIRIN SCH: 325 TABLET ORAL at 08:03

## 2018-07-04 RX ADMIN — GABAPENTIN SCH MG: 400 CAPSULE ORAL at 15:38

## 2018-07-04 RX ADMIN — INSULIN DETEMIR SCH UNIT: 100 INJECTION, SOLUTION SUBCUTANEOUS at 09:12

## 2018-07-04 RX ADMIN — HEPARIN SODIUM SCH UNIT: 5000 INJECTION, SOLUTION INTRAVENOUS; SUBCUTANEOUS at 08:02

## 2018-07-04 RX ADMIN — PANTOPRAZOLE SODIUM SCH MG: 40 TABLET, DELAYED RELEASE ORAL at 08:03

## 2018-07-04 RX ADMIN — HEPARIN SODIUM SCH UNIT: 5000 INJECTION, SOLUTION INTRAVENOUS; SUBCUTANEOUS at 00:31

## 2018-07-04 RX ADMIN — INSULIN ASPART SCH UNIT: 100 INJECTION, SOLUTION INTRAVENOUS; SUBCUTANEOUS at 23:03

## 2018-07-04 RX ADMIN — INSULIN ASPART SCH UNIT: 100 INJECTION, SOLUTION INTRAVENOUS; SUBCUTANEOUS at 17:46

## 2018-07-04 RX ADMIN — IPRATROPIUM BROMIDE AND ALBUTEROL SULFATE SCH ML: .5; 3 SOLUTION RESPIRATORY (INHALATION) at 07:22

## 2018-07-04 RX ADMIN — FUROSEMIDE SCH MG: 10 INJECTION, SOLUTION INTRAMUSCULAR; INTRAVENOUS at 20:45

## 2018-07-04 RX ADMIN — GABAPENTIN SCH MG: 400 CAPSULE ORAL at 08:03

## 2018-07-04 RX ADMIN — DEXTROSE MONOHYDRATE SCH MLS/HR: 5 INJECTION, SOLUTION INTRAVENOUS at 15:37

## 2018-07-04 RX ADMIN — HEPARIN SODIUM SCH UNIT: 5000 INJECTION, SOLUTION INTRAVENOUS; SUBCUTANEOUS at 23:06

## 2018-07-04 RX ADMIN — CEFAZOLIN SCH: 330 INJECTION, POWDER, FOR SOLUTION INTRAMUSCULAR; INTRAVENOUS at 06:38

## 2018-07-04 RX ADMIN — DEXTROSE MONOHYDRATE SCH MLS/HR: 5 INJECTION, SOLUTION INTRAVENOUS at 09:12

## 2018-07-04 RX ADMIN — INSULIN ASPART SCH: 100 INJECTION, SOLUTION INTRAVENOUS; SUBCUTANEOUS at 23:12

## 2018-07-04 NOTE — P.PN
Subjective


Progress Note Date: 07/04/18


Principal diagnosis: 





Abdominal and chest pain.





Patient is asking to go home, he is still weak.  Urine output is decreasing.  

When the nurse emptied the Castro bag this morning, she only got 150 mL of urine.





Objective





- Vital Signs


Vital signs: 


 Vital Signs











Temp  98.6 F   07/04/18 09:11


 


Pulse  72   07/04/18 12:29


 


Resp  16   07/04/18 07:24


 


BP  156/83   07/04/18 07:24


 


Pulse Ox  93 L  07/04/18 00:00








 Intake & Output











 07/03/18 07/04/18 07/04/18





 18:59 06:59 18:59


 


Intake Total  50 580


 


Output Total   150


 


Balance  50 430


 


Weight 102 kg 101.5 kg 


 


Intake:   


 


  IV   80


 


    Sodium Chloride 0.9% 1,   80





    000 ml @ 10 mls/hr IV .   





    Q24H ANTOLIN Rx#:[id number]   


 


  Intake, IV Titration   100





  Amount   


 


    Piperacillin-Tazobactam 3   100





    .375 gm In Dextrose/Water   





    1 50ml.bag @ 12.5 mls/hr   





    IVPB Q8HR ANTOLIN Rx#:   





    [id number]   


 


  Oral  50 400


 


Output:   


 


  Urine   150


 


    Uretheral (Castro)   150


 


Other:   


 


  Voiding Method Indwelling Catheter Indwelling Catheter Indwelling Catheter














- Exam





Constitutional: No acute distress, conversant, pleasant


Eyes: Positive for edema around the eyelids and eyes.  Anicteric sclerae, moist 

conjunctiva, no lid-lag, PERRLA, 


ENMT: Oropharynx clear, no erythema, exudates


Neck: Supple, FROM, no masses, or JVD, No carotid bruits, No thyromegaly


Lungs: Clear to auscultation, Clear to percussion, Normal respiratory effort, 

no accessory muscle use 


Cardiovascular: Heart regular in rate and rhythm, No murmurs, gallops, or rubs, 

1+ peripheral edema


Abdominal: Soft, Nontender, no guarding, rebound or rigidity, Normoactive bowel 

sounds, No hepatomegaly, No splenomegaly, No palpable mass 


Skin: Normal temperature, tone, texture, turgor, no induration, No subcutaneous 

nodules, No rash, lesions, No ulcers


Extremities: No digital cyanosis, No clubbing, Pedal pulses intact and 

symmetrical, Radial pulses intact and symmetrical, No calf tenderness 


Psychiatric: Alert and oriented to person, place and time, appropriate affect, 

intact judgement         


Neuro: Muscles Strength 5/5 in all 4 extremities, Sensation to light touch 

grossly present throughout, Cranial nerves II-XII grossly intact, no focal 

sensory deficits








- Labs


CBC & Chem 7: 


 07/04/18 10:21





 07/04/18 10:21


Labs: 


 Abnormal Lab Results - Last 24 Hours (Table)











  07/03/18 07/03/18 07/04/18 Range/Units





  16:41 20:43 07:22 


 


WBC     (3.8-10.6)  k/uL


 


RBC     (4.30-5.90)  m/uL


 


Hgb     (13.0-17.5)  gm/dL


 


Hct     (39.0-53.0)  %


 


Plt Count     (150-450)  k/uL


 


Neutrophils #     (1.3-7.7)  k/uL


 


Sodium     (137-145)  mmol/L


 


Chloride     ()  mmol/L


 


BUN     (9-20)  mg/dL


 


Creatinine     (0.66-1.25)  mg/dL


 


Glucose     (74-99)  mg/dL


 


POC Glucose (mg/dL)  218 H  149 H  118 H  (75-99)  mg/dL


 


Phosphorus     (2.5-4.5)  mg/dL


 


Alkaline Phosphatase     ()  U/L


 


Albumin     (3.5-5.0)  g/dL


 


Amylase     ()  U/L


 


Lipase     ()  U/L














  07/04/18 07/04/18 07/04/18 Range/Units





  10:21 10:21 11:03 


 


WBC  10.7 H    (3.8-10.6)  k/uL


 


RBC  3.78 L    (4.30-5.90)  m/uL


 


Hgb  10.8 L    (13.0-17.5)  gm/dL


 


Hct  33.3 L    (39.0-53.0)  %


 


Plt Count  119 L    (150-450)  k/uL


 


Neutrophils #  8.7 H    (1.3-7.7)  k/uL


 


Sodium   129 L   (137-145)  mmol/L


 


Chloride   89 L   ()  mmol/L


 


BUN   87 H*   (9-20)  mg/dL


 


Creatinine   3.20 H   (0.66-1.25)  mg/dL


 


Glucose   265 H   (74-99)  mg/dL


 


POC Glucose (mg/dL)     (75-99)  mg/dL


 


Phosphorus   5.5 H   (2.5-4.5)  mg/dL


 


Alkaline Phosphatase   219 H   ()  U/L


 


Albumin   3.4 L   (3.5-5.0)  g/dL


 


Amylase   278 H   ()  U/L


 


Lipase    2661 H  ()  U/L














  07/04/18 Range/Units





  12:09 


 


WBC   (3.8-10.6)  k/uL


 


RBC   (4.30-5.90)  m/uL


 


Hgb   (13.0-17.5)  gm/dL


 


Hct   (39.0-53.0)  %


 


Plt Count   (150-450)  k/uL


 


Neutrophils #   (1.3-7.7)  k/uL


 


Sodium   (137-145)  mmol/L


 


Chloride   ()  mmol/L


 


BUN   (9-20)  mg/dL


 


Creatinine   (0.66-1.25)  mg/dL


 


Glucose   (74-99)  mg/dL


 


POC Glucose (mg/dL)  243 H  (75-99)  mg/dL


 


Phosphorus   (2.5-4.5)  mg/dL


 


Alkaline Phosphatase   ()  U/L


 


Albumin   (3.5-5.0)  g/dL


 


Amylase   ()  U/L


 


Lipase   ()  U/L








 Microbiology - Last 24 Hours (Table)











 06/29/18 11:03 Blood Culture - Preliminary





 Blood    No Growth after 120 hours


 


 06/29/18 11:17 Blood Culture - Preliminary





 Blood    No Growth after 120 hours


 


 07/03/18 19:18 Blood Culture Gram Stain - Preliminary





 Blood 


 


 07/03/18 19:18 Blood Culture - Final





 Blood 














Assessment and Plan


Plan: 





Abdominal and chest pain likely acute pancreatitis, likely alcoholic


Etiology unclear possibly ETOH related?  Patient doesn't admit to drinking


Ultrasound of the abdomen, nonrevealing


Lipase trended down earlier but it trended up again, discontinue diet, back to 

nothing by mouth





Recurrent fevers:


Patient continues to have low-grade fevers


Workup for fevers was done.  Urinalysis was negative for pyuria.  Blood 

cultures are negative.  Chest x-ray showed possible atelectasis versus 

infiltrate.  Because patient wasn't having shortness of breath and leukocytosis 

he was not treated with antibiotics.


Could be secondary to acute pancreatitis


Tylenol when necessary for fever


Consult ID--recommend adding Zosyn and doing computed tomography scan of the 

abdomen and pelvis which came back negative.





Acute renal failure with hyperkalemia


Worse today.  Patient is volume overloaded again.


Continue IV lasix--increase to 60 mg IV every 8 hours by nephrology--if no 

response will go back on Lasix gtt.


Continue to hold ACE inhibitor


Recheck electrolytes and renal function in a.m.


Status post treatment with Kayexalate, insulin with D50 for hyperkalemia.





Elevated trops


Likely non-specific, sec to above, seen by cardiology


Echo checked, positive for moderate pulm hypertension, no motion abnormalities 

to suggest acute event.


Continue Coreg, hold ACE inhibitor because of renal failure





Diabetes mellitus type 2 with hyperglycemia


Resume home insulin regimen


Sliding scale insulin moderate dose


Blood sugar check CBC and at bedtime


Sugars currently controlled





Essential hypertension, 


Blood pressure has been elevated throughout the admission,


Resume home doses of his BP meds


Follow-up BP





COPD, hyperlipidemia, osteoarthritis


Stable


Resume home meds





General Weakness:


PT/OT


Will likely need rehab





DVT prophylaxis


Heparin subcu

## 2018-07-04 NOTE — PN
PROGRESS NOTE



DATE OF SERVICE:

07/04/2018.



HISTORY:

The patient is seen for followup for acute kidney injury.  He was admitted to the

hospital with pancreatitis, following which the patient developed ATN with serum

creatinine going up to 4.3 from 1.85 on initial admission.  The patient was started on

Lasix drip.  He responded very well to diuresis and his creatinine had improved to 1.6.

Last couple of days, serum creatinine has been rising and urine output had dropped.

Patient remains edematous.  He denies any significant shortness of breath.  He has also

been tolerating oral intake. At baseline the patient has also had fevers with all

cultures being negative.  CT scan of the abdomen done yesterday showed infiltrate and

atelectasis at the lung bases.  No major abnormalities in the abdomen.  No

hydronephrosis was seen.



EXAMINATION:

This morning, patient is comfortable.  Blood pressure is 156/83, heart rate is 72 per

minute.  He is afebrile.  Examination of the heart, S1, S2.  Examination lungs

bilateral breath sounds are heard.  Decreased breath sounds at bases with crackles

heard at the bases.  Abdomen is soft, nontender, distended, obese. Examination of the

lower extremities shows edema 2+ bilaterally.



LABS:

Hemoglobin 10.8, sodium 129, potassium 4.9, BUN 87, serum creatinine 3.2, phosphorus

5.5, calcium 9.2, albumin 3.4.



ASSESSMENT:

1. Acute kidney injury, acute tubular necrosis, initially oliguric, now nonoliguric.

    Renal function had improved with diuresis, but serum creatinine has slowly been

    increased in gain for the past 3 days.  The patient is also volume overloaded.  I

    will restart the Lasix drip and if the patient does not improve over the next 24

    hours we will start dialysis.  His blood pressure is currently not low and patient

    is not on any nephrotoxic medications.  His ejection fraction is not low, although

    it had been low previously.

2. Fever of unclear etiology.  ID has been consulted.  All cultures are negative thus

    far.  CT of the abdomen did not reveal any significant abnormalities.  The patient

    did have underlying pancreatitis at the time of admission.

3. Volume overload.  This had improved with aggressive diuresis, however, patient is

    now volume overloaded again.

4. Acute pancreatitis, most likely alcohol-related, with no evidence of gallstones.

5. Hyperkalemia associated with acute kidney injury.  Continue to diurese the patient

    and avoid hyperglycemia.

6. Hypertension.  Blood pressure was initially low when all blood pressure medications

    were held, however, lately the blood pressure has been rising and patient is back

    on his home antihypertensive medications except the ACE inhibitors.



PLAN:

Increase Lasix to 60 mg every 8 hours.  The patient may need Lasix drip as well later

on today depending upon his urine output. If his renal function does not improve,

patient will be dialyzed.





MMODL / IJN: [id number] / Job#: [id number]

## 2018-07-05 LAB
ALBUMIN SERPL-MCNC: 3 G/DL (ref 3.5–5)
ALP SERPL-CCNC: 203 U/L (ref 38–126)
ALT SERPL-CCNC: 29 U/L (ref 21–72)
AMYLASE SERPL-CCNC: 233 U/L (ref 30–110)
ANION GAP SERPL CALC-SCNC: 16 MMOL/L
AST SERPL-CCNC: 45 U/L (ref 17–59)
BASOPHILS # BLD AUTO: 0.1 K/UL (ref 0–0.2)
BASOPHILS NFR BLD AUTO: 0 %
BUN SERPL-SCNC: 91 MG/DL (ref 9–20)
CALCIUM SPEC-MCNC: 8.5 MG/DL (ref 8.4–10.2)
CHLORIDE SERPL-SCNC: 89 MMOL/L (ref 98–107)
CO2 SERPL-SCNC: 25 MMOL/L (ref 22–30)
EOSINOPHIL # BLD AUTO: 0.3 K/UL (ref 0–0.7)
EOSINOPHIL NFR BLD AUTO: 3 %
ERYTHROCYTE [DISTWIDTH] IN BLOOD BY AUTOMATED COUNT: 3.68 M/UL (ref 4.3–5.9)
ERYTHROCYTE [DISTWIDTH] IN BLOOD: 13.6 % (ref 11.5–15.5)
GLUCOSE BLD-MCNC: 161 MG/DL (ref 75–99)
GLUCOSE BLD-MCNC: 164 MG/DL (ref 75–99)
GLUCOSE BLD-MCNC: 171 MG/DL (ref 75–99)
GLUCOSE BLD-MCNC: 99 MG/DL (ref 75–99)
GLUCOSE SERPL-MCNC: 143 MG/DL (ref 74–99)
HCT VFR BLD AUTO: 32.3 % (ref 39–53)
HGB BLD-MCNC: 10.5 GM/DL (ref 13–17.5)
LIPASE SERPL-CCNC: 1826 U/L (ref 23–300)
LYMPHOCYTES # SPEC AUTO: 1.1 K/UL (ref 1–4.8)
LYMPHOCYTES NFR SPEC AUTO: 9 %
MAGNESIUM SPEC-SCNC: 2.3 MG/DL (ref 1.6–2.3)
MCH RBC QN AUTO: 28.5 PG (ref 25–35)
MCHC RBC AUTO-ENTMCNC: 32.4 G/DL (ref 31–37)
MCV RBC AUTO: 87.8 FL (ref 80–100)
MONOCYTES # BLD AUTO: 0.6 K/UL (ref 0–1)
MONOCYTES NFR BLD AUTO: 5 %
NEUTROPHILS # BLD AUTO: 10 K/UL (ref 1.3–7.7)
NEUTROPHILS NFR BLD AUTO: 81 %
PLATELET # BLD AUTO: 121 K/UL (ref 150–450)
POTASSIUM SERPL-SCNC: 5.2 MMOL/L (ref 3.5–5.1)
PROT SERPL-MCNC: 6.5 G/DL (ref 6.3–8.2)
SODIUM SERPL-SCNC: 130 MMOL/L (ref 137–145)
WBC # BLD AUTO: 12.3 K/UL (ref 3.8–10.6)

## 2018-07-05 PROCEDURE — 06H033Z INSERTION OF INFUSION DEVICE INTO INFERIOR VENA CAVA, PERCUTANEOUS APPROACH: ICD-10-PCS

## 2018-07-05 RX ADMIN — GABAPENTIN SCH MG: 100 CAPSULE ORAL at 17:59

## 2018-07-05 RX ADMIN — INSULIN ASPART SCH: 100 INJECTION, SOLUTION INTRAVENOUS; SUBCUTANEOUS at 21:38

## 2018-07-05 RX ADMIN — GABAPENTIN SCH MG: 400 CAPSULE ORAL at 11:02

## 2018-07-05 RX ADMIN — HEPARIN SODIUM SCH: 5000 INJECTION, SOLUTION INTRAVENOUS; SUBCUTANEOUS at 17:58

## 2018-07-05 RX ADMIN — INSULIN ASPART SCH UNIT: 100 INJECTION, SOLUTION INTRAVENOUS; SUBCUTANEOUS at 18:25

## 2018-07-05 RX ADMIN — CEFAZOLIN SCH MLS/HR: 330 INJECTION, POWDER, FOR SOLUTION INTRAMUSCULAR; INTRAVENOUS at 10:53

## 2018-07-05 RX ADMIN — LIDOCAINE HYDROCHLORIDE ONE ML: 10 INJECTION, SOLUTION INFILTRATION; PERINEURAL at 16:15

## 2018-07-05 RX ADMIN — CEFAZOLIN SCH: 330 INJECTION, POWDER, FOR SOLUTION INTRAMUSCULAR; INTRAVENOUS at 12:45

## 2018-07-05 RX ADMIN — INSULIN DETEMIR SCH UNIT: 100 INJECTION, SOLUTION SUBCUTANEOUS at 10:55

## 2018-07-05 RX ADMIN — IPRATROPIUM BROMIDE AND ALBUTEROL SULFATE SCH ML: .5; 3 SOLUTION RESPIRATORY (INHALATION) at 13:44

## 2018-07-05 RX ADMIN — HEPARIN SODIUM ONE UNIT: 1000 INJECTION INTRAVENOUS; SUBCUTANEOUS at 16:50

## 2018-07-05 RX ADMIN — HEPARIN SODIUM ONE UNIT: 1000 INJECTION INTRAVENOUS; SUBCUTANEOUS at 16:51

## 2018-07-05 RX ADMIN — IPRATROPIUM BROMIDE AND ALBUTEROL SULFATE SCH: .5; 3 SOLUTION RESPIRATORY (INHALATION) at 19:41

## 2018-07-05 RX ADMIN — GABAPENTIN SCH MG: 100 CAPSULE ORAL at 22:55

## 2018-07-05 RX ADMIN — ISOSORBIDE MONONITRATE SCH MG: 60 TABLET, EXTENDED RELEASE ORAL at 10:55

## 2018-07-05 RX ADMIN — CARVEDILOL SCH MG: 12.5 TABLET, FILM COATED ORAL at 10:59

## 2018-07-05 RX ADMIN — ASPIRIN SCH: 325 TABLET ORAL at 11:03

## 2018-07-05 RX ADMIN — FENTANYL CITRATE ONE MCG: 50 INJECTION, SOLUTION INTRAMUSCULAR; INTRAVENOUS at 16:06

## 2018-07-05 RX ADMIN — FUROSEMIDE SCH MG: 10 INJECTION, SOLUTION INTRAMUSCULAR; INTRAVENOUS at 22:54

## 2018-07-05 RX ADMIN — HEPARIN SODIUM SCH UNIT: 5000 INJECTION, SOLUTION INTRAVENOUS; SUBCUTANEOUS at 22:56

## 2018-07-05 RX ADMIN — INSULIN ASPART SCH: 100 INJECTION, SOLUTION INTRAVENOUS; SUBCUTANEOUS at 11:43

## 2018-07-05 RX ADMIN — CARVEDILOL SCH MG: 12.5 TABLET, FILM COATED ORAL at 22:55

## 2018-07-05 RX ADMIN — HYDROCODONE BITARTRATE AND ACETAMINOPHEN PRN EACH: 7.5; 325 TABLET ORAL at 12:59

## 2018-07-05 RX ADMIN — HEPARIN SODIUM SCH: 5000 INJECTION, SOLUTION INTRAVENOUS; SUBCUTANEOUS at 07:20

## 2018-07-05 RX ADMIN — PANTOPRAZOLE SODIUM SCH MG: 40 TABLET, DELAYED RELEASE ORAL at 11:01

## 2018-07-05 RX ADMIN — LIDOCAINE HYDROCHLORIDE ONE ML: 10 INJECTION, SOLUTION INFILTRATION; PERINEURAL at 16:32

## 2018-07-05 RX ADMIN — FENTANYL CITRATE ONE MCG: 50 INJECTION, SOLUTION INTRAMUSCULAR; INTRAVENOUS at 16:36

## 2018-07-05 RX ADMIN — HEPARIN SODIUM SCH UNIT: 5000 INJECTION, SOLUTION INTRAVENOUS; SUBCUTANEOUS at 11:01

## 2018-07-05 RX ADMIN — DEXTROSE MONOHYDRATE SCH MLS/HR: 5 INJECTION, SOLUTION INTRAVENOUS at 13:57

## 2018-07-05 RX ADMIN — FUROSEMIDE SCH MG: 10 INJECTION, SOLUTION INTRAMUSCULAR; INTRAVENOUS at 10:56

## 2018-07-05 RX ADMIN — INSULIN ASPART SCH: 100 INJECTION, SOLUTION INTRAVENOUS; SUBCUTANEOUS at 10:41

## 2018-07-05 RX ADMIN — Medication SCH MG: at 11:02

## 2018-07-05 RX ADMIN — IPRATROPIUM BROMIDE AND ALBUTEROL SULFATE SCH ML: .5; 3 SOLUTION RESPIRATORY (INHALATION) at 07:04

## 2018-07-05 NOTE — OP
OPERATIVE REPORT



PREOPERATIVE DIAGNOSIS:

Acute chronic failure.



PROCEDURE:

Attempted right internal jugular vein, ultrasound-guided, and placement of a 40 cm

dialysis catheter, right femoral approach, ultrasound-guided.



PROCEDURE:

This patient has a history of chronic renal failure.  Patient was brought to the cath

lab.  Right neck was prepped and draped in standard manner. Ultrasound-guided

micropuncture was made in the right internal jugular vein.  This patient has a

pacemaker, and a biventricular ICD through the left subclavian vein was found. At this

point there was concern that dialysis catheter would interfere with pacemaker, and we

decided to go to the right femoral approach.  Then the right groin was prepped and

draped in standard sterile manner. Lidocaine 1% was infiltrated.  Ultrasound-guided

micropuncture was introduced in the right femoral vein. Micropuncture guide was passed

and then 4-Malagasy dilator was advanced on top of the guidewire. Then we made an

incision on the anterior aspect of the thigh under local anesthesia. Catheter was

passed through the tunnel and a small incision was made in the groin area. Dilator was

advanced and sheath was advanced on top of the guidewire. Through the sheath we

introduced the 40 cm permanent dialysis catheter, which was found to be in the inferior

vena cava.  There was free flow noted and it was flushed with heparin and saline and

the incision was closed with Vicryl and secured with 4-0 nylon.  The catheter was Hep-

locked, dressing applied. Patient tolerated the procedure well.  Sedation time was 47

minutes.





MMODL / IJN: [id number] / Job#: [id number]

## 2018-07-05 NOTE — PN
PROGRESS NOTE



DATE OF SERVICE:

07/04/2018.



REASON FOR FOLLOWUP:

Fever and bacteremia.



INTERVAL HISTORY:

The patient overall feels better and has improved with last fever recorded was 100.3

last evening.  The patient has been breathing comfortably.  Denies having any chest

pain or shortness of breath or cough. His abdominal pain has resolved and no diarrhea.



PHYSICAL EXAMINATION:

On examination, blood pressure 143/71 with a pulse of 68, temperature 98.9.  He is 96%

on 2 L nasal cannula.

General description is an elderly male lying in bed in no distress.

RESPIRATORY SYSTEM:  Unlabored breathing, clear to auscultation anteriorly.

HEART: S1, S2.  Regular rate and rhythm.

ABDOMEN: Soft, no tenderness.



LABS:

Hemoglobin 10.8, white count 10.7 with a BUN of 87, creatinine 3.20.



DIAGNOSTIC IMPRESSION AND PLAN:

Patient with fever, concern for possible abdominal source in view of his abdominal pain

and elevated lipase.  He did have a CT of abdomen and pelvis with oral contrast

yesterday that did not show any evidence of any inflammation.  Blood culture now coming

back positive with gram-positive cocci.  Antibiotic in the form of daptomycin, as the

patient is high risk of nephrotoxicity from vancomycin.  Blood culture has been

repeated to document clearance of his bacteremia.  Continue supportive care.





MMODL / IJN: [id number] / Job#: [id number]

## 2018-07-05 NOTE — P.PN
Subjective





Patient is seen in follow-up for acute kidney injury.  Renal function is 

worsening with creatinine up to 3.8 today.  Urine output this morning has been 

about 100 mL despite 80 mg of IV Lasix.  Patient is not a reliable historian.  

Appetite is poor.  He does have diastolic CHF with moderate pulmonary 

hypertension.





Vital signs are stable.


General: The patient appeared well nourished and normally developed. 


HEENT: Head exam is unremarkable. Neck is without jugular venous distension.


LUNGS: Breath sounds decreased.


HEART: Rate and Rhythm are regular. First and second heart sounds normal. No 

murmurs, rubs or gallops. 


ABDOMEN: Abdominal exam reveals normal bowel sounds. Non-tender and non-

distended. No evidence of peritonitis.


EXTREMITITES: 1+ edema.





Objective





- Vital Signs


Vital signs: 


 Vital Signs











Temp  98.6 F   07/05/18 09:02


 


Pulse  67   07/05/18 09:02


 


Resp  12   07/05/18 09:02


 


BP  169/75   07/05/18 09:02


 


Pulse Ox  97   07/05/18 09:02








 Intake & Output











 07/04/18 07/05/18 07/05/18





 18:59 06:59 18:59


 


Intake Total 580 950 250


 


Output Total 150 250 100


 


Balance 430 700 150


 


Weight   101.5 kg


 


Intake:   


 


  IV 80  200


 


    Sodium Chloride 0.9% 1, 80  200





    000 ml @ 10 mls/hr IV .   





    Q24H ANTOLIN Rx#:[id number]   


 


  Intake, IV Titration 100 800 50





  Amount   


 


    Piperacillin-Tazobactam 3 100 100 50





    .375 gm In Dextrose/Water   





    1 50ml.bag @ 12.5 mls/hr   





    IVPB Q8HR ANTOLIN Rx#:   





    [id number]   


 


    Sodium Chloride 0.9% 1,  200 





    000 ml @ 10 mls/hr IV .   





    Q24H ANTOLIN Rx#:[id number]   


 


    Vancomycin 2,000 mg In  500 





    Sodium Chloride 0.9% 500   





    ml @ 167 mls/hr IVPB ONCE   





    ONE Rx#:[id number]   


 


  Oral 400 150 


 


Output:   


 


  Urine 150 250 100


 


    Uretheral (Castro) 150 150 100


 


Other:   


 


  Voiding Method Indwelling Catheter Indwelling Catheter 














- Labs


CBC & Chem 7: 


 07/05/18 06:10





 07/05/18 06:10


Labs: 


 Abnormal Lab Results - Last 24 Hours (Table)











  07/04/18 07/04/18 07/04/18 Range/Units





  12:09 17:22 21:06 


 


WBC     (3.8-10.6)  k/uL


 


RBC     (4.30-5.90)  m/uL


 


Hgb     (13.0-17.5)  gm/dL


 


Hct     (39.0-53.0)  %


 


Plt Count     (150-450)  k/uL


 


Neutrophils #     (1.3-7.7)  k/uL


 


Sodium     (137-145)  mmol/L


 


Potassium     (3.5-5.1)  mmol/L


 


Chloride     ()  mmol/L


 


BUN     (9-20)  mg/dL


 


Creatinine     (0.66-1.25)  mg/dL


 


Glucose     (74-99)  mg/dL


 


POC Glucose (mg/dL)  243 H  299 H  211 H  (75-99)  mg/dL


 


Phosphorus     (2.5-4.5)  mg/dL


 


Alkaline Phosphatase     ()  U/L


 


Albumin     (3.5-5.0)  g/dL


 


Amylase     ()  U/L


 


Lipase     ()  U/L














  07/05/18 07/05/18 07/05/18 Range/Units





  06:10 06:10 06:53 


 


WBC  12.3 H    (3.8-10.6)  k/uL


 


RBC  3.68 L    (4.30-5.90)  m/uL


 


Hgb  10.5 L    (13.0-17.5)  gm/dL


 


Hct  32.3 L    (39.0-53.0)  %


 


Plt Count  121 L    (150-450)  k/uL


 


Neutrophils #  10.0 H    (1.3-7.7)  k/uL


 


Sodium   130 L   (137-145)  mmol/L


 


Potassium   5.2 H   (3.5-5.1)  mmol/L


 


Chloride   89 L   ()  mmol/L


 


BUN   91 H*   (9-20)  mg/dL


 


Creatinine   3.81 H   (0.66-1.25)  mg/dL


 


Glucose   143 H   (74-99)  mg/dL


 


POC Glucose (mg/dL)    164 H  (75-99)  mg/dL


 


Phosphorus   6.1 H   (2.5-4.5)  mg/dL


 


Alkaline Phosphatase   203 H   ()  U/L


 


Albumin   3.0 L   (3.5-5.0)  g/dL


 


Amylase   233 H   ()  U/L


 


Lipase   1826 H   ()  U/L














  07/05/18 Range/Units





  11:03 


 


WBC   (3.8-10.6)  k/uL


 


RBC   (4.30-5.90)  m/uL


 


Hgb   (13.0-17.5)  gm/dL


 


Hct   (39.0-53.0)  %


 


Plt Count   (150-450)  k/uL


 


Neutrophils #   (1.3-7.7)  k/uL


 


Sodium   (137-145)  mmol/L


 


Potassium   (3.5-5.1)  mmol/L


 


Chloride   ()  mmol/L


 


BUN   (9-20)  mg/dL


 


Creatinine   (0.66-1.25)  mg/dL


 


Glucose   (74-99)  mg/dL


 


POC Glucose (mg/dL)  161 H  (75-99)  mg/dL


 


Phosphorus   (2.5-4.5)  mg/dL


 


Alkaline Phosphatase   ()  U/L


 


Albumin   (3.5-5.0)  g/dL


 


Amylase   ()  U/L


 


Lipase   ()  U/L








 Microbiology - Last 24 Hours (Table)











 07/03/18 19:18 Blood Culture Gram Stain - Preliminary





 Blood Blood Culture - Preliminary





    Coagulase Negative Staph


 


 06/29/18 11:03 Blood Culture - Preliminary





 Blood    No Growth after 120 hours


 


 06/29/18 11:17 Blood Culture - Preliminary





 Blood    No Growth after 120 hours


 


 07/03/18 19:18 Blood Culture - Final





 Blood 














Assessment and Plan


Plan: 





Assessment: 


1.  Acute kidney injury secondary to ATN  secondary to cardiorenal syndrome as 

well as underlying infection.  Renal function worsening with creatinine up to 

3.8 today.  No proteinuria on UA.


2.  Volume overload.


3.  Gram-positive bacteremia.  Blood culture positive for coag-negative staph.  

Vancomycin has been discontinued.  He is on daptomycin.


4.  Pancreatitis.  Currently nothing by mouth.


5.  Diastolic CHF with moderate pulmonary hypertension.


6.  Hypervolemic hyponatremia.


7.  Benign hypertension. 


8.  Insulin-dependent diabetes mellitus.





Plan:


With worsening renal function and volume overload, plan for first treatment of 

hemodialysis today and second treatment tomorrow.


Vascular surgery consulted for dialysis catheter placement.


Continue to monitor renal function and urine output closely.


Continue Lasix 80 mg IV twice daily.


Follow-up cultures.


Antibiotics per infectious disease recommendations.


Decrease dose of Neurontin to 100 mg 3 times daily.

## 2018-07-05 NOTE — P.PN
Subjective


Progress Note Date: 07/05/18





Patient laying comfortably in bed denies that he has any abdominal pain or 

nausea, patient afebrile. apparently only had 100 mL out after receiving Lasix 

yesterday





Objective





- Vital Signs


Vital signs: 


 Vital Signs











Temp  98.7 F   07/05/18 14:10


 


Pulse  68   07/05/18 14:10


 


Resp  16   07/05/18 14:10


 


BP  145/52   07/05/18 14:10


 


Pulse Ox  97   07/05/18 14:10








 Intake & Output











 07/04/18 07/05/18 07/05/18





 18:59 06:59 18:59


 


Intake Total 580 950 370


 


Output Total 150 250 100


 


Balance 430 700 270


 


Weight   102.5 kg


 


Intake:   


 


  IV 80  200


 


    Sodium Chloride 0.9% 1, 80  200





    000 ml @ 10 mls/hr IV .   





    Q24H ANTOLIN Rx#:[id number]   


 


  Intake, IV Titration 100 800 50





  Amount   


 


    Piperacillin-Tazobactam 3 100 100 50





    .375 gm In Dextrose/Water   





    1 50ml.bag @ 12.5 mls/hr   





    IVPB Q8HR ANTOLIN Rx#:   





    [id number]   


 


    Sodium Chloride 0.9% 1,  200 





    000 ml @ 10 mls/hr IV .   





    Q24H ANTOLIN Rx#:[id number]   


 


    Vancomycin 2,000 mg In  500 





    Sodium Chloride 0.9% 500   





    ml @ 167 mls/hr IVPB ONCE   





    ONE Rx#:[id number]   


 


  Oral 400 150 120


 


Output:   


 


  Urine 150 250 100


 


    Uretheral (Castro) 150 150 100


 


Other:   


 


  Voiding Method Indwelling Catheter Indwelling Catheter 














- Exam





Constitutional: No acute distress, conversant, pleasant





Eyes: Anicteric sclerae, moist conjunctiva, no lid-lag, PERRLA





ENMT: NC/AT,Oropharynx clear, no erythema, exudates





Neck:Supple, FROM, no masses, or JVD, No carotid bruits; No thyromegaly





Lungs: Clear to auscultation, Clear to percussion, Normal respiratory effort, 

no accessory muscle use 





Cardiovascular: Heart regular in rate and rhythm,  No murmurs, gallops, or rubs 

no peripheral edema





Abdominal: Soft Nontender, nom distended, no guarding, no rebound or  rigidity, 

Normoactive bowel sounds No hepatomegaly, No splenomegaly,  No palpable mass No 

abdominal wall hernia noted 





Skin: Normal temperature, tone, texture, turgor, No induration No subcutaneous 

nodules, No rash, lesions, No ulcers





Extremities:No digital cyanosis No clubbing, Pedal pulses intact and  

symmetrical Radial pulses intact and symmetrical Normal gait and station, No 

calf tenderness, +1 pitting edema bilaterally


 


Psychiatric: Alert and oriented to person, place and time, Appropriate affect 

Intact judgement   


      


Neuro: Muscles Strength 5/5 in all 4 extremities, Sensation to light touch 

grossly present throughout, Cranial nerves II-XII grossly intact. No focal 

sensory deficits








- Labs


CBC & Chem 7: 


 07/05/18 06:10





 07/05/18 06:10


Labs: 


 Abnormal Lab Results - Last 24 Hours (Table)











  07/04/18 07/04/18 07/05/18 Range/Units





  17:22 21:06 06:10 


 


WBC    12.3 H  (3.8-10.6)  k/uL


 


RBC    3.68 L  (4.30-5.90)  m/uL


 


Hgb    10.5 L  (13.0-17.5)  gm/dL


 


Hct    32.3 L  (39.0-53.0)  %


 


Plt Count    121 L  (150-450)  k/uL


 


Neutrophils #    10.0 H  (1.3-7.7)  k/uL


 


Sodium     (137-145)  mmol/L


 


Potassium     (3.5-5.1)  mmol/L


 


Chloride     ()  mmol/L


 


BUN     (9-20)  mg/dL


 


Creatinine     (0.66-1.25)  mg/dL


 


Glucose     (74-99)  mg/dL


 


POC Glucose (mg/dL)  299 H  211 H   (75-99)  mg/dL


 


Phosphorus     (2.5-4.5)  mg/dL


 


Alkaline Phosphatase     ()  U/L


 


Albumin     (3.5-5.0)  g/dL


 


Amylase     ()  U/L


 


Lipase     ()  U/L














  07/05/18 07/05/18 07/05/18 Range/Units





  06:10 06:53 11:03 


 


WBC     (3.8-10.6)  k/uL


 


RBC     (4.30-5.90)  m/uL


 


Hgb     (13.0-17.5)  gm/dL


 


Hct     (39.0-53.0)  %


 


Plt Count     (150-450)  k/uL


 


Neutrophils #     (1.3-7.7)  k/uL


 


Sodium  130 L    (137-145)  mmol/L


 


Potassium  5.2 H    (3.5-5.1)  mmol/L


 


Chloride  89 L    ()  mmol/L


 


BUN  91 H*    (9-20)  mg/dL


 


Creatinine  3.81 H    (0.66-1.25)  mg/dL


 


Glucose  143 H    (74-99)  mg/dL


 


POC Glucose (mg/dL)   164 H  161 H  (75-99)  mg/dL


 


Phosphorus  6.1 H    (2.5-4.5)  mg/dL


 


Alkaline Phosphatase  203 H    ()  U/L


 


Albumin  3.0 L    (3.5-5.0)  g/dL


 


Amylase  233 H    ()  U/L


 


Lipase  1826 H    ()  U/L








 Microbiology - Last 24 Hours (Table)











 06/29/18 11:03 Blood Culture - Final





 Blood    No Growth after 144 hours


 


 06/29/18 11:17 Blood Culture - Final





 Blood    No Growth after 144 hours


 


 07/03/18 19:18 Blood Culture Gram Stain - Preliminary





 Blood Blood Culture - Preliminary





    Coagulase Negative Staph


 


 07/03/18 19:18 Blood Culture - Final





 Blood 














Assessment and Plan


Plan: 





Abdominal and chest pain likely acute pancreatitis, likely alcoholic


Etiology unclear possibly ETOH related?  Patient doesn't admit to drinking


Ultrasound of the abdomen, nonrevealing


Lipase trended down patient currently denies abdominal pain


Restarted on clear liquids and advanced to diabetic diet





Recurrent fevers:


Patient afebrile


Gram-positive bacteremia repeat cultures pending 


Chest x-ray showed possible atelectasis versus infiltrate.  Because patient wasn

't having shortness of breath and leukocytosis he was not treated with 

antibiotics.


Could be secondary to acute pancreatitis


Tylenol when necessary for fever


Appreciate ID recommendations patient currently on daptomycin, vancomycin 

discontinued secondary to nephrotoxicity 





Acute renal failure with hyperkalemia


Creatinine Worse today.  Patient is volume overloaded again.


Poor response to IV Lasix, 


Continue to hold ACE inhibitor


Recheck electrolytes and renal function in a.m.


Vascular to place a tunneled  dialysis catheter placement with plans for 

hemodialysis today or tomorrow





Elevated trops


Likely non-specific, sec to above, seen by cardiology


Echo checked, positive for moderate pulm hypertension, no motion abnormalities 

to suggest acute event.


Continue Coreg, hold ACE inhibitor because of renal failure





Diabetes mellitus type 2 with hyperglycemia


Resume home insulin regimen


Sliding scale insulin moderate dose


Blood sugar check CBC and at bedtime


Sugars currently controlled





Essential hypertension, 


Blood pressure has been elevated throughout the admission,


Resume home doses of his BP meds


Follow-up BP





COPD, hyperlipidemia, osteoarthritis


Stable


Resume home meds





General Weakness:


PT/OT


Will likely need rehab





DVT prophylaxis


Heparin subcu





Anticipated discharge


2-3 days

## 2018-07-06 LAB
ANION GAP SERPL CALC-SCNC: 17 MMOL/L
BUN SERPL-SCNC: 68 MG/DL (ref 9–20)
CALCIUM SPEC-MCNC: 8.7 MG/DL (ref 8.4–10.2)
CHLORIDE SERPL-SCNC: 93 MMOL/L (ref 98–107)
CO2 SERPL-SCNC: 24 MMOL/L (ref 22–30)
GLUCOSE BLD-MCNC: 110 MG/DL (ref 75–99)
GLUCOSE BLD-MCNC: 117 MG/DL (ref 75–99)
GLUCOSE BLD-MCNC: 135 MG/DL (ref 75–99)
GLUCOSE BLD-MCNC: 181 MG/DL (ref 75–99)
GLUCOSE SERPL-MCNC: 105 MG/DL (ref 74–99)
MAGNESIUM SPEC-SCNC: 2.3 MG/DL (ref 1.6–2.3)
POTASSIUM SERPL-SCNC: 5.1 MMOL/L (ref 3.5–5.1)
SODIUM SERPL-SCNC: 134 MMOL/L (ref 137–145)
VANCOMYCIN SERPL-MCNC: 11.6 UG/ML

## 2018-07-06 PROCEDURE — 5A1D70Z PERFORMANCE OF URINARY FILTRATION, INTERMITTENT, LESS THAN 6 HOURS PER DAY: ICD-10-PCS

## 2018-07-06 RX ADMIN — INSULIN ASPART SCH: 100 INJECTION, SOLUTION INTRAVENOUS; SUBCUTANEOUS at 17:05

## 2018-07-06 RX ADMIN — CARVEDILOL SCH MG: 12.5 TABLET, FILM COATED ORAL at 17:07

## 2018-07-06 RX ADMIN — INSULIN ASPART SCH: 100 INJECTION, SOLUTION INTRAVENOUS; SUBCUTANEOUS at 08:14

## 2018-07-06 RX ADMIN — ASPIRIN SCH: 325 TABLET ORAL at 10:45

## 2018-07-06 RX ADMIN — INSULIN ASPART SCH: 100 INJECTION, SOLUTION INTRAVENOUS; SUBCUTANEOUS at 12:04

## 2018-07-06 RX ADMIN — Medication SCH MG: at 11:13

## 2018-07-06 RX ADMIN — DEXTROSE MONOHYDRATE SCH MLS/HR: 5 INJECTION, SOLUTION INTRAVENOUS at 15:48

## 2018-07-06 RX ADMIN — HEPARIN SODIUM SCH UNIT: 5000 INJECTION, SOLUTION INTRAVENOUS; SUBCUTANEOUS at 15:49

## 2018-07-06 RX ADMIN — PANTOPRAZOLE SODIUM SCH MG: 40 TABLET, DELAYED RELEASE ORAL at 08:25

## 2018-07-06 RX ADMIN — IPRATROPIUM BROMIDE AND ALBUTEROL SULFATE SCH: .5; 3 SOLUTION RESPIRATORY (INHALATION) at 07:22

## 2018-07-06 RX ADMIN — FUROSEMIDE SCH MG: 10 INJECTION, SOLUTION INTRAMUSCULAR; INTRAVENOUS at 11:14

## 2018-07-06 RX ADMIN — HYDROCODONE BITARTRATE AND ACETAMINOPHEN PRN EACH: 7.5; 325 TABLET ORAL at 20:21

## 2018-07-06 RX ADMIN — IPRATROPIUM BROMIDE AND ALBUTEROL SULFATE SCH ML: .5; 3 SOLUTION RESPIRATORY (INHALATION) at 21:01

## 2018-07-06 RX ADMIN — FUROSEMIDE SCH MG: 10 INJECTION, SOLUTION INTRAMUSCULAR; INTRAVENOUS at 20:25

## 2018-07-06 RX ADMIN — HYDROCODONE BITARTRATE AND ACETAMINOPHEN PRN EACH: 7.5; 325 TABLET ORAL at 11:14

## 2018-07-06 RX ADMIN — CARVEDILOL SCH MG: 12.5 TABLET, FILM COATED ORAL at 11:14

## 2018-07-06 RX ADMIN — HEPARIN SODIUM SCH: 5000 INJECTION, SOLUTION INTRAVENOUS; SUBCUTANEOUS at 15:53

## 2018-07-06 RX ADMIN — IPRATROPIUM BROMIDE AND ALBUTEROL SULFATE SCH ML: .5; 3 SOLUTION RESPIRATORY (INHALATION) at 13:19

## 2018-07-06 RX ADMIN — INSULIN ASPART SCH UNIT: 100 INJECTION, SOLUTION INTRAVENOUS; SUBCUTANEOUS at 20:24

## 2018-07-06 RX ADMIN — HEPARIN SODIUM SCH UNIT: 5000 INJECTION, SOLUTION INTRAVENOUS; SUBCUTANEOUS at 08:21

## 2018-07-06 RX ADMIN — DEXTROSE MONOHYDRATE SCH MLS/HR: 5 INJECTION, SOLUTION INTRAVENOUS at 02:04

## 2018-07-06 RX ADMIN — ISOSORBIDE MONONITRATE SCH MG: 60 TABLET, EXTENDED RELEASE ORAL at 11:16

## 2018-07-06 RX ADMIN — GABAPENTIN SCH MG: 100 CAPSULE ORAL at 15:48

## 2018-07-06 RX ADMIN — GABAPENTIN SCH MG: 100 CAPSULE ORAL at 08:25

## 2018-07-06 RX ADMIN — GABAPENTIN SCH MG: 100 CAPSULE ORAL at 20:24

## 2018-07-06 RX ADMIN — INSULIN DETEMIR SCH UNIT: 100 INJECTION, SOLUTION SUBCUTANEOUS at 08:21

## 2018-07-06 NOTE — PN
PROGRESS NOTE



DATE OF SERVICE:

07/06/2018.



REASON FOR FOLLOWUP:

1. Fever, possible abdominal source.

2. Positive blood culture, more likely contamination.



INTERVAL HISTORY:

The patient is afebrile.  He has been breathing comfortably.  Denies having any chest

pain or shortness of breath or cough.  No abdominal pain, nausea, vomiting or any

diarrhea.



EXAMINATION:

Blood pressure 136/73, pulse of 70, temperature 98.1.  He is 98% on 4L nasal cannula.

General description is an elderly male lying in bed in no distress.

RESPIRATORY SYSTEM:  Unlabored breathing.  Clear to auscultation anteriorly

HEART:  S1, S2.  Regular.

ABDOMEN:  Soft, no tenderness.



LABS:

Creatinine 3.70.  Blood culture with Staph epi.  Repeat has been negative.



DIAGNOSTIC IMPRESSION AND PLAN:

1. Patient with a positive blood culture, Staph epidermidis contamination, currently

    off daptomycin.

2. Patient with a fever with concern for possible abdominal source.  The patient

    currently on Zosyn, transition to oral on discharge.  Continue with supportive

    care.





MMODL / IJN: [id number] / Job#: [id number]

## 2018-07-06 NOTE — CONS
CONSULTATION



This is a 73-year-old gentleman who has been admitted for medical condition and I was

consulted for placement of urgent dialysis catheter and he has been vomiting. In the

emergency room, his potassium was 5.9 and magnesium was 1 and an elevated troponin at

0.038 and also has a high BUN and creatinine.



MEDICAL HISTORY:

History of coronary artery disease, history of heart failure, history of COPD, history

of diabetes mellitus, history of hyperlipidemia, hypertension, liver disease,

osteoarthritis.



SURGICAL HISTORY:

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

heart catheterization in the past.



PHYSICAL EXAMINATION:

On examination, patient was seen in his room.

NECK:  Supple. Trachea central.

CHEST:  Clear.

ABDOMEN:  Soft.

Femoral pulses are present.



PLAN:

Placement of the dialysis catheter. Risks and complications have been discussed.





MMODL / IJN: [id number] / Job#: [id number]

## 2018-07-06 NOTE — P.PN
Subjective


Progress Note Date: 07/06/18





Patient laying comfortably in bed denies that he has any abdominal pain or 

nausea, apparently did not eat very much dinner but had a very strong appetite 

this morning at breakfast patient afebrile.  Had first round of hemodialysis 

yesterday.  approximately 800 mL of urine output in last 24 hours.  Patient 

afebrile for 3 days





Objective





- Vital Signs


Vital signs: 


 Vital Signs











Temp  98 F   07/06/18 07:00


 


Pulse  68   07/06/18 07:00


 


Resp  16   07/06/18 08:31


 


BP  146/74   07/06/18 07:00


 


Pulse Ox  100   07/06/18 07:00








 Intake & Output











 07/05/18 07/06/18 07/06/18





 18:59 06:59 18:59


 


Intake Total 545 272 30


 


Output Total 200 680 80


 


Balance 345 -408 -50


 


Weight 102.5 kg 103.5 kg 


 


Intake:   


 


    30


 


    Sodium Chloride 0.9% 1, 200  30





    000 ml @ 10 mls/hr IV .   





    Q24H ANTOLIN Rx#:[id number]   


 


  Intake, IV Titration 50 32 





  Amount   


 


    Piperacillin-Tazobactam 3 50  





    .375 gm In Dextrose/Water   





    1 50ml.bag @ 12.5 mls/hr   





    IVPB Q8HR ANTOLIN Rx#:   





    [id number]   


 


    Sodium Chloride 0.9% 1,  32 





    000 ml @ 10 mls/hr IV .   





    Q24H ANTOLIN Rx#:[id number]   


 


  Oral 220 240 


 


Output:   


 


  Urine 200 680 80


 


    Uretheral (Castro) 100 280 80


 


Other:   


 


  Voiding Method  Indwelling Catheter Indwelling Catheter


 


  # Voids  1 


 


  # Bowel Movements  0 














- Exam





Constitutional: No acute distress, conversant, pleasant





Eyes: Anicteric sclerae, moist conjunctiva, no lid-lag, PERRLA





ENMT: NC/AT,Oropharynx clear, no erythema, exudates





Neck:Supple, FROM, no masses, or JVD, No carotid bruits; No thyromegaly





Lungs: Clear to auscultation, Clear to percussion, Normal respiratory effort, 

no accessory muscle use 





Cardiovascular: Heart regular in rate and rhythm,  No murmurs, gallops, or rubs 

no peripheral edema





Abdominal: Soft Nontender, nom distended, no guarding, no rebound or  rigidity, 

Normoactive bowel sounds No hepatomegaly, No splenomegaly,  No palpable mass No 

abdominal wall hernia noted 





Skin: Normal temperature, tone, texture, turgor, No induration No subcutaneous 

nodules, No rash, lesions, No ulcers





Extremities:No digital cyanosis No clubbing, Pedal pulses intact and  

symmetrical Radial pulses intact and symmetrical Normal gait and station, No 

calf tenderness, +2 pitting edema bilaterally


 


Psychiatric: Alert and oriented to person, place and time, Appropriate affect 

Intact judgement   


      


Neuro: Muscles Strength 5/5 in all 4 extremities, Sensation to light touch 

grossly present throughout, Cranial nerves II-XII grossly intact. No focal 

sensory deficits








- Labs


CBC & Chem 7: 


 07/05/18 06:10





 07/06/18 06:01


Labs: 


 Abnormal Lab Results - Last 24 Hours (Table)











  07/05/18 07/06/18 07/06/18 Range/Units





  17:14 06:01 07:10 


 


Sodium   134 L   (137-145)  mmol/L


 


Chloride   93 L   ()  mmol/L


 


BUN   68 H   (9-20)  mg/dL


 


Creatinine   3.70 H   (0.66-1.25)  mg/dL


 


Glucose   105 H   (74-99)  mg/dL


 


POC Glucose (mg/dL)  171 H   117 H  (75-99)  mg/dL














  07/06/18 Range/Units





  11:21 


 


Sodium   (137-145)  mmol/L


 


Chloride   ()  mmol/L


 


BUN   (9-20)  mg/dL


 


Creatinine   (0.66-1.25)  mg/dL


 


Glucose   (74-99)  mg/dL


 


POC Glucose (mg/dL)  135 H  (75-99)  mg/dL








 Microbiology - Last 24 Hours (Table)











 07/03/18 19:18 Blood Culture Gram Stain - Final





 Blood Blood Culture - Final





    Staphylococcus epidermidis


 


 07/04/18 16:30 Blood Culture - Preliminary





 Blood    No Growth after 24 hours


 


 06/29/18 11:03 Blood Culture - Final





 Blood    No Growth after 144 hours


 


 06/29/18 11:17 Blood Culture - Final





 Blood    No Growth after 144 hours














Assessment and Plan


Plan: 





Abdominal and chest pain likely acute pancreatitis, likely alcoholic


Etiology unclear possibly ETOH related?  Patient doesn't admit to drinking


Ultrasound of the abdomen, nonrevealing


Lipase trended down patient currently denies abdominal pain


Restarted on clear liquids and advanced to diabetic diet





Recurrent fevers possibly secondary to pneumonia


Patient afebrile for 3 days


Blood cultures suggesting contaminant growing staph epidermidis vancomycin 

discontinued


Chest x-ray showed possible atelectasis versus infiltrate.  Because patient wasn

't having shortness of breath and leukocytosis he was not treated with 

antibiotics.


Could be secondary to acute pancreatitis


Tylenol when necessary for fever


Appreciate ID recommendations patient currently on IV Zosyn





Acute renal failure with hyperkalemia


Creatinine Worse today.  Patient is volume overloaded again.


Poor response to IV Lasix, 


Continue to hold ACE inhibitor


Recheck electrolytes and renal function in a.m.


Vascular to place a tunneled  dialysis catheter placement with plans for 

hemodialysis today or tomorrow





Diastolic CHF with Elevated trops


Likely non-specific, sec to above, seen by cardiology


Echo checked, positive for moderate pulm hypertension, no motion abnormalities 

to suggest acute event.


Continue Coreg, hold ACE inhibitor because of renal failure





Diabetes mellitus type 2 with hyperglycemia


Resume home insulin regimen


Sliding scale insulin moderate dose


Blood sugar check CBC and at bedtime


Sugars currently controlled





Essential hypertension, 


Blood pressure has been elevated throughout the admission,


Resume home doses of his BP meds


Follow-up BP





COPD, hyperlipidemia, osteoarthritis


Stable


Resume home meds





General Weakness:


PT/OT


Will likely need rehab





DVT prophylaxis


Heparin subcu





Disposition


Plans to hold dialysis over the weekend and reassess his electrolytes and 

kidney function over the weekend


Anticipated discharge


2-3 days

## 2018-07-06 NOTE — PN
PROGRESS NOTE



DATE OF SERVICE:

07/05/2018.



REASON FOR FOLLOWUP:

1. Positive blood culture, more likely contamination.

2. Patient with fever, possible abdominal source.



INTERVAL HISTORY:

The patient has been afebrile.  He is breathing comfortably.  Denies significant chest

pain.  Occasional cough.  Denies any abdominal pain.  No nausea, vomiting or any

diarrhea.



EXAMINATION:

Blood pressure 130/60 with a pulse of 67, temperature 98.2.  He is 93% on room air.

General description is an elderly male lying in bed in no distress.

RESPIRATORY SYSTEM:  Unlabored breathing.  Clear to auscultation anteriorly.

HEART:  S1, S2.  Regular rate and rhythm.

ABDOMEN:  Soft.  No tenderness.



LABS:

Hemoglobin is 10.5, white count 12.3 with a BUN of 21, creatinine 0.81.  Blood cultures

were finalized with coagulase-negative Staph.



DIAGNOSTIC IMPRESSION AND PLAN:

Patient with fever with concern for possible abdominal source.  The patient's amylase

and lipase were elevated; however, CT abdomen and pelvis with oral contrast did not

show any inflammation in the pancreatic head or body area.  He did have blood cultures

done which were showing gram-positive cocci; however, has been finalized with coagulase-

negative Staphylococcus, likely secondary to contamination.  Daptomycin has been

discontinued.  The patient is currently on Zosyn.  Will continue for now, watching his

clinical course closely.  Continue supportive care.





MMODL / IJN: [id number] / Job#: [id number]

## 2018-07-06 NOTE — P.PN
Subjective





Patient is seen in follow-up for acute kidney injury.  Renal function was 

worsening with creatinine up to 3.8 yesterday - underwent first treatment of 

hemodialysis yesterday.  Urine output documented as 880 cc over the last 24 

hours.  Patient is not a reliable historian.  Appetite is poor.  He does have 

diastolic CHF with moderate pulmonary hypertension.  He was seen while 

undergoing hemodialysis today.





Vital signs are stable.


General: The patient appeared well nourished and normally developed. 


HEENT: Head exam is unremarkable. Neck is without jugular venous distension.


LUNGS: Breath sounds decreased.


HEART: Rate and Rhythm are regular. First and second heart sounds normal. No 

murmurs, rubs or gallops. 


ABDOMEN: Abdominal exam reveals normal bowel sounds. Non-tender and non-

distended. No evidence of peritonitis.


EXTREMITITES: 1+ edema.





Objective





- Vital Signs


Vital signs: 


 Vital Signs











Temp  98 F   07/06/18 07:00


 


Pulse  68   07/06/18 07:00


 


Resp  16   07/06/18 08:31


 


BP  146/74   07/06/18 07:00


 


Pulse Ox  100   07/06/18 07:00








 Intake & Output











 07/05/18 07/06/18 07/06/18





 18:59 06:59 18:59


 


Intake Total 545 272 30


 


Output Total 200 680 80


 


Balance 345 -408 -50


 


Weight 102.5 kg 103.5 kg 


 


Intake:   


 


    30


 


    Sodium Chloride 0.9% 1, 200  30





    000 ml @ 10 mls/hr IV .   





    Q24H ANTOLIN Rx#:[id number]   


 


  Intake, IV Titration 50 32 





  Amount   


 


    Piperacillin-Tazobactam 3 50  





    .375 gm In Dextrose/Water   





    1 50ml.bag @ 12.5 mls/hr   





    IVPB Q8HR ANTOLIN Rx#:   





    [id number]   


 


    Sodium Chloride 0.9% 1,  32 





    000 ml @ 10 mls/hr IV .   





    Q24H ANTOLIN Rx#:[id number]   


 


  Oral 220 240 


 


Output:   


 


  Urine 200 680 80


 


    Uretheral (Castro) 100 280 80


 


Other:   


 


  Voiding Method  Indwelling Catheter Indwelling Catheter


 


  # Voids  1 


 


  # Bowel Movements  0 














- Labs


CBC & Chem 7: 


 07/05/18 06:10





 07/06/18 06:01


Labs: 


 Abnormal Lab Results - Last 24 Hours (Table)











  07/05/18 07/05/18 07/06/18 Range/Units





  11:03 17:14 06:01 


 


Sodium    134 L  (137-145)  mmol/L


 


Chloride    93 L  ()  mmol/L


 


BUN    68 H  (9-20)  mg/dL


 


Creatinine    3.70 H  (0.66-1.25)  mg/dL


 


Glucose    105 H  (74-99)  mg/dL


 


POC Glucose (mg/dL)  161 H  171 H   (75-99)  mg/dL














  07/06/18 Range/Units





  07:10 


 


Sodium   (137-145)  mmol/L


 


Chloride   ()  mmol/L


 


BUN   (9-20)  mg/dL


 


Creatinine   (0.66-1.25)  mg/dL


 


Glucose   (74-99)  mg/dL


 


POC Glucose (mg/dL)  117 H  (75-99)  mg/dL








 Microbiology - Last 24 Hours (Table)











 07/03/18 19:18 Blood Culture Gram Stain - Final





 Blood Blood Culture - Final





    Staphylococcus epidermidis


 


 07/04/18 16:30 Blood Culture - Preliminary





 Blood    No Growth after 24 hours


 


 06/29/18 11:03 Blood Culture - Final





 Blood    No Growth after 144 hours


 


 06/29/18 11:17 Blood Culture - Final





 Blood    No Growth after 144 hours














Assessment and Plan


Plan: 





Assessment: 


1.  Acute kidney injury secondary to ATN  secondary to cardiorenal syndrome as 

well as underlying infection.  Status post first treatment of hemodialysis on 

July 5.  No proteinuria on UA.


2.  Volume overload.


3.  Gram-positive bacteremia.  Blood culture positive for coag-negative staph - 

possible contamination.  Off antibiotics.


4.  Pancreatitis.  Diet has been advanced.


5.  Diastolic CHF with moderate pulmonary hypertension.


6.  Hypervolemic hyponatremia.  Improved postdialysis.


7.  Benign hypertension. 


8.  Insulin-dependent diabetes mellitus.





Plan:


With worsening renal function and volume overload, he was started on 

hemodialysis on July 5.


Currently seen while undergoing second treatment of dialysis.


Continue Lasix 80 mg IV twice daily.


Follow-up cultures.


Antibiotics per infectious disease recommendations.


Decreased dose of Neurontin to 100 mg 3 times daily.


Will plan to hold dialysis over the weekend and monitor renal function closely.

## 2018-07-07 LAB
ALBUMIN SERPL-MCNC: 3.1 G/DL (ref 3.5–5)
ALP SERPL-CCNC: 193 U/L (ref 38–126)
ALT SERPL-CCNC: 25 U/L (ref 21–72)
ANION GAP SERPL CALC-SCNC: 16 MMOL/L
AST SERPL-CCNC: 43 U/L (ref 17–59)
BUN SERPL-SCNC: 52 MG/DL (ref 9–20)
CALCIUM SPEC-MCNC: 8.4 MG/DL (ref 8.4–10.2)
CHLORIDE SERPL-SCNC: 92 MMOL/L (ref 98–107)
CO2 SERPL-SCNC: 23 MMOL/L (ref 22–30)
GLUCOSE BLD-MCNC: 127 MG/DL (ref 75–99)
GLUCOSE BLD-MCNC: 203 MG/DL (ref 75–99)
GLUCOSE BLD-MCNC: 224 MG/DL (ref 75–99)
GLUCOSE BLD-MCNC: 260 MG/DL (ref 75–99)
GLUCOSE SERPL-MCNC: 188 MG/DL (ref 74–99)
POTASSIUM SERPL-SCNC: 4.7 MMOL/L (ref 3.5–5.1)
PROT SERPL-MCNC: 6.7 G/DL (ref 6.3–8.2)
SODIUM SERPL-SCNC: 131 MMOL/L (ref 137–145)

## 2018-07-07 RX ADMIN — ISOSORBIDE MONONITRATE SCH MG: 60 TABLET, EXTENDED RELEASE ORAL at 08:31

## 2018-07-07 RX ADMIN — HEPARIN SODIUM SCH UNIT: 5000 INJECTION, SOLUTION INTRAVENOUS; SUBCUTANEOUS at 00:16

## 2018-07-07 RX ADMIN — DEXTROSE MONOHYDRATE SCH MLS/HR: 5 INJECTION, SOLUTION INTRAVENOUS at 13:07

## 2018-07-07 RX ADMIN — INSULIN ASPART SCH UNIT: 100 INJECTION, SOLUTION INTRAVENOUS; SUBCUTANEOUS at 13:07

## 2018-07-07 RX ADMIN — GABAPENTIN SCH MG: 100 CAPSULE ORAL at 08:32

## 2018-07-07 RX ADMIN — IPRATROPIUM BROMIDE AND ALBUTEROL SULFATE SCH ML: .5; 3 SOLUTION RESPIRATORY (INHALATION) at 19:20

## 2018-07-07 RX ADMIN — INSULIN ASPART SCH UNIT: 100 INJECTION, SOLUTION INTRAVENOUS; SUBCUTANEOUS at 20:50

## 2018-07-07 RX ADMIN — HEPARIN SODIUM SCH UNIT: 5000 INJECTION, SOLUTION INTRAVENOUS; SUBCUTANEOUS at 08:31

## 2018-07-07 RX ADMIN — INSULIN ASPART SCH: 100 INJECTION, SOLUTION INTRAVENOUS; SUBCUTANEOUS at 08:24

## 2018-07-07 RX ADMIN — CEFAZOLIN SCH MLS/HR: 330 INJECTION, POWDER, FOR SOLUTION INTRAMUSCULAR; INTRAVENOUS at 00:52

## 2018-07-07 RX ADMIN — FUROSEMIDE SCH MG: 10 INJECTION, SOLUTION INTRAMUSCULAR; INTRAVENOUS at 08:31

## 2018-07-07 RX ADMIN — CARVEDILOL SCH MG: 12.5 TABLET, FILM COATED ORAL at 08:31

## 2018-07-07 RX ADMIN — INSULIN ASPART SCH UNIT: 100 INJECTION, SOLUTION INTRAVENOUS; SUBCUTANEOUS at 17:16

## 2018-07-07 RX ADMIN — HEPARIN SODIUM SCH: 5000 INJECTION, SOLUTION INTRAVENOUS; SUBCUTANEOUS at 17:13

## 2018-07-07 RX ADMIN — HYDROCODONE BITARTRATE AND ACETAMINOPHEN PRN EACH: 7.5; 325 TABLET ORAL at 08:30

## 2018-07-07 RX ADMIN — GABAPENTIN SCH MG: 100 CAPSULE ORAL at 17:16

## 2018-07-07 RX ADMIN — ASPIRIN SCH: 325 TABLET ORAL at 08:26

## 2018-07-07 RX ADMIN — IPRATROPIUM BROMIDE AND ALBUTEROL SULFATE SCH: .5; 3 SOLUTION RESPIRATORY (INHALATION) at 13:11

## 2018-07-07 RX ADMIN — FUROSEMIDE SCH MG: 10 INJECTION, SOLUTION INTRAMUSCULAR; INTRAVENOUS at 21:07

## 2018-07-07 RX ADMIN — IPRATROPIUM BROMIDE AND ALBUTEROL SULFATE SCH ML: .5; 3 SOLUTION RESPIRATORY (INHALATION) at 07:56

## 2018-07-07 RX ADMIN — PANTOPRAZOLE SODIUM SCH MG: 40 TABLET, DELAYED RELEASE ORAL at 08:31

## 2018-07-07 RX ADMIN — CARVEDILOL SCH MG: 12.5 TABLET, FILM COATED ORAL at 17:16

## 2018-07-07 RX ADMIN — DEXTROSE MONOHYDRATE SCH MLS/HR: 5 INJECTION, SOLUTION INTRAVENOUS at 00:50

## 2018-07-07 RX ADMIN — GABAPENTIN SCH MG: 100 CAPSULE ORAL at 20:50

## 2018-07-07 RX ADMIN — CEFAZOLIN SCH: 330 INJECTION, POWDER, FOR SOLUTION INTRAMUSCULAR; INTRAVENOUS at 00:48

## 2018-07-07 RX ADMIN — INSULIN DETEMIR SCH UNIT: 100 INJECTION, SOLUTION SUBCUTANEOUS at 08:32

## 2018-07-07 RX ADMIN — Medication SCH MG: at 08:31

## 2018-07-07 NOTE — P.PN
Subjective


Progress Note Date: 07/07/18


Principal diagnosis: 





This is a 73-year-old male seen in consultation because of acute kidney injury, 

ATN with hemodialysis dependent.  First dialysis was on 76 and the second one 

yesterday on 07/06/2018.





He has a femoral catheter in his right groin.





He looks somewhat ill and has nasal oxygen.  He denies shortness of breath.  

His appetite is fair.  He is profoundly weak.  He wants to be discharged and 

says his vertigo home today again's medical advice.





Objective





- Vital Signs


Vital signs: 


 Vital Signs











Temp  98.5 F   07/07/18 07:00


 


Pulse  69   07/07/18 08:06


 


Resp  12   07/07/18 07:00


 


BP  160/69   07/07/18 07:00


 


Pulse Ox  99   07/07/18 07:56








 Intake & Output











 07/06/18 07/07/18 07/07/18





 18:59 06:59 18:59


 


Intake Total 30 540 


 


Output Total 200 130 


 


Balance -170 410 


 


Weight 103.5 kg  


 


Intake:   


 


  IV 30 90 


 


    Sodium Chloride 0.9% 1, 30 90 





    000 ml @ 10 mls/hr IV .   





    Q24H ANTOLIN Rx#:[id number]   


 


  Intake, IV Titration  50 





  Amount   


 


    Piperacillin-Tazobactam 3  50 





    .375 gm In Dextrose/Water   





    1 50ml.bag @ 12.5 mls/hr   





    IVPB Q12H ANTOLIN Rx#:   





    [id number]   


 


  Oral  400 


 


Output:   


 


  Urine 200 130 


 


    Uretheral (Castor) 80 50 


 


Other:   


 


  Voiding Method Indwelling Catheter Indwelling Catheter 


 


  # Voids  1 


 


  # Bowel Movements  0 








On examination he is on nasal cannula awake alert oriented





HEENT exam no JVP neck is supple no facial asymmetry





Lungs are clear to auscultation and fair air entry bilaterally.





Heart sounds are unremarkable for any murmur rub gallop





Abdomen is soft obese protuberant





No masses felt.





Extremity exam was moderate edema up to the thighs.





Warm to touch.





Neurologically awake alert but profoundly weak seems to be oriented 3.  Is 

somewhat hostile.  Blood agree to be examined.  Seems like he wants to go home..





- Labs


CBC & Chem 7: 


 07/05/18 06:10





 07/06/18 06:01


Labs: 


 Abnormal Lab Results - Last 24 Hours (Table)











  07/06/18 07/06/18 07/07/18 Range/Units





  16:48 19:51 06:47 


 


POC Glucose (mg/dL)  110 H  181 H  127 H  (75-99)  mg/dL














  07/07/18 Range/Units





  11:30 


 


POC Glucose (mg/dL)  260 H  (75-99)  mg/dL








 Microbiology - Last 24 Hours (Table)











 07/04/18 16:30 Blood Culture - Preliminary





 Blood    No Growth after 48 hours


 


 07/03/18 19:18 Blood Culture Gram Stain - Final





 Blood Blood Culture - Final





    Staphylococcus epidermidis














Assessment and Plan


Assessment: 





Impression





1.  Hemodialysis dependent ATN from prerenal from cardiorenal syndrome on 

dialysis well Joselito catheter in the right groin.  First dialysis was on 07/05/ 2080 and second one was yesterday.





2.  Remains volume overloaded.  He has edema.





3.  Staph epidermidis bacteremia possibly contaminant.





4.  Diastolic dysfunction.  Ejection fraction is 55% by echocardiogram.





5.  Diabetes mellitus.





6.  Pancreatitis, pain-free and currently on regular diet.





7.  Hypertension.





Recommendation.





1.  Will maintain dialysis schedule on Monday Wednesday Friday.





2.  Maintain aggressive diuresis, currently on IV Lasix 80 twice a day





3.  Watch intake and output and labs closely.

## 2018-07-07 NOTE — PN
PROGRESS NOTE



DATE OF SERVICE:

07/07/2018.



REASON FOR FOLLOWUP:

Fever and positive blood culture.



INTERVAL HISTORY:

The patient's overall fever pattern has improved with no fever recorded in last 3-4

days.  The patient has been breathing comfortably.  Denies any chest pain.  No cough.

No abdominal pain or any diarrhea.



EXAMINATION:

Blood pressure 124/64 with a pulse of 69, temperature 98.2.  He is 98% on room air.

General description is an elderly male lying in bed in no distress.

RESPIRATORY SYSTEM:  Unlabored breathing.  Clear to auscultation anteriorly.

HEART:  S1, S2.  Regular rate and rhythm.

ABDOMEN:  Soft, no tenderness.



LABS:

Blood culture repeat 07/04 has been negative.



DIAGNOSTIC IMPRESSION AND PLAN:

1. Patient with a positive blood culture, more likely skin contamination.  Followup

    blood culture negative.  No need for further workup for the same.

2. The patient with fever with a question of possible abdominal source.  Currently on

    Zosyn.  Transition to oral antibiotic on discharge.  Continue with supportive care.





MMODL / IJN: [id number] / Job#: [id number]

## 2018-07-08 LAB
GLUCOSE BLD-MCNC: 183 MG/DL (ref 75–99)
GLUCOSE BLD-MCNC: 205 MG/DL (ref 75–99)
GLUCOSE BLD-MCNC: 218 MG/DL (ref 75–99)
GLUCOSE BLD-MCNC: 220 MG/DL (ref 75–99)

## 2018-07-08 RX ADMIN — GABAPENTIN SCH MG: 100 CAPSULE ORAL at 17:40

## 2018-07-08 RX ADMIN — INSULIN ASPART SCH UNIT: 100 INJECTION, SOLUTION INTRAVENOUS; SUBCUTANEOUS at 13:07

## 2018-07-08 RX ADMIN — CARVEDILOL SCH MG: 12.5 TABLET, FILM COATED ORAL at 17:40

## 2018-07-08 RX ADMIN — HYDROCODONE BITARTRATE AND ACETAMINOPHEN PRN EACH: 7.5; 325 TABLET ORAL at 14:42

## 2018-07-08 RX ADMIN — FUROSEMIDE SCH MG: 10 INJECTION, SOLUTION INTRAMUSCULAR; INTRAVENOUS at 20:45

## 2018-07-08 RX ADMIN — INSULIN ASPART SCH UNIT: 100 INJECTION, SOLUTION INTRAVENOUS; SUBCUTANEOUS at 07:21

## 2018-07-08 RX ADMIN — IPRATROPIUM BROMIDE AND ALBUTEROL SULFATE SCH ML: .5; 3 SOLUTION RESPIRATORY (INHALATION) at 08:07

## 2018-07-08 RX ADMIN — GABAPENTIN SCH MG: 100 CAPSULE ORAL at 08:39

## 2018-07-08 RX ADMIN — IPRATROPIUM BROMIDE AND ALBUTEROL SULFATE SCH ML: .5; 3 SOLUTION RESPIRATORY (INHALATION) at 18:59

## 2018-07-08 RX ADMIN — HEPARIN SODIUM SCH UNIT: 5000 INJECTION, SOLUTION INTRAVENOUS; SUBCUTANEOUS at 01:37

## 2018-07-08 RX ADMIN — GABAPENTIN SCH MG: 100 CAPSULE ORAL at 20:45

## 2018-07-08 RX ADMIN — CARVEDILOL SCH MG: 12.5 TABLET, FILM COATED ORAL at 07:21

## 2018-07-08 RX ADMIN — INSULIN ASPART SCH UNIT: 100 INJECTION, SOLUTION INTRAVENOUS; SUBCUTANEOUS at 20:45

## 2018-07-08 RX ADMIN — IPRATROPIUM BROMIDE AND ALBUTEROL SULFATE SCH: .5; 3 SOLUTION RESPIRATORY (INHALATION) at 13:04

## 2018-07-08 RX ADMIN — Medication SCH MG: at 08:39

## 2018-07-08 RX ADMIN — INSULIN ASPART SCH UNIT: 100 INJECTION, SOLUTION INTRAVENOUS; SUBCUTANEOUS at 17:39

## 2018-07-08 RX ADMIN — DEXTROSE MONOHYDRATE SCH MLS/HR: 5 INJECTION, SOLUTION INTRAVENOUS at 13:08

## 2018-07-08 RX ADMIN — FUROSEMIDE SCH MG: 10 INJECTION, SOLUTION INTRAMUSCULAR; INTRAVENOUS at 08:40

## 2018-07-08 RX ADMIN — HEPARIN SODIUM SCH UNIT: 5000 INJECTION, SOLUTION INTRAVENOUS; SUBCUTANEOUS at 07:21

## 2018-07-08 RX ADMIN — INSULIN DETEMIR SCH UNIT: 100 INJECTION, SOLUTION SUBCUTANEOUS at 08:40

## 2018-07-08 RX ADMIN — ISOSORBIDE MONONITRATE SCH MG: 60 TABLET, EXTENDED RELEASE ORAL at 08:39

## 2018-07-08 RX ADMIN — CEFAZOLIN SCH: 330 INJECTION, POWDER, FOR SOLUTION INTRAMUSCULAR; INTRAVENOUS at 01:47

## 2018-07-08 RX ADMIN — HEPARIN SODIUM SCH: 5000 INJECTION, SOLUTION INTRAVENOUS; SUBCUTANEOUS at 17:40

## 2018-07-08 RX ADMIN — ASPIRIN SCH: 325 TABLET ORAL at 08:39

## 2018-07-08 RX ADMIN — DEXTROSE MONOHYDRATE SCH MLS/HR: 5 INJECTION, SOLUTION INTRAVENOUS at 01:37

## 2018-07-08 RX ADMIN — PANTOPRAZOLE SODIUM SCH MG: 40 TABLET, DELAYED RELEASE ORAL at 07:21

## 2018-07-08 NOTE — P.PN
Subjective


Progress Note Date: 07/08/18





Patient laying comfortably in bed denies that he has any abdominal pain or 

nausea, apparently did not eat very much dinner but had a very strong appetite 

this morning at breakfast patient afebrile.  Had second round of hemodialysis 

yesterday.  approximately 100 mlL of urine output overnight, afebrile for 3 

days.  Patient denies being short of air





Objective





- Vital Signs


Vital signs: 


 Vital Signs











Temp  97.6 F   07/08/18 06:53


 


Pulse  73   07/08/18 08:16


 


Resp  16   07/08/18 07:25


 


BP  150/68   07/08/18 06:53


 


Pulse Ox  98   07/08/18 08:07








 Intake & Output











 07/07/18 07/08/18 07/08/18





 18:59 06:59 18:59


 


Intake Total 80 455 


 


Output Total 50 100 


 


Balance 30 355 


 


Weight  106.5 kg 


 


Intake:   


 


  IV 80 55 


 


    Sodium Chloride 0.9% 1, 80 55 





    000 ml @ 10 mls/hr IV .   





    Q24H ANTOLIN Rx#:[id number]   


 


  Intake, IV Titration  50 





  Amount   


 


    Sodium Chloride 0.9% 1,  50 





    000 ml @ 10 mls/hr IV .   





    Q24H ANTOLIN Rx#:[id number]   


 


  Oral  350 


 


Output:   


 


  Urine 50 100 


 


Other:   


 


  Voiding Method  Indwelling Catheter Indwelling Catheter














- Exam





Constitutional: No acute distress, conversant, pleasant





Eyes: Anicteric sclerae, moist conjunctiva, no lid-lag, PERRLA





ENMT: NC/AT,Oropharynx clear, no erythema, exudates





Neck:Supple, FROM, no masses, or JVD, No carotid bruits; No thyromegaly





Lungs: Clear to auscultation, Clear to percussion, Normal respiratory effort, 

no accessory muscle use 





Cardiovascular: Heart regular in rate and rhythm,  No murmurs, gallops, or rubs 

no peripheral edema





Abdominal: Soft Nontender, nom distended, no guarding, no rebound or  rigidity, 

Normoactive bowel sounds No hepatomegaly, No splenomegaly,  No palpable mass No 

abdominal wall hernia noted 





Skin: Normal temperature, tone, texture, turgor, No induration No subcutaneous 

nodules, No rash, lesions, No ulcers





Extremities:No digital cyanosis No clubbing, Pedal pulses intact and  

symmetrical Radial pulses intact and symmetrical Normal gait and station, No 

calf tenderness, +1 pitting edema bilaterally


 


Psychiatric: Alert and oriented to person, place and time, Appropriate affect 

Intact judgement   


      


Neuro: Muscles Strength 5/5 in all 4 extremities, Sensation to light touch 

grossly present throughout, Cranial nerves II-XII grossly intact. No focal 

sensory deficits








- Labs


CBC & Chem 7: 


 07/05/18 06:10





 07/07/18 18:04


Labs: 


 Abnormal Lab Results - Last 24 Hours (Table)











  07/07/18 07/07/18 07/07/18 Range/Units





  11:30 16:43 18:04 


 


Sodium    131 L  (137-145)  mmol/L


 


Chloride    92 L  ()  mmol/L


 


BUN    52 H  (9-20)  mg/dL


 


Creatinine    4.40 H  (0.66-1.25)  mg/dL


 


Glucose    188 H  (74-99)  mg/dL


 


POC Glucose (mg/dL)  260 H  203 H   (75-99)  mg/dL


 


Alkaline Phosphatase    193 H  ()  U/L


 


Albumin    3.1 L  (3.5-5.0)  g/dL














  07/07/18 07/08/18 Range/Units





  19:46 06:45 


 


Sodium    (137-145)  mmol/L


 


Chloride    ()  mmol/L


 


BUN    (9-20)  mg/dL


 


Creatinine    (0.66-1.25)  mg/dL


 


Glucose    (74-99)  mg/dL


 


POC Glucose (mg/dL)  224 H  183 H  (75-99)  mg/dL


 


Alkaline Phosphatase    ()  U/L


 


Albumin    (3.5-5.0)  g/dL








 Microbiology - Last 24 Hours (Table)











 07/04/18 16:30 Blood Culture - Preliminary





 Blood    No Growth after 72 hours














Assessment and Plan


Plan: 





Abdominal and chest pain likely acute pancreatitis, likely alcoholic


Etiology unclear possibly ETOH related?  Patient doesn't admit to drinking


Ultrasound of the abdomen, nonrevealing


Lipase trended down patient currently denies abdominal pain


Restarted on clear liquids and advanced to diabetic diet





Recurrent fevers possibly secondary to pneumonia


Patient afebrile for several days 


Blood cultures suggesting contaminant growing staph epidermidis vancomycin 

discontinued repeat blood  cultures have been negative


Chest x-ray showed possible atelectasis versus infiltrate.  Because patient wasn

't having shortness of breath and leukocytosis he was not treated with 

antibiotics.


Could be secondary to acute pancreatitis


Tylenol when necessary for fever


Appreciate ID recommendations patient currently on IV Zosyn





Acute renal failure with hyperkalemia


Creatinine improved today 


Poor response to IV Lasix, 


Continue to hold ACE inhibitor


Recheck electrolytes and renal function in a.m.


Vascular to place a tunneled  dialysis catheter placement with plans for 

hemodialysis today or tomorrow





Diastolic CHF with Elevated trops


Likely non-specific, sec to above, seen by cardiology


Echo checked, positive for moderate pulm hypertension, no motion abnormalities 

to suggest acute event.


Continue Coreg, hold ACE inhibitor because of renal failure





Diabetes mellitus type 2 with hyperglycemia


Resume home insulin regimen


Sliding scale insulin moderate dose


Blood sugar check CBC and at bedtime


Sugars currently controlled





Essential hypertension, 


Blood pressure has been elevated throughout the admission,


Resume home doses of his BP meds


Follow-up BP





COPD, hyperlipidemia, osteoarthritis


Stable


Resume home meds





General Weakness:


PT/OT


Will likely need rehab





DVT prophylaxis


Heparin subcu





Disposition


Plans to hold dialysis over the weekend and reassess his electrolytes and 

kidney function over the weekend


Anticipated discharge


1-2 days

## 2018-07-08 NOTE — PN
PROGRESS NOTE



DATE OF SERVICE:

07/08/2018.



REASON FOR FOLLOWUP:

1. Positive blood culture.

2. Patient with fever, possible abdominal source.



INTERVAL HISTORY:

The patient is currently afebrile, has been breathing comfortably.  Denies having any

chest pain or cough. No abdominal pain, nausea, vomiting.  Tolerating his diet.  No

diarrhea.



EXAMINATION:

Blood pressure 169/59 with a pulse of 68. Temperature 97.8.  He is 97% on 2 L nasal

cannula.

General description is an elderly male lying in bed in no distress.

RESPIRATORY SYSTEM:  Unlabored breathing. Clear to auscultation anteriorly.

HEART: S1, S2.  Regular rate and rhythm.

ABDOMEN: Soft, no tenderness. No guarding or rigidity.



LABS:

BUN of 52, creatinine 4.40.  Blood culture repeat 7/4 has been negative.



DIAGNOSTIC IMPRESSION AND PLAN:

1. Patient positive blood culture, Staph epi likely contamination. Repeat blood

    culture negative.  Currently off daptomycin.

2. Patient with fever with concern for possible abdominal source and the patient did

    have an elevated amylase, lipase.  He did have CT x2 that did not show any

    significant inflammation.  Currently on Zosyn that will be transitioned to a short

    course of oral Augmentin on discharge to finish the course of therapy. Continue

    supportive care.





MMODL / IJN: [id number] / Job#: [id number]

## 2018-07-08 NOTE — P.PN
Subjective


Progress Note Date: 07/08/18


Principal diagnosis: 





This is a 73-year-old male seen in consultation because of acute kidney injury, 

ATN with hemodialysis dependent.  First dialysis was on 07/05/2018 and the 

second one was day before yesterday on 07/06/2018.





He has a femoral catheter in his right groin.





He looks somewhat ill and has nasal oxygen.  He denies shortness of breath.  

His appetite is fair.  He is profoundly weak.  His urine output is minimal, 150 

mL for the last 24 hours.  He is grossly edematous.  Is on Lasix 80 every 12 

hours








Objective





- Vital Signs


Vital signs: 


 Vital Signs











Temp  97.6 F   07/08/18 06:53


 


Pulse  73   07/08/18 08:16


 


Resp  16   07/08/18 07:25


 


BP  150/68   07/08/18 06:53


 


Pulse Ox  98   07/08/18 08:07








 Intake & Output











 07/07/18 07/08/18 07/08/18





 18:59 06:59 18:59


 


Intake Total 80 455 70


 


Output Total 50 100 


 


Balance 30 355 70


 


Weight  106.5 kg 


 


Intake:   


 


  IV 80 55 70


 


    Sodium Chloride 0.9% 1, 80 55 70





    000 ml @ 10 mls/hr IV .   





    Q24H ANTOLIN Rx#:[id number]   


 


  Intake, IV Titration  50 





  Amount   


 


    Sodium Chloride 0.9% 1,  50 





    000 ml @ 10 mls/hr IV .   





    Q24H ANTOLIN Rx#:[id number]   


 


  Oral  350 


 


Output:   


 


  Urine 50 100 


 


Other:   


 


  Voiding Method  Indwelling Catheter Indwelling Catheter








On examination he looks ill,





HEENT exam no JVP neck is supple no facial asymmetry





Lungs are clear to auscultation but his air entry is less than optimal.





Heart sounds are unremarkable for any murmur rub gallop





Abdomen is slightly distended protuberant and nontender no masses felt





Extreme exam was 2+ edema





Neurologically awake alert oriented 3.  No asterixis.





- Labs


CBC & Chem 7: 


 07/05/18 06:10





 07/07/18 18:04


Labs: 


 Abnormal Lab Results - Last 24 Hours (Table)











  07/07/18 07/07/18 07/07/18 Range/Units





  16:43 18:04 19:46 


 


Sodium   131 L   (137-145)  mmol/L


 


Chloride   92 L   ()  mmol/L


 


BUN   52 H   (9-20)  mg/dL


 


Creatinine   4.40 H   (0.66-1.25)  mg/dL


 


Glucose   188 H   (74-99)  mg/dL


 


POC Glucose (mg/dL)  203 H   224 H  (75-99)  mg/dL


 


Alkaline Phosphatase   193 H   ()  U/L


 


Albumin   3.1 L   (3.5-5.0)  g/dL














  07/08/18 07/08/18 Range/Units





  06:45 11:23 


 


Sodium    (137-145)  mmol/L


 


Chloride    ()  mmol/L


 


BUN    (9-20)  mg/dL


 


Creatinine    (0.66-1.25)  mg/dL


 


Glucose    (74-99)  mg/dL


 


POC Glucose (mg/dL)  183 H  218 H  (75-99)  mg/dL


 


Alkaline Phosphatase    ()  U/L


 


Albumin    (3.5-5.0)  g/dL








 Microbiology - Last 24 Hours (Table)











 07/04/18 16:30 Blood Culture - Preliminary





 Blood    No Growth after 72 hours














Assessment and Plan


Assessment: 





Impression





1.  Hemodialysis dependent ATN from prerenal from cardiorenal syndrome on 

dialysis with Joselito catheter in the right groin.  First dialysis was on 07/05/ 20 18 and second one was 07/06/2018.





2.  Remains volume overloaded.  He has 2+ edema.





3.  Staph epidermidis bacteremia possibly contaminant.





4.  Diastolic dysfunction.  Ejection fraction is 55% by echocardiogram.





5.  Diabetes mellitus.





6.  Pancreatitis, pain-free and currently on regular diet.





7.  Hypertension.





Recommendation.





1.  Will maintain dialysis schedule on Monday Wednesday Friday.





2.  Maintain aggressive diuresis, currently on IV Lasix 80 twice a day





3.  Watch intake and output and labs closely.

## 2018-07-09 LAB
ALBUMIN SERPL-MCNC: 3.1 G/DL (ref 3.5–5)
ALP SERPL-CCNC: 160 U/L (ref 38–126)
ALT SERPL-CCNC: 24 U/L (ref 21–72)
ANION GAP SERPL CALC-SCNC: 17 MMOL/L
AST SERPL-CCNC: 24 U/L (ref 17–59)
BASOPHILS # BLD AUTO: 0 K/UL (ref 0–0.2)
BASOPHILS NFR BLD AUTO: 1 %
BUN SERPL-SCNC: 62 MG/DL (ref 9–20)
CALCIUM SPEC-MCNC: 8.4 MG/DL (ref 8.4–10.2)
CHLORIDE SERPL-SCNC: 91 MMOL/L (ref 98–107)
CO2 SERPL-SCNC: 23 MMOL/L (ref 22–30)
EOSINOPHIL # BLD AUTO: 0.2 K/UL (ref 0–0.7)
EOSINOPHIL NFR BLD AUTO: 3 %
ERYTHROCYTE [DISTWIDTH] IN BLOOD BY AUTOMATED COUNT: 3.28 M/UL (ref 4.3–5.9)
ERYTHROCYTE [DISTWIDTH] IN BLOOD: 13.8 % (ref 11.5–15.5)
GLUCOSE BLD-MCNC: 134 MG/DL (ref 75–99)
GLUCOSE BLD-MCNC: 186 MG/DL (ref 75–99)
GLUCOSE BLD-MCNC: 223 MG/DL (ref 75–99)
GLUCOSE BLD-MCNC: 423 MG/DL (ref 75–99)
GLUCOSE SERPL-MCNC: 152 MG/DL (ref 74–99)
HCT VFR BLD AUTO: 29.4 % (ref 39–53)
HGB BLD-MCNC: 9.1 GM/DL (ref 13–17.5)
LYMPHOCYTES # SPEC AUTO: 1.2 K/UL (ref 1–4.8)
LYMPHOCYTES NFR SPEC AUTO: 13 %
MCH RBC QN AUTO: 27.9 PG (ref 25–35)
MCHC RBC AUTO-ENTMCNC: 31.1 G/DL (ref 31–37)
MCV RBC AUTO: 89.7 FL (ref 80–100)
MONOCYTES # BLD AUTO: 0.6 K/UL (ref 0–1)
MONOCYTES NFR BLD AUTO: 7 %
NEUTROPHILS # BLD AUTO: 6.9 K/UL (ref 1.3–7.7)
NEUTROPHILS NFR BLD AUTO: 75 %
PLATELET # BLD AUTO: 216 K/UL (ref 150–450)
POTASSIUM SERPL-SCNC: 4.9 MMOL/L (ref 3.5–5.1)
PROT SERPL-MCNC: 6.6 G/DL (ref 6.3–8.2)
SODIUM SERPL-SCNC: 131 MMOL/L (ref 137–145)
WBC # BLD AUTO: 9.1 K/UL (ref 3.8–10.6)

## 2018-07-09 RX ADMIN — GABAPENTIN SCH MG: 100 CAPSULE ORAL at 16:27

## 2018-07-09 RX ADMIN — CARVEDILOL SCH: 12.5 TABLET, FILM COATED ORAL at 09:09

## 2018-07-09 RX ADMIN — ISOSORBIDE MONONITRATE SCH: 60 TABLET, EXTENDED RELEASE ORAL at 09:11

## 2018-07-09 RX ADMIN — INSULIN ASPART SCH UNIT: 100 INJECTION, SOLUTION INTRAVENOUS; SUBCUTANEOUS at 08:57

## 2018-07-09 RX ADMIN — FUROSEMIDE SCH MG: 10 INJECTION, SOLUTION INTRAMUSCULAR; INTRAVENOUS at 20:31

## 2018-07-09 RX ADMIN — HEPARIN SODIUM SCH: 5000 INJECTION, SOLUTION INTRAVENOUS; SUBCUTANEOUS at 09:10

## 2018-07-09 RX ADMIN — Medication SCH: at 09:11

## 2018-07-09 RX ADMIN — INSULIN ASPART SCH UNIT: 100 INJECTION, SOLUTION INTRAVENOUS; SUBCUTANEOUS at 12:49

## 2018-07-09 RX ADMIN — DEXTROSE MONOHYDRATE SCH MLS/HR: 5 INJECTION, SOLUTION INTRAVENOUS at 14:45

## 2018-07-09 RX ADMIN — CEFAZOLIN SCH: 330 INJECTION, POWDER, FOR SOLUTION INTRAMUSCULAR; INTRAVENOUS at 00:46

## 2018-07-09 RX ADMIN — NIFEDIPINE SCH MG: 30 TABLET, FILM COATED, EXTENDED RELEASE ORAL at 20:31

## 2018-07-09 RX ADMIN — HEPARIN SODIUM SCH UNIT: 5000 INJECTION, SOLUTION INTRAVENOUS; SUBCUTANEOUS at 00:05

## 2018-07-09 RX ADMIN — FUROSEMIDE SCH MG: 10 INJECTION, SOLUTION INTRAMUSCULAR; INTRAVENOUS at 08:58

## 2018-07-09 RX ADMIN — INSULIN ASPART SCH UNIT: 100 INJECTION, SOLUTION INTRAVENOUS; SUBCUTANEOUS at 20:32

## 2018-07-09 RX ADMIN — HEPARIN SODIUM SCH UNIT: 5000 INJECTION, SOLUTION INTRAVENOUS; SUBCUTANEOUS at 16:28

## 2018-07-09 RX ADMIN — GABAPENTIN SCH MG: 100 CAPSULE ORAL at 20:32

## 2018-07-09 RX ADMIN — IPRATROPIUM BROMIDE AND ALBUTEROL SULFATE SCH: .5; 3 SOLUTION RESPIRATORY (INHALATION) at 08:11

## 2018-07-09 RX ADMIN — GABAPENTIN SCH MG: 100 CAPSULE ORAL at 08:58

## 2018-07-09 RX ADMIN — INSULIN ASPART SCH UNIT: 100 INJECTION, SOLUTION INTRAVENOUS; SUBCUTANEOUS at 17:44

## 2018-07-09 RX ADMIN — ASPIRIN SCH: 325 TABLET ORAL at 09:11

## 2018-07-09 RX ADMIN — CARVEDILOL SCH MG: 12.5 TABLET, FILM COATED ORAL at 17:44

## 2018-07-09 RX ADMIN — INSULIN DETEMIR SCH UNIT: 100 INJECTION, SOLUTION SUBCUTANEOUS at 08:57

## 2018-07-09 RX ADMIN — PANTOPRAZOLE SODIUM SCH: 40 TABLET, DELAYED RELEASE ORAL at 09:10

## 2018-07-09 RX ADMIN — IPRATROPIUM BROMIDE AND ALBUTEROL SULFATE SCH: .5; 3 SOLUTION RESPIRATORY (INHALATION) at 20:40

## 2018-07-09 RX ADMIN — DEXTROSE MONOHYDRATE SCH MLS/HR: 5 INJECTION, SOLUTION INTRAVENOUS at 00:05

## 2018-07-09 RX ADMIN — IPRATROPIUM BROMIDE AND ALBUTEROL SULFATE SCH: .5; 3 SOLUTION RESPIRATORY (INHALATION) at 13:41

## 2018-07-09 NOTE — PN
PROGRESS NOTE



Patient is seen currently on dialysis.  He is tolerating his treatment well.  We going

for about 4 L.



PHYSICAL EXAMINATION:

Blood pressure this morning was 181/71, heart rate of 70 per minute.  Patient is

afebrile.  Examination of the heart: S1, S2.  Examination lungs: Bilateral breath

sounds are heard.  Abdomen is soft, nontender, and distended with abdominal wall edema.

Examination lower extremity shows edema 2+ bilaterally.  Patient has edema in upper

extremities as well.



LABS SHOW:

Sodium 131, potassium 4.9, BUN 62, serum creatinine 6.26, hemoglobin 9.1 g/dL.



ASSESSMENT:

1. Acute kidney injury, ATN currently with volume overload and maintained on dialysis.

    I will dialyze the patient again tomorrow  given the significant volume overload.

2. Volume overload.  Repeat hemodialysis in a.m.  Continue with diuretics as well.

3. Staph epi bacteremia.

4. Diastolic dysfunction.  Ejection fraction 55%.

5. Pancreatitis, currently improved.  Patient is tolerating oral diet.

6. Hypertension.  Partly volume sensitive.  Blood pressure remains elevated.  The

    patient is maintained on Coreg 25 mg b.i.d. along with max dose of hydralazine.  He

    is on Imdur as well.  I will add calcium channel blockers and we can wean down his

    antihypertensive medications once volume status improves.





MMODL / IJN: [id number] / Job#: [id number]

## 2018-07-09 NOTE — PN
PROGRESS NOTE



DATE OF SERVICE:

07/09/2018.



REASON FOR FOLLOWUP:

1. Positive blood culture, more likely contamination.

2. Fever, possible abdominal source.



INTERVAL HISTORY:

The patient is afebrile.  He has been breathing comfortably.  Denies any significant

chest pain or cough.  No nausea, no vomiting.



EXAMINATION:

Blood pressure 130/72 with a pulse of 70. Temperature 98.7. She is 97% on room air.

General description is an elderly male lying in bed in no distress.

RESPIRATORY SYSTEM:  Unlabored breathing. Clear to auscultation anteriorly.

HEART: S1, S2.  Regular rate and rhythm.

ABDOMEN: Soft, no tenderness.



LABS:

Hemoglobin 9.1, white count 9.1, BUN of 52, creatinine of 6.26.



DIAGNOSTIC IMPRESSION AND PLAN:

1. Patient with positive blood culture, more likely a contamination.  Repeat blood

    culture negative.  Currently off the daptomycin.

2. Patient with a fever source, possible abdominal. The fever has resolved.  Culture

    has been negative.  We will discontinue the Zosyn and give a short course of oral

    Augmentin.  Plan of care discussed with admitting physician.





MMODL / IJN: [id number] / Job#: [id number]

## 2018-07-09 NOTE — P.PN
Subjective


Progress Note Date: 07/09/18


Principal diagnosis: 


chest pain





patient is a 73-year-old -American male with a past medical history 

ofcoronary artery disease, systolic congestive heart failure, prior pancreatitis

, multiple other medical comorbidities who presented to the emergency 

department with complaints of abdominal and chest pain.  In the emergency 

department he was found to have a potassium of 5.9, low magnesium, and an 

elevated troponin of 0.38.  He was admitted for further monitoring of his acute 

hyperkalemia, elevated troponin, and abdominal pain.he was started on IV fluids 

and nothing by mouth.  He was seen by GI and diagnosed with acute pancreatitis 

secondary to alcoholic hepatitis.  He was seen by cardiology and his elevated 

troponins were deemed likely secondary to renal insufficiency.  He was 

diagnosed with diastolic congestive heart failure with acute exacerbation and 

had been on Lasix.  He was seen by nephrology and diagnosed with a I likely 

secondary to ATN and hypercalcemia.  He had been taken off of his angiotensin 

receptor blocker.  He was started on IV Lasix.  His IV Lasix did not help with 

diuresis and he was ultimately started on a Lasix drip.  His diet was slowly 

advanced for pancreatitis.  He did start spiking fever and was seen by 

infectious disease on 11/3.  He was started on Zosyn and a repeat CT abdomen 

and pelvis was ordered.  CT abdomen and pelvis showed an infiltrate at the left 

lung base similar to old exam but no acute abdominal abnormality.  One blood 

culture came back with staph epidermidis and was determined to be a likely 

contaminant.  Chest x-ray and urinalysis were negative.  His fevers were 

thought to be secondary to his acute pancreatitis.  He continued to have 

worsening fluid overload as well as worsening renal function despite 

adjustments in his IV Lasix.  He was subsequently started on hemodialysis on 

July 5 after a Joselito cath was placed in the right groin.  His fevers abated.  

They attempted to hold dialysis and his creatinine increased again. Plans are 

for outpatient dialysis.





Patient seen and examined at bedside.  No improvement in his shortness of breath

, still with significant edema.  Feeling tired and fatigued.  No chest 

discomfort.  Denies any nausea but still with low appetite.  Agreeable to go to 

[facility] for rehabilitation.








Objective





- Vital Signs


Vital signs: 


 Vital Signs











Temp  97.8 F   07/09/18 07:08


 


Pulse  67   07/09/18 07:08


 


Resp  18   07/09/18 07:08


 


BP  181/71   07/09/18 07:08


 


Pulse Ox  99   07/09/18 07:08








 Intake & Output











 07/08/18 07/09/18 07/09/18





 18:59 06:59 18:59


 


Intake Total 70 155 25


 


Output Total 90 50 


 


Balance -20 105 25


 


Weight  107.8 kg 


 


Intake:   


 


  IV 70 105 


 


    Sodium Chloride 0.9% 1, 70 105 





    000 ml @ 10 mls/hr IV .   





    Q24H ANTOLIN Rx#:[id number]   


 


  Intake, IV Titration  50 





  Amount   


 


    Piperacillin-Tazobactam 3  50 





    .375 gm In Dextrose/Water   





    1 50ml.bag @ 12.5 mls/hr   





    IVPB Q12H ANTOLIN Rx#:   





    [id number]   


 


  Oral   25


 


Output:   


 


  Urine 90 50 


 


Other:   


 


  Voiding Method Indwelling Catheter Indwelling Catheter 














- Exam


General: non toxic, no distress, appears at stated age


Derm: warm, moist


Head: atraumatic, normocephalic, symmetric


Eyes: EOMI, no lid lag, anicteric sclera


Mouth: no lip lesion, mucus membranes moist


Cardiovascular: S1S2 reg, no murmur, positive posterior tibial pulse bilateral, 


Lungs: Decreased breath sounds bilateral bases, no rhonchi, no rales , no 

accessory muscle use


Abdominal: soft,  nontender to palpation, no guarding, no appreciable 

organomegaly


Ext: no gross muscle atrophy, diffuse anasarca, no contractures


Neuro:  CN II-XI grossly intact, no focal neuro deficits


Psych: Alert, oriented, appropriate affect 











- Labs


CBC & Chem 7: 


 07/09/18 07:07





 07/09/18 07:07


Labs: 


 Abnormal Lab Results - Last 24 Hours (Table)











  07/08/18 07/08/18 07/09/18 Range/Units





  16:54 19:38 06:52 


 


RBC     (4.30-5.90)  m/uL


 


Hgb     (13.0-17.5)  gm/dL


 


Hct     (39.0-53.0)  %


 


Sodium     (137-145)  mmol/L


 


Chloride     ()  mmol/L


 


BUN     (9-20)  mg/dL


 


Creatinine     (0.66-1.25)  mg/dL


 


Glucose     (74-99)  mg/dL


 


POC Glucose (mg/dL)  205 H  220 H  186 H  (75-99)  mg/dL


 


Alkaline Phosphatase     ()  U/L


 


Albumin     (3.5-5.0)  g/dL














  07/09/18 07/09/18 07/09/18 Range/Units





  07:07 07:07 11:23 


 


RBC  3.28 L    (4.30-5.90)  m/uL


 


Hgb  9.1 L    (13.0-17.5)  gm/dL


 


Hct  29.4 L    (39.0-53.0)  %


 


Sodium   131 L   (137-145)  mmol/L


 


Chloride   91 L   ()  mmol/L


 


BUN   62 H   (9-20)  mg/dL


 


Creatinine   6.26 H*   (0.66-1.25)  mg/dL


 


Glucose   152 H   (74-99)  mg/dL


 


POC Glucose (mg/dL)    134 H  (75-99)  mg/dL


 


Alkaline Phosphatase   160 H   ()  U/L


 


Albumin   3.1 L   (3.5-5.0)  g/dL








 Microbiology - Last 24 Hours (Table)











 07/04/18 16:30 Blood Culture - Preliminary





 Blood    No Growth after 96 hours














Assessment and Plan


Assessment: 


Acute kidney injury on chronic kidney disease likely now will need hemodialysis


-Nephrology recommendations


-Per nursing plan is hemodialysis daily for the next 3 days and then 

chronically on Monday/Wednesday/Friday


-Patient will need permanent catheter placed prior to discharge


-Continue off Cozaar, Aldactone





Acute exacerbation of diastolic congestive heart failure, ejection fraction 55-

60% with moderate pulmonary hypertension


-Continue with Coreg


-No ACE inhibitor secondary to acute kidney injury


-Fluid control with dialysis and IV Lasix





Hypertensive urgency with hypertensive emergency on admission


-Continue with Coreg, hydralazine, and Imdur


-We will add Catapres if blood pressure remains elevated after daily dialysis 

initiated


-Follow blood pressures





Febrile event


-Likely intra-abdominal source


-ID recommendations appreciated


-Currently on Zosyn day #7, transition to a short course of augmentin on 

discharge per ID





Diabetes mellitus type 2


-Had slightly elevated blood sugars but with initiation of daily dialysis will 

not increase insulin regimen at this time


-Continue with Levemir 35 units daily and sliding scale





Hepatitis C 


- currently on Mavyret has not been receiving this hospital stay as did not 

bring in from home. 





Chronic/resolved


HLD


Asthma


COPD





Acute pancreatitis, resolved


Elevated troponin, flat


Contaminated blood culture with staph epidermidis


Hyperkalemia, resolved





DVT prophylaxis: Heparin


Discussed with: Patient, nursing, case management


Anticipated discharge: 7/12


Anticipated discharge place: [facility]


A total of [45] minutes was spent on the care of this complex patient more than 

50% of the time was spent in counseling and care coordination.

## 2018-07-09 NOTE — IR
Fluoroscopy

 

HISTORY: Dialysis catheter placement

 

2.2 minutes fluoroscopy time supplied to the referring clinician.  392 intraoperative C-arm images do
cument the procedure. See dictated report from vascular surgery.

## 2018-07-10 LAB
GLUCOSE BLD-MCNC: 142 MG/DL (ref 75–99)
GLUCOSE BLD-MCNC: 181 MG/DL (ref 75–99)
GLUCOSE BLD-MCNC: 200 MG/DL (ref 75–99)
GLUCOSE BLD-MCNC: 220 MG/DL (ref 75–99)

## 2018-07-10 RX ADMIN — Medication SCH MG: at 10:48

## 2018-07-10 RX ADMIN — AMOXICILLIN AND CLAVULANATE POTASSIUM SCH EACH: 500; 125 TABLET, FILM COATED ORAL at 20:44

## 2018-07-10 RX ADMIN — CARVEDILOL SCH MG: 12.5 TABLET, FILM COATED ORAL at 10:48

## 2018-07-10 RX ADMIN — PANTOPRAZOLE SODIUM SCH MG: 40 TABLET, DELAYED RELEASE ORAL at 10:48

## 2018-07-10 RX ADMIN — ISOSORBIDE MONONITRATE SCH MG: 60 TABLET, EXTENDED RELEASE ORAL at 10:48

## 2018-07-10 RX ADMIN — INSULIN ASPART SCH UNIT: 100 INJECTION, SOLUTION INTRAVENOUS; SUBCUTANEOUS at 18:02

## 2018-07-10 RX ADMIN — FUROSEMIDE SCH MG: 10 INJECTION, SOLUTION INTRAMUSCULAR; INTRAVENOUS at 20:44

## 2018-07-10 RX ADMIN — NIFEDIPINE SCH MG: 30 TABLET, FILM COATED, EXTENDED RELEASE ORAL at 10:48

## 2018-07-10 RX ADMIN — INSULIN DETEMIR SCH UNIT: 100 INJECTION, SOLUTION SUBCUTANEOUS at 08:07

## 2018-07-10 RX ADMIN — IPRATROPIUM BROMIDE AND ALBUTEROL SULFATE SCH: .5; 3 SOLUTION RESPIRATORY (INHALATION) at 09:16

## 2018-07-10 RX ADMIN — IPRATROPIUM BROMIDE AND ALBUTEROL SULFATE SCH ML: .5; 3 SOLUTION RESPIRATORY (INHALATION) at 11:55

## 2018-07-10 RX ADMIN — GABAPENTIN SCH MG: 100 CAPSULE ORAL at 18:01

## 2018-07-10 RX ADMIN — CARVEDILOL SCH MG: 12.5 TABLET, FILM COATED ORAL at 18:01

## 2018-07-10 RX ADMIN — FUROSEMIDE SCH MG: 10 INJECTION, SOLUTION INTRAMUSCULAR; INTRAVENOUS at 10:48

## 2018-07-10 RX ADMIN — HEPARIN SODIUM SCH UNIT: 5000 INJECTION, SOLUTION INTRAVENOUS; SUBCUTANEOUS at 18:01

## 2018-07-10 RX ADMIN — HEPARIN SODIUM SCH UNIT: 5000 INJECTION, SOLUTION INTRAVENOUS; SUBCUTANEOUS at 08:07

## 2018-07-10 RX ADMIN — GABAPENTIN SCH MG: 100 CAPSULE ORAL at 20:44

## 2018-07-10 RX ADMIN — IPRATROPIUM BROMIDE AND ALBUTEROL SULFATE SCH ML: .5; 3 SOLUTION RESPIRATORY (INHALATION) at 19:08

## 2018-07-10 RX ADMIN — GABAPENTIN SCH MG: 100 CAPSULE ORAL at 10:48

## 2018-07-10 RX ADMIN — AMOXICILLIN AND CLAVULANATE POTASSIUM SCH EACH: 500; 125 TABLET, FILM COATED ORAL at 13:36

## 2018-07-10 RX ADMIN — HYDROCODONE BITARTRATE AND ACETAMINOPHEN PRN EACH: 7.5; 325 TABLET ORAL at 10:55

## 2018-07-10 RX ADMIN — CEFAZOLIN SCH MLS/HR: 330 INJECTION, POWDER, FOR SOLUTION INTRAMUSCULAR; INTRAVENOUS at 00:30

## 2018-07-10 RX ADMIN — INSULIN ASPART SCH UNIT: 100 INJECTION, SOLUTION INTRAVENOUS; SUBCUTANEOUS at 20:43

## 2018-07-10 RX ADMIN — HEPARIN SODIUM SCH UNIT: 5000 INJECTION, SOLUTION INTRAVENOUS; SUBCUTANEOUS at 00:30

## 2018-07-10 RX ADMIN — INSULIN ASPART SCH UNIT: 100 INJECTION, SOLUTION INTRAVENOUS; SUBCUTANEOUS at 12:36

## 2018-07-10 RX ADMIN — ASPIRIN SCH: 325 TABLET ORAL at 10:53

## 2018-07-10 RX ADMIN — INSULIN ASPART SCH UNIT: 100 INJECTION, SOLUTION INTRAVENOUS; SUBCUTANEOUS at 08:07

## 2018-07-10 NOTE — P.PN
Subjective


Progress Note Date: 07/10/18


Principal diagnosis: 


chest pain





patient is a 73-year-old -American male with a past medical history 

ofcoronary artery disease, systolic congestive heart failure, prior pancreatitis

, multiple other medical comorbidities who presented to the emergency 

department with complaints of abdominal and chest pain.  In the emergency 

department he was found to have a potassium of 5.9, low magnesium, and an 

elevated troponin of 0.38.  He was admitted for further monitoring of his acute 

hyperkalemia, elevated troponin, and abdominal pain.he was started on IV fluids 

and nothing by mouth.  He was seen by GI and diagnosed with acute pancreatitis 

secondary to alcoholic hepatitis.  He was seen by cardiology and his elevated 

troponins were deemed likely secondary to renal insufficiency.  He was 

diagnosed with diastolic congestive heart failure with acute exacerbation and 

had been on Lasix.  He was seen by nephrology and diagnosed with a I likely 

secondary to ATN and hypercalcemia.  He had been taken off of his angiotensin 

receptor blocker.  He was started on IV Lasix.  His IV Lasix did not help with 

diuresis and he was ultimately started on a Lasix drip.  His diet was slowly 

advanced for pancreatitis.  He did start spiking fever and was seen by 

infectious disease on 11/3.  He was started on Zosyn and a repeat CT abdomen 

and pelvis was ordered.  CT abdomen and pelvis showed an infiltrate at the left 

lung base similar to old exam but no acute abdominal abnormality.  One blood 

culture came back with staph epidermidis and was determined to be a likely 

contaminant.  Chest x-ray and urinalysis were negative.  His fevers were 

thought to be secondary to his acute pancreatitis.  He continued to have 

worsening fluid overload as well as worsening renal function despite 

adjustments in his IV Lasix.  He was subsequently started on hemodialysis on 

July 5 after a HD cath was placed in the right groin.  His fevers abated.  They 

attempted to hold dialysis and his creatinine increased again. Plans are for HD 

today and tomorrow. 





Patient seen and examined at bedside while undergoing HD. He is in better 

spirits today. His shortness of breath is improved. His nausea is resolved. No 

chest pain. Edema slightly better.  





Objective





- Vital Signs


Vital signs: 


 Vital Signs











Temp  98.5 F   07/10/18 07:00


 


Pulse  67   07/10/18 07:00


 


Resp  14   07/10/18 07:00


 


BP  181/56   07/10/18 07:00


 


Pulse Ox  92 L  07/10/18 07:00








 Intake & Output











 07/09/18 07/10/18 07/10/18





 18:59 06:59 18:59


 


Intake Total 555 70 


 


Output Total 50 50 


 


Balance 505 20 


 


Weight 107.8 kg 0 g 


 


Intake:   


 


  IV 80 70 


 


    Sodium Chloride 0.9% 1, 80 70 





    000 ml @ 10 mls/hr IV .   





    Q24H [facility] Rx#:[id number]   


 


  Oral 475  


 


Output:   


 


  Urine 50 50 


 


    Uretheral (Castro) 50  


 


Other:   


 


  Voiding Method  Indwelling Catheter 


 


  # Bowel Movements  2 














- Exam


General: non toxic, no distress, appears at stated age


Derm: warm, dry


Head: atraumatic, normocephalic, symmetric


Eyes: EOMI, no lid lag, anicteric sclera


Mouth: no lip lesion, mucus membranes moist


Cardiovascular: S1S2 reg, no murmur, positive posterior tibial pulse bilateral, 


Lungs: Decreased breath sounds bilateral bases, no rhonchi, no rales , no 

accessory muscle use


Abdominal: soft,  nontender to palpation, no guarding, no appreciable 

organomegaly


Ext: no gross muscle atrophy, diffuse anasarca, no contractures


Neuro:  CN II-XI grossly intact, no focal neuro deficits


Psych: Alert, oriented, appropriate affect 











- Labs


CBC & Chem 7: 


 07/09/18 07:07





 07/09/18 07:07


Labs: 


 Abnormal Lab Results - Last 24 Hours (Table)











  07/09/18 07/09/18 07/09/18 Range/Units





  11:23 17:12 20:13 


 


POC Glucose (mg/dL)  134 H  223 H  423 H  (75-99)  mg/dL














  07/10/18 Range/Units





  06:57 


 


POC Glucose (mg/dL)  142 H  (75-99)  mg/dL








 Microbiology - Last 24 Hours (Table)











 07/04/18 16:30 Blood Culture - Preliminary





 Blood    No Growth after 120 hours














Assessment and Plan


Assessment: 


Acute kidney injury on chronic kidney disease likely now will need hemodialysis


-Nephrology recommendations


-Per nursing plan is hemodialysis today and tomorrow and then chronically on 

Monday/Wednesday/Friday


-Has HD cath in place 


-Continue off Cozaar, Aldactone





Acute exacerbation of diastolic congestive heart failure, ejection fraction 55-

60% with moderate pulmonary hypertension


-Continue with Coreg


-No ACE inhibitor secondary to acute kidney injury


-Fluid control with dialysis and IV Lasix





Hypertensive urgency with hypertensive emergency on admission


-Continue with Coreg, hydralazine, procardia, and Imdur. Better control 

overnight on 7/9.


-Follow blood pressures





Diabetes mellitus type 2


- add novolog 2 units to lunch and 4 units to dinner 


-Continue with Levemir 35 units daily and sliding scale





Febrile event


-Likely intra-abdominal source


-ID recommendations appreciated


-Currently on Zosyn day #8, transition to a short course of augmentin on 

discharge per ID





Hepatitis C 


- currently on [facility] has not been receiving this hospital stay as did not 

bring in from home. 





Chronic/resolved


HLD


Asthma


COPD





Acute pancreatitis, resolved


Elevated troponin, flat


Contaminated blood culture with staph epidermidis


Hyperkalemia, resolved





DVT prophylaxis: Heparin


Discussed with: Patient, nursing, case management


Anticipated discharge: 7/12


Anticipated discharge place: [facility]


A total of 25 minutes was spent on the care of this complex patient more than 50

% of the time was spent in counseling and care coordination.

## 2018-07-10 NOTE — PN
PROGRESS NOTE



The patient is seen for followup of acute kidney injury.  He is currently maintained on

hemodialysis.  He had treatment this morning and there are plans for discharge to

nursing home with continued dialysis as outpatient for acute kidney injury.  The

patient will be maintained on a Monday, Wednesday, Friday schedule.



PHYSICAL EXAMINATION:

On examination today, blood pressure was 181/56. Heart rate 92 per minute.  He is

afebrile.  Examination of the heart S1, S2.  Examination of the lungs:  Bilateral

breath sounds are heard.  Decrease breath sound at the bases.  Abdomen is soft, obese,

nontender.  Exam of the lower extremities shows much decreased edema, however, about 2+

bilaterally upper and lower extremities.



LABS:

Not available from today.



ASSESSMENT:

1. Acute kidney injury, currently dialysis dependent.  We will continue with

    outpatient hemodialysis and continue to monitor for recovery of renal function.

    Etiology is acute tubular necrosis.

2. Acute pancreatitis with no evidence of gallstones, currently improved.  The patient

    is tolerating oral diet.

3. Volume overload also significantly improved.  Will continue with diuretics and

    maintain good ultrafiltration with hemodialysis.

4. Staphylococcus epidermidis bacteremia, likely contaminant.



PLAN:

Patient can be discharged from nephrology standpoint with continued dialysis as

outpatient.  We will continue to monitor for recovery of renal function as outpatient.





MMODL / IJN: [id number] / Job#: [id number]

## 2018-07-10 NOTE — CDI
Last Revision, December 2017





               Documentation Clarification Form



Date: 7/10/18 0910

From: Betsey Fields RN, CCDS

Phone: [phone]

MRN: [id number]

Admit Date: 6/26/2018 6:52:00 AM

Patient Name: Ramsey Lipscomb

Visit Number: [id number]



ATTENTION: The Clinical Documentation Specialists (CDI) and [facility] Coding Staff 
appreciate your assistance in clarifying documentation. Please respond to the 
clarification below the line at the bottom and electronically sign. The CDI & 
[facility] Coding staff will review the response and follow-up if needed. Please note: 
Queries are made part of the Legal Health Record. If you have any questions, 
please contact the author of this message via ITS.



Dr. Isabella Reyes



CKD has been documented and requires further specificity.



History/Risk Factors:

ETOH, Acute pancreatitis, Hepatitis C, "renal Insufficiency", CHF, Home O2, 
chronic anemia, gouty OA



Clinical Indicators:

7/9 Nephrology: "Acute kidney injury, ATN currently with volume overload and 
maintained on dialysis." 

7/10 attending Progress Note: "Acute kidney injury on chronic kidney disease 
likely now will need hem dialysis."

Current BUN: 52/56/58/63/70/87/91

   CR: 2.4/3.13.9/4.3/2.95/3.2/3.81

   GFR: 26/19/14/13/20/18/15

3/19/18 Patients Baseline: BUN/CR/GFR: 24/1.56/44



Treatment:

IVF: 1.5L IVF 0.9% NS

HCO3 IVP

Lasix IVP multiple doses, Lasix Drip @ 10 cc/ht, currently D/C, currently Lasix 
80mg IVP Q 12 hrs



In order to capture the severity of condition, please clarify if the condition 
signifies:



CKD Stage 1 (GFR > 90)

CKD Stage 2 (GFR 60-89)

CKD Stage 3 (GFR 30-59)

CKD Stage 4 (GFR 15-29)

CKD Stage 5 (GFR <15)

ESRD

Other, please specify  

Unable to determine



Please continue to document in your progress notes and discharge summary in 
order to capture severity of illness and risk of mortality. Include clinical 
findings that support your diagnosis.

_____________________________________________________________CKD stage 3______
MTDD

## 2018-07-10 NOTE — PN
PROGRESS NOTE



DATE OF SERVICE:

07/10/2018



REASON FOR FOLLOWUP:

1. Fever, likely abdominal source.

2. Positive blood culture likely contamination.



INTERVAL HISTORY:

The patient is afebrile, has been breathing comfortably.  Denies having any chest pain

or shortness of breath or cough.  No abdominal pain or any diarrhea.



PHYSICAL EXAMINATION:

Blood pressure 181/56, pulse of 57, temperature 98.5.  He is 92% on room air.

General description is an elderly male, lying in bed in no distress.

RESPIRATORY SYSTEM:  Unlabored breathing, clear to auscultation anteriorly.

HEART: S1, S2.  Regular rate.

ABDOMEN: Soft, no tenderness.



LABS:

White count normalized to 9.1.  Blood culture repeat has been negative.



DIAGNOSTIC IMPRESSION AND PLAN:

1. Patient with positive blood cultures, staph-epi, likely contamination.  Follow up

    blood culture has been negative, off the daptomycin.

2. Patient who did have a fever with concern for possible abdominal source for which

    the patient will be continued on the oral Augmentin for about 5 days.

Plan of care was discussed with the admitting physician.





MMODL / IJN: [id number] / Job#: [id number]

## 2018-07-11 LAB
ANION GAP SERPL CALC-SCNC: 12 MMOL/L
BUN SERPL-SCNC: 35 MG/DL (ref 9–20)
CALCIUM SPEC-MCNC: 9 MG/DL (ref 8.4–10.2)
CHLORIDE SERPL-SCNC: 94 MMOL/L (ref 98–107)
CO2 SERPL-SCNC: 26 MMOL/L (ref 22–30)
GLUCOSE BLD-MCNC: 177 MG/DL (ref 75–99)
GLUCOSE BLD-MCNC: 189 MG/DL (ref 75–99)
GLUCOSE BLD-MCNC: 243 MG/DL (ref 75–99)
GLUCOSE BLD-MCNC: 253 MG/DL (ref 75–99)
GLUCOSE SERPL-MCNC: 250 MG/DL (ref 74–99)
POTASSIUM SERPL-SCNC: 4.5 MMOL/L (ref 3.5–5.1)
SODIUM SERPL-SCNC: 132 MMOL/L (ref 137–145)

## 2018-07-11 RX ADMIN — FUROSEMIDE SCH MG: 10 INJECTION, SOLUTION INTRAMUSCULAR; INTRAVENOUS at 07:51

## 2018-07-11 RX ADMIN — HEPARIN SODIUM SCH: 5000 INJECTION, SOLUTION INTRAVENOUS; SUBCUTANEOUS at 05:12

## 2018-07-11 RX ADMIN — AMOXICILLIN AND CLAVULANATE POTASSIUM SCH EACH: 500; 125 TABLET, FILM COATED ORAL at 07:50

## 2018-07-11 RX ADMIN — GABAPENTIN SCH MG: 100 CAPSULE ORAL at 07:51

## 2018-07-11 RX ADMIN — PANTOPRAZOLE SODIUM SCH MG: 40 TABLET, DELAYED RELEASE ORAL at 07:48

## 2018-07-11 RX ADMIN — INSULIN ASPART SCH UNIT: 100 INJECTION, SOLUTION INTRAVENOUS; SUBCUTANEOUS at 12:49

## 2018-07-11 RX ADMIN — CARVEDILOL SCH MG: 12.5 TABLET, FILM COATED ORAL at 17:59

## 2018-07-11 RX ADMIN — HEPARIN SODIUM SCH UNIT: 5000 INJECTION, SOLUTION INTRAVENOUS; SUBCUTANEOUS at 23:32

## 2018-07-11 RX ADMIN — FUROSEMIDE SCH MG: 10 INJECTION, SOLUTION INTRAMUSCULAR; INTRAVENOUS at 22:03

## 2018-07-11 RX ADMIN — INSULIN ASPART SCH UNIT: 100 INJECTION, SOLUTION INTRAVENOUS; SUBCUTANEOUS at 07:48

## 2018-07-11 RX ADMIN — NIFEDIPINE SCH MG: 30 TABLET, FILM COATED, EXTENDED RELEASE ORAL at 08:15

## 2018-07-11 RX ADMIN — INSULIN ASPART SCH UNIT: 100 INJECTION, SOLUTION INTRAVENOUS; SUBCUTANEOUS at 17:56

## 2018-07-11 RX ADMIN — IPRATROPIUM BROMIDE AND ALBUTEROL SULFATE SCH: .5; 3 SOLUTION RESPIRATORY (INHALATION) at 13:21

## 2018-07-11 RX ADMIN — IPRATROPIUM BROMIDE AND ALBUTEROL SULFATE SCH ML: .5; 3 SOLUTION RESPIRATORY (INHALATION) at 07:59

## 2018-07-11 RX ADMIN — INSULIN ASPART SCH UNIT: 100 INJECTION, SOLUTION INTRAVENOUS; SUBCUTANEOUS at 21:35

## 2018-07-11 RX ADMIN — GABAPENTIN SCH MG: 100 CAPSULE ORAL at 21:36

## 2018-07-11 RX ADMIN — GABAPENTIN SCH MG: 100 CAPSULE ORAL at 15:45

## 2018-07-11 RX ADMIN — HEPARIN SODIUM SCH UNIT: 5000 INJECTION, SOLUTION INTRAVENOUS; SUBCUTANEOUS at 07:48

## 2018-07-11 RX ADMIN — CARVEDILOL SCH MG: 12.5 TABLET, FILM COATED ORAL at 07:48

## 2018-07-11 RX ADMIN — IPRATROPIUM BROMIDE AND ALBUTEROL SULFATE SCH ML: .5; 3 SOLUTION RESPIRATORY (INHALATION) at 19:51

## 2018-07-11 RX ADMIN — CEFAZOLIN SCH MLS/HR: 330 INJECTION, POWDER, FOR SOLUTION INTRAMUSCULAR; INTRAVENOUS at 05:10

## 2018-07-11 RX ADMIN — ASPIRIN SCH: 325 TABLET ORAL at 08:12

## 2018-07-11 RX ADMIN — INSULIN ASPART SCH UNIT: 100 INJECTION, SOLUTION INTRAVENOUS; SUBCUTANEOUS at 17:55

## 2018-07-11 RX ADMIN — AMOXICILLIN AND CLAVULANATE POTASSIUM SCH EACH: 500; 125 TABLET, FILM COATED ORAL at 21:34

## 2018-07-11 RX ADMIN — Medication SCH MG: at 08:15

## 2018-07-11 RX ADMIN — HYDROCODONE BITARTRATE AND ACETAMINOPHEN PRN EACH: 7.5; 325 TABLET ORAL at 19:23

## 2018-07-11 RX ADMIN — ISOSORBIDE MONONITRATE SCH MG: 60 TABLET, EXTENDED RELEASE ORAL at 07:52

## 2018-07-11 RX ADMIN — INSULIN ASPART SCH UNIT: 100 INJECTION, SOLUTION INTRAVENOUS; SUBCUTANEOUS at 12:48

## 2018-07-11 RX ADMIN — HEPARIN SODIUM SCH UNIT: 5000 INJECTION, SOLUTION INTRAVENOUS; SUBCUTANEOUS at 15:45

## 2018-07-11 RX ADMIN — INSULIN DETEMIR SCH UNIT: 100 INJECTION, SOLUTION SUBCUTANEOUS at 07:54

## 2018-07-11 NOTE — P.PN
Subjective


Progress Note Date: 07/11/18


Principal diagnosis: 


chest pain





patient is a 73-year-old -American male with a past medical history 

ofcoronary artery disease, systolic congestive heart failure, prior pancreatitis

, multiple other medical comorbidities who presented to the emergency 

department with complaints of abdominal and chest pain.  In the emergency 

department he was found to have a potassium of 5.9, low magnesium, and an 

elevated troponin of 0.38.  He was admitted for further monitoring of his acute 

hyperkalemia, elevated troponin, and abdominal pain.he was started on IV fluids 

and nothing by mouth.  He was seen by GI and diagnosed with acute pancreatitis 

secondary to alcoholic hepatitis.  He was seen by cardiology and his elevated 

troponins were deemed likely secondary to renal insufficiency.  He was 

diagnosed with diastolic congestive heart failure with acute exacerbation and 

had been on Lasix.  He was seen by nephrology and diagnosed with a I likely 

secondary to ATN and hypercalcemia.  He had been taken off of his angiotensin 

receptor blocker.  He was started on IV Lasix.  His IV Lasix did not help with 

diuresis and he was ultimately started on a Lasix drip.  His diet was slowly 

advanced for pancreatitis.  He did start spiking fever and was seen by 

infectious disease on 11/3.  He was started on Zosyn and a repeat CT abdomen 

and pelvis was ordered.  CT abdomen and pelvis showed an infiltrate at the left 

lung base similar to old exam but no acute abdominal abnormality.  One blood 

culture came back with staph epidermidis and was determined to be a likely 

contaminant.  Chest x-ray and urinalysis were negative.  His fevers were 

thought to be secondary to his acute pancreatitis.  He continued to have 

worsening fluid overload as well as worsening renal function despite 

adjustments in his IV Lasix.  He was subsequently started on hemodialysis on 

July 5 after a HD cath was placed in the right groin.  His fevers abated.  They 

attempted to hold dialysis and his creatinine increased again. Underwent HD on 7

/10, 7/11, nad plans are for 7/12. He will also have outpatient HD on m,w,f. 





Patient seen and examined at bedside while undergoing HD. He is worried about 

paying his rent when at rehab. Feeling better. SOB is easing, much less 

swelling. No nausea. No vomiting.   





Objective





- Vital Signs


Vital signs: 


 Vital Signs











Temp  99.0 F   07/11/18 08:14


 


Pulse  72   07/11/18 08:14


 


Resp  20   07/11/18 08:14


 


BP  150/77   07/11/18 08:14


 


Pulse Ox  97   07/11/18 08:14








 Intake & Output











 07/10/18 07/11/18 07/11/18





 18:59 06:59 18:59


 


Intake Total 80 110 


 


Output Total 75  


 


Balance 5 110 


 


Weight  107.6 kg 


 


Intake:   


 


  IV  110 


 


    Sodium Chloride 0.9% 1,  110 





    000 ml @ 10 mls/hr IV .   





    Q24H ANTOLIN Rx#:[id number]   


 


  Lipid 80  


 


    Sodium Chloride 0.9% 1, 80  





    000 ml @ 10 mls/hr IV .   





    Q24H ANTOLIN Rx#:[id number]   


 


Output:   


 


  Urine 75  


 


Other:   


 


  Voiding Method Indwelling Catheter Indwelling Catheter Indwelling Catheter














- Exam


General: non toxic, no distress, appears at stated age


Derm: warm, dry


Head: atraumatic, normocephalic, symmetric


Eyes: EOMI, no lid lag, anicteric sclera


Mouth: no lip lesion, mucus membranes moist


Cardiovascular: S1S2 reg, no murmur, positive posterior tibial pulse bilateral, 


Lungs: CTA b/l, no rhonchi, no rales , no accessory muscle use


Abdominal: soft,  nontender to palpation, no guarding, no appreciable 

organomegaly


Ext: no gross muscle atrophy, 3+ edema, no contractures


Neuro:  CN II-XI grossly intact, no focal neuro deficits


Psych: Alert, oriented, appropriate affect 











- Labs


CBC & Chem 7: 


 07/09/18 07:07





 07/09/18 07:07


Labs: 


 Abnormal Lab Results - Last 24 Hours (Table)











  07/10/18 07/10/18 07/11/18 Range/Units





  16:54 20:02 07:10 


 


POC Glucose (mg/dL)  200 H  181 H  177 H  (75-99)  mg/dL














  07/11/18 Range/Units





  11:25 


 


POC Glucose (mg/dL)  243 H  (75-99)  mg/dL








 Microbiology - Last 24 Hours (Table)











 07/04/18 16:30 Blood Culture - Final





 Blood    No Growth after 144 hours














Assessment and Plan


Assessment: 


Acute kidney injury on chronic kidney disease likely now will need hemodialysis


-Nephrology recommendations


-HD today and then chronically on Monday/Wednesday/Friday


-Has HD cath in place 


-Continue off Cozaar, Aldactone





Acute exacerbation of diastolic congestive heart failure, ejection fraction 55-

60% with moderate pulmonary hypertension


-Continue with Coreg


-No ACE inhibitor secondary to acute kidney injury


-Fluid control with dialysis and IV Lasix. Transition to PO on discharge





Hypertensive urgency with hypertensive emergency on admission


-Continue with Coreg, hydralazine, procardia, and Imdur. Better control 

overnight on 7/9.


-Follow blood pressures





Diabetes mellitus type 2


- Novolog 2 units to lunch and 4 units to dinner 


-Continue with Levemir 35 units daily and sliding scale





Febrile event


-Likely intra-abdominal source


-ID recommendations appreciated


-Currently on Zosyn day #9, transition to a short course of augmentin on 

discharge per ID





Hepatitis C 


[facility] as did not 

bring in from home. 





Chronic/resolved


HLD


Asthma


COPD





Acute pancreatitis, resolved


Elevated troponin, flat


Contaminated blood culture with staph epidermidis


Hyperkalemia, resolved





Hoping to discharge today if possible, awaiting placement





DVT prophylaxis: Heparin


Discussed with: Patient, nursing, case management


Anticipated discharge: 7/12


Anticipated discharge place: [facility]


A total of 25 minutes was spent on the care of this complex patient more than 50

% of the time was spent in counseling and care coordination.

## 2018-07-11 NOTE — PN
PROGRESS NOTE



Patient is seen for followup for acute kidney injury, currently dialysis dependent.

The patient will be dialyzed again today.  He was scheduled for discharged.  However,

there is still some paperwork pending.  The patient will be dialyzed on a Monday,

Wednesday, Friday schedule as outpatient starting Friday.



PHYSICAL EXAMINATION:

On examination today, blood pressure was 150/77, heart rate is 72 per minute.  He is

afebrile.  Examination of the heart S1, S2.  Examination of the lungs bilateral breath

sounds are heard.  Abdomen is soft, nontender, obese.  Examination of lower extremity

shows 2+ edema bilaterally.



LABS:

Not available from today.



ASSESSMENT:

1. Acute kidney injury, currently dialysis dependent.  We will continue to maintain

    patient on hemodialysis which he will continue as outpatient as well.  We will

    continue to monitor for recovery of renal function.  Patient will be dialyzed

    today.

2. Volume overload, currently improving.

3. Acute pancreatitis with no evidence of gallstones possibly related to ETOH,

    currently improved.  Patient is maintained on oral intake, which he is tolerating

    well.

4. History of hyperkalemia, currently improved.

5. Staphylococcus epidermidis bacteremia, likely contaminant.



PLAN:

Hemodialysis today and patient can be discharged once bed is available.  His next

dialysis will be on Friday and this can be done as outpatient if he is discharged.





MMODL / IJN: [id number] / Job#: [id number]

## 2018-07-11 NOTE — P.PN
Progress Note - Text


Progress Note Date: 07/11/18


Hoping to discharge today. Awaiting approval. Formal note or discharge summary 

to follow.

## 2018-07-11 NOTE — PN
PROGRESS NOTE



DATE OF SERVICE:

07/11/2018



REASON FOR FOLLOWUP:

1. Fever, possible abdominal source.

2. Positive blood culture.



INTERVAL HISTORY:

The patient is afebrile. Has been breathing comfortably.  No nausea, vomiting or

diarrhea has been reported by the nursing staff. No new symptoms.



PHYSICAL EXAMINATION:

On examination, blood pressure 150/77 with a pulse of 72, temperature 99.  He is 97% on

4 L nasal cannula.

General description is an elderly male lying in bed in no distress.

RESPIRATORY SYSTEM:  Unlabored breathing, clear to auscultation anteriorly.

HEART: S1, S2.  Regular rate and rhythm.

ABDOMEN: Soft, no tenderness.



LABS:

No new labs have been obtained today.



DIAGNOSTIC IMPRESSION AND PLAN:

1. Patient with positive blood culture, Staphylococcus epidermidis likely

    contamination followup blood culture negative.

2. Patient with fever, possible abdominal source. The patient did have a elevated

    amylase and lipase; however, CT was negative.  Currently on oral Augmentin continue

    for another 4 to 5 days to finish course of therapy. Continue supportive care.





MMODL / IJN: [id number] / Job#: [id number]

## 2018-07-12 LAB
BASOPHILS # BLD AUTO: 0 K/UL (ref 0–0.2)
BASOPHILS NFR BLD AUTO: 0 %
BILIRUB UR QL STRIP.AUTO: (no result)
EOSINOPHIL # BLD AUTO: 0.3 K/UL (ref 0–0.7)
EOSINOPHIL NFR BLD AUTO: 3 %
ERYTHROCYTE [DISTWIDTH] IN BLOOD BY AUTOMATED COUNT: 3.11 M/UL (ref 4.3–5.9)
ERYTHROCYTE [DISTWIDTH] IN BLOOD: 13.5 % (ref 11.5–15.5)
GLUCOSE BLD-MCNC: 162 MG/DL (ref 75–99)
GLUCOSE BLD-MCNC: 197 MG/DL (ref 75–99)
GLUCOSE BLD-MCNC: 239 MG/DL (ref 75–99)
GLUCOSE BLD-MCNC: 253 MG/DL (ref 75–99)
HCT VFR BLD AUTO: 28.6 % (ref 39–53)
HGB BLD-MCNC: 9 GM/DL (ref 13–17.5)
HYALINE CASTS UR QL AUTO: 76 /LPF (ref 0–2)
LYMPHOCYTES # SPEC AUTO: 1.6 K/UL (ref 1–4.8)
LYMPHOCYTES NFR SPEC AUTO: 15 %
MCH RBC QN AUTO: 29 PG (ref 25–35)
MCHC RBC AUTO-ENTMCNC: 31.5 G/DL (ref 31–37)
MCV RBC AUTO: 92 FL (ref 80–100)
MONOCYTES # BLD AUTO: 0.6 K/UL (ref 0–1)
MONOCYTES NFR BLD AUTO: 6 %
NEUTROPHILS # BLD AUTO: 7.7 K/UL (ref 1.3–7.7)
NEUTROPHILS NFR BLD AUTO: 74 %
PH UR: 5 [PH] (ref 5–8)
PLATELET # BLD AUTO: 298 K/UL (ref 150–450)
PROT UR QL: (no result)
RBC UR QL: 60 /HPF (ref 0–5)
SP GR UR: 1.02 (ref 1–1.03)
SQUAMOUS UR QL AUTO: 38 /HPF (ref 0–4)
UROBILINOGEN UR QL STRIP: 4 MG/DL (ref ?–2)
WBC # BLD AUTO: 10.4 K/UL (ref 3.8–10.6)
WBC #/AREA URNS HPF: 30 /HPF (ref 0–5)

## 2018-07-12 RX ADMIN — GABAPENTIN SCH MG: 100 CAPSULE ORAL at 16:51

## 2018-07-12 RX ADMIN — IPRATROPIUM BROMIDE AND ALBUTEROL SULFATE SCH ML: .5; 3 SOLUTION RESPIRATORY (INHALATION) at 08:07

## 2018-07-12 RX ADMIN — IPRATROPIUM BROMIDE AND ALBUTEROL SULFATE SCH ML: .5; 3 SOLUTION RESPIRATORY (INHALATION) at 19:28

## 2018-07-12 RX ADMIN — HYDROCODONE BITARTRATE AND ACETAMINOPHEN PRN EACH: 7.5; 325 TABLET ORAL at 11:18

## 2018-07-12 RX ADMIN — GABAPENTIN SCH MG: 100 CAPSULE ORAL at 07:59

## 2018-07-12 RX ADMIN — Medication SCH MG: at 08:00

## 2018-07-12 RX ADMIN — HEPARIN SODIUM SCH UNIT: 5000 INJECTION, SOLUTION INTRAVENOUS; SUBCUTANEOUS at 07:59

## 2018-07-12 RX ADMIN — INSULIN ASPART SCH UNIT: 100 INJECTION, SOLUTION INTRAVENOUS; SUBCUTANEOUS at 17:46

## 2018-07-12 RX ADMIN — ASPIRIN SCH: 325 TABLET ORAL at 08:00

## 2018-07-12 RX ADMIN — CARVEDILOL SCH MG: 12.5 TABLET, FILM COATED ORAL at 07:59

## 2018-07-12 RX ADMIN — FUROSEMIDE SCH MG: 10 INJECTION, SOLUTION INTRAMUSCULAR; INTRAVENOUS at 07:59

## 2018-07-12 RX ADMIN — INSULIN ASPART SCH UNIT: 100 INJECTION, SOLUTION INTRAVENOUS; SUBCUTANEOUS at 12:53

## 2018-07-12 RX ADMIN — CEFAZOLIN SCH: 330 INJECTION, POWDER, FOR SOLUTION INTRAMUSCULAR; INTRAVENOUS at 04:32

## 2018-07-12 RX ADMIN — NIFEDIPINE SCH MG: 30 TABLET, FILM COATED, EXTENDED RELEASE ORAL at 08:00

## 2018-07-12 RX ADMIN — INSULIN ASPART SCH UNIT: 100 INJECTION, SOLUTION INTRAVENOUS; SUBCUTANEOUS at 07:57

## 2018-07-12 RX ADMIN — PANTOPRAZOLE SODIUM SCH MG: 40 TABLET, DELAYED RELEASE ORAL at 07:59

## 2018-07-12 RX ADMIN — INSULIN ASPART SCH UNIT: 100 INJECTION, SOLUTION INTRAVENOUS; SUBCUTANEOUS at 22:06

## 2018-07-12 RX ADMIN — INSULIN DETEMIR SCH UNIT: 100 INJECTION, SOLUTION SUBCUTANEOUS at 08:01

## 2018-07-12 RX ADMIN — AMOXICILLIN AND CLAVULANATE POTASSIUM SCH EACH: 500; 125 TABLET, FILM COATED ORAL at 07:59

## 2018-07-12 RX ADMIN — FUROSEMIDE SCH MG: 10 INJECTION, SOLUTION INTRAMUSCULAR; INTRAVENOUS at 22:06

## 2018-07-12 RX ADMIN — AMOXICILLIN AND CLAVULANATE POTASSIUM SCH EACH: 500; 125 TABLET, FILM COATED ORAL at 22:07

## 2018-07-12 RX ADMIN — IPRATROPIUM BROMIDE AND ALBUTEROL SULFATE SCH ML: .5; 3 SOLUTION RESPIRATORY (INHALATION) at 13:16

## 2018-07-12 RX ADMIN — INSULIN ASPART SCH UNIT: 100 INJECTION, SOLUTION INTRAVENOUS; SUBCUTANEOUS at 12:52

## 2018-07-12 RX ADMIN — ISOSORBIDE MONONITRATE SCH MG: 60 TABLET, EXTENDED RELEASE ORAL at 08:00

## 2018-07-12 RX ADMIN — CARVEDILOL SCH MG: 12.5 TABLET, FILM COATED ORAL at 16:51

## 2018-07-12 RX ADMIN — GABAPENTIN SCH MG: 100 CAPSULE ORAL at 22:08

## 2018-07-12 RX ADMIN — HEPARIN SODIUM SCH UNIT: 5000 INJECTION, SOLUTION INTRAVENOUS; SUBCUTANEOUS at 16:50

## 2018-07-12 NOTE — P.CONS
History of Present Illness





- Chief Complaint


Medical debility





- History of Present Illness





I had the opportunity to see patient for inpatient rehab consultation guard to 

medical debility.  He was admitted to [facility] June 26 with chest and abdominal 

pain.  I note some alcohol-related problems including pancreatitis and possibly 

hepatitis.  Abdominal pelvic CT 2 demonstrated on atelectasis and 

cardiomegaly.  Chest x-ray with mild CHF and cardiomegaly.  Abdominal 

ultrasound limited due to body habitus.  Pulmonary perfusion low probability.  

PT reports maximal assistance to person for bed mobility standing and 

transfers.  OT reports moderate assistance for upper dressing and total 

assistance for lower dressing, bathing, toileting.  Maximal assistance of 2 

person for mobility.





Previous functional history as elicited from patient: 73-year-old right-handed 

 male who is retired.  Lives alone in [place].  Describes independent 

with own cooking, laundry, driving and standing shower.  Regular doctors Dr. Farley.  Denies tobacco.





Review of Systems





Review of systems: Elicited from chart and exam of patient.


ENT: Denies sneezes or discharge.


Eyes: Denies discharge or photophobia.


Cardiac: Denies chest pain or palpitation.


Pulmonary: Denies cough or shortness of breath.


Gastrointestinal: Mild abdominal discomfort.


Genitourinary: Denies discharge or frequency.


Musculoskeletal: Denies muscle or bone aches.


Neurologic: Quite confused.


Endocrine: Denies shakes or sweats.


Oncology: Denies cancers.


Dermatologic: Denies rash, itching, pruritus.


ALLERGY/immunology: Denies sneezes, rashes.








Past Medical History


Past Medical History: Coronary Artery Disease (CAD), Chest Pain / Angina, Heart 

Failure, COPD, Diabetes Mellitus, Hyperlipidemia, Hypertension, Liver Disease, 

Osteoarthritis (OA)


Additional Past Medical History / Comment(s): Chronic CHF systolic dysfunction 

with EF 20%, murmur, home oxygen, 3/3/18 acute pancreatitis, gout several joints

, gouty arthiritis to R great toe, ddd lumbar region, folliculitis, constipation

, renal insufficiency, chronic anemia, thrombocytopenia, hep c 1-1-14, diabetes 

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 

peripheral neuropathy hands, legs and feet, hemothorax associated with liver bx 

tx with chest tube, cellulitis to lt foot/ankle, pt was born with R leg larger 

than L leg.


Last Myocardial Infarction Date:: 5/2014


History of Any Multi-Drug Resistant Organisms: None Reported


Past Surgical History: Heart Catheterization, Pacemaker


Additional Past Surgical History / Comment(s): PTCA 2011 in [place], CATARACTS 

ROCKY EYES REMOVED.  liver biopsy on 4-7-14,


Past Anesthesia/Blood Transfusion Reactions: No Reported Reaction


Type of Cardiac Device: Permanent Pacemaker


Device Placement Date:: 2014? pt unsure


Smoking Status: Never smoker





- Past Family History


  ** Mother


Family Medical History: Hypertension





  ** Sister(s)


Family Medical History: Cancer





  ** Father


History Unknown: Yes





Medications and Allergies


 Home Medications











 Medication  Instructions  Recorded  Confirmed  Type


 


Nitroglycerin Sl Tabs [Nitrostat] 0.4 mg SUBLINGUAL Q5M PRN 05/18/14 06/26/18 

History


 


Carvedilol 25 mg PO AC-BID 07/14/15 06/26/18 History


 


Docusate [Colace] 100 mg PO HS PRN 11/25/15 06/26/18 History


 


Isosorbide Mononitrate ER [Imdur] 60 mg PO DAILY  tab.er.24h 03/13/18 06/26/18 

Rx


 


Pantoprazole [Protonix] 40 mg PO AC-BRKFST  tablet. 03/13/18 06/26/18 Rx


 


hydrALAZINE HCL [Apresoline] 100 mg PO TID  tab 03/13/18 06/26/18 Rx


 


Atorvastatin [Lipitor] 20 mg PO HS 06/26/18 06/26/18 History


 


Glecaprevir/Pibrentasvir [Mavyret 3 tab PO DAILY 06/26/18 06/26/18 History





100-40 mg Tablet]    


 


Insulin Detemir [Levemir] 35 unit SQ DAILY 06/26/18 06/26/18 History


 


Magnesium Oxide [Mag-Ox] 400 mg PO DAILY 06/26/18 06/26/18 History


 


Amoxic-Pot Clav 500-125 mg 1 each PO BID 5 Days #10 tab 07/11/18  Rx





[Augmentin 500-125 mg]    


 


Furosemide [Lasix] 80 mg PO AC-BID #60 tablet 07/11/18  Rx


 


Gabapentin [Neurontin] 100 mg PO TID  cap 07/11/18  Rx


 


HYDROcodone/APAP 7.5-325MG [Norco 1 tab PO Q6HR PRN #28 tab 07/11/18  Rx





7.5-325]    


 


Insulin Aspart [NovoLOG 0 unit SQ ACHS  vial 07/11/18  Rx





(formulary)]    


 


Insulin Aspart [NovoLOG 2 unit SQ AC-LUNCH #0 vial 07/11/18  Rx





(formulary)]    


 


Insulin Aspart [NovoLOG 4 unit SQ AC-SUPPER  vial 07/11/18  Rx





(formulary)]    


 


NIFEdipine XL [Procardia XL] 30 mg PO DAILY  tab.er.24 07/11/18  Rx


 


Polyethylene Glycol 3350 [Miralax] 17 gm PO DAILY PRN  powd.pack 07/11/18  Rx











 Allergies











Allergy/AdvReac Type Severity Reaction Status Date / Time


 


No Known Allergies Allergy   Verified 06/26/18 07:39














Physical Exam


Vitals: 


 Vital Signs











  Temp Pulse Pulse Pulse Resp BP Pulse Ox


 


 07/11/18 23:40  98.6 F    65  18  139/72  98


 


 07/11/18 20:04   64    16  


 


 07/11/18 19:52   68    16  


 


 07/11/18 19:30  99.1 F   65   17  127/56 


 


 07/11/18 15:00  97.5 F L   63   15  153/63  99


 


 07/11/18 08:14  99.0 F    72  20  150/77  97


 


 07/11/18 08:10   64     


 


 07/11/18 07:59   64     








 Intake and Output











 07/11/18 07/11/18 07/12/18





 14:59 22:59 06:59


 


Intake Total 1040 240 120


 


Output Total   200


 


Balance 1040 240 -80


 


Intake:   


 


  IV   120


 


    Sodium Chloride 0.9% 1,   120





    000 ml @ 10 mls/hr IV .   





    Q24H [facility] Rx#:[id number]   


 


  Intake, IV Titration 800  





  Amount   


 


    Sodium Chloride 0.9% 1, 800  





    000 ml @ 10 mls/hr IV .   





    Q24H [facility] Rx#:[id number]   


 


  Oral 240 240 


 


Output:   


 


  Urine   200


 


Other:   


 


  Voiding Method Indwelling Catheter Indwelling Catheter 


 


  Weight  107.6 kg 














Skin: Good color, texture, turgor.


General: Obese build and slightly uncomfortable appearance.


Head: Normocephalic, atraumatic.


Eyes: Symmetric.  Pupils equal round.


Ears: Symmetric.  Hearing within normal limits.


Mouth: Clear.


Neck: Supple.  Carotid without bruit.


Cardiac: Regular rate and rhythm.


Lungs: Clear anteriorly and posteriorly.


Abdomen: Soft active nontender.  Obese.


Extremities: Normal tone.


Neurological: Mental status: Confused but cooperative.


Cranial nerves: Symmetric facial tone and trapezius.


Motor: Poor movement arms and legs against gravity.


Sensation: Intact throughout.


DTRs: Symmetric and equal throughout.


Mobility: Maximal to total assistance for bed mobility.





Results


CBC & Chem 7: 


 07/09/18 07:07





 07/11/18 15:10


Labs: 


 Abnormal Lab Results - Last 24 Hours (Table)











  07/11/18 07/11/18 07/11/18 Range/Units





  07:10 11:25 15:10 


 


Sodium    132 L  (137-145)  mmol/L


 


Chloride    94 L  ()  mmol/L


 


BUN    35 H  (9-20)  mg/dL


 


Creatinine    3.50 H  (0.66-1.25)  mg/dL


 


Glucose    250 H  (74-99)  mg/dL


 


POC Glucose (mg/dL)  177 H  243 H   (75-99)  mg/dL














  07/11/18 07/11/18 Range/Units





  16:49 20:48 


 


Sodium    (137-145)  mmol/L


 


Chloride    ()  mmol/L


 


BUN    (9-20)  mg/dL


 


Creatinine    (0.66-1.25)  mg/dL


 


Glucose    (74-99)  mg/dL


 


POC Glucose (mg/dL)  253 H  189 H  (75-99)  mg/dL














Assessment and Plan


(1) Abdominal pain


Current Visit: Yes   Status: Acute   Code(s): R10.9 - UNSPECIFIED ABDOMINAL 

PAIN   SNOMED Code(s): 34974813


   





(2) Atypical chest pain


Current Visit: Yes   Status: Acute   Code(s): R07.89 - OTHER CHEST PAIN   

SNOMED Code(s): 145226812


   


Plan: 





Impression:


1.  Medical debility.


2.  Abdominal pain.


3.  Atypical chest pain.


4.  Obese.


5.  Pancreatitis per


6.  Possible alcoholic hepatitis.


7.  Hypertension.


8.  Dyslipidemia.


9.  CHF.


10.  Coronary disease with history angina.


11.  COPD.


12.  Diabetes.


13.  Osteoarthritis.





Comments and plan: PT and OT ongoing.  I have added speech therapy.  Rehab 

prognosis currently guarded as therapies currently done to patient and rather 

than with patient.

## 2018-07-12 NOTE — CDI
Last Revision, December 2017





            Documentation Clarification Form



Date: 7/12/15 1116

From: Betsey Fields RN, CCDS

Phone: [phone]

MRN: [id number]

Admit Date: 6/26/2018 6:52:00 AM

Patient Name: Ramsey Lipscomb

Visit Number: [id number]



ATTENTION: The Clinical Documentation Specialists (CDI) and [facility] Coding Staff 
appreciate your assistance in clarifying documentation. Please respond to the 
clarification below the line at the bottom and electronically sign. The CDI & 
[facility] Coding staff will review the response and follow-up if needed. Please note: 
Queries are made part of the Legal Health Record. If you have any questions, 
please contact the author of this message via ITS.



Dr. Zayas



Documentation and location in medical record included:

History/Risk Factors:

Home Oxygen, chronic anemia, chronic systolic CHF, CKD stage 3, 

Home oxygen: per H&P

Clinical Indicators: 

Vital signs: Temp 98.3, hr 79, rr 18, b/p 202/110, spo2 93% ra

7/11 Attending Lung/Breathing assessment:"Lungs: CTA b/l, no rhonchi, no rales 
, no accessory muscle use."

ABG/CBG:  pH 7.28   pCO2 45   pHCO3    21   Lactate  -5.7

Treatment: 

Breathing tx: Duoneb inh TID

Continuous Pulse ox: per unit protocol

O2: maintained @ 2-4L NC



In your professional opinion, can you please clarify if these findings signify 
one of the following conditions?  



Acuity:

Chronic



Specificity:

Respiratory Failure, further specify (if known):

   With hypercapnia?

   With hypoxia?

Other Diagnosis, please specify 

Unable to determine



Please continue to document in your progress notes and discharge summary in 
order to capture severity of illness and risk of mortality. Include clinical 
findings that support your diagnosis.

________________________________________________________________________________
__________________________



Chronic hypoxic respiratory failure
MTDD

## 2018-07-12 NOTE — PN
PROGRESS NOTE



Patient is seen for followup for acute kidney injury.  Patient remains on dialysis.  He

remains fluid-overloaded.  He was being discharged to a nursing home; however, patient

is currently awaiting insurance approval for antiviral medication at the nursing home.

He has had no urine output.  Patient will be dialyzed again tomorrow.  He remains on IV

Lasix and with an indwelling Castro catheter.



On examination today, blood pressure this morning was 154/73, heart rate of 60 per

minute. Patient is afebrile.

EXAMINATION OF THE HEART: S1, S2.

EXAMINATION OF LUNGS: Bilateral breath sounds are heard.

ABDOMEN:  Soft, non-tender.

Examination of lower extremities shows edema 2+ bilaterally.

CNS exam is grossly intact.



Labs show hemoglobin 9.0 g/dL.



ASSESSMENT:

1. Acute kidney injury, currently dialysis-dependent.  Urine output has been on the

    lower side.  Patient is maintained on IV Lasix.  We can switch to p.o. Lasix at the

    time of discharge.  Patient is a Friday schedule.  Currently he is being considered

    at the [facility] unit.

2. Acute pancreatitis, currently improved.

3. Volume overload.  Continue to maintain UF of about 3 to 3.5 L with hemodialysis.

4. Anemia with no active bleeding noted.  I will start the patient on Aranesp.  Iron

    profile will be checked as well.



PLAN:

Hemodialysis in a.m.  Check iron profile and start Aranesp.  Continue to maintain

patient on dialysis upon discharge.





MMODL / ALMAN: [id number] / Job#: [id number]

## 2018-07-12 NOTE — PN
PROGRESS NOTE



DATE OF SERVICE:

07/12/2018



REASON FOR FOLLOWUP:

Fever.



INTERVAL HISTORY:

The patient was doing okay; however, did have a fever of 100.4 this morning, repeat has

been 100.6.  Patient is breathing comfortably.  Denies having any chest pain or

shortness of breath, no cough.  No abdominal pain.  His Castro catheter was discontinued

this morning.  The patient has not urinated since then.



PHYSICAL EXAMINATION:

Blood pressure 110/64 with a pulse of 62, temperature of 100.6.  He is 94% on 4 L nasal

cannula.

General description is an elderly male, up in the chair in no distress.

RESPIRATORY SYSTEM:  Unlabored breathing, clear to auscultation anteriorly.

HEART: S1, S2.  Regular rate and rhythm.

ABDOMEN:  Soft, no tenderness.

EXTREMITIES: 2+ edema of the feet.



LABS:

BUN of 35, creatinine 3.5.



DIAGNOSTIC IMPRESSION AND PLAN:

Patient with a new fever, low-grade with question of possible Castro catheter

associated.  The Castro has already been discontinued.  Urine culture has been requested

along with blood cultures and the CBC is stable. _____. Keep the patient on Augmentin.

At this point, continue supportive care.





MMODL / IJN: [id number] / Job#: [id number]

## 2018-07-12 NOTE — P.PN
Subjective


Progress Note Date: 07/12/18


Principal diagnosis: 


chest pain





patient is a 73-year-old -American male with a past medical history 

ofcoronary artery disease, systolic congestive heart failure, prior pancreatitis

, multiple other medical comorbidities who presented to the emergency 

department with complaints of abdominal and chest pain.  In the emergency 

department he was found to have a potassium of 5.9, low magnesium, and an 

elevated troponin of 0.38.  He was admitted for further monitoring of his acute 

hyperkalemia, elevated troponin, and abdominal pain.he was started on IV fluids 

and nothing by mouth.  He was seen by GI and diagnosed with acute pancreatitis 

secondary to alcoholic hepatitis.  He was seen by cardiology and his elevated 

troponins were deemed likely secondary to renal insufficiency.  He was 

diagnosed with diastolic congestive heart failure with acute exacerbation and 

had been on Lasix.  He was seen by nephrology and diagnosed with a I likely 

secondary to ATN and hypercalcemia.  He had been taken off of his angiotensin 

receptor blocker.  He was started on IV Lasix.  His IV Lasix did not help with 

diuresis and he was ultimately started on a Lasix drip.  His diet was slowly 

advanced for pancreatitis.  He did start spiking fever and was seen by 

infectious disease on 11/3.  He was started on Zosyn and a repeat CT abdomen 

and pelvis was ordered.  CT abdomen and pelvis showed an infiltrate at the left 

lung base similar to old exam but no acute abdominal abnormality.  One blood 

culture came back with staph epidermidis and was determined to be a likely 

contaminant.  Chest x-ray and urinalysis were negative.  His fevers were 

thought to be secondary to his acute pancreatitis.  He continued to have 

worsening fluid overload as well as worsening renal function despite 

adjustments in his IV Lasix.  He was subsequently started on hemodialysis on 

July 5 after a HD cath was placed in the right groin.  His fevers abated.  They 

attempted to hold dialysis and his creatinine increased again. Underwent HD on 7

/10, 7/11, nad plans are for 7/12. He will also have outpatient HD on m,w,f. 





Patient seen and examined at bedside.  Feeling well today.  No chest pain.  

Shortness of breath is better and swelling is going down.  No nausea or 

vomiting and tolerating diet well.  Feels relieved that his son will help 

facilitate getting his payment to the apartment complex on time.  Aware that we 

are still waiting to find him placement.  





Objective





- Vital Signs


Vital signs: 


 Vital Signs











Temp  99.6 F   07/12/18 13:36


 


Pulse  62   07/12/18 13:36


 


Resp  20   07/12/18 13:36


 


BP  110/64   07/12/18 13:36


 


Pulse Ox  94 L  07/12/18 13:36








 Intake & Output











 07/11/18 07/12/18 07/12/18





 18:59 06:59 18:59


 


Intake Total 1040 360 317


 


Output Total  200 75


 


Balance 1040 160 242


 


Weight  107.6 kg 106.5 kg


 


Intake:   


 


  IV  120 80


 


    Sodium Chloride 0.9% 1,  120 80





    000 ml @ 10 mls/hr IV .   





    Q24H [facility] Rx#:[id number]   


 


  Intake, IV Titration 800  





  Amount   


 


    Sodium Chloride 0.9% 1, 800  





    000 ml @ 10 mls/hr IV .   





    Q24H [facility] Rx#:[id number]   


 


  Oral 240 240 237


 


Output:   


 


  Urine  200 75


 


Other:   


 


  Voiding Method Indwelling Catheter Indwelling Catheter 














- Exam


General: non toxic, no distress, appears at stated age


Derm: warm, dry


Head: atraumatic, normocephalic, symmetric


Eyes: EOMI, no lid lag, anicteric sclera


Mouth: no lip lesion, mucus membranes moist


Cardiovascular: S1S2 reg, no murmur, positive posterior tibial pulse bilateral, 


Lungs: Decreased breath sounds bilateral bases, no rhonchi, no rales , no 

accessory muscle use


Abdominal: soft,  nontender to palpation, no guarding, no appreciable 

organomegaly


Ext: no gross muscle atrophy, 2+ edema, no contractures


Neuro:  CN II-XI grossly intact, no focal neuro deficits


Psych: Alert, oriented, appropriate affect 











- Labs


CBC & Chem 7: 


 07/09/18 07:07





 07/11/18 15:10


Labs: 


 Abnormal Lab Results - Last 24 Hours (Table)











  07/11/18 07/11/18 07/11/18 Range/Units





  15:10 16:49 20:48 


 


Sodium  132 L    (137-145)  mmol/L


 


Chloride  94 L    ()  mmol/L


 


BUN  35 H    (9-20)  mg/dL


 


Creatinine  3.50 H    (0.66-1.25)  mg/dL


 


Glucose  250 H    (74-99)  mg/dL


 


POC Glucose (mg/dL)   253 H  189 H  (75-99)  mg/dL














  07/12/18 07/12/18 Range/Units





  06:52 11:13 


 


Sodium    (137-145)  mmol/L


 


Chloride    ()  mmol/L


 


BUN    (9-20)  mg/dL


 


Creatinine    (0.66-1.25)  mg/dL


 


Glucose    (74-99)  mg/dL


 


POC Glucose (mg/dL)  162 H  239 H  (75-99)  mg/dL














Assessment and Plan


Assessment: 


Acute kidney injury on chronic kidney disease likely now will need hemodialysis


-Nephrology recommendations


-HD again tomorrow plan is for Monday/Wednesday/Friday


-Has HD cath in place 


-Continue off Cozaar, Aldactone


-Discontinue Castro catheter in anticipation of transitioning patient to rehab





Acute exacerbation of diastolic congestive heart failure, ejection fraction 55-

60% with moderate pulmonary hypertension


-Continue with Coreg


-No ACE inhibitor secondary to acute kidney injury


-Fluid control with dialysis and IV Lasix. Transition to PO on discharge





HTN, controlled with hypertensive emergency on admission


-Continue with Coreg, hydralazine, procardia, and Imdur


-Follow blood pressures





Diabetes mellitus type 2


- Novolog 2 units breakfast and lunch and 4 units to dinner 


-Continue with Levemir 35 units daily and sliding scale





Febrile event


-Likely intra-abdominal source


-ID recommendations appreciated


-Currently on Augmentin for 3 more days 





Hepatitis C 


- currently on Mavyret has not been receiving this hospital stay as did not 

bring in from home. 





Chronic/resolved


HLD


Asthma


COPD





Acute pancreatitis, resolved


Elevated troponin, flat


Contaminated blood culture with staph epidermidis


Hyperkalemia, resolved





Hoping to discharge today if possible, awaiting placement





DVT prophylaxis: Heparin


Discussed with: Patient, nursing, case management


Anticipated discharge: 7/13


Anticipated discharge place: SNF


A total of 25 minutes was spent on the care of this complex patient more than 50

% of the time was spent in counseling and care coordination.

## 2018-07-13 LAB
GLUCOSE BLD-MCNC: 165 MG/DL (ref 75–99)
GLUCOSE BLD-MCNC: 169 MG/DL (ref 75–99)
GLUCOSE BLD-MCNC: 192 MG/DL (ref 75–99)
GLUCOSE BLD-MCNC: 197 MG/DL (ref 75–99)

## 2018-07-13 RX ADMIN — AMOXICILLIN AND CLAVULANATE POTASSIUM SCH EACH: 500; 125 TABLET, FILM COATED ORAL at 22:48

## 2018-07-13 RX ADMIN — INSULIN ASPART SCH UNIT: 100 INJECTION, SOLUTION INTRAVENOUS; SUBCUTANEOUS at 17:38

## 2018-07-13 RX ADMIN — NIFEDIPINE SCH MG: 30 TABLET, FILM COATED, EXTENDED RELEASE ORAL at 08:32

## 2018-07-13 RX ADMIN — INSULIN ASPART SCH UNIT: 100 INJECTION, SOLUTION INTRAVENOUS; SUBCUTANEOUS at 22:48

## 2018-07-13 RX ADMIN — CARVEDILOL SCH MG: 12.5 TABLET, FILM COATED ORAL at 07:33

## 2018-07-13 RX ADMIN — FUROSEMIDE SCH MG: 10 INJECTION, SOLUTION INTRAMUSCULAR; INTRAVENOUS at 22:48

## 2018-07-13 RX ADMIN — GABAPENTIN SCH MG: 100 CAPSULE ORAL at 16:45

## 2018-07-13 RX ADMIN — CEFAZOLIN SCH MLS/HR: 330 INJECTION, POWDER, FOR SOLUTION INTRAMUSCULAR; INTRAVENOUS at 01:53

## 2018-07-13 RX ADMIN — AMOXICILLIN AND CLAVULANATE POTASSIUM SCH EACH: 500; 125 TABLET, FILM COATED ORAL at 08:31

## 2018-07-13 RX ADMIN — INSULIN ASPART SCH UNIT: 100 INJECTION, SOLUTION INTRAVENOUS; SUBCUTANEOUS at 07:34

## 2018-07-13 RX ADMIN — INSULIN DETEMIR SCH UNIT: 100 INJECTION, SOLUTION SUBCUTANEOUS at 08:32

## 2018-07-13 RX ADMIN — Medication SCH MG: at 08:32

## 2018-07-13 RX ADMIN — INSULIN ASPART SCH UNIT: 100 INJECTION, SOLUTION INTRAVENOUS; SUBCUTANEOUS at 13:04

## 2018-07-13 RX ADMIN — HEPARIN SODIUM SCH UNIT: 5000 INJECTION, SOLUTION INTRAVENOUS; SUBCUTANEOUS at 16:45

## 2018-07-13 RX ADMIN — IPRATROPIUM BROMIDE AND ALBUTEROL SULFATE SCH ML: .5; 3 SOLUTION RESPIRATORY (INHALATION) at 07:42

## 2018-07-13 RX ADMIN — GABAPENTIN SCH MG: 100 CAPSULE ORAL at 08:32

## 2018-07-13 RX ADMIN — ISOSORBIDE MONONITRATE SCH MG: 60 TABLET, EXTENDED RELEASE ORAL at 08:32

## 2018-07-13 RX ADMIN — ASPIRIN SCH: 325 TABLET ORAL at 07:34

## 2018-07-13 RX ADMIN — CARVEDILOL SCH MG: 12.5 TABLET, FILM COATED ORAL at 16:45

## 2018-07-13 RX ADMIN — HEPARIN SODIUM SCH UNIT: 5000 INJECTION, SOLUTION INTRAVENOUS; SUBCUTANEOUS at 01:53

## 2018-07-13 RX ADMIN — GABAPENTIN SCH MG: 100 CAPSULE ORAL at 23:08

## 2018-07-13 RX ADMIN — IPRATROPIUM BROMIDE AND ALBUTEROL SULFATE SCH ML: .5; 3 SOLUTION RESPIRATORY (INHALATION) at 19:16

## 2018-07-13 RX ADMIN — HEPARIN SODIUM SCH UNIT: 5000 INJECTION, SOLUTION INTRAVENOUS; SUBCUTANEOUS at 07:34

## 2018-07-13 RX ADMIN — IPRATROPIUM BROMIDE AND ALBUTEROL SULFATE SCH: .5; 3 SOLUTION RESPIRATORY (INHALATION) at 12:57

## 2018-07-13 RX ADMIN — INSULIN ASPART SCH UNIT: 100 INJECTION, SOLUTION INTRAVENOUS; SUBCUTANEOUS at 07:32

## 2018-07-13 RX ADMIN — PANTOPRAZOLE SODIUM SCH MG: 40 TABLET, DELAYED RELEASE ORAL at 07:34

## 2018-07-13 RX ADMIN — FUROSEMIDE SCH MG: 10 INJECTION, SOLUTION INTRAMUSCULAR; INTRAVENOUS at 08:31

## 2018-07-13 NOTE — PN
PROGRESS NOTE



The patient is seen for followup for acute kidney injury.  He remains hemodialysis-

dependent with no significant urine output.  The patient is awaiting placement at a

nursing home.  He has been accepted at a [facility] unit; however, there are issues with

arrangements for nursing home.



EXAMINATION:

Patient is comfortable.  He denies any significant complaints.  He is currently on a

bedpan.  Blood pressure this morning was 165/75, heart rate of 59 per minute.  Patient

is afebrile.

HEART:  S1, S2.

LUNGS:  Bilateral breath sounds are heard.  Abdomen is soft, distended, nontender.

Lower extremities show edema 2+ bilaterally.  CNS is grossly intact.  Patient is moving

all 4 extremities.



LABS:

Not available from today.



ASSESSMENT:

1. Acute kidney injury, currently hemodialysis-dependent with decreased urine output.

    The patient will need to be maintained on dialysis as outpatient.  He is scheduled

    for hemodialysis today in the hospital.

2. Hepatitis C.

3. Acute pancreatitis with no evidence of gallstones, most likely alcohol related,

    currently improved and tolerating oral intake.

4. Volume overload maintained on IV Lasix and having about 3-4 L of ultrafiltration

    with each hemodialysis treatment.

5. Hypertension, partly volume sensitive.  Blood pressure remains elevated; however,

    it had improved to systolic of 110 and 126 yesterday evening.  Expect improvement

    post dialysis today.



PLAN:

Hemodialysis today.  Next dialysis will be Monday.  Awaiting placement at nursing home.





MMODL / IJN: [id number] / Job#: [id number]

## 2018-07-13 NOTE — P.PN
Subjective


Progress Note Date: 07/13/18


Principal diagnosis: 


chest pain





patient is a 73-year-old -American male with a past medical history 

ofcoronary artery disease, systolic congestive heart failure, prior pancreatitis

, multiple other medical comorbidities who presented to the emergency 

department with complaints of abdominal and chest pain.  In the emergency 

department he was found to have a potassium of 5.9, low magnesium, and an 

elevated troponin of 0.38.  He was admitted for further monitoring of his acute 

hyperkalemia, elevated troponin, and abdominal pain.he was started on IV fluids 

and nothing by mouth.  He was seen by GI and diagnosed with acute pancreatitis 

secondary to alcoholic hepatitis.  He was seen by cardiology and his elevated 

troponins were deemed likely secondary to renal insufficiency.  He was 

diagnosed with diastolic congestive heart failure with acute exacerbation and 

had been on Lasix.  He was seen by nephrology and diagnosed with a I likely 

secondary to ATN and hypercalcemia.  He had been taken off of his angiotensin 

receptor blocker.  He was started on IV Lasix.  His IV Lasix did not help with 

diuresis and he was ultimately started on a Lasix drip.  His diet was slowly 

advanced for pancreatitis.  He did start spiking fever and was seen by 

infectious disease on 11/3.  He was started on Zosyn and a repeat CT abdomen 

and pelvis was ordered.  CT abdomen and pelvis showed an infiltrate at the left 

lung base similar to old exam but no acute abdominal abnormality.  One blood 

culture came back with staph epidermidis and was determined to be a likely 

contaminant.  Chest x-ray and urinalysis were negative.  His fevers were 

thought to be secondary to his acute pancreatitis.  He continued to have 

worsening fluid overload as well as worsening renal function despite 

adjustments in his IV Lasix.  He was subsequently started on hemodialysis on 

July 5 after a HD cath was placed in the right groin.  His fevers abated.  They 

attempted to hold dialysis and his creatinine increased again. Underwent HD on 7 /9, 7/10, 7/11. He will also have outpatient HD on m,w,f. Currently awaiting 

placement. 





Patient seen and examined at bedside.  Doing well today. No complaints 

currently. Breathing is better. No chest pain. Appetite is good. 





Objective





- Vital Signs


Vital signs: 


 Vital Signs











Temp  98 F   07/13/18 15:33


 


Pulse  69   07/13/18 15:33


 


Resp  16   07/13/18 15:33


 


BP  162/69   07/13/18 15:33


 


Pulse Ox  100   07/13/18 15:33








 Intake & Output











 07/12/18 07/13/18 07/13/18





 18:59 06:59 18:59


 


Intake Total 317 201 120


 


Output Total 75 100 


 


Balance 242 101 120


 


Weight 106.5 kg 107.5 kg 107.5 kg


 


Intake:   


 


  IV 80 120 


 


    Sodium Chloride 0.9% 1, 80 120 





    000 ml @ 10 mls/hr IV .   





    Q24H [facility] Rx#:[id number]   


 


  Intake, IV Titration  81 120





  Amount   


 


    Sodium Chloride 0.9% 1,  81 120





    000 ml @ 10 mls/hr IV .   





    Q24H [facility] Rx#:[id number]   


 


  Oral 237  


 


Output:   


 


  Urine 75 100 


 


Other:   


 


  Voiding Method  Urinal Urinal


 


  # Voids   1


 


  # Bowel Movements   1














- Exam


General: non toxic, no distress, appears at stated age


Derm: warm, dry


Head: atraumatic, normocephalic, symmetric


Eyes: EOMI, no lid lag, anicteric sclera


Mouth: no lip lesion, mucus membranes moist


Cardiovascular: S1S2 reg, no murmur, positive posterior tibial pulse bilateral, 


Lungs: CTA bilateral, no rhonchi, no rales , no accessory muscle use


Abdominal: soft,  nontender to palpation, no guarding, no appreciable 

organomegaly


Ext: no gross muscle atrophy, 2+ edema, no contractures


Neuro:  CN II-XI grossly intact, no focal neuro deficits


Psych: Alert, oriented, appropriate affect 











- Labs


CBC & Chem 7: 


 07/12/18 15:52





 07/11/18 15:10


Labs: 


 Abnormal Lab Results - Last 24 Hours (Table)











  07/11/18 07/12/18 07/12/18 Range/Units





  15:10 20:07 21:46 


 


POC Glucose (mg/dL)    197 H  (75-99)  mg/dL


 


Iron  31 L    ()  ug/dL


 


Iron Saturation  12.55 L    (15.00-50.00)   


 


Urine Protein   1+ H   (Negative)  


 


Urine Glucose (UA)   Trace H   (Negative)  


 


Urine Ketones   Trace H   (Negative)  


 


Urine Blood   Small H   (Negative)  


 


Urine Bilirubin   1+ H   (Negative)  


 


Ur Leukocyte Esterase   Large H   (Negative)  


 


Urine RBC   60 H   (0-5)  /hpf


 


Urine WBC   30 H   (0-5)  /hpf


 


Ur Squamous Epith Cells   38 H   (0-4)  /hpf


 


Calcium Oxalate Crystal   Occasional H   (None)  /hpf


 


Urine Bacteria   Rare H   (None)  /hpf


 


Hyaline Casts   76 H   (0-2)  /lpf


 


Urine Mucus   Rare H   (None)  /hpf














  07/13/18 07/13/18 Range/Units





  07:23 11:18 


 


POC Glucose (mg/dL)  169 H  192 H  (75-99)  mg/dL


 


Iron    ()  ug/dL


 


Iron Saturation    (15.00-50.00)   


 


Urine Protein    (Negative)  


 


Urine Glucose (UA)    (Negative)  


 


Urine Ketones    (Negative)  


 


Urine Blood    (Negative)  


 


Urine Bilirubin    (Negative)  


 


Ur Leukocyte Esterase    (Negative)  


 


Urine RBC    (0-5)  /hpf


 


Urine WBC    (0-5)  /hpf


 


Ur Squamous Epith Cells    (0-4)  /hpf


 


Calcium Oxalate Crystal    (None)  /hpf


 


Urine Bacteria    (None)  /hpf


 


Hyaline Casts    (0-2)  /lpf


 


Urine Mucus    (None)  /hpf








 Microbiology - Last 24 Hours (Table)











 07/12/18 20:07 Urine Culture - Preliminary





 Urine,Clean Catch 














Assessment and Plan


Assessment: 


Acute kidney injury on chronic kidney disease currently dialysis dependent 


-Nephrology recommendations


-HD Monday/Wednesday/Friday


-Has HD cath in place 


-Continue off Cozaar, Aldactone





Acute exacerbation of diastolic congestive heart failure, ejection fraction 55-

60% with moderate pulmonary hypertension


-Continue with Coreg


-No ACE inhibitor secondary to acute kidney injury


-Fluid control with dialysis and IV Lasix. Transition to PO on discharge





HTN, controlled with hypertensive emergency on admission


-Continue with Coreg, hydralazine, procardia, and Imdur


-Follow blood pressures





Diabetes mellitus type 2


- Novolog 2 units breakfast and 4 units lunch and dinner 


-Continue with Levemir 35 units daily and sliding scale





Febrile event


-Likely intra-abdominal source


-ID recommendations appreciated


-Currently on Augmentin for 3 more days 





Hepatitis C 


- currently on Mavyret has not been receiving this hospital stay as did not 

bring in from home. 





Chronic/resolved


HLD


Asthma


COPD





Acute pancreatitis, resolved


Elevated troponin, flat


Contaminated blood culture with staph epidermidis


Hyperkalemia, resolved





 awaiting placement. no safe to go home as not ambulatory and not getting out 

of bed. 





DVT prophylaxis: Heparin


Discussed with: Patient, nursing, case management


Anticipated discharge: 7/16


Anticipated discharge place: [facility]


A total of 25 minutes was spent on the care of this complex patient more than 50

% of the time was spent in counseling and care coordination.

## 2018-07-13 NOTE — CDI
Last Revision, December 2017





            Documentation Clarification Form



Date:  7/13/18 1004

From: Betsey Fields RN, CCDS

Phone: [phone]

MRN: [id number]

Admit Date: 6/26/2018 6:52:00 AM

Patient Name: Ramsey Lipscomb

Visit Number: [id number]



ATTENTION: The Clinical Documentation Specialists (CDI) and [facility] Coding Staff 
appreciate your assistance in clarifying documentation. Please respond to the 
clarification below the line at the bottom and electronically sign. The CDI & 
[facility] Coding staff will review the response and follow-up if needed. Please note: 
Queries are made part of the Legal Health Record. If you have any questions, 
please contact the author of this message via ITS.



Dr. Karissa Kwok



You are the only provider that has documented the patient has CKD, please 
provide further specificity if possible.



History/Risk Factors:

6/28 Nephrology Consult: "Previous episode of acute kidney injury in March with 
serum Creatinine as high as 2.1 mg/dL. Previously it had been 3.7 in November of 2016. The patient's renal function improved significantly on his last 
admission."

PMH: CAD, CHF, DM, HTN, chronic liver disease, renal insufficiency, chronic 
anemia, thrombocytopenia

Clinical Indicators:

7/9-7/12 Attending Progress Note: "Acute kidney injury on chronic kidney 
disease likely now will need hemodialysis -Nephrology recommendations."

Current BUN: 52/56/58/63/70/87/62/35

CR:2.4/3.1/3.9/4.3/2.95/3.2/6.26/3.5

GFR: 26/19/14/13/20/18/8/17

3/19/18 Patients Baseline: BUN/CR/GFR: 24/1.56/44

Treatment:

Dopamine drip @ 2.5 mcg/kg /min for renal perfusion

Lasix Gtt @ 10 cc/hr

IVF: 1L Bolus followed by 125 cc/hr decreased to KVO

Hemodialysis



In order to capture the severity of condition, please clarify if the condition 
signifies:



CKD Stage 1 (GFR > 90)

CKD Stage 2 (GFR 60-89)

CKD Stage 3 (GFR 30-59)

CKD Stage 4 (GFR 15-29)

CKD Stage 5 (GFR <15)

ESRD

CKD ruled out

Other, please specify  ___________

Unable to determine



Please continue to document in your progress notes and discharge summary in 
order to capture severity of illness and risk of mortality. Include clinical 
findings that support your diagnosis.

___________________________________________________________________
MTDD

## 2018-07-14 LAB
ERYTHROCYTE [DISTWIDTH] IN BLOOD BY AUTOMATED COUNT: 3.1 M/UL (ref 4.3–5.9)
ERYTHROCYTE [DISTWIDTH] IN BLOOD: 13.8 % (ref 11.5–15.5)
GLUCOSE BLD-MCNC: 148 MG/DL (ref 75–99)
GLUCOSE BLD-MCNC: 158 MG/DL (ref 75–99)
GLUCOSE BLD-MCNC: 161 MG/DL (ref 75–99)
GLUCOSE BLD-MCNC: 314 MG/DL (ref 75–99)
HCT VFR BLD AUTO: 27.6 % (ref 39–53)
HGB BLD-MCNC: 8.8 GM/DL (ref 13–17.5)
MCH RBC QN AUTO: 28.5 PG (ref 25–35)
MCHC RBC AUTO-ENTMCNC: 32 G/DL (ref 31–37)
MCV RBC AUTO: 89 FL (ref 80–100)
PLATELET # BLD AUTO: 308 K/UL (ref 150–450)
WBC # BLD AUTO: 9.9 K/UL (ref 3.8–10.6)

## 2018-07-14 RX ADMIN — FUROSEMIDE SCH MG: 10 INJECTION, SOLUTION INTRAMUSCULAR; INTRAVENOUS at 20:41

## 2018-07-14 RX ADMIN — CEFAZOLIN SCH: 330 INJECTION, POWDER, FOR SOLUTION INTRAMUSCULAR; INTRAVENOUS at 01:09

## 2018-07-14 RX ADMIN — PANTOPRAZOLE SODIUM SCH MG: 40 TABLET, DELAYED RELEASE ORAL at 09:11

## 2018-07-14 RX ADMIN — HEPARIN SODIUM SCH UNIT: 5000 INJECTION, SOLUTION INTRAVENOUS; SUBCUTANEOUS at 00:29

## 2018-07-14 RX ADMIN — INSULIN ASPART SCH UNIT: 100 INJECTION, SOLUTION INTRAVENOUS; SUBCUTANEOUS at 09:08

## 2018-07-14 RX ADMIN — GABAPENTIN SCH MG: 100 CAPSULE ORAL at 20:42

## 2018-07-14 RX ADMIN — GABAPENTIN SCH MG: 100 CAPSULE ORAL at 17:18

## 2018-07-14 RX ADMIN — ISOSORBIDE MONONITRATE SCH MG: 60 TABLET, EXTENDED RELEASE ORAL at 09:13

## 2018-07-14 RX ADMIN — HEPARIN SODIUM SCH UNIT: 5000 INJECTION, SOLUTION INTRAVENOUS; SUBCUTANEOUS at 09:10

## 2018-07-14 RX ADMIN — CARVEDILOL SCH MG: 12.5 TABLET, FILM COATED ORAL at 17:18

## 2018-07-14 RX ADMIN — ACETAMINOPHEN PRN MG: 325 TABLET, FILM COATED ORAL at 21:05

## 2018-07-14 RX ADMIN — IPRATROPIUM BROMIDE AND ALBUTEROL SULFATE SCH ML: .5; 3 SOLUTION RESPIRATORY (INHALATION) at 21:04

## 2018-07-14 RX ADMIN — SODIUM FERRIC GLUCONATE COMPLEX SCH MLS/HR: 12.5 INJECTION INTRAVENOUS at 18:02

## 2018-07-14 RX ADMIN — HEPARIN SODIUM SCH UNIT: 5000 INJECTION, SOLUTION INTRAVENOUS; SUBCUTANEOUS at 17:18

## 2018-07-14 RX ADMIN — GABAPENTIN SCH MG: 100 CAPSULE ORAL at 09:11

## 2018-07-14 RX ADMIN — INSULIN ASPART SCH UNIT: 100 INJECTION, SOLUTION INTRAVENOUS; SUBCUTANEOUS at 17:34

## 2018-07-14 RX ADMIN — INSULIN DETEMIR SCH UNIT: 100 INJECTION, SOLUTION SUBCUTANEOUS at 09:14

## 2018-07-14 RX ADMIN — IPRATROPIUM BROMIDE AND ALBUTEROL SULFATE SCH ML: .5; 3 SOLUTION RESPIRATORY (INHALATION) at 08:10

## 2018-07-14 RX ADMIN — CARVEDILOL SCH MG: 12.5 TABLET, FILM COATED ORAL at 09:09

## 2018-07-14 RX ADMIN — INSULIN ASPART SCH UNIT: 100 INJECTION, SOLUTION INTRAVENOUS; SUBCUTANEOUS at 21:04

## 2018-07-14 RX ADMIN — NIFEDIPINE SCH MG: 30 TABLET, FILM COATED, EXTENDED RELEASE ORAL at 09:13

## 2018-07-14 RX ADMIN — CEFAZOLIN SCH MLS/HR: 330 INJECTION, POWDER, FOR SOLUTION INTRAMUSCULAR; INTRAVENOUS at 20:43

## 2018-07-14 RX ADMIN — INSULIN ASPART SCH UNIT: 100 INJECTION, SOLUTION INTRAVENOUS; SUBCUTANEOUS at 13:14

## 2018-07-14 RX ADMIN — Medication SCH MG: at 09:13

## 2018-07-14 RX ADMIN — INSULIN ASPART SCH UNIT: 100 INJECTION, SOLUTION INTRAVENOUS; SUBCUTANEOUS at 13:15

## 2018-07-14 RX ADMIN — AMOXICILLIN AND CLAVULANATE POTASSIUM SCH EACH: 500; 125 TABLET, FILM COATED ORAL at 09:10

## 2018-07-14 RX ADMIN — AMOXICILLIN AND CLAVULANATE POTASSIUM SCH EACH: 500; 125 TABLET, FILM COATED ORAL at 20:41

## 2018-07-14 RX ADMIN — INSULIN ASPART SCH UNIT: 100 INJECTION, SOLUTION INTRAVENOUS; SUBCUTANEOUS at 09:09

## 2018-07-14 RX ADMIN — FUROSEMIDE SCH MG: 10 INJECTION, SOLUTION INTRAMUSCULAR; INTRAVENOUS at 09:08

## 2018-07-14 RX ADMIN — IPRATROPIUM BROMIDE AND ALBUTEROL SULFATE SCH: .5; 3 SOLUTION RESPIRATORY (INHALATION) at 14:32

## 2018-07-14 RX ADMIN — ASPIRIN SCH: 325 TABLET ORAL at 18:19

## 2018-07-14 NOTE — P.PN
Subjective





Patient is seen in follow-up for acute kidney injury currently hemodialysis 

dependent.  He is maintained on hemodialysis on a Monday Wednesday Friday 

schedule.  Patient's currently being treated for hepatitis C.  Creatinine in 

March 2018 was in the range of 1-1.5.  Patient has been oliguric.  He was also 

noted to have pancreatitis and is now tolerating oral intake.





Vital signs are stable.


General: The patient appeared well nourished and normally developed. 


HEENT: Head exam is unremarkable. Neck is without jugular venous distension.


LUNGS: Lungs are clear to auscultation and percussion. Breath sounds decreased.


HEART: Rate and Rhythm are regular. First and second heart sounds normal. No 

murmurs, rubs or gallops. 


ABDOMEN: Abdominal exam reveals normal bowel sounds. Non-tender and non-

distended. No evidence of peritonitis.


EXTREMITITES: 1+ edema.





Objective





- Vital Signs


Vital signs: 


 Vital Signs











Temp  98.8 F   07/14/18 08:00


 


Pulse  70   07/14/18 08:20


 


Resp  20   07/14/18 08:00


 


BP  161/72   07/14/18 08:00


 


Pulse Ox  99   07/14/18 08:12








 Intake & Output











 07/13/18 07/14/18 07/14/18





 18:59 06:59 18:59


 


Intake Total 120 1205 


 


Balance 120 1205 


 


Weight 107.5 kg 107.5 kg 


 


Intake:   


 


  Intake, IV Titration 120 105 





  Amount   


 


    Sodium Chloride 0.9% 1, 120 105 





    000 ml @ 10 mls/hr IV .   





    Q24H [facility] Rx#:[id number]   


 


  Oral  1100 


 


Other:   


 


  Voiding Method Urinal Urinal 


 


  # Voids 1 1 


 


  # Bowel Movements 1  














- Labs


CBC & Chem 7: 


 07/14/18 07:16





 07/11/18 15:10


Labs: 


 Abnormal Lab Results - Last 24 Hours (Table)











  07/13/18 07/13/18 07/14/18 Range/Units





  17:30 22:37 06:44 


 


RBC     (4.30-5.90)  m/uL


 


Hgb     (13.0-17.5)  gm/dL


 


Hct     (39.0-53.0)  %


 


POC Glucose (mg/dL)  197 H  165 H  161 H  (75-99)  mg/dL














  07/14/18 07/14/18 Range/Units





  07:16 11:46 


 


RBC  3.10 L   (4.30-5.90)  m/uL


 


Hgb  8.8 L   (13.0-17.5)  gm/dL


 


Hct  27.6 L   (39.0-53.0)  %


 


POC Glucose (mg/dL)   314 H  (75-99)  mg/dL








 Microbiology - Last 24 Hours (Table)











 07/12/18 20:07 Urine Culture - Preliminary





 Urine,Clean Catch    Coagulase Negative Staph





    Gram Neg Bacilli


 


 07/12/18 15:52 Blood Culture - Preliminary





 Blood    No Growth after 24 hours














Assessment and Plan


Plan: 





Assessment:


1.  Acute kidney injury currently hemodialysis dependent maintained on a Monday Wednesday Friday schedule.  He is oliguric.  Etiology is ATN.  Creatinine in 

March 2018 was in the range of 1-1.5.


2.  Hepatitis C.


3.  Acute pancreatitis now tolerating oral intake.


4.  Diastolic CHF with moderate pulmonary hypertension.


5.  Volume overload.  Improving with ultrafiltration.


6.  Anemia with iron deficiency noted. 


7.  Benign hypertension.


8.  Insulin-dependent diabetes mellitus.





Plan:


Patient will need an IJ catheter and the groin catheter removed.


Next hemodialysis on Monday.


Ferrlecit 125 mg IV daily for 3 days.  First dose today.


Add Aranesp.


Maintain IV Lasix.


Strict is and os.


Patient being set up for outpatient hemodialysis as well as outpatient nursing 

home placement.

## 2018-07-14 NOTE — P.PN
Subjective


Progress Note Date: 07/14/18


Principal diagnosis: 


chest pain





Patient is a 73-year-old -American male with a past medical history 

ofcoronary artery disease, systolic congestive heart failure, prior pancreatitis

, multiple other medical comorbidities who presented to the emergency 

department with complaints of abdominal and chest pain.  In the emergency 

department he was found to have a potassium of 5.9, low magnesium, and an 

elevated troponin of 0.38.  He was admitted for further monitoring of his acute 

hyperkalemia, elevated troponin, and abdominal pain.he was started on IV fluids 

and nothing by mouth.  He was seen by GI and diagnosed with acute pancreatitis 

secondary to alcoholic hepatitis.  He was seen by cardiology and his elevated 

troponins were deemed likely secondary to renal insufficiency.  He was 

diagnosed with diastolic congestive heart failure with acute exacerbation and 

had been on Lasix.  He was seen by nephrology and diagnosed with a I likely 

secondary to ATN and hypercalcemia.  He had been taken off of his angiotensin 

receptor blocker.  He was started on IV Lasix.  His IV Lasix did not help with 

diuresis and he was ultimately started on a Lasix drip.  His diet was slowly 

advanced for pancreatitis.  He did start spiking fever and was seen by 

infectious disease on 11/3.  He was started on Zosyn and a repeat CT abdomen 

and pelvis was ordered.  CT abdomen and pelvis showed an infiltrate at the left 

lung base similar to old exam but no acute abdominal abnormality.  One blood 

culture came back with staph epidermidis and was determined to be a likely 

contaminant.  Chest x-ray and urinalysis were negative.  His fevers were 

thought to be secondary to his acute pancreatitis.  He continued to have 

worsening fluid overload as well as worsening renal function despite 

adjustments in his IV Lasix.  He was subsequently started on hemodialysis on 

July 5 after a HD cath was placed in the right groin.  His fevers abated.  They 

attempted to hold dialysis and his creatinine increased again. Underwent HD on 7 /9, 7/10, and 7/11. He will also have outpatient HD on m,w,f. Currently 

awaiting placement. 





Patient seen and examined at bedside. Tired today after sitting on the edge of 

the bed. Frustrated with being in the hospital. No chest pain or shortness of 

breath. No pain currently. Denies constipation.  





Objective





- Vital Signs


Vital signs: 


 Vital Signs











Temp  98.8 F   07/14/18 08:00


 


Pulse  70   07/14/18 08:20


 


Resp  20   07/14/18 08:00


 


BP  161/72   07/14/18 08:00


 


Pulse Ox  99   07/14/18 08:12








 Intake & Output











 07/13/18 07/14/18 07/14/18





 18:59 06:59 18:59


 


Intake Total 120 1205 


 


Balance 120 1205 


 


Weight 107.5 kg 107.5 kg 


 


Intake:   


 


  Intake, IV Titration 120 105 





  Amount   


 


    Sodium Chloride 0.9% 1, 120 105 





    000 ml @ 10 mls/hr IV .   





    Q24H [facility] Rx#:[id number]   


 


  Oral  1100 


 


Other:   


 


  Voiding Method Urinal Urinal 


 


  # Voids 1 1 


 


  # Bowel Movements 1  














- Exam


General: non toxic, no distress, appears at stated age


Derm: warm, dry


Head: atraumatic, normocephalic, symmetric


Eyes: EOMI, no lid lag, anicteric sclera


Mouth: no lip lesion, mucus membranes moist


Cardiovascular: S1S2 reg, no murmur, positive posterior tibial pulse bilateral, 


Lungs: decreased bs b/l bases, no rhonchi, no rales , no accessory muscle use


Abdominal: soft,  nontender to palpation, no guarding, no appreciable 

organomegaly


Ext: no gross muscle atrophy, 2+ edema, no contractures


Neuro:  CN II-XI grossly intact, no focal neuro deficits


Psych: Alert, oriented, appropriate affect 











- Labs


CBC & Chem 7: 


 07/14/18 07:16





 07/11/18 15:10


Labs: 


 Abnormal Lab Results - Last 24 Hours (Table)











  07/13/18 07/13/18 07/14/18 Range/Units





  17:30 22:37 06:44 


 


RBC     (4.30-5.90)  m/uL


 


Hgb     (13.0-17.5)  gm/dL


 


Hct     (39.0-53.0)  %


 


POC Glucose (mg/dL)  197 H  165 H  161 H  (75-99)  mg/dL














  07/14/18 07/14/18 Range/Units





  07:16 11:46 


 


RBC  3.10 L   (4.30-5.90)  m/uL


 


Hgb  8.8 L   (13.0-17.5)  gm/dL


 


Hct  27.6 L   (39.0-53.0)  %


 


POC Glucose (mg/dL)   314 H  (75-99)  mg/dL








 Microbiology - Last 24 Hours (Table)











 07/12/18 20:07 Urine Culture - Preliminary





 Urine,Clean Catch    Coagulase Negative Staph





    Gram Neg Bacilli


 


 07/12/18 15:52 Blood Culture - Preliminary





 Blood    No Growth after 24 hours














Assessment and Plan


Assessment: 


Acute kidney injury on chronic kidney disease currently dialysis dependent 


-Nephrology recommendations


-HD Monday/Wednesday/Friday


-Has HD cath in place 


-Continue off Cozaar, Aldactone





Acute exacerbation of diastolic congestive heart failure, ejection fraction 55-

60% with moderate pulmonary hypertension


-Continue with Coreg


-No ACE inhibitor secondary to acute kidney injury


-Fluid control with dialysis and IV Lasix. Transition to PO on discharge





HTN, controlled with hypertensive emergency on admission


-Continue with Coreg, hydralazine, procardia, and Imdur


-Follow blood pressures





Diabetes mellitus type 2


- Novolog 2 units breakfast and 4 units lunch and dinner 


-Continue with Levemir 35 units daily and sliding scale





Febrile event


-Likely intra-abdominal source


-ID recommendations appreciated


-Currently on Augmentin for 2 more days 





Hepatitis C 


- currently on Mavyret has not been receiving this hospital stay as did not 

bring in from home. 





Chronic/resolved


HLD


Asthma


COPD





Acute pancreatitis, resolved


Elevated troponin, flat


Contaminated blood culture with staph epidermidis


Hyperkalemia, resolved





 Awaiting placement. Not safe to go home as not ambulatory and not getting out 

of bed. 





DVT prophylaxis: Heparin


Discussed with: Patient, nursing, case management


Anticipated discharge: 7/16


Anticipated discharge place: [facility]


A total of 25 minutes was spent on the care of this complex patient more than 50

% of the time was spent in counseling and care coordination.

## 2018-07-15 LAB
GLUCOSE BLD-MCNC: 133 MG/DL (ref 75–99)
GLUCOSE BLD-MCNC: 182 MG/DL (ref 75–99)
GLUCOSE BLD-MCNC: 231 MG/DL (ref 75–99)
GLUCOSE BLD-MCNC: 90 MG/DL (ref 75–99)

## 2018-07-15 RX ADMIN — INSULIN ASPART SCH UNIT: 100 INJECTION, SOLUTION INTRAVENOUS; SUBCUTANEOUS at 07:46

## 2018-07-15 RX ADMIN — IPRATROPIUM BROMIDE AND ALBUTEROL SULFATE SCH ML: .5; 3 SOLUTION RESPIRATORY (INHALATION) at 13:54

## 2018-07-15 RX ADMIN — INSULIN ASPART SCH UNIT: 100 INJECTION, SOLUTION INTRAVENOUS; SUBCUTANEOUS at 12:20

## 2018-07-15 RX ADMIN — INSULIN ASPART SCH UNIT: 100 INJECTION, SOLUTION INTRAVENOUS; SUBCUTANEOUS at 07:47

## 2018-07-15 RX ADMIN — ISOSORBIDE MONONITRATE SCH MG: 60 TABLET, EXTENDED RELEASE ORAL at 10:13

## 2018-07-15 RX ADMIN — INSULIN ASPART SCH UNIT: 100 INJECTION, SOLUTION INTRAVENOUS; SUBCUTANEOUS at 21:35

## 2018-07-15 RX ADMIN — GABAPENTIN SCH MG: 100 CAPSULE ORAL at 21:41

## 2018-07-15 RX ADMIN — FUROSEMIDE SCH MG: 10 INJECTION, SOLUTION INTRAMUSCULAR; INTRAVENOUS at 10:12

## 2018-07-15 RX ADMIN — GABAPENTIN SCH MG: 100 CAPSULE ORAL at 16:12

## 2018-07-15 RX ADMIN — INSULIN ASPART SCH: 100 INJECTION, SOLUTION INTRAVENOUS; SUBCUTANEOUS at 18:27

## 2018-07-15 RX ADMIN — CARVEDILOL SCH MG: 12.5 TABLET, FILM COATED ORAL at 17:48

## 2018-07-15 RX ADMIN — ASPIRIN SCH: 325 TABLET ORAL at 10:30

## 2018-07-15 RX ADMIN — INSULIN DETEMIR SCH UNIT: 100 INJECTION, SOLUTION SUBCUTANEOUS at 10:17

## 2018-07-15 RX ADMIN — FUROSEMIDE SCH MG: 10 INJECTION, SOLUTION INTRAMUSCULAR; INTRAVENOUS at 21:41

## 2018-07-15 RX ADMIN — SODIUM FERRIC GLUCONATE COMPLEX SCH MLS/HR: 12.5 INJECTION INTRAVENOUS at 10:44

## 2018-07-15 RX ADMIN — HEPARIN SODIUM SCH UNIT: 5000 INJECTION, SOLUTION INTRAVENOUS; SUBCUTANEOUS at 07:46

## 2018-07-15 RX ADMIN — IPRATROPIUM BROMIDE AND ALBUTEROL SULFATE SCH ML: .5; 3 SOLUTION RESPIRATORY (INHALATION) at 07:18

## 2018-07-15 RX ADMIN — AMOXICILLIN AND CLAVULANATE POTASSIUM SCH EACH: 500; 125 TABLET, FILM COATED ORAL at 22:48

## 2018-07-15 RX ADMIN — AMOXICILLIN AND CLAVULANATE POTASSIUM SCH EACH: 500; 125 TABLET, FILM COATED ORAL at 10:13

## 2018-07-15 RX ADMIN — PANTOPRAZOLE SODIUM SCH MG: 40 TABLET, DELAYED RELEASE ORAL at 07:46

## 2018-07-15 RX ADMIN — HEPARIN SODIUM SCH UNIT: 5000 INJECTION, SOLUTION INTRAVENOUS; SUBCUTANEOUS at 01:12

## 2018-07-15 RX ADMIN — INSULIN ASPART SCH: 100 INJECTION, SOLUTION INTRAVENOUS; SUBCUTANEOUS at 17:21

## 2018-07-15 RX ADMIN — GABAPENTIN SCH MG: 100 CAPSULE ORAL at 10:13

## 2018-07-15 RX ADMIN — CARVEDILOL SCH MG: 12.5 TABLET, FILM COATED ORAL at 07:46

## 2018-07-15 RX ADMIN — HEPARIN SODIUM SCH UNIT: 5000 INJECTION, SOLUTION INTRAVENOUS; SUBCUTANEOUS at 16:12

## 2018-07-15 RX ADMIN — Medication SCH MG: at 10:12

## 2018-07-15 RX ADMIN — IPRATROPIUM BROMIDE AND ALBUTEROL SULFATE SCH: .5; 3 SOLUTION RESPIRATORY (INHALATION) at 21:36

## 2018-07-15 NOTE — P.PN
Subjective





Patient is seen in follow-up for acute kidney injury currently hemodialysis 

dependent.  He is maintained on hemodialysis on a Monday Wednesday Friday 

schedule.  Patient's currently being treated for hepatitis C.  Creatinine in 

March 2018 was in the range of 1-1.5.  Patient has been oliguric.  He was also 

noted to have pancreatitis and is now tolerating oral intake.





Vital signs are stable.


General: The patient appeared well nourished and normally developed. 


HEENT: Head exam is unremarkable. Neck is without jugular venous distension.


LUNGS: Lungs are clear to auscultation and percussion. Breath sounds decreased.


HEART: Rate and Rhythm are regular. First and second heart sounds normal. No 

murmurs, rubs or gallops. 


ABDOMEN: Abdominal exam reveals normal bowel sounds. Non-tender and non-

distended. No evidence of peritonitis.


EXTREMITITES: 1+ edema.





Objective





- Vital Signs


Vital signs: 


 Vital Signs











Temp  98.5 F   07/15/18 08:00


 


Pulse  96   07/15/18 08:00


 


Resp  20   07/15/18 08:00


 


BP  171/75   07/15/18 08:00


 


Pulse Ox  96   07/15/18 01:14








 Intake & Output











 07/14/18 07/15/18 07/15/18





 18:59 06:59 18:59


 


Intake Total  570 237


 


Output Total  200 


 


Balance  370 237


 


Intake:   


 


  Intake, IV Titration  30 





  Amount   


 


    Sodium Chloride 0.9% 1,  30 





    000 ml @ 10 mls/hr IV .   





    Q24H [facility] Rx#:[id number]   


 


  Oral  540 237


 


Output:   


 


  Urine  200 


 


Other:   


 


  Voiding Method  Urinal 


 


  # Voids  1 


 


  # Bowel Movements  1 


 


  # Emeses  0 














- Labs


CBC & Chem 7: 


 07/14/18 07:16





 07/11/18 15:10


Labs: 


 Abnormal Lab Results - Last 24 Hours (Table)











  07/14/18 07/14/18 07/14/18 Range/Units





  11:46 16:54 20:40 


 


POC Glucose (mg/dL)  314 H  148 H  158 H  (75-99)  mg/dL














  07/15/18 07/15/18 Range/Units





  06:52 11:20 


 


POC Glucose (mg/dL)  182 H  231 H  (75-99)  mg/dL








 Microbiology - Last 24 Hours (Table)











 07/12/18 20:07 Urine Culture - Final





 Urine,Clean Catch    Staphylococcus epidermidis





    Escherichia coli


 


 07/12/18 15:52 Blood Culture - Preliminary





 Blood    No Growth after 48 hours














Assessment and Plan


Plan: 





Assessment:


1.  Acute kidney injury currently hemodialysis dependent maintained on a Monday Wednesday Friday schedule.  He is oliguric.  Etiology is ATN.  Creatinine in 

March 2018 was in the range of 1-1.5.


2.  Hepatitis C.


3.  Acute pancreatitis now tolerating oral intake.


4.  Diastolic CHF with moderate pulmonary hypertension.


5.  Volume overload.  Improving with ultrafiltration.


6.  Anemia with iron deficiency noted. 


7.  Benign hypertension.


8.  Insulin-dependent diabetes mellitus.





Plan:


Next hemodialysis on Monday.


Ferrlecit 125 mg IV daily for 3 days.  Second dose today.


Maintain Aranesp.


Maintain IV Lasix.


Strict is and os.


Patient being set up for outpatient hemodialysis as well as outpatient nursing 

home placement.

## 2018-07-15 NOTE — P.PN
Subjective


Progress Note Date: 07/15/18


Principal diagnosis: 


chest pain





Patient is a 73-year-old -American male with a past medical history 

ofcoronary artery disease, systolic congestive heart failure, prior pancreatitis

, multiple other medical comorbidities who presented to the emergency 

department with complaints of abdominal and chest pain.  In the emergency 

department he was found to have a potassium of 5.9, low magnesium, and an 

elevated troponin of 0.38.  He was admitted for further monitoring of his acute 

hyperkalemia, elevated troponin, and abdominal pain.he was started on IV fluids 

and nothing by mouth.  He was seen by GI and diagnosed with acute pancreatitis 

secondary to alcoholic hepatitis.  He was seen by cardiology and his elevated 

troponins were deemed likely secondary to renal insufficiency.  He was 

diagnosed with diastolic congestive heart failure with acute exacerbation and 

had been on Lasix.  He was seen by nephrology and diagnosed with a I likely 

secondary to ATN and hypercalcemia.  He had been taken off of his angiotensin 

receptor blocker.  He was started on IV Lasix.  His IV Lasix did not help with 

diuresis and he was ultimately started on a Lasix drip.  His diet was slowly 

advanced for pancreatitis.  He did start spiking fever and was seen by 

infectious disease on 11/3.  He was started on Zosyn and a repeat CT abdomen 

and pelvis was ordered.  CT abdomen and pelvis showed an infiltrate at the left 

lung base similar to old exam but no acute abdominal abnormality.  One blood 

culture came back with staph epidermidis and was determined to be a likely 

contaminant.  Chest x-ray and urinalysis were negative.  His fevers were 

thought to be secondary to his acute pancreatitis.  He continued to have 

worsening fluid overload as well as worsening renal function despite 

adjustments in his IV Lasix.  He was subsequently started on hemodialysis on 

July 5 after a HD cath was placed in the right groin.  His fevers abated.  They 

attempted to hold dialysis and his creatinine increased again. Underwent HD on 7 /9, 7/10, and 7/11. He will also have outpatient HD on m,w,f. Currently 

awaiting placement. he was transitioned to Augmenting. Her developed one fever 

on 7/12 and UA was check culture whos E coli and stpah epi but possible 

contaminant continue with augment. 





Patient seen and examined at bedside. Overwhelmed and feeling like he is never 

going to get home- long discussion had about plan of care and goal is for rehab 

and then home. Up in chair. No chest pain, breathing is at baseline, good 

appetite and swelling is getting better.   





Objective





- Vital Signs


Vital signs: 


 Vital Signs











Temp  98.9 F   07/15/18 14:13


 


Pulse  68   07/15/18 14:13


 


Resp  16   07/15/18 14:13


 


BP  118/67   07/15/18 14:13


 


Pulse Ox  95   07/15/18 14:13








 Intake & Output











 07/14/18 07/15/18 07/15/18





 18:59 06:59 18:59


 


Intake Total  570 377


 


Output Total  200 


 


Balance  370 377


 


Intake:   


 


  Intake, IV Titration  30 





  Amount   


 


    Sodium Chloride 0.9% 1,  30 





    000 ml @ 10 mls/hr IV .   





    Q24H [facility] Rx#:[id number]   


 


  Oral  540 377


 


Output:   


 


  Urine  200 


 


Other:   


 


  Voiding Method  Urinal 


 


  # Voids  1 


 


  # Bowel Movements  1 


 


  # Emeses  0 














- Exam


General: non toxic, no distress, appears at stated age


Derm: warm, dry


Head: atraumatic, normocephalic, symmetric


Eyes: EOMI, no lid lag, anicteric sclera


Mouth: no lip lesion, mucus membranes moist


Cardiovascular: S1S2 reg, no murmur, positive posterior tibial pulse bilateral, 


Lungs: decreased bs b/l bases, no rhonchi, no rales , no accessory muscle use


Abdominal: soft,  nontender to palpation, no guarding, no appreciable 

organomegaly


Ext: no gross muscle atrophy, 2+ edema, no contractures


Neuro:  CN II-XI grossly intact, no focal neuro deficits


Psych: Alert, oriented, appropriate affect 











- Labs


CBC & Chem 7: 


 07/14/18 07:16





 07/11/18 15:10


Labs: 


 Abnormal Lab Results - Last 24 Hours (Table)











  07/14/18 07/14/18 07/15/18 Range/Units





  16:54 20:40 06:52 


 


POC Glucose (mg/dL)  148 H  158 H  182 H  (75-99)  mg/dL














  07/15/18 Range/Units





  11:20 


 


POC Glucose (mg/dL)  231 H  (75-99)  mg/dL








 Microbiology - Last 24 Hours (Table)











 07/12/18 20:07 Urine Culture - Final





 Urine,Clean Catch    Staphylococcus epidermidis





    Escherichia coli


 


 07/12/18 15:52 Blood Culture - Preliminary





 Blood    No Growth after 48 hours














Assessment and Plan


Assessment: 


Acute kidney injury on chronic kidney disease currently dialysis dependent 


-Nephrology recommendations


-HD Monday/Wednesday/Friday


-Has HD cath in place, d/w Dr. Clark and this is a permanent cath


-Continue off Cozaar, Aldactone


- receiving IV iron 





UA with staph epi and e. coli, catch appears dirty


- please ask ID to re-eval on 7/16


- Augmentin in place 


- check CBC in AM





Acute exacerbation of diastolic congestive heart failure, ejection fraction 55-

60% with moderate pulmonary hypertension


-Continue with Coreg


-No ACE inhibitor secondary to acute kidney injury


-Fluid control with dialysis and IV Lasix. Transition to PO on discharge





Anemia of chronic disease


- IV iron per nephro


- follow CBC





HTN, controlled with hypertensive emergency on admission


-Continue with Coreg, hydralazine, procardia, and Imdur


-Follow blood pressures





Diabetes mellitus type 2


- Novolog 2 units breakfast and 4 units lunch and dinner 


-Continue with Levemir 35 units daily and sliding scale





Febrile event


-Likely intra-abdominal source


-ID recommendations appreciated


-Currently on Augmentin for 1 more day 





Hepatitis C 


- currently on Mavyret has not been receiving this hospital stay as did not 

bring in from home. 





Chronic/resolved


HLD


Asthma


COPD





Acute pancreatitis, resolved


Elevated troponin, flat


Contaminated blood culture with staph epidermidis


Hyperkalemia, resolved





 Awaiting placement. Not safe to go home as not ambulatory and not getting out 

of bed. Need final ID evaluation before discharge 





DVT prophylaxis: Heparin


Discussed with: Patient, nursing, case management


Anticipated discharge: 7/16


Anticipated discharge place: SNF


A total of 25 minutes was spent on the care of this complex patient more than 50

% of the time was spent in counseling and care coordination.

## 2018-07-16 VITALS — TEMPERATURE: 97.6 F | RESPIRATION RATE: 16 BRPM | SYSTOLIC BLOOD PRESSURE: 154 MMHG | DIASTOLIC BLOOD PRESSURE: 84 MMHG

## 2018-07-16 VITALS — HEART RATE: 69 BPM

## 2018-07-16 LAB
ANION GAP SERPL CALC-SCNC: 11 MMOL/L
BUN SERPL-SCNC: 55 MG/DL (ref 9–20)
CALCIUM SPEC-MCNC: 9.1 MG/DL (ref 8.4–10.2)
CHLORIDE SERPL-SCNC: 97 MMOL/L (ref 98–107)
CO2 SERPL-SCNC: 24 MMOL/L (ref 22–30)
ERYTHROCYTE [DISTWIDTH] IN BLOOD BY AUTOMATED COUNT: 2.99 M/UL (ref 4.3–5.9)
ERYTHROCYTE [DISTWIDTH] IN BLOOD: 13.7 % (ref 11.5–15.5)
GLUCOSE BLD-MCNC: 168 MG/DL (ref 75–99)
GLUCOSE BLD-MCNC: 230 MG/DL (ref 75–99)
GLUCOSE SERPL-MCNC: 159 MG/DL (ref 74–99)
HCT VFR BLD AUTO: 26.9 % (ref 39–53)
HGB BLD-MCNC: 8.4 GM/DL (ref 13–17.5)
MCH RBC QN AUTO: 28.2 PG (ref 25–35)
MCHC RBC AUTO-ENTMCNC: 31.2 G/DL (ref 31–37)
MCV RBC AUTO: 90.2 FL (ref 80–100)
PLATELET # BLD AUTO: 257 K/UL (ref 150–450)
POTASSIUM SERPL-SCNC: 6 MMOL/L (ref 3.5–5.1)
SODIUM SERPL-SCNC: 132 MMOL/L (ref 137–145)
WBC # BLD AUTO: 9.7 K/UL (ref 3.8–10.6)

## 2018-07-16 RX ADMIN — IPRATROPIUM BROMIDE AND ALBUTEROL SULFATE SCH ML: .5; 3 SOLUTION RESPIRATORY (INHALATION) at 13:40

## 2018-07-16 RX ADMIN — SODIUM FERRIC GLUCONATE COMPLEX SCH: 12.5 INJECTION INTRAVENOUS at 11:22

## 2018-07-16 RX ADMIN — ISOSORBIDE MONONITRATE SCH: 60 TABLET, EXTENDED RELEASE ORAL at 11:21

## 2018-07-16 RX ADMIN — HEPARIN SODIUM SCH UNIT: 5000 INJECTION, SOLUTION INTRAVENOUS; SUBCUTANEOUS at 00:32

## 2018-07-16 RX ADMIN — INSULIN ASPART SCH UNIT: 100 INJECTION, SOLUTION INTRAVENOUS; SUBCUTANEOUS at 09:11

## 2018-07-16 RX ADMIN — IPRATROPIUM BROMIDE AND ALBUTEROL SULFATE SCH ML: .5; 3 SOLUTION RESPIRATORY (INHALATION) at 07:11

## 2018-07-16 RX ADMIN — Medication SCH: at 11:22

## 2018-07-16 RX ADMIN — FUROSEMIDE SCH: 10 INJECTION, SOLUTION INTRAMUSCULAR; INTRAVENOUS at 11:20

## 2018-07-16 RX ADMIN — AMOXICILLIN AND CLAVULANATE POTASSIUM SCH: 500; 125 TABLET, FILM COATED ORAL at 11:20

## 2018-07-16 RX ADMIN — CEFAZOLIN SCH MLS/HR: 330 INJECTION, POWDER, FOR SOLUTION INTRAMUSCULAR; INTRAVENOUS at 00:32

## 2018-07-16 RX ADMIN — PANTOPRAZOLE SODIUM SCH: 40 TABLET, DELAYED RELEASE ORAL at 11:20

## 2018-07-16 RX ADMIN — ASPIRIN SCH: 325 TABLET ORAL at 11:21

## 2018-07-16 RX ADMIN — GABAPENTIN SCH: 100 CAPSULE ORAL at 11:20

## 2018-07-16 RX ADMIN — INSULIN ASPART SCH: 100 INJECTION, SOLUTION INTRAVENOUS; SUBCUTANEOUS at 11:19

## 2018-07-16 RX ADMIN — HEPARIN SODIUM SCH: 5000 INJECTION, SOLUTION INTRAVENOUS; SUBCUTANEOUS at 11:20

## 2018-07-16 RX ADMIN — INSULIN ASPART SCH UNIT: 100 INJECTION, SOLUTION INTRAVENOUS; SUBCUTANEOUS at 13:08

## 2018-07-16 RX ADMIN — INSULIN ASPART SCH UNIT: 100 INJECTION, SOLUTION INTRAVENOUS; SUBCUTANEOUS at 13:07

## 2018-07-16 RX ADMIN — CARVEDILOL SCH: 12.5 TABLET, FILM COATED ORAL at 11:19

## 2018-07-16 NOTE — PN
PROGRESS NOTE



DATE OF SERVICE:

07/16/2018.



REASON FOR FOLLOWUP:

Leukocytosis, UTI.



INTERVAL HISTORY:

The patient was seen on rounds this morning.  The patient has been afebrile.  He was

breathing comfortably.  He denied having any chest pain or cough.  No abdominal pain or

any diarrhea.



PHYSICAL EXAMINATION:

Blood pressure 154/84 with a pulse of 66, temperature 97.6.  He is 95% on room air.

General description is an elderly male lying in bed in no distress.

RESPIRATORY SYSTEM: Unlabored breathing. Clear to auscultation anteriorly.

HEART: S1, S2.  Regular rate and rhythm.

ABDOMEN: Soft. No tenderness.



LABS:

Hemoglobin 10.4, white count 9.7 with a BUN of 55, creatinine 5.27.  Repeat urine

showing Staph epi and E coli.



DIAGNOSTIC IMPRESSION AND PLAN:

1. Patient with positive blood culture with Staphylococcus epidermidis. Contamination.

    Has been off antibiotic.

2. Patient with low-grade fever, concern for possible urinary tract infection.

    However, the patient's fever and white count _____ for the same. Antibiotic can be

    safely discontinued.  Continue with supportive care.





MMODL / IJN: [id number] / Job#: [id number]

## 2018-07-16 NOTE — P.DS
Providers


Date of admission: 


06/26/18 06:52





Attending physician: 


Santi Braga MD





Consults: 





 





06/26/18 06:49


Consult Physician Routine 


   Consulting Provider: Gladis Cade


   Consult Reason/Comments: cp


   Do you want consulting provider notified?: Yes





06/26/18 06:50


Consult Physician Routine 


   Consulting Provider: Jean Paul Bowens


   Consult Reason/Comments: iv access


   Do you want consulting provider notified?: Yes





06/27/18 11:58


Consult Physician Routine 


   Consulting Provider: Isabella Reyes


   Consult Reason/Comments: elevated bun and creatinine


   Do you want consulting provider notified?: Yes





07/02/18 11:56


Consult Physician Routine 


   Consulting Provider: Rg Mane


   Consult Reason/Comments: fever


   Do you want consulting provider notified?: Yes





07/05/18 11:21


Consult Physician Routine 


   Consulting Provider: Gerald Bowens


   Consult Reason/Comments: Dialysis Catheter Placement


   Do you want consulting provider notified?: Yes





07/11/18 15:13


Consult Physician Routine 


   Consulting Provider: Nikos Jacinto


   Consult Reason/Comments: inpatient rehab evaluation


   Do you want consulting provider notified?: Yes











Primary care physician: 


Physician Nonstaff








- Discharge Diagnosis(es)


(1) ESRD (end stage renal disease) on dialysis


Status: Acute   





(2) Acute pancreatitis


Status: Acute   





(3) Acute on chronic diastolic (congestive) heart failure


Status: Acute   





(4) Type 2 diabetes mellitus with peripheral neuropathy


Status: Acute   





(5) Hypertensive emergency


Status: Acute   





(6) Hyperkalemia


Status: Acute   





(7) Elevated troponin


Status: Acute   


Hospital Course: 





Patient is a 73-year-old -American male with a past medical history 

ofcoronary artery disease, systolic congestive heart failure, prior pancreatitis

, multiple other medical comorbidities who presented to the emergency 

department with complaints of abdominal and chest pain.  In the emergency 

department he was found to have a potassium of 5.9, low magnesium, and an 

elevated troponin of 0.38.  He was admitted for further monitoring of his acute 

hyperkalemia, elevated troponin, and abdominal pain.he was started on IV fluids 

and nothing by mouth.  He was seen by GI and diagnosed with acute pancreatitis 

secondary to alcoholic hepatitis.  He was seen by cardiology and his elevated 

troponins were deemed likely secondary to renal insufficiency.  He was 

diagnosed with diastolic congestive heart failure with acute exacerbation and 

had been on Lasix.  He was seen by nephrology and diagnosed with a I likely 

secondary to ATN and hypercalcemia.  He had been taken off of his angiotensin 

receptor blocker.  He was started on IV Lasix.  His IV Lasix did not help with 

diuresis and he was ultimately started on a Lasix drip.  His diet was slowly 

advanced for pancreatitis.  He did start spiking fever and was seen by 

infectious disease on 11/3.  He was started on Zosyn and a repeat CT abdomen 

and pelvis was ordered.  CT abdomen and pelvis showed an infiltrate at the left 

lung base similar to old exam but no acute abdominal abnormality.  One blood 

culture came back with staph epidermidis and was determined to be a likely 

contaminant.  Chest x-ray and urinalysis were negative.  His fevers were 

thought to be secondary to his acute pancreatitis.  He continued to have 

worsening fluid overload as well as worsening renal function despite 

adjustments in his IV Lasix.  He was subsequently started on hemodialysis on 

July 5 after a HD cath was placed in the right groin.  His fevers abated.  They 

attempted to hold dialysis and his creatinine increased again. Underwent HD on 7 /9, 7/10, and 7/11. He will also have outpatient HD on m,w,f. Currently 

awaiting placement. he was transitioned to Augmentin. Her developed one fever 

on 7/12 and UA was check culture whos E coli and stpah epi but possible 

contaminant continue with augmentin.  He was subsequently discharged tomorrow 

in stable condition.  With plans to have dialysis Monday Wednesday Friday.  

This discharge summary took approximately 35 minutes


Patient Condition at Discharge: Good





Plan - Discharge Summary


Discharge Rx Participant: No


New Discharge Prescriptions: 


New


   Insulin Aspart [NovoLOG (formulary)] 2 unit SQ AC-LUNCH #0 vial


   Polyethylene Glycol 3350 [Miralax] 17 gm PO DAILY PRN  powd.pack


     PRN Reason: Constipation





Continue


   Nitroglycerin Sl Tabs [Nitrostat] 0.4 mg SUBLINGUAL Q5M PRN


     PRN Reason: Angina


   Carvedilol 25 mg PO BID@0800,1700


   Docusate [Colace] 100 mg PO HS PRN


     PRN Reason: Constipation


   Pantoprazole [Protonix] 40 mg PO AC-BRKFST  tablet.


   Magnesium Oxide [Mag-Ox] 400 mg PO DAILY@1700


   Atorvastatin [Lipitor] 20 mg PO HS@2100


   Insulin Detemir [Levemir] 35 unit SQ HS@2100


   Glecaprevir/Pibrentasvir [Mavyret 100-40 mg Tablet] 3 tab PO DAILY@0800


   HYDROcodone/APAP 7.5-325MG [Norco 7.5-325] 1 tab PO Q6HR PRN #28 tab


     PRN Reason: Pain





Discontinued


   Spironolactone [Aldactone] 25 mg PO DAILY


   Losartan [Cozaar] 12.5 mg PO DAILY


   INSULIN LISPRO (humaLOG) [humaLOG] See Protocol SQ AC-TID


   Furosemide [Lasix] 40 mg PO BID@0900,1600  tab


   Gabapentin [Neurontin] 400 mg PO TID





No Action


   NIFEdipine XL [Procardia XL] 30 mg PO DAILY@0800


   Na Phos,M-B/Na Phos,Di-Ba [Fleet Adult] 133 ml RECTAL DAILY PRN


     PRN Reason: Constipation


   Magnesium Hydroxide [Milk of Magnesia] 2,400 mg PO DAILY PRN


     PRN Reason: Constipation


   Isosorbide Mononitrate ER [Imdur] 60 mg PO DAILY@0800


   Insulin Aspart [NovoLOG (formulary)] 4 unit SQ AC-SUPPER@1700


   Insulin Aspart [NovoLOG (formulary)] See Protocol SQ ACHS


   hydrALAZINE HCL [Apresoline] 100 mg PO TID@0600,1400,2100


   Gabapentin [Neurontin] 100 mg PO TID@0600,1400,2100


   Furosemide [Lasix] 80 mg PO BID@0600,1400


   Bisacodyl 10 mg RECTAL DAILY PRN


     PRN Reason: Constipation


   Amoxic-Pot Clav 500-125 mg [Augmentin 500-125 mg] 1 tab PO BID@0800,1700


Discharge Medication List





Nitroglycerin Sl Tabs [Nitrostat] 0.4 mg SUBLINGUAL Q5M PRN 05/18/14 [History]


Carvedilol 25 mg PO BID@0800,1700 07/14/15 [History]


Docusate [Colace] 100 mg PO HS PRN 11/25/15 [History]


Pantoprazole [Protonix] 40 mg PO AC-BRKFST  tablet. 03/13/18 [Rx]


Atorvastatin [Lipitor] 20 mg PO HS@2100 06/26/18 [History]


Glecaprevir/Pibrentasvir [Mavyret 100-40 mg Tablet] 3 tab PO DAILY@0800 06/26/ 18 [History]


Insulin Detemir [Levemir] 35 unit SQ HS@2100 06/26/18 [History]


Magnesium Oxide [Mag-Ox] 400 mg PO DAILY@1700 06/26/18 [History]


HYDROcodone/APAP 7.5-325MG [Norco 7.5-325] 1 tab PO Q6HR PRN #28 tab 07/11/18 [

Rx]


Insulin Aspart [NovoLOG (formulary)] 2 unit SQ AC-LUNCH #0 vial 07/11/18 [Rx]


Polyethylene Glycol 3350 [Miralax] 17 gm PO DAILY PRN  powd.pack 07/11/18 [Rx]


Amoxic-Pot Clav 500-125 mg [Augmentin 500-125 mg] 1 tab PO BID@0800,1700 07/17/ 18 [History]


Bisacodyl 10 mg RECTAL DAILY PRN 07/17/18 [History]


Furosemide [Lasix] 80 mg PO BID@0600,1400 07/17/18 [History]


Gabapentin [Neurontin] 100 mg PO TID@0600,1400,2100 07/17/18 [History]


Insulin Aspart [NovoLOG (formulary)] 4 unit SQ AC-SUPPER@1700 07/17/18 [History]


Insulin Aspart [NovoLOG (formulary)] See Protocol SQ ACHS 07/17/18 [History]


Isosorbide Mononitrate ER [Imdur] 60 mg PO DAILY@0800 07/17/18 [History]


Magnesium Hydroxide [Milk of Magnesia] 2,400 mg PO DAILY PRN 07/17/18 [History]


NIFEdipine XL [Procardia XL] 30 mg PO DAILY@0800 07/17/18 [History]


Na Phos,M-B/Na Phos,Di-Ba [Fleet Adult] 133 ml RECTAL DAILY PRN 07/17/18 [

History]


hydrALAZINE HCL [Apresoline] 100 mg PO TID@0600,1400,2100 07/17/18 [History]








Follow up Appointment(s)/Referral(s): 


Isabella Reyes MD [STAFF PHYSICIAN] - 1 Week (office will call [facility] with 

appointment)


David George MD [REFERRING] - 1 Week


Jazmine Soto PAC [REFERRING] - 08/14/18 11:15 am


Activity/Diet/Wound Care/Special Instructions: 


[facility] - hemodialysis chair time: Monday, Wednesday, Friday at 6:15 a.m. 





Carb consistent renal diet


Activity as tolerated





physical and occupational therapy 








 


Discharge Disposition: TRANSFER TO SNF/ECF

## 2018-07-16 NOTE — P.PN
Subjective





Patient is seen in follow-up for acute kidney injury currently hemodialysis 

dependent.  He is maintained on hemodialysis on a Monday Wednesday Friday 

schedule.  Patient's currently being treated for hepatitis C.  Creatinine in 

March 2018 was in the range of 1-1.5.  Patient has been oliguric.  He was also 

noted to have pancreatitis and is now tolerating oral intake.  Currently seen 

was undergoing hemodialysis.





Vital signs are stable.


General: The patient appeared well nourished and normally developed. 


HEENT: Head exam is unremarkable. Neck is without jugular venous distension.


LUNGS: Lungs are clear to auscultation and percussion. Breath sounds decreased.


HEART: Rate and Rhythm are regular. First and second heart sounds normal. No 

murmurs, rubs or gallops. 


ABDOMEN: Abdominal exam reveals normal bowel sounds. Non-tender and non-

distended. No evidence of peritonitis.


EXTREMITITES: 1+ edema.





Objective





- Vital Signs


Vital signs: 


 Vital Signs











Temp  99.3 F   07/16/18 00:40


 


Pulse  88   07/16/18 07:21


 


Resp  20   07/16/18 03:40


 


BP  143/71   07/16/18 00:40


 


Pulse Ox  99   07/16/18 00:40








 Intake & Output











 07/15/18 07/16/18 07/16/18





 18:59 06:59 18:59


 


Intake Total 1014 160 


 


Balance 1014 160 


 


Weight  106.5 kg 


 


Intake:   


 


  IV  160 


 


    Sodium Chloride 0.9% 1,  160 





    000 ml @ 10 mls/hr IV .   





    Q24H [facility] Rx#:[id number]   


 


  Intake, IV Titration 400  





  Amount   


 


    IV Fluid Continuation 700 300  





    ml @ 0 mls/hr IV .STK-   





    [facility] Rx#:[id number]   


 


    Sodium Ferric Gluconat- 100  





    Sucrose 125 mg In Sodium   





    Chloride 0.9% 100 ml @   





    100 mls/hr IVPB DAILY [facility]   





    Rx#:[id number]   


 


  Oral 614  


 


Other:   


 


  Voiding Method  Urinal 














- Labs


CBC & Chem 7: 


 07/16/18 06:46





 07/16/18 06:46


Labs: 


 Abnormal Lab Results - Last 24 Hours (Table)











  07/15/18 07/15/18 07/16/18 Range/Units





  11:20 21:22 06:45 


 


RBC     (4.30-5.90)  m/uL


 


Hgb     (13.0-17.5)  gm/dL


 


Hct     (39.0-53.0)  %


 


Sodium     (137-145)  mmol/L


 


Potassium     (3.5-5.1)  mmol/L


 


Chloride     ()  mmol/L


 


BUN     (9-20)  mg/dL


 


Creatinine     (0.66-1.25)  mg/dL


 


Glucose     (74-99)  mg/dL


 


POC Glucose (mg/dL)  231 H  133 H  168 H  (75-99)  mg/dL


 


Phosphorus     (2.5-4.5)  mg/dL














  07/16/18 07/16/18 Range/Units





  06:46 06:46 


 


RBC   2.99 L  (4.30-5.90)  m/uL


 


Hgb   8.4 L  (13.0-17.5)  gm/dL


 


Hct   26.9 L  (39.0-53.0)  %


 


Sodium  132 L   (137-145)  mmol/L


 


Potassium  6.0 H   (3.5-5.1)  mmol/L


 


Chloride  97 L   ()  mmol/L


 


BUN  55 H   (9-20)  mg/dL


 


Creatinine  5.27 H*   (0.66-1.25)  mg/dL


 


Glucose  159 H   (74-99)  mg/dL


 


POC Glucose (mg/dL)    (75-99)  mg/dL


 


Phosphorus  5.8 H   (2.5-4.5)  mg/dL








 Microbiology - Last 24 Hours (Table)











 07/12/18 15:52 Blood Culture - Preliminary





 Blood    No Growth after 72 hours














Assessment and Plan


Plan: 





Assessment:


1.  Acute kidney injury currently hemodialysis dependent maintained on a Monday Wednesday Friday schedule.  He is oliguric.  Etiology is ATN.  Creatinine in 

March 2018 was in the range of 1-1.5.


2.  Hepatitis C.


3.  Acute pancreatitis now tolerating oral intake.


4.  Diastolic CHF with moderate pulmonary hypertension.


5.  Volume overload.  Improving with ultrafiltration.


6.  Anemia with iron deficiency noted. 


7.  Benign hypertension.


8.  Insulin-dependent diabetes mellitus.


9.  Hyperkalemia secondary to acute kidney injury.  Expect improvement 

postdialysis.


10.  Hyperphosphatemia secondary to acute kidney injury.





Plan:


Currently seen while undergoing hemodialysis.  Next treatment on Wednesday.


Ferrlecit 125 mg IV daily for 3 days.  Third dose today.


Add Renvela with meals.


Maintain Aranesp.


Maintain IV Lasix.


Strict is and os.


Patient being set up for outpatient hemodialysis as well as outpatient nursing 

home placement.

## 2018-07-17 ENCOUNTER — HOSPITAL ENCOUNTER (INPATIENT)
Dept: HOSPITAL 47 - EC | Age: 73
LOS: 4 days | Discharge: NURSING, REHAB OR LONG-TERM CARE | DRG: 682 | End: 2018-07-21
Attending: INTERNAL MEDICINE | Admitting: INTERNAL MEDICINE
Payer: MEDICARE

## 2018-07-17 VITALS — BODY MASS INDEX: 35 KG/M2

## 2018-07-17 DIAGNOSIS — E87.5: ICD-10-CM

## 2018-07-17 DIAGNOSIS — Z96.1: ICD-10-CM

## 2018-07-17 DIAGNOSIS — B19.20: ICD-10-CM

## 2018-07-17 DIAGNOSIS — Z99.2: ICD-10-CM

## 2018-07-17 DIAGNOSIS — Z95.0: ICD-10-CM

## 2018-07-17 DIAGNOSIS — I25.2: ICD-10-CM

## 2018-07-17 DIAGNOSIS — I13.2: ICD-10-CM

## 2018-07-17 DIAGNOSIS — Z98.42: ICD-10-CM

## 2018-07-17 DIAGNOSIS — I25.10: ICD-10-CM

## 2018-07-17 DIAGNOSIS — E78.5: ICD-10-CM

## 2018-07-17 DIAGNOSIS — R77.9: ICD-10-CM

## 2018-07-17 DIAGNOSIS — G93.41: ICD-10-CM

## 2018-07-17 DIAGNOSIS — J44.9: ICD-10-CM

## 2018-07-17 DIAGNOSIS — E11.42: ICD-10-CM

## 2018-07-17 DIAGNOSIS — Z98.41: ICD-10-CM

## 2018-07-17 DIAGNOSIS — Z99.81: ICD-10-CM

## 2018-07-17 DIAGNOSIS — E83.39: ICD-10-CM

## 2018-07-17 DIAGNOSIS — D63.1: ICD-10-CM

## 2018-07-17 DIAGNOSIS — M51.36: ICD-10-CM

## 2018-07-17 DIAGNOSIS — I50.32: ICD-10-CM

## 2018-07-17 DIAGNOSIS — Z82.49: ICD-10-CM

## 2018-07-17 DIAGNOSIS — M10.9: ICD-10-CM

## 2018-07-17 DIAGNOSIS — R32: ICD-10-CM

## 2018-07-17 DIAGNOSIS — N17.9: Primary | ICD-10-CM

## 2018-07-17 DIAGNOSIS — M19.90: ICD-10-CM

## 2018-07-17 DIAGNOSIS — R50.9: ICD-10-CM

## 2018-07-17 DIAGNOSIS — N18.6: ICD-10-CM

## 2018-07-17 DIAGNOSIS — R01.1: ICD-10-CM

## 2018-07-17 DIAGNOSIS — E11.22: ICD-10-CM

## 2018-07-17 DIAGNOSIS — Z80.9: ICD-10-CM

## 2018-07-17 DIAGNOSIS — Z79.899: ICD-10-CM

## 2018-07-17 DIAGNOSIS — Z79.4: ICD-10-CM

## 2018-07-17 LAB
ALBUMIN SERPL-MCNC: 3.1 G/DL (ref 3.5–5)
ALP SERPL-CCNC: 122 U/L (ref 38–126)
ALT SERPL-CCNC: 24 U/L (ref 21–72)
AMMONIA PLAS-SCNC: 14 UMOL/L (ref ?–30)
ANION GAP SERPL CALC-SCNC: 7 MMOL/L
APTT BLD: 27.5 SEC (ref 22–30)
AST SERPL-CCNC: 20 U/L (ref 17–59)
BASOPHILS # BLD AUTO: 0.1 K/UL (ref 0–0.2)
BASOPHILS NFR BLD AUTO: 1 %
BILIRUB UR QL STRIP.AUTO: (no result)
BUN SERPL-SCNC: 40 MG/DL (ref 9–20)
CALCIUM SPEC-MCNC: 9.1 MG/DL (ref 8.4–10.2)
CHLORIDE SERPL-SCNC: 99 MMOL/L (ref 98–107)
CK SERPL-CCNC: 37 U/L (ref 55–170)
CO2 SERPL-SCNC: 28 MMOL/L (ref 22–30)
EOSINOPHIL # BLD AUTO: 0.2 K/UL (ref 0–0.7)
EOSINOPHIL NFR BLD AUTO: 2 %
ERYTHROCYTE [DISTWIDTH] IN BLOOD BY AUTOMATED COUNT: 2.94 M/UL (ref 4.3–5.9)
ERYTHROCYTE [DISTWIDTH] IN BLOOD: 14 % (ref 11.5–15.5)
GLUCOSE SERPL-MCNC: 204 MG/DL (ref 74–99)
HCT VFR BLD AUTO: 26.5 % (ref 39–53)
HGB BLD-MCNC: 8.3 GM/DL (ref 13–17.5)
HYALINE CASTS UR QL AUTO: 4 /LPF (ref 0–2)
INR PPP: 1.1 (ref ?–1.2)
LACTATE BLDV-SCNC: 0.5 MMOL/L (ref 0.7–2)
LIPASE SERPL-CCNC: 638 U/L (ref 23–300)
LYMPHOCYTES # SPEC AUTO: 1.6 K/UL (ref 1–4.8)
LYMPHOCYTES NFR SPEC AUTO: 15 %
MAGNESIUM SPEC-SCNC: 1.9 MG/DL (ref 1.6–2.3)
MCH RBC QN AUTO: 28.1 PG (ref 25–35)
MCHC RBC AUTO-ENTMCNC: 31.2 G/DL (ref 31–37)
MCV RBC AUTO: 90.2 FL (ref 80–100)
MONOCYTES # BLD AUTO: 0.8 K/UL (ref 0–1)
MONOCYTES NFR BLD AUTO: 8 %
NEUTROPHILS # BLD AUTO: 7.8 K/UL (ref 1.3–7.7)
NEUTROPHILS NFR BLD AUTO: 74 %
PH UR: 5 [PH] (ref 5–8)
PLATELET # BLD AUTO: 254 K/UL (ref 150–450)
POTASSIUM SERPL-SCNC: 5.7 MMOL/L (ref 3.5–5.1)
PROT SERPL-MCNC: 6.9 G/DL (ref 6.3–8.2)
PROT UR QL: (no result)
PT BLD: 10.4 SEC (ref 9–12)
RBC UR QL: 1 /HPF (ref 0–5)
SODIUM SERPL-SCNC: 134 MMOL/L (ref 137–145)
SP GR UR: 1.02 (ref 1–1.03)
SQUAMOUS UR QL AUTO: <1 /HPF (ref 0–4)
TROPONIN I SERPL-MCNC: 0.07 NG/ML (ref 0–0.03)
UROBILINOGEN UR QL STRIP: 3 MG/DL (ref ?–2)
WBC # BLD AUTO: 10.6 K/UL (ref 3.8–10.6)
WBC #/AREA URNS HPF: 4 /HPF (ref 0–5)

## 2018-07-17 PROCEDURE — 83550 IRON BINDING TEST: CPT

## 2018-07-17 PROCEDURE — 81001 URINALYSIS AUTO W/SCOPE: CPT

## 2018-07-17 PROCEDURE — 85730 THROMBOPLASTIN TIME PARTIAL: CPT

## 2018-07-17 PROCEDURE — 90935 HEMODIALYSIS ONE EVALUATION: CPT

## 2018-07-17 PROCEDURE — 85025 COMPLETE CBC W/AUTO DIFF WBC: CPT

## 2018-07-17 PROCEDURE — 36415 COLL VENOUS BLD VENIPUNCTURE: CPT

## 2018-07-17 PROCEDURE — 93970 EXTREMITY STUDY: CPT

## 2018-07-17 PROCEDURE — 96365 THER/PROPH/DIAG IV INF INIT: CPT

## 2018-07-17 PROCEDURE — 71046 X-RAY EXAM CHEST 2 VIEWS: CPT

## 2018-07-17 PROCEDURE — 93005 ELECTROCARDIOGRAM TRACING: CPT

## 2018-07-17 PROCEDURE — 96366 THER/PROPH/DIAG IV INF ADDON: CPT

## 2018-07-17 PROCEDURE — 82550 ASSAY OF CK (CPK): CPT

## 2018-07-17 PROCEDURE — 84443 ASSAY THYROID STIM HORMONE: CPT

## 2018-07-17 PROCEDURE — 83735 ASSAY OF MAGNESIUM: CPT

## 2018-07-17 PROCEDURE — 82140 ASSAY OF AMMONIA: CPT

## 2018-07-17 PROCEDURE — 83690 ASSAY OF LIPASE: CPT

## 2018-07-17 PROCEDURE — 83605 ASSAY OF LACTIC ACID: CPT

## 2018-07-17 PROCEDURE — 85610 PROTHROMBIN TIME: CPT

## 2018-07-17 PROCEDURE — 82553 CREATINE MB FRACTION: CPT

## 2018-07-17 PROCEDURE — 85027 COMPLETE CBC AUTOMATED: CPT

## 2018-07-17 PROCEDURE — 83540 ASSAY OF IRON: CPT

## 2018-07-17 PROCEDURE — 96374 THER/PROPH/DIAG INJ IV PUSH: CPT

## 2018-07-17 PROCEDURE — 87086 URINE CULTURE/COLONY COUNT: CPT

## 2018-07-17 PROCEDURE — 83036 HEMOGLOBIN GLYCOSYLATED A1C: CPT

## 2018-07-17 PROCEDURE — 84100 ASSAY OF PHOSPHORUS: CPT

## 2018-07-17 PROCEDURE — 84484 ASSAY OF TROPONIN QUANT: CPT

## 2018-07-17 PROCEDURE — 82728 ASSAY OF FERRITIN: CPT

## 2018-07-17 PROCEDURE — 80202 ASSAY OF VANCOMYCIN: CPT

## 2018-07-17 PROCEDURE — 99285 EMERGENCY DEPT VISIT HI MDM: CPT

## 2018-07-17 PROCEDURE — 80053 COMPREHEN METABOLIC PANEL: CPT

## 2018-07-17 PROCEDURE — 87040 BLOOD CULTURE FOR BACTERIA: CPT

## 2018-07-17 PROCEDURE — 80048 BASIC METABOLIC PNL TOTAL CA: CPT

## 2018-07-17 RX ADMIN — DEXTROSE MONOHYDRATE SCH MLS/HR: 5 INJECTION, SOLUTION INTRAVENOUS at 22:38

## 2018-07-17 NOTE — US
EXAMINATION TYPE: US venous doppler duplex LE 

 

DATE OF EXAM: 7/17/2018 3:02 PM

 

COMPARISON: NONE

 

CLINICAL HISTORY: 73-year-old male Pain. EC patient with altered mental status with hemodialysis acce
ss graft in right CFV. Right leg > in size than left leg

 

SIDE PERFORMED: Bilateral  

 

TECHNIQUE:  The lower extremity deep venous system is examined utilizing real time linear array sonog
cortes with graded compression, doppler sonography and color-flow sonography.

 

VESSELS IMAGED:

Common Femoral Vein

Deep Femoral Vein

Greater Saphenous Vein *

Femoral Vein

Popliteal Vein

Small Saphenous Vein *

Proximal Calf Veins

Posterior tibial veins

(* superficial vessels)

 

 

 

Right Leg:  Negative for DVT; hemodialysis access graft hyperechoic lines noted in CFV, thus compress
ion here was deferred

 

Left Leg:  Negative for DVT

 

 

 

IMPRESSION:

Graft material identified in the region of the right common femoral vein. Compression here was deferr
ed. No evidence for DVT within the bilateral lower extremities.

## 2018-07-17 NOTE — XR
EXAMINATION TYPE: XR chest 2V

 

DATE OF EXAM: 7/17/2018

 

COMPARISON: Prior chest 7/3/2018

 

HISTORY: Weakness, altered mental status

 

TECHNIQUE:  Frontal and lateral views of the chest are obtained.

 

FINDINGS:  Patient is rotated. Intracardiac defibrillator leads are stable. Heart remains enlarged. T
here are overlying cardiac leads. Central vascularity and interstitium are somewhat prominent. No pne
umothorax, there is blunting of the posterior gastric angle suggesting small effusions.

 

IMPRESSION:  Correlate for pulmonary venous hypertension and interstitial edema. Exam somewhat limite
d technically, follow-up as indicated.

## 2018-07-17 NOTE — ED
General Adult HPI





- General


Stated complaint: altered mental status


Time Seen by Provider: 07/17/18 12:16


Source: RN notes reviewed, old records reviewed


Limitations: altered mental status





- History of Present Illness


Initial comments: 





This is a 71-year-old male the ER for mental state.  Patient's coming in for 

fever, not acting appropriately.  Patient has positive fever, patient has 

multiple sources of infection fever including bacteremia, multiple catheters 

insertion sites.  Patient's history is obtained by EMS and patient's chart





- Related Data


 Home Medications











 Medication  Instructions  Recorded  Confirmed


 


Nitroglycerin Sl Tabs [Nitrostat] 0.4 mg SUBLINGUAL Q5M PRN 05/18/14 07/17/18


 


Carvedilol 25 mg PO BID@0800,1700 07/14/15 07/17/18


 


Docusate [Colace] 100 mg PO HS PRN 11/25/15 07/17/18


 


Atorvastatin [Lipitor] 20 mg PO HS@2100 06/26/18 07/17/18


 


Glecaprevir/Pibrentasvir [Mavyret 3 tab PO DAILY@0800 06/26/18 07/17/18





100-40 mg Tablet]   


 


Insulin Detemir [Levemir] 35 unit SQ HS@2100 06/26/18 07/17/18


 


Magnesium Oxide [Mag-Ox] 400 mg PO DAILY@1700 06/26/18 07/17/18


 


Amoxic-Pot Clav 500-125 mg 1 tab PO BID@0800,1700 07/17/18 07/17/18





[Augmentin 500-125 mg]   


 


Bisacodyl 10 mg RECTAL DAILY PRN 07/17/18 07/17/18


 


Furosemide [Lasix] 80 mg PO BID@0600,1400 07/17/18 07/17/18


 


Gabapentin [Neurontin] 100 mg PO TID@0600,1400,2100 07/17/18 07/17/18


 


Insulin Aspart [NovoLOG 4 unit SQ AC-SUPPER@1700 07/17/18 07/17/18





(formulary)]   


 


Insulin Aspart [NovoLOG See Protocol SQ ACHS 07/17/18 07/17/18





(formulary)]   


 


Isosorbide Mononitrate ER [Imdur] 60 mg PO DAILY@0800 07/17/18 07/17/18


 


Magnesium Hydroxide [Milk of 2,400 mg PO DAILY PRN 07/17/18 07/17/18





Magnesia]   


 


NIFEdipine XL [Procardia XL] 30 mg PO DAILY@0800 07/17/18 07/17/18


 


Na Phos,M-B/Na Phos,Di-Ba [Fleet 133 ml RECTAL DAILY PRN 07/17/18 07/17/18





Adult]   


 


hydrALAZINE HCL [Apresoline] 100 mg PO TID@0600,1400,2100 07/17/18 07/17/18








 Previous Rx's











 Medication  Instructions  Recorded


 


Pantoprazole [Protonix] 40 mg PO AC-BRKFST  tablet. 03/13/18


 


HYDROcodone/APAP 7.5-325MG [Norco 1 tab PO Q6HR PRN #28 tab 07/11/18





7.5-325]  


 


Insulin Aspart [NovoLOG 2 unit SQ AC-LUNCH #0 vial 07/11/18





(formulary)]  


 


Polyethylene Glycol 3350 [Miralax] 17 gm PO DAILY PRN  powd.pack 07/11/18











 Allergies











Allergy/AdvReac Type Severity Reaction Status Date / Time


 


No Known Allergies Allergy   Verified 07/17/18 12:49














Review of Systems


ROS Statement: 


Those systems with pertinent positive or pertinent negative responses have been 

documented in the HPI.





ROS Other: All systems not noted in ROS Statement are negative.





Past Medical History


Past Medical History: Coronary Artery Disease (CAD), Chest Pain / Angina, Heart 

Failure, COPD, Diabetes Mellitus, Hyperlipidemia, Hypertension, Liver Disease, 

Osteoarthritis (OA)


Additional Past Medical History / Comment(s): Chronic CHF systolic dysfunction 

with EF 20%, murmur, home oxygen, 3/3/18 acute pancreatitis, gout several joints

, gouty arthiritis to R great toe, ddd lumbar region, folliculitis, constipation

, renal insufficiency, chronic anemia, thrombocytopenia, hep c 1-1-14, diabetes 

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 

peripheral neuropathy hands, legs and feet, hemothorax associated with liver bx 

tx with chest tube, cellulitis to lt foot/ankle, pt was born with R leg larger 

than L leg.


Last Myocardial Infarction Date:: 5/2014


History of Any Multi-Drug Resistant Organisms: None Reported


Past Surgical History: Heart Catheterization, Pacemaker


Additional Past Surgical History / Comment(s): PTCA 2011 in [place], CATARACTS 

ROCKY EYES REMOVED.  liver biopsy on 4-7-14,


Past Anesthesia/Blood Transfusion Reactions: No Reported Reaction


Type of Cardiac Device: Permanent Pacemaker


Device Placement Date:: 2014? pt unsure


Smoking Status: Never smoker





- Past Family History


  ** Mother


Family Medical History: Hypertension





  ** Sister(s)


Family Medical History: Cancer





  ** Father


History Unknown: Yes





General Exam


General appearance: alert, in no apparent distress


Head exam: Present: atraumatic, normocephalic, normal inspection


Eye exam: Present: normal appearance, PERRL, EOMI.  Absent: scleral icterus, 

conjunctival injection, periorbital swelling


ENT exam: Present: normal exam, mucous membranes moist


Neck exam: Present: normal inspection.  Absent: tenderness, meningismus, 

lymphadenopathy


Respiratory exam: Present: normal lung sounds bilaterally.  Absent: respiratory 

distress, wheezes, rales, rhonchi, stridor


Cardiovascular Exam: Present: regular rate, normal rhythm, normal heart sounds.

  Absent: systolic murmur, diastolic murmur, rubs, gallop, clicks


GI/Abdominal exam: Present: soft, normal bowel sounds.  Absent: distended, 

tenderness, guarding, rebound, rigid


Extremities exam: Present: normal inspection, full ROM, normal capillary 

refill.  Absent: tenderness, pedal edema, joint swelling, calf tenderness


Back exam: Present: normal inspection


Neurological exam: Present: alert, oriented X3, CN II-XII intact


Psychiatric exam: Present: normal affect, normal mood


Skin exam: Present: warm, dry, intact, normal color.  Absent: rash





Course


 Vital Signs











  07/17/18 07/17/18 07/17/18





  12:20 14:24 15:34


 


Temperature 101.5 F H  98.9 F


 


Pulse Rate 66 56 L 62


 


Respiratory 18 18 20





Rate   


 


Blood Pressure 132/60 121/57 160/88


 


O2 Sat by Pulse 97 98 98





Oximetry   














- Reevaluation(s)


Reevaluation #1: 





07/17/18 15:42


Medical record is reviewed, patient is improving with fever control and 

hydration





EKG Findings





- EKG Comments:


EKG Findings:: EKG shows paced rhythm rate of 66, , , QTc 461





Medical Decision Making





- Medical Decision Making





73 male the ER for evaluation.  Patient presents ER for evaluation of altered 

mental state positive fever.  Patient to be admitted to the hospital for broad-

spectrum antibiotics.





- Lab Data


Result diagrams: 


 07/17/18 14:05





 07/17/18 14:05


 Lab Results











  07/17/18 07/17/18 07/17/18 Range/Units





  14:05 14:05 14:05 


 


WBC  10.6    (3.8-10.6)  k/uL


 


RBC  2.94 L    (4.30-5.90)  m/uL


 


Hgb  8.3 L    (13.0-17.5)  gm/dL


 


Hct  26.5 L    (39.0-53.0)  %


 


MCV  90.2    (80.0-100.0)  fL


 


MCH  28.1    (25.0-35.0)  pg


 


MCHC  31.2    (31.0-37.0)  g/dL


 


RDW  14.0    (11.5-15.5)  %


 


Plt Count  254    (150-450)  k/uL


 


Neutrophils %  74    %


 


Lymphocytes %  15    %


 


Monocytes %  8    %


 


Eosinophils %  2    %


 


Basophils %  1    %


 


Neutrophils #  7.8 H    (1.3-7.7)  k/uL


 


Lymphocytes #  1.6    (1.0-4.8)  k/uL


 


Monocytes #  0.8    (0-1.0)  k/uL


 


Eosinophils #  0.2    (0-0.7)  k/uL


 


Basophils #  0.1    (0-0.2)  k/uL


 


Hypochromasia  Slight    


 


PT   10.4   (9.0-12.0)  sec


 


INR   1.1   (<1.2)  


 


APTT   27.5   (22.0-30.0)  sec


 


Sodium    134 L  (137-145)  mmol/L


 


Potassium    5.7 H  (3.5-5.1)  mmol/L


 


Chloride    99  ()  mmol/L


 


Carbon Dioxide    28  (22-30)  mmol/L


 


Anion Gap    7  mmol/L


 


BUN    40 H  (9-20)  mg/dL


 


Creatinine    4.60 H  (0.66-1.25)  mg/dL


 


Est GFR (CKD-EPI)AfAm    14  (>60 ml/min/1.73 sqM)  


 


Est GFR (CKD-EPI)NonAf    12  (>60 ml/min/1.73 sqM)  


 


Glucose    204 H  (74-99)  mg/dL


 


Plasma Lactic Acid Buddy     (0.7-2.0)  mmol/L


 


Calcium    9.1  (8.4-10.2)  mg/dL


 


Phosphorus    4.4  (2.5-4.5)  mg/dL


 


Magnesium    1.9  (1.6-2.3)  mg/dL


 


Total Bilirubin    0.6  (0.2-1.3)  mg/dL


 


AST    20  (17-59)  U/L


 


ALT    24  (21-72)  U/L


 


Alkaline Phosphatase    122  ()  U/L


 


Ammonia     (<30)  umol/L


 


Total Creatine Kinase     ()  U/L


 


CK-MB (CK-2)     (0.0-2.4)  ng/mL


 


CK-MB (CK-2) Rel Index     


 


Total Protein    6.9  (6.3-8.2)  g/dL


 


Albumin    3.1 L  (3.5-5.0)  g/dL


 


Lipase    638 H  ()  U/L


 


TSH    4.290  (0.465-4.680)  mIU/L














  07/17/18 07/17/18 Range/Units





  14:05 14:05 


 


WBC    (3.8-10.6)  k/uL


 


RBC    (4.30-5.90)  m/uL


 


Hgb    (13.0-17.5)  gm/dL


 


Hct    (39.0-53.0)  %


 


MCV    (80.0-100.0)  fL


 


MCH    (25.0-35.0)  pg


 


MCHC    (31.0-37.0)  g/dL


 


RDW    (11.5-15.5)  %


 


Plt Count    (150-450)  k/uL


 


Neutrophils %    %


 


Lymphocytes %    %


 


Monocytes %    %


 


Eosinophils %    %


 


Basophils %    %


 


Neutrophils #    (1.3-7.7)  k/uL


 


Lymphocytes #    (1.0-4.8)  k/uL


 


Monocytes #    (0-1.0)  k/uL


 


Eosinophils #    (0-0.7)  k/uL


 


Basophils #    (0-0.2)  k/uL


 


Hypochromasia    


 


PT    (9.0-12.0)  sec


 


INR    (<1.2)  


 


APTT    (22.0-30.0)  sec


 


Sodium    (137-145)  mmol/L


 


Potassium    (3.5-5.1)  mmol/L


 


Chloride    ()  mmol/L


 


Carbon Dioxide    (22-30)  mmol/L


 


Anion Gap    mmol/L


 


BUN    (9-20)  mg/dL


 


Creatinine    (0.66-1.25)  mg/dL


 


Est GFR (CKD-EPI)AfAm    (>60 ml/min/1.73 sqM)  


 


Est GFR (CKD-EPI)NonAf    (>60 ml/min/1.73 sqM)  


 


Glucose    (74-99)  mg/dL


 


Plasma Lactic Acid Buddy  0.5 L   (0.7-2.0)  mmol/L


 


Calcium    (8.4-10.2)  mg/dL


 


Phosphorus    (2.5-4.5)  mg/dL


 


Magnesium    (1.6-2.3)  mg/dL


 


Total Bilirubin    (0.2-1.3)  mg/dL


 


AST    (17-59)  U/L


 


ALT    (21-72)  U/L


 


Alkaline Phosphatase    ()  U/L


 


Ammonia  14   (<30)  umol/L


 


Total Creatine Kinase   37 L  ()  U/L


 


CK-MB (CK-2)   1.1  (0.0-2.4)  ng/mL


 


CK-MB (CK-2) Rel Index   3.0  


 


Total Protein    (6.3-8.2)  g/dL


 


Albumin    (3.5-5.0)  g/dL


 


Lipase    ()  U/L


 


TSH    (0.465-4.680)  mIU/L














- Radiology Data


Radiology results: report reviewed (Chest x-ray is positive for CHF ultrasound 

negative for DVT bilateral), image reviewed





Disposition


Clinical Impression: 


 Altered mental status, Fever, Acute renal failure





Disposition: ADMITTED AS IP TO THIS [facility]


Condition: Fair


Is patient prescribed a controlled substance at d/c from ED?: No


Referrals: 


Edward Avila MD [Primary Care Provider] - 1-2 days

## 2018-07-18 LAB
GLUCOSE BLD-MCNC: 149 MG/DL (ref 75–99)
GLUCOSE BLD-MCNC: 211 MG/DL (ref 75–99)
GLUCOSE BLD-MCNC: 212 MG/DL (ref 75–99)
HBA1C MFR BLD: 9 % (ref 4–6)

## 2018-07-18 PROCEDURE — 5A1D70Z PERFORMANCE OF URINARY FILTRATION, INTERMITTENT, LESS THAN 6 HOURS PER DAY: ICD-10-PCS

## 2018-07-18 RX ADMIN — CARVEDILOL SCH MG: 12.5 TABLET, FILM COATED ORAL at 17:52

## 2018-07-18 RX ADMIN — INSULIN DETEMIR SCH UNIT: 100 INJECTION, SOLUTION SUBCUTANEOUS at 21:08

## 2018-07-18 RX ADMIN — PANTOPRAZOLE SODIUM SCH MG: 40 INJECTION, POWDER, FOR SOLUTION INTRAVENOUS at 14:22

## 2018-07-18 RX ADMIN — GABAPENTIN SCH MG: 100 CAPSULE ORAL at 17:51

## 2018-07-18 RX ADMIN — SODIUM BICARBONATE SCH: 84 INJECTION, SOLUTION INTRAVENOUS at 11:29

## 2018-07-18 RX ADMIN — Medication SCH MG: at 17:52

## 2018-07-18 RX ADMIN — FUROSEMIDE SCH MG: 80 TABLET ORAL at 17:51

## 2018-07-18 RX ADMIN — INSULIN ASPART SCH UNIT: 100 INJECTION, SOLUTION INTRAVENOUS; SUBCUTANEOUS at 12:40

## 2018-07-18 RX ADMIN — GABAPENTIN SCH MG: 100 CAPSULE ORAL at 21:32

## 2018-07-18 RX ADMIN — HEPARIN SODIUM SCH UNIT: 5000 INJECTION, SOLUTION INTRAVENOUS; SUBCUTANEOUS at 21:07

## 2018-07-18 RX ADMIN — ATORVASTATIN CALCIUM SCH MG: 20 TABLET, FILM COATED ORAL at 21:08

## 2018-07-18 RX ADMIN — SODIUM BICARBONATE SCH MLS/HR: 84 INJECTION, SOLUTION INTRAVENOUS at 10:29

## 2018-07-18 RX ADMIN — DEXTROSE MONOHYDRATE SCH MLS/HR: 5 INJECTION, SOLUTION INTRAVENOUS at 10:30

## 2018-07-18 RX ADMIN — DEXTROSE MONOHYDRATE SCH MLS/HR: 5 INJECTION, SOLUTION INTRAVENOUS at 21:32

## 2018-07-18 RX ADMIN — INSULIN ASPART SCH UNIT: 100 INJECTION, SOLUTION INTRAVENOUS; SUBCUTANEOUS at 17:50

## 2018-07-18 RX ADMIN — INSULIN ASPART SCH UNIT: 100 INJECTION, SOLUTION INTRAVENOUS; SUBCUTANEOUS at 21:07

## 2018-07-18 NOTE — P.HPIM
History of Present Illness


H&P Date: 07/18/18


Chief Complaint: Confusion and fever





The patient is a 73-year-old -American male well known to our service 

that was apparently admitted to the floor yesterday from the ER after 

presenting there for more worried with reports of increasing confusion fever, 

the patient was admitted yesterday however I was not notified until today that 

the patient was here.  The patient has a known history of acute on chronic 

congestive heart failure, type 2 diabetes with peripheral neuropathy, and was 

recently upgraded to end-stage renal disease on hemodialysis Monday Wednesday Friday after he presented on his last admission with worsening acute on chronic 

kidney disease, with hyperkalemia and volume overload resistant to IV diuresis.

  During that hospitalization the patient was noted to be febrile and was 

started on Zosyn and later transition to Augmentin, workup at that time 

indicated the blood culture of Staphylococcus epidermidis that was suggestive 

of contamination.  Urine culture at that time also grew Staphylococcus 

epididymis and E. coli.  The patient is a notoriously poor historian, today he 

denies any chest pain shortness of breath and reports that he feels fine and is 

asking when he can go back to [facility].  Review of the chart indicates the 

patient did have a temperature of 101.5 on presentation and was started on 

vancomycin and Zosyn, urine and blood cultures were also ordered and are 

pending. 





Review of Systems





All other 12 point review of systems negative except for HPI





Past Medical History


Past Medical History: Coronary Artery Disease (CAD), Chest Pain / Angina, Heart 

Failure, COPD, Diabetes Mellitus, Hyperlipidemia, Hypertension, Liver Disease, 

Myocardial Infarction (MI), Osteoarthritis (OA)


Additional Past Medical History / Comment(s): Chronic CHF systolic dysfunction 

with EF 20%, murmur, home oxygen, 3/3/18 acute pancreatitis, gout several joints

, gouty arthiritis to R great toe, ddd lumbar region, folliculitis, constipation

, ESRD/DIALYSIS, chronic anemia, thrombocytopenia, hep c 1-1-14, diabetes 

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 

peripheral neuropathy hands, legs and feet, hemothorax associated with liver bx 

tx with chest tube, cellulitis to lt foot/ankle, pt was born with R leg larger 

than L leg.UTI


Last Myocardial Infarction Date:: 5/2014


History of Any Multi-Drug Resistant Organisms: None Reported


Past Surgical History: Heart Catheterization, Pacemaker


Additional Past Surgical History / Comment(s): PTCA 2011 in [place], CATARACTS 

ROCKY EYES REMOVED ,DOUBLE LUMEN DIAYSIS CATH RT GROIN.  liver biopsy on 4-7-14,


Past Anesthesia/Blood Transfusion Reactions: No Reported Reaction


Type of Cardiac Device: Permanent Pacemaker


Device Placement Date:: 2014? pt unsure


Smoking Status: Never smoker





- Past Family History


  ** Mother


Family Medical History: Hypertension





  ** Sister(s)


Family Medical History: Cancer





  ** Father


History Unknown: Yes





Medications and Allergies


 Home Medications











 Medication  Instructions  Recorded  Confirmed  Type


 


Nitroglycerin Sl Tabs [Nitrostat] 0.4 mg SUBLINGUAL Q5M PRN 05/18/14 07/17/18 

History


 


Carvedilol 25 mg PO BID@0800,1700 07/14/15 07/17/18 History


 


Docusate [Colace] 100 mg PO HS PRN 11/25/15 07/17/18 History


 


Pantoprazole [Protonix] 40 mg PO AC-BRKFST  tablet. 03/13/18 07/17/18 Rx


 


Atorvastatin [Lipitor] 20 mg PO HS@2100 06/26/18 07/17/18 History


 


Glecaprevir/Pibrentasvir [Mavyret 3 tab PO DAILY@0800 06/26/18 07/17/18 History





100-40 mg Tablet]    


 


Insulin Detemir [Levemir] 35 unit SQ HS@2100 06/26/18 07/17/18 History


 


Magnesium Oxide [Mag-Ox] 400 mg PO DAILY@1700 06/26/18 07/17/18 History


 


HYDROcodone/APAP 7.5-325MG [Norco 1 tab PO Q6HR PRN #28 tab 07/11/18 07/17/18 Rx





7.5-325]    


 


Insulin Aspart [NovoLOG 2 unit SQ AC-LUNCH #0 vial 07/11/18 07/17/18 Rx





(formulary)]    


 


Polyethylene Glycol 3350 [Miralax] 17 gm PO DAILY PRN  powd.pack 07/11/18 07/17/ 18 Rx


 


Amoxic-Pot Clav 500-125 mg 1 tab PO BID@0800,1700 07/17/18 07/17/18 History





[Augmentin 500-125 mg]    


 


Bisacodyl 10 mg RECTAL DAILY PRN 07/17/18 07/17/18 History


 


Furosemide [Lasix] 80 mg PO BID@0600,1400 07/17/18 07/17/18 History


 


Gabapentin [Neurontin] 100 mg PO TID@0600,1400,2100 07/17/18 07/17/18 History


 


Insulin Aspart [NovoLOG 4 unit SQ AC-SUPPER@1700 07/17/18 07/17/18 History





(formulary)]    


 


Insulin Aspart [NovoLOG See Protocol SQ ACHS 07/17/18 07/17/18 History





(formulary)]    


 


Isosorbide Mononitrate ER [Imdur] 60 mg PO DAILY@0800 07/17/18 07/17/18 History


 


Magnesium Hydroxide [Milk of 2,400 mg PO DAILY PRN 07/17/18 07/17/18 History





Magnesia]    


 


NIFEdipine XL [Procardia XL] 30 mg PO DAILY@0800 07/17/18 07/17/18 History


 


Na Phos,M-B/Na Phos,Di-Ba [Fleet 133 ml RECTAL DAILY PRN 07/17/18 07/17/18 

History





Adult]    


 


hydrALAZINE HCL [Apresoline] 100 mg PO TID@0600,1400,2100 07/17/18 07/17/18 

History











 Allergies











Allergy/AdvReac Type Severity Reaction Status Date / Time


 


No Known Allergies Allergy   Verified 07/17/18 12:49














Physical Exam


Vitals: 


 Vital Signs











  Temp Pulse Pulse Resp BP BP Pulse Ox


 


 07/18/18 08:00    65  28 H   


 


 07/18/18 07:42  99.4 F   65  28 H   149/76  92 L


 


 07/18/18 07:00  98.6 F   66  20   170/78  98


 


 07/17/18 22:55    63  16   


 


 07/17/18 20:29  97.0 F L   63  16   116/57  92 L


 


 07/17/18 17:29  96.6 F L   57 L  16   134/69  91 L


 


 07/17/18 16:36   59 L   18  119/56   96


 


 07/17/18 15:34  98.9 F  62   20  160/88   98


 


 07/17/18 14:24   56 L   18  121/57   98


 


 07/17/18 12:20  101.5 F H  66   18  132/60   97








 Intake and Output











 07/17/18 07/18/18 07/18/18





 22:59 06:59 14:59


 


Intake Total  950 


 


Balance  950 


 


Intake:   


 


  Intake, IV Titration  950 





  Amount   


 


    Piperacillin-Tazobactam 3  50 





    .375 gm In Dextrose/Water   





    1 50ml.bag @ 12.5 mls/hr   





    IVPB Q8HR ANTOLIN Rx#:   





    [id number]   


 


    Sodium Chloride 0.45% 1,  900 





    000 ml @ 100 mls/hr IV .   





    Q10H ANTOLIN Rx#:[id number]   


 


Other:   


 


  # Voids 1  


 


  Weight   104.326 kg














Constitutional: No acute distress, conversant, pleasant





Eyes: Anicteric sclerae, moist conjunctiva, no lid-lag, PERRLA





ENMT: NC/AT,Oropharynx clear, no erythema, exudates





Neck:Supple, FROM, no masses, or JVD, No carotid bruits; No thyromegaly





Lungs: Clear to auscultation, Clear to percussion, Normal respiratory effort, 

no accessory muscle use 





Cardiovascular: Heart regular in rate and rhythm,  No murmurs, gallops, or rubs 

+2 peripheral pitting edema pedally





Abdominal: Soft Nontender, nom distended, no guarding, no rebound or  rigidity, 

Normoactive bowel sounds No hepatomegaly, No splenomegaly,  No palpable mass No 

abdominal wall hernia noted 





Skin: Normal temperature, tone, texture, turgor, No induration No subcutaneous 

nodules, No rash, lesions, No ulcers





Extremities:No digital cyanosis No clubbing, Pedal pulses intact and  

symmetrical Radial pulses intact and symmetrical Normal gait and station, No 

calf tenderness


 


Psychiatric: Alert and oriented to person, place and time, Appropriate affect 

Intact judgement   


      


Neuro: Muscles Strength 5/5 in all 4 extremities, Sensation to light touch 

grossly present throughout, Cranial nerves II-XII grossly intact. No focal 

sensory deficits








Results


CBC & Chem 7: 


 07/17/18 14:05





 07/17/18 14:05


Labs: 


 Abnormal Lab Results - Last 24 Hours (Table)











  07/17/18 07/17/18 07/17/18 Range/Units





  14:05 14:05 14:05 


 


RBC  2.94 L    (4.30-5.90)  m/uL


 


Hgb  8.3 L    (13.0-17.5)  gm/dL


 


Hct  26.5 L    (39.0-53.0)  %


 


Neutrophils #  7.8 H    (1.3-7.7)  k/uL


 


Sodium   134 L   (137-145)  mmol/L


 


Potassium   5.7 H   (3.5-5.1)  mmol/L


 


BUN   40 H   (9-20)  mg/dL


 


Creatinine   4.60 H   (0.66-1.25)  mg/dL


 


Glucose   204 H   (74-99)  mg/dL


 


Plasma Lactic Acid Buddy    0.5 L  (0.7-2.0)  mmol/L


 


Total Creatine Kinase     ()  U/L


 


Troponin I     (0.000-0.034)  ng/mL


 


Albumin   3.1 L   (3.5-5.0)  g/dL


 


Lipase   638 H   ()  U/L


 


Urine Protein     (Negative)  


 


Urine Ketones     (Negative)  


 


Urine Bilirubin     (Negative)  


 


Ur Leukocyte Esterase     (Negative)  


 


Hyaline Casts     (0-2)  /lpf














  07/17/18 07/17/18 Range/Units





  14:05 15:38 


 


RBC    (4.30-5.90)  m/uL


 


Hgb    (13.0-17.5)  gm/dL


 


Hct    (39.0-53.0)  %


 


Neutrophils #    (1.3-7.7)  k/uL


 


Sodium    (137-145)  mmol/L


 


Potassium    (3.5-5.1)  mmol/L


 


BUN    (9-20)  mg/dL


 


Creatinine    (0.66-1.25)  mg/dL


 


Glucose    (74-99)  mg/dL


 


Plasma Lactic Acid Buddy    (0.7-2.0)  mmol/L


 


Total Creatine Kinase  37 L   ()  U/L


 


Troponin I  0.065 H*   (0.000-0.034)  ng/mL


 


Albumin    (3.5-5.0)  g/dL


 


Lipase    ()  U/L


 


Urine Protein   1+ H  (Negative)  


 


Urine Ketones   Trace H  (Negative)  


 


Urine Bilirubin   1+ H  (Negative)  


 


Ur Leukocyte Esterase   Trace H  (Negative)  


 


Hyaline Casts   4 H  (0-2)  /lpf








 Microbiology - Last 24 Hours (Table)











 07/17/18 15:38 Urine Culture - Preliminary





 Urine,Catheterized 














Thrombosis Risk Factor Assmnt





- Choose All That Apply


Each Factor Represents 1 point: Obesity (BMI >25)


Other Risk Factors: Yes


Each Risk Factor Represents 2 Points: Age 61-74 years


Other congenital or acquired thrombophilia - If yes, enter type in comment: No


Thrombosis Risk Factor Assessment Total Risk Factor Score: 3


Thrombosis Risk Factor Assessment Level: Moderate Risk





Assessment and Plan


(1) Fever of unknown origin


Current Visit: Yes   Status: Acute   Code(s): R50.9 - FEVER, UNSPECIFIED   

SNOMED Code(s): 8804416


   





(2) Metabolic encephalopathy


Current Visit: Yes   Status: Acute   Code(s): G93.41 - METABOLIC ENCEPHALOPATHY

   SNOMED Code(s): 13211076


   





(3) ESRD (end stage renal disease) on dialysis


Current Visit: No   Status: Acute   Code(s): N18.6 - END STAGE RENAL DISEASE; 

Z99.2 - DEPENDENCE ON RENAL DIALYSIS   SNOMED Code(s): 320882881


   





(4) Type 2 diabetes mellitus with peripheral neuropathy


Current Visit: No   Status: Acute   Code(s): E11.42 - TYPE 2 DIABETES MELLITUS 

WITH DIABETIC POLYNEUROPATHY   SNOMED Code(s): 0128197985498


   





(5) Chronic diastolic heart failure


Current Visit: Yes   Status: Acute   Code(s): I50.32 - CHRONIC DIASTOLIC (

CONGESTIVE) HEART FAILURE   SNOMED Code(s): 091673968


   


Plan: 





The patient is admitted anticipated greater than 2 midnight stay with fever of 

unknown origin, plan to consult Dr. Mane ID continue antibiotic regimen with 

vancomycin and Zosyn, urine and blood cultures have been drawn.  Nephrology Dr. Cortez has been consulted to coordinate hemodialysis.  Patient is resumed on 

all his chronic medications.  We will continue to follow his clinical course.  

Continue heparin for DVT prophylaxis

## 2018-07-18 NOTE — P.NPCON
History of Present Illness





- Reason for Consult


acute renal failure





- History of Present Illness





Reason for consultation: Acute kidney injury





History of present illness:


Patient is a 73-year-old male seen in renal consultation for acute kidney 

injury currently hemodialysis dependent.  He is maintained on hemodialysis on a 

Monday Wednesday Friday schedule.  He has a groin tunneled catheter.  

Hemodialysis was initiated earlier this month after he developed ATN from 

pancreatitis.  He was also oliguric.  Patient was discharged on Monday to 

extended care facility and was sent back to the hospital due to altered mental 

status and fever.  This concerned that his tunneled catheter is infected.  His 

potassium was 5.7 and creatinine is 4.6.  Patient is not a very reliable 

historian.  He denies chest pain or shortness of breath.  No vomiting or 

diarrhea.  Hemodynamically stable.  He did have a temperature of 101.5F on 

admission.  He has history of diastolic CHF.  Creatinine in March 2018 was in 

the range of 1-1.5.  He does have history of diabetes mellitus.





Vital signs are stable.


General: The patient appeared well nourished and normally developed. 


HEENT: Head exam is unremarkable. Neck is without jugular venous distension.


LUNGS: Lungs are clear to auscultation and percussion. Breath sounds decreased.


HEART: Rate and Rhythm are regular. First and second heart sounds normal. No 

murmurs, rubs or gallops. 


ABDOMEN: Abdominal exam reveals normal bowel sounds. Non-tender and non-

distended. No evidence of peritonitis.


EXTREMITITES: Trace edema.





Past Medical History


Past Medical History: Coronary Artery Disease (CAD), Chest Pain / Angina, Heart 

Failure, COPD, Diabetes Mellitus, Hyperlipidemia, Hypertension, Liver Disease, 

Myocardial Infarction (MI), Osteoarthritis (OA)


Additional Past Medical History / Comment(s): Chronic CHF systolic dysfunction 

with EF 20%, murmur, home oxygen, 3/3/18 acute pancreatitis, gout several joints

, gouty arthiritis to R great toe, ddd lumbar region, folliculitis, constipation

, ESRD/DIALYSIS, chronic anemia, thrombocytopenia, hep c 1-1-14, diabetes 

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 

peripheral neuropathy hands, legs and feet, hemothorax associated with liver bx 

tx with chest tube, cellulitis to lt foot/ankle, pt was born with R leg larger 

than L leg.UTI


Last Myocardial Infarction Date:: 5/2014


History of Any Multi-Drug Resistant Organisms: None Reported


Past Surgical History: Heart Catheterization, Pacemaker


Additional Past Surgical History / Comment(s): PTCA 2011 in [place], CATARACTS 

ROCKY EYES REMOVED ,DOUBLE LUMEN DIAYSIS CATH RT GROIN.  liver biopsy on 4-7-14,


Past Anesthesia/Blood Transfusion Reactions: No Reported Reaction


Type of Cardiac Device: Permanent Pacemaker


Device Placement Date:: 2014? pt unsure


Smoking Status: Never smoker





- Past Family History


  ** Mother


Family Medical History: Hypertension





  ** Sister(s)


Family Medical History: Cancer





  ** Father


History Unknown: Yes





Medications and Allergies


 Home Medications











 Medication  Instructions  Recorded  Confirmed  Type


 


Nitroglycerin Sl Tabs [Nitrostat] 0.4 mg SUBLINGUAL Q5M PRN 05/18/14 07/17/18 

History


 


Carvedilol 25 mg PO BID@0800,1700 07/14/15 07/17/18 History


 


Docusate [Colace] 100 mg PO HS PRN 11/25/15 07/17/18 History


 


Pantoprazole [Protonix] 40 mg PO AC-BRKFST  tablet. 03/13/18 07/17/18 Rx


 


Atorvastatin [Lipitor] 20 mg PO HS@2100 06/26/18 07/17/18 History


 


Glecaprevir/Pibrentasvir [Mavyret 3 tab PO DAILY@0800 06/26/18 07/17/18 History





100-40 mg Tablet]    


 


Insulin Detemir [Levemir] 35 unit SQ HS@2100 06/26/18 07/17/18 History


 


Magnesium Oxide [Mag-Ox] 400 mg PO DAILY@1700 06/26/18 07/17/18 History


 


HYDROcodone/APAP 7.5-325MG [Norco 1 tab PO Q6HR PRN #28 tab 07/11/18 07/17/18 Rx





7.5-325]    


 


Insulin Aspart [NovoLOG 2 unit SQ AC-LUNCH #0 vial 07/11/18 07/17/18 Rx





(formulary)]    


 


Polyethylene Glycol 3350 [Miralax] 17 gm PO DAILY PRN  powd.pack 07/11/18 07/17/ 18 Rx


 


Amoxic-Pot Clav 500-125 mg 1 tab PO BID@0800,1700 07/17/18 07/17/18 History





[Augmentin 500-125 mg]    


 


Bisacodyl 10 mg RECTAL DAILY PRN 07/17/18 07/17/18 History


 


Furosemide [Lasix] 80 mg PO BID@0600,1400 07/17/18 07/17/18 History


 


Gabapentin [Neurontin] 100 mg PO TID@0600,1400,2100 07/17/18 07/17/18 History


 


Insulin Aspart [NovoLOG 4 unit SQ AC-SUPPER@1700 07/17/18 07/17/18 History





(formulary)]    


 


Insulin Aspart [NovoLOG See Protocol SQ ACHS 07/17/18 07/17/18 History





(formulary)]    


 


Isosorbide Mononitrate ER [Imdur] 60 mg PO DAILY@0800 07/17/18 07/17/18 History


 


Magnesium Hydroxide [Milk of 2,400 mg PO DAILY PRN 07/17/18 07/17/18 History





Magnesia]    


 


NIFEdipine XL [Procardia XL] 30 mg PO DAILY@0800 07/17/18 07/17/18 History


 


Na Phos,M-B/Na Phos,Di-Ba [Fleet 133 ml RECTAL DAILY PRN 07/17/18 07/17/18 

History





Adult]    


 


hydrALAZINE HCL [Apresoline] 100 mg PO TID@0600,1400,2100 07/17/18 07/17/18 

History











 Allergies











Allergy/AdvReac Type Severity Reaction Status Date / Time


 


No Known Allergies Allergy   Verified 07/17/18 12:49














Physical Exam


Vitals: 


 Vital Signs











  Temp Pulse Pulse Resp BP BP Pulse Ox


 


 07/18/18 08:00    65  28 H   


 


 07/18/18 07:42  99.4 F   65  28 H   149/76  92 L


 


 07/18/18 07:00  98.6 F   66  20   170/78  98


 


 07/17/18 22:55    63  16   


 


 07/17/18 20:29  97.0 F L   63  16   116/57  92 L


 


 07/17/18 17:29  96.6 F L   57 L  16   134/69  91 L


 


 07/17/18 16:36   59 L   18  119/56   96


 


 07/17/18 15:34  98.9 F  62   20  160/88   98


 


 07/17/18 14:24   56 L   18  121/57   98


 


 07/17/18 12:20  101.5 F H  66   18  132/60   97








 Intake and Output











 07/17/18 07/18/18 07/18/18





 22:59 06:59 14:59


 


Intake Total  950 


 


Balance  950 


 


Intake:   


 


  Intake, IV Titration  950 





  Amount   


 


    Piperacillin-Tazobactam 3  50 





    .375 gm In Dextrose/Water   





    1 50ml.bag @ 12.5 mls/hr   





    IVPB Q8HR [facility] Rx#:   





    [id number]   


 


    Sodium Chloride 0.45% 1,  900 





    000 ml @ 100 mls/hr IV .   





    Q10H [facility] Rx#:[id number]   


 


Other:   


 


  # Voids 1  


 


  Weight   104.326 kg














Results





- Lab Results


 Most recent lab results











Calcium  9.1 mg/dL (8.4-10.2)   07/17/18  14:05    


 


Phosphorus  4.4 mg/dL (2.5-4.5)   07/17/18  14:05    


 


Magnesium  1.9 mg/dL (1.6-2.3)   07/17/18  14:05    














 07/17/18 14:05





 07/17/18 14:05





Assessment and Plan


Plan: 





Assessment:


1.  Acute kidney injury currently hemodialysis dependent maintain on a Monday Wednesday Friday schedule via groin catheter.  No hydronephrosis noted on CT of 

the abdomen and pelvis done earlier this month.


2.  Fever.  Concern for catheter infection.  Infectious disease following.  

Maintained on IV antibiotics.


3.  Benign hypertension.


4.  Insulin-dependent diabetes mellitus.


5.  Anemia.  Rule out iron deficiency.


6.  Hyperkalemia secondary to acute kidney injury.


7.  Hepatitis C.


8.  Diastolic CHF.





Plan:


Hemodialysis today with goal 2-3 liters ultrafiltration.


Check iron studies.


Add Aranesp.


Check phosphorus level.


Add Lasix 80 mg orally twice daily.


Follow-up cultures.


Monitor vancomycin levels.  Target level of 15.





Thank you for the consultation.  I will continue to follow the patient with you 

during his hospital stay.

## 2018-07-18 NOTE — CONS
CONSULTATION



DATE OF SERVICE:

07/18/2018



REASON FOR CONSULTATION:

Fever.



HISTORY OF PRESENT ILLNESS:

The patient is a 73-year-old -American male who was recently admitted to this

facility and did have multiple ______ including possible pancreatitis and acute kidney

injury.  According to the patient, did get a right femoral ______ catheter and

subclavian access could not be used.  The patient subsequently has been discharged to

the rehab on Monday and now has been brought back to the ER within 24 to 48 hours with

a fever of 101.5 degrees Fahrenheit.  The patient himself is not a very good historian.

He is not able to tell me when exactly the fever started.  The patient denies having

any symptoms.  The patient denies having any headache.  No urinary symptoms.  No chest

pain, some shortness of breath.  Very minimal cough.  No abdominal pain.  No nausea,

vomiting, or any diarrhea.  With these symptoms, the patient has been noted by the ER

physician on arrival to have a have a fever of 101.5.  His white count was normal

though.  His UA is not significantly positive.  The patient did have a chest x-ray,

which shows ______ hypertension and interstitial edema ______.  The patient also had a

Doppler study that was negative for DVT.  The patient was started on vancomycin and

Zosyn in the hospital.  Infectious Disease was consulted for further recommendation of

antibiotic therapy.



REVIEW OF SYSTEMS:

CONSTITUTIONAL: Positive for weakness along with the fever.

EYES:  No complaint.

ENT:  No complaint.

RESPIRATORY:  As per HPI.

CARDIOVASCULAR:  No complaint.

GENITOURINARY:  No complaint.

GASTROINTESTINAL:  No complaint.

MUSCULOSKELETAL:  No complaint.

INTEGUMENTARY:  No complaint.

PSYCHOLOGICAL:  No complaint.

NEUROLOGIC:  No complaint.



PAST MEDICAL HISTORY:

Significant for coronary artery disease, heart failure, COPD, diabetes mellitus,

hypertension, hyperlipidemia, ______, osteoarthritis, UTI.



PAST SURGICAL HISTORY:

PTCA, cataract surgery, bilateral liver biopsy, permanent pacemaker placement and

_______.



SOCIAL HISTORY:

Denies smoking, drinking or drug use.



FAMILY HISTORY:

Mother with history of hypertension.  Sister with a history of cancer.



ALLERGIES:

No known drug allergies.



MEDICATION:

The patient is currently on Norco, Lipitor, Coreg, Colace, Lasix, Neurontin, heparin,

hydralazine, NovoLog, Levemir, Imdur, lactulose, mag oxide, vancomycin pharmacy to

dose, Procardia XL, tazobactam.



PHYSICAL EXAMINATION:

Blood pressure is 144/59 with a pulse of 62, temperature 98.1.  He is 92% on 3 L nasal

cannula. General description is an elderly male lying in bed in no distress.  No

tachypnea or accessory muscle of respiration use.

HEENT:  Shows pallor.  No scleral icterus.  Oral mucosa is dry.  No pharyngeal erythema

or thrush.

NECK:  Trachea central.  No thyromegaly.

LUNGS:  Unlabored breathing with decreased breath sounds in the bases, no wheeze.

ABDOMEN:  Soft, no tenderness.  No guarding.  No rigidity.

EXTREMITIES:  No edema feet.

SKIN EXAMINATION:  No rash or mass palpable.

NEUROLOGICAL:  The patient is awake, alert and oriented.  Mood and affect normal.



LABS:

Hemoglobin 8.8, white count 10.6, BUN of 40, creatinine 4.6.  Electrolytes slightly

elevated at 5.7.  Liver enzymes has been normal.  Urine is negative.



DIAGNOSTIC IMPRESSION AND PLAN:

Patient admitted to the hospital with a fever of 101.5 Fahrenheit.  The patient has

been recently admitted to this facility and ______ medical problem and did have acute

renal insufficiency requiring a right groin catheter placement and the patient

currently with no clear focus of infection.  UA being negative.  Chest x-ray reported

negative for pneumonia.  He has abdominal source on clinical examination and no

evidence of any cellulitis ______ likely suspicious source of this fever to be in the

_______ catheter.



PLAN:

1. Blood culture should be repeated from _______ today.

2. The patient continue with Zosyn and vancomycin.  ______ kidney function need to be

    monitored closely, as well as a vancomycin trough need to monitor closely.

3. Depending upon his clinical response, as well as cultures will adjust his

    medications further if needed.

Thank you for this consultation.  We will follow this patient along with you.



MMODL / IJN: [id number] / Job#: [id number]

## 2018-07-19 LAB
ALBUMIN SERPL-MCNC: 3.2 G/DL (ref 3.5–5)
ALP SERPL-CCNC: 125 U/L (ref 38–126)
ALT SERPL-CCNC: 21 U/L (ref 21–72)
ANION GAP SERPL CALC-SCNC: 10 MMOL/L
AST SERPL-CCNC: 23 U/L (ref 17–59)
BUN SERPL-SCNC: 27 MG/DL (ref 9–20)
CALCIUM SPEC-MCNC: 9.2 MG/DL (ref 8.4–10.2)
CHLORIDE SERPL-SCNC: 101 MMOL/L (ref 98–107)
CO2 SERPL-SCNC: 26 MMOL/L (ref 22–30)
GLUCOSE BLD-MCNC: 110 MG/DL (ref 75–99)
GLUCOSE BLD-MCNC: 211 MG/DL (ref 75–99)
GLUCOSE BLD-MCNC: 219 MG/DL (ref 75–99)
GLUCOSE BLD-MCNC: 221 MG/DL (ref 75–99)
GLUCOSE SERPL-MCNC: 90 MG/DL (ref 74–99)
POTASSIUM SERPL-SCNC: 5.3 MMOL/L (ref 3.5–5.1)
PROT SERPL-MCNC: 6.8 G/DL (ref 6.3–8.2)
SODIUM SERPL-SCNC: 137 MMOL/L (ref 137–145)

## 2018-07-19 RX ADMIN — HEPARIN SODIUM SCH UNIT: 5000 INJECTION, SOLUTION INTRAVENOUS; SUBCUTANEOUS at 09:04

## 2018-07-19 RX ADMIN — CARVEDILOL SCH MG: 12.5 TABLET, FILM COATED ORAL at 09:05

## 2018-07-19 RX ADMIN — INSULIN ASPART SCH UNIT: 100 INJECTION, SOLUTION INTRAVENOUS; SUBCUTANEOUS at 21:02

## 2018-07-19 RX ADMIN — DEXTROSE MONOHYDRATE SCH MLS/HR: 5 INJECTION, SOLUTION INTRAVENOUS at 21:05

## 2018-07-19 RX ADMIN — INSULIN DETEMIR SCH UNIT: 100 INJECTION, SOLUTION SUBCUTANEOUS at 21:02

## 2018-07-19 RX ADMIN — ATORVASTATIN CALCIUM SCH MG: 20 TABLET, FILM COATED ORAL at 21:02

## 2018-07-19 RX ADMIN — DEXTROSE MONOHYDRATE SCH MLS/HR: 5 INJECTION, SOLUTION INTRAVENOUS at 09:04

## 2018-07-19 RX ADMIN — GABAPENTIN SCH MG: 100 CAPSULE ORAL at 06:26

## 2018-07-19 RX ADMIN — SODIUM FERRIC GLUCONATE COMPLEX SCH MLS/HR: 12.5 INJECTION INTRAVENOUS at 12:46

## 2018-07-19 RX ADMIN — FUROSEMIDE SCH MG: 80 TABLET ORAL at 06:26

## 2018-07-19 RX ADMIN — GABAPENTIN SCH MG: 100 CAPSULE ORAL at 21:02

## 2018-07-19 RX ADMIN — ISOSORBIDE MONONITRATE SCH MG: 60 TABLET, EXTENDED RELEASE ORAL at 09:04

## 2018-07-19 RX ADMIN — Medication SCH MG: at 17:46

## 2018-07-19 RX ADMIN — GABAPENTIN SCH MG: 100 CAPSULE ORAL at 14:07

## 2018-07-19 RX ADMIN — INSULIN ASPART SCH UNIT: 100 INJECTION, SOLUTION INTRAVENOUS; SUBCUTANEOUS at 12:46

## 2018-07-19 RX ADMIN — INSULIN ASPART SCH UNIT: 100 INJECTION, SOLUTION INTRAVENOUS; SUBCUTANEOUS at 17:46

## 2018-07-19 RX ADMIN — HEPARIN SODIUM SCH UNIT: 5000 INJECTION, SOLUTION INTRAVENOUS; SUBCUTANEOUS at 21:02

## 2018-07-19 RX ADMIN — INSULIN ASPART SCH: 100 INJECTION, SOLUTION INTRAVENOUS; SUBCUTANEOUS at 07:31

## 2018-07-19 RX ADMIN — ASPIRIN SCH: 325 TABLET ORAL at 12:44

## 2018-07-19 RX ADMIN — FUROSEMIDE SCH MG: 80 TABLET ORAL at 14:07

## 2018-07-19 RX ADMIN — CARVEDILOL SCH MG: 12.5 TABLET, FILM COATED ORAL at 17:46

## 2018-07-19 RX ADMIN — PANTOPRAZOLE SODIUM SCH MG: 40 INJECTION, POWDER, FOR SOLUTION INTRAVENOUS at 09:04

## 2018-07-19 RX ADMIN — NIFEDIPINE SCH MG: 30 TABLET, FILM COATED, EXTENDED RELEASE ORAL at 09:05

## 2018-07-19 NOTE — P.PN
Subjective





Patient is seen in follow-up for acute kidney injury currently hemodialysis 

dependent maintained on a Monday Wednesday Friday schedule.  Patient presented 

with fever.  There is concern that the groin dialysis catheter is infected.  He 

is maintained on IV antibiotics.  Patient is not a very reliable historian.  

States he wants to go home.





Vital signs are stable.


General: The patient appeared well nourished and normally developed. 


HEENT: Head exam is unremarkable. Neck is without jugular venous distension.


LUNGS: Lungs are clear to auscultation and percussion. Breath sounds decreased.


HEART: Rate and Rhythm are regular. First and second heart sounds normal. No 

murmurs, rubs or gallops. 


ABDOMEN: Abdominal exam reveals normal bowel sounds. Non-tender and non-

distended. No evidence of peritonitis.


EXTREMITITES: No clubbing, cyanosis, or edema.





Objective





- Vital Signs


Vital signs: 


 Vital Signs











Temp  98.2 F   07/19/18 07:00


 


Pulse  59 L  07/19/18 07:00


 


Resp  16   07/19/18 07:00


 


BP  159/69   07/19/18 07:00


 


Pulse Ox  95   07/19/18 07:00








 Intake & Output











 07/18/18 07/19/18 07/19/18





 18:59 06:59 18:59


 


Intake Total 60 350 


 


Output Total 0 300 


 


Balance 60 50 


 


Weight 104.326 kg 104 kg 


 


Intake:   


 


  Intake, IV Titration  50 





  Amount   


 


    Piperacillin-Tazobactam 3  50 





    .375 gm In Dextrose/Water   





    1 50ml.bag @ 12.5 mls/hr   





    IVPB Q8HR [facility] Rx#:   





    [id number]   


 


  Oral 60 300 


 


Output:   


 


  Urine 0 300 


 


Other:   


 


  Voiding Method  Bedpan 





  Diaper 





  Incontinent 


 


  # Bowel Movements   1














- Labs


CBC & Chem 7: 


 07/17/18 14:05





 07/19/18 07:17


Labs: 


 Abnormal Lab Results - Last 24 Hours (Table)











  07/17/18 07/18/18 07/18/18 Range/Units





  14:05 11:18 11:18 


 


Potassium     (3.5-5.1)  mmol/L


 


BUN     (9-20)  mg/dL


 


Creatinine     (0.66-1.25)  mg/dL


 


POC Glucose (mg/dL)     (75-99)  mg/dL


 


Hemoglobin A1c  9.0 H    (4.0-6.0)  %


 


Phosphorus   5.4 H   (2.5-4.5)  mg/dL


 


Iron    16 L  ()  ug/dL


 


Iron Saturation    6.43 L  (15.00-50.00)   


 


Ferritin    830.9 H  (22.0-322.0)  ng/mL


 


Albumin     (3.5-5.0)  g/dL














  07/18/18 07/18/18 07/18/18 Range/Units





  11:44 17:03 20:57 


 


Potassium     (3.5-5.1)  mmol/L


 


BUN     (9-20)  mg/dL


 


Creatinine     (0.66-1.25)  mg/dL


 


POC Glucose (mg/dL)  212 H  211 H  149 H  (75-99)  mg/dL


 


Hemoglobin A1c     (4.0-6.0)  %


 


Phosphorus     (2.5-4.5)  mg/dL


 


Iron     ()  ug/dL


 


Iron Saturation     (15.00-50.00)   


 


Ferritin     (22.0-322.0)  ng/mL


 


Albumin     (3.5-5.0)  g/dL














  07/19/18 07/19/18 Range/Units





  07:07 07:17 


 


Potassium   5.3 H  (3.5-5.1)  mmol/L


 


BUN   27 H  (9-20)  mg/dL


 


Creatinine   3.60 H  (0.66-1.25)  mg/dL


 


POC Glucose (mg/dL)  110 H   (75-99)  mg/dL


 


Hemoglobin A1c    (4.0-6.0)  %


 


Phosphorus    (2.5-4.5)  mg/dL


 


Iron    ()  ug/dL


 


Iron Saturation    (15.00-50.00)   


 


Ferritin    (22.0-322.0)  ng/mL


 


Albumin   3.2 L  (3.5-5.0)  g/dL








 Microbiology - Last 24 Hours (Table)











 07/17/18 15:38 Urine Culture - Final





 Urine,Catheterized 


 


 07/17/18 14:05 Blood Culture - Preliminary





 Blood    No Growth after 24 hours


 


 07/17/18 13:15 Blood Culture - Preliminary





 Blood    No Growth after 24 hours














Assessment and Plan


Plan: 





Assessment:


1.  Acute kidney injury currently hemodialysis dependent maintain on a Monday Wednesday Friday schedule via groin catheter.  No hydronephrosis noted on CT of 

the abdomen and pelvis done earlier this month.


2.  Fever.  Concern for catheter infection.  Infectious disease following.  

Maintained on IV antibiotics.


3.  Benign hypertension.


4.  Insulin-dependent diabetes mellitus.


5.  Anemia.  Iron deficiency noted.


6.  Hyperkalemia secondary to acute kidney injury.  Improved postdialysis.


7.  Hepatitis C.


8.  Diastolic CHF.


9.  Hyperphosphatemia secondary to acute kidney injury. Phos 5.4. Expect 

improvement with dialysis.





Plan:


Hemodialysis tomorrow with goal 2-3 liters ultrafiltration.


Ferrlecit 125 mg IV daily for 3 days.  First dose today.


Maintain Aranesp.


Continue Lasix 80 mg orally twice daily.


Follow-up cultures.


Monitor vancomycin levels.  Target level of 15.


Will continue to monitor renal function for recovery.


Discontinue Fleet enemas.

## 2018-07-19 NOTE — P.PN
Subjective


Progress Note Date: 07/19/18


Principal diagnosis: 


altered mentation





Patient is a 73-year-old -American male with a past medical history of 

coronary artery disease, systolic congestive heart failure, prior pancreatitis, 

multiple other medical comorbidities who presented to the emergency department 

from [facility] with altered mentation and Fever. He had just left the hospital 

after a prolonged stay for ESTEVAN requiring HD on CKD, AE CHF, Pancreatitis, and 

abdominal infections with possible UTI. He was seen in the emergency department 

and was found ot have elevated potassium and mildly elevated troponin. His UA 

and CXR were negative. Blood cultures were obtained. He was started on vano and 

zosyn and was admitted. He was seen by ID and nephrology. Concern is for 

possible infected HD cath. 





Patient seen and examined at bedside. He has no complaints frustrated that he 

is hospitalized again. He denies chest pain, nausea, SOB, and constipation. He 

is worried about being here again. DOes not believe me that he had a fever and 

states that he does not have an infection. 








Objective





- Vital Signs


Vital signs: 


 Vital Signs











Temp  98.2 F   07/19/18 07:00


 


Pulse  59 L  07/19/18 09:04


 


Resp  16   07/19/18 09:04


 


BP  159/69   07/19/18 07:00


 


Pulse Ox  95   07/19/18 07:00








 Intake & Output











 07/18/18 07/19/18 07/19/18





 18:59 06:59 18:59


 


Intake Total 60 350 


 


Output Total 0 300 


 


Balance 60 50 


 


Weight 104.326 kg 104 kg 


 


Intake:   


 


  Intake, IV Titration  50 





  Amount   


 


    Piperacillin-Tazobactam 3  50 





    .375 gm In Dextrose/Water   





    1 50ml.bag @ 12.5 mls/hr   





    IVPB Q8HR [facility] Rx#:   





    [id number]   


 


  Oral 60 300 


 


Output:   


 


  Urine 0 300 


 


Other:   


 


  Voiding Method  Bedpan Incontinent





  Diaper 





  Incontinent 


 


  # Bowel Movements   1














- Exam


General: non toxic, no distress, appears at stated age


Derm: warm, dry


Head: atraumatic, normocephalic, symmetric


Eyes: EOMI, no lid lag, anicteric sclera


Mouth: no lip lesion, mucus membranes moist


Cardiovascular: S1S2 reg, no murmur, positive posterior tibial pulse bilateral, 


Lungs: course bs b/l bases , no accessory muscle use


Abdominal: soft,  nontender to palpation, no guarding, no appreciable 

organomegaly


Ext: no gross muscle atrophy, 2+ edema, no contractures


Neuro:  CN II-XI grossly intact, no focal neuro deficits


Psych: Alert, oriented, appropriate affect 











- Labs


CBC & Chem 7: 


 07/17/18 14:05





 07/19/18 07:17


Labs: 


 Abnormal Lab Results - Last 24 Hours (Table)











  07/17/18 07/18/18 07/18/18 Range/Units





  14:05 11:18 17:03 


 


Potassium     (3.5-5.1)  mmol/L


 


BUN     (9-20)  mg/dL


 


Creatinine     (0.66-1.25)  mg/dL


 


POC Glucose (mg/dL)    211 H  (75-99)  mg/dL


 


Hemoglobin A1c  9.0 H    (4.0-6.0)  %


 


Iron   16 L   ()  ug/dL


 


Iron Saturation   6.43 L   (15.00-50.00)   


 


Ferritin   830.9 H   (22.0-322.0)  ng/mL


 


Albumin     (3.5-5.0)  g/dL














  07/18/18 07/19/18 07/19/18 Range/Units





  20:57 07:07 07:17 


 


Potassium    5.3 H  (3.5-5.1)  mmol/L


 


BUN    27 H  (9-20)  mg/dL


 


Creatinine    3.60 H  (0.66-1.25)  mg/dL


 


POC Glucose (mg/dL)  149 H  110 H   (75-99)  mg/dL


 


Hemoglobin A1c     (4.0-6.0)  %


 


Iron     ()  ug/dL


 


Iron Saturation     (15.00-50.00)   


 


Ferritin     (22.0-322.0)  ng/mL


 


Albumin    3.2 L  (3.5-5.0)  g/dL














  07/19/18 Range/Units





  11:24 


 


Potassium   (3.5-5.1)  mmol/L


 


BUN   (9-20)  mg/dL


 


Creatinine   (0.66-1.25)  mg/dL


 


POC Glucose (mg/dL)  211 H  (75-99)  mg/dL


 


Hemoglobin A1c   (4.0-6.0)  %


 


Iron   ()  ug/dL


 


Iron Saturation   (15.00-50.00)   


 


Ferritin   (22.0-322.0)  ng/mL


 


Albumin   (3.5-5.0)  g/dL








 Microbiology - Last 24 Hours (Table)











 07/18/18 11:20 Blood Culture - Preliminary





 Blood    No Growth after 24 hours


 


 07/17/18 15:38 Urine Culture - Final





 Urine,Catheterized 


 


 07/17/18 14:05 Blood Culture - Preliminary





 Blood    No Growth after 24 hours


 


 07/17/18 13:15 Blood Culture - Preliminary





 Blood    No Growth after 24 hours














Assessment and Plan


Assessment: 


Fever with concern for bacteremia


- await blood cultures


- ID recs


- vanco and zosyn


- follow fever profile 





ESTEVAN on CKD currently HD dependent


- Management per nephrology





Anemia of chronic kidney disease


- on ferrous gluconate and aranesp


- follow intermittent CBC





DM 2 with neuropathy


- fixed dose and SSI with novol, levemir, follow BS


-A1C 9





Compensated diastolic CHF 


- fluid control with HD and lasix


- Coreg


- No ACEI due to hx of hyperkalemia





HTN, improved control


- continue current meds


- follow BP





Hepatitis C 


- currently on Mavyret has not been receiving this hospital stay as did not 

bring in from home. 





Chronic:


HLD


Asthma


COPD





DVT prophylaxis: Heparin


Discussed with: Patient, nursing, nephrology


Anticipated discharge: 24-48 hours


Anticipated discharge place: back to [facility]


A total of 35 minutes was spent on the care of this complex patient more than 50

% of the time was spent in counseling and care coordination.

## 2018-07-19 NOTE — PN
PROGRESS NOTE



DATE OF SERVICE:

07/19/2018.



REASON FOR FOLLOWUP:

Fever and a question of possible dialysis catheter infection.



INTERVAL HISTORY:

The patient is currently afebrile, has been breathing comfortably.  Denies significant

chest pain.  No cough.  No abdominal pain.  No diarrhea.



EXAMINATION:

Blood pressure 108/54,  with a pulse of 55, temperature 97.4.  He is 92% 3 L nasal

cannula. General description is an elderly male lying in bed in no distress.

Respiratory system:  Unlabored breathing.  Clear to auscultation anteriorly.  Heart S1,

S2.  Regular rate and rhythm.

ABDOMEN:  Soft, no tenderness.



LABS:

BUN of 27, creatinine 3.60.  Blood culture has been negative so far.



DIAGNOSTIC IMPRESSION AND PLAN:

Patient admitted to the hospital with a fever in a patient with no clinical focus of

infection with concern for possible dialysis catheter infection.  However, his blood

culture has been negative. That will not be likely with the underlying history of

infection.  His urine was negative.  Chest x-ray was negative.  Currently on Zosyn and

vancomycin.  However, the cultures remain to be negative.  We will recommend

discontinuation of antibiotic and watching closely off antibiotic therapy.  Continue

supportive care.  No need to remove his dialysis access catheter at this point.





MMODL / IJN: [id number] / Job#: [id number]

## 2018-07-20 LAB
ANION GAP SERPL CALC-SCNC: 11 MMOL/L
BUN SERPL-SCNC: 33 MG/DL (ref 9–20)
CALCIUM SPEC-MCNC: 9 MG/DL (ref 8.4–10.2)
CHLORIDE SERPL-SCNC: 100 MMOL/L (ref 98–107)
CO2 SERPL-SCNC: 24 MMOL/L (ref 22–30)
ERYTHROCYTE [DISTWIDTH] IN BLOOD BY AUTOMATED COUNT: 2.98 M/UL (ref 4.3–5.9)
ERYTHROCYTE [DISTWIDTH] IN BLOOD: 13.9 % (ref 11.5–15.5)
GLUCOSE BLD-MCNC: 103 MG/DL (ref 75–99)
GLUCOSE BLD-MCNC: 141 MG/DL (ref 75–99)
GLUCOSE BLD-MCNC: 173 MG/DL (ref 75–99)
GLUCOSE BLD-MCNC: 176 MG/DL (ref 75–99)
GLUCOSE SERPL-MCNC: 100 MG/DL (ref 74–99)
HCT VFR BLD AUTO: 27.3 % (ref 39–53)
HGB BLD-MCNC: 8.2 GM/DL (ref 13–17.5)
MCH RBC QN AUTO: 27.6 PG (ref 25–35)
MCHC RBC AUTO-ENTMCNC: 30.2 G/DL (ref 31–37)
MCV RBC AUTO: 91.6 FL (ref 80–100)
PLATELET # BLD AUTO: 201 K/UL (ref 150–450)
POTASSIUM SERPL-SCNC: 5.2 MMOL/L (ref 3.5–5.1)
SODIUM SERPL-SCNC: 135 MMOL/L (ref 137–145)
VANCOMYCIN SERPL-MCNC: 20.2 UG/ML
WBC # BLD AUTO: 9.2 K/UL (ref 3.8–10.6)

## 2018-07-20 RX ADMIN — PANTOPRAZOLE SODIUM SCH MG: 40 TABLET, DELAYED RELEASE ORAL at 07:41

## 2018-07-20 RX ADMIN — ISOSORBIDE MONONITRATE SCH MG: 60 TABLET, EXTENDED RELEASE ORAL at 07:41

## 2018-07-20 RX ADMIN — NIFEDIPINE SCH MG: 30 TABLET, FILM COATED, EXTENDED RELEASE ORAL at 07:41

## 2018-07-20 RX ADMIN — HEPARIN SODIUM SCH UNIT: 5000 INJECTION, SOLUTION INTRAVENOUS; SUBCUTANEOUS at 07:41

## 2018-07-20 RX ADMIN — ATORVASTATIN CALCIUM SCH MG: 20 TABLET, FILM COATED ORAL at 20:50

## 2018-07-20 RX ADMIN — HEPARIN SODIUM SCH UNIT: 5000 INJECTION, SOLUTION INTRAVENOUS; SUBCUTANEOUS at 20:50

## 2018-07-20 RX ADMIN — INSULIN ASPART SCH UNIT: 100 INJECTION, SOLUTION INTRAVENOUS; SUBCUTANEOUS at 17:57

## 2018-07-20 RX ADMIN — INSULIN ASPART SCH UNIT: 100 INJECTION, SOLUTION INTRAVENOUS; SUBCUTANEOUS at 17:56

## 2018-07-20 RX ADMIN — INSULIN ASPART SCH: 100 INJECTION, SOLUTION INTRAVENOUS; SUBCUTANEOUS at 07:29

## 2018-07-20 RX ADMIN — FUROSEMIDE SCH MG: 80 TABLET ORAL at 14:35

## 2018-07-20 RX ADMIN — GABAPENTIN SCH MG: 100 CAPSULE ORAL at 06:13

## 2018-07-20 RX ADMIN — GABAPENTIN SCH MG: 100 CAPSULE ORAL at 14:35

## 2018-07-20 RX ADMIN — CARVEDILOL SCH MG: 12.5 TABLET, FILM COATED ORAL at 07:41

## 2018-07-20 RX ADMIN — DEXTROSE MONOHYDRATE SCH MLS/HR: 5 INJECTION, SOLUTION INTRAVENOUS at 07:41

## 2018-07-20 RX ADMIN — Medication SCH MG: at 17:57

## 2018-07-20 RX ADMIN — DEXTROSE MONOHYDRATE SCH: 5 INJECTION, SOLUTION INTRAVENOUS at 12:23

## 2018-07-20 RX ADMIN — ASPIRIN SCH: 325 TABLET ORAL at 07:43

## 2018-07-20 RX ADMIN — CARVEDILOL SCH MG: 12.5 TABLET, FILM COATED ORAL at 17:57

## 2018-07-20 RX ADMIN — GABAPENTIN SCH MG: 100 CAPSULE ORAL at 20:50

## 2018-07-20 RX ADMIN — FUROSEMIDE SCH MG: 80 TABLET ORAL at 06:13

## 2018-07-20 RX ADMIN — INSULIN DETEMIR SCH UNIT: 100 INJECTION, SOLUTION SUBCUTANEOUS at 20:51

## 2018-07-20 RX ADMIN — INSULIN ASPART SCH UNIT: 100 INJECTION, SOLUTION INTRAVENOUS; SUBCUTANEOUS at 13:09

## 2018-07-20 RX ADMIN — INSULIN ASPART SCH UNIT: 100 INJECTION, SOLUTION INTRAVENOUS; SUBCUTANEOUS at 20:51

## 2018-07-20 RX ADMIN — SODIUM FERRIC GLUCONATE COMPLEX SCH MLS/HR: 12.5 INJECTION INTRAVENOUS at 12:12

## 2018-07-20 NOTE — P.PN
Subjective


Progress Note Date: 07/20/18 (Delayed charting patient seen at approximately 11 

AM)


Principal diagnosis: 


altered mentation





Patient is a 73-year-old -American male with a past medical history of 

coronary artery disease, systolic congestive heart failure, prior pancreatitis, 

multiple other medical comorbidities who presented to the emergency department 

from [facility] with altered mentation and Fever. He had just left the hospital 

after a prolonged stay for ESTEVAN requiring HD on CKD, AE CHF, Pancreatitis, and 

abdominal infections with possible UTI. He was seen in the emergency department 

and was found ot have elevated potassium and mildly elevated troponin. His UA 

and CXR were negative. Blood cultures were obtained. He was started on vano and 

zosyn and was admitted. He was seen by ID and nephrology. Concern is for 

possible infected HD cath.  Blood cultures were negative for 24 hours including 

one removed from pORT.  Patient may have an isolated febrile event.  Infectious 

disease recommended monitoring the patient off antibiotics and antibiotics were 

discontinued the morning of 7/20.





Patient seen and examined at bedside.  Feeling very fatigued on dialysis today.

  No chest pain or shortness of breath.  No nausea or vomiting.  per HD nurse 

at bedside patient had crackles prior to starting dialysis.





Objective





- Vital Signs


Vital signs: 


 Vital Signs











Temp  98.5 F   07/20/18 05:41


 


Pulse  61   07/20/18 15:40


 


Resp  22   07/20/18 15:40


 


BP  150/70   07/20/18 05:41


 


Pulse Ox  93 L  07/20/18 05:41








 Intake & Output











 07/19/18 07/20/18 07/20/18





 18:59 06:59 18:59


 


Intake Total 150 100 100


 


Output Total  50 


 


Balance 150 50 100


 


Weight  104.6 kg 104.6 kg


 


Intake:   


 


  Intake, IV Titration 150  100





  Amount   


 


    Piperacillin-Tazobactam 3 50  





    .375 gm In Dextrose/Water   





    1 50ml.bag @ 12.5 mls/hr   





    IVPB Q12HR ANTOLIN Rx#:   





    [id number]   


 


    Sodium Ferric Gluconat- 100  100





    Sucrose 125 mg In Sodium   





    Chloride 0.9% 100 ml @   





    100 mls/hr IVPB DAILY ANTOLIN   





    Rx#:[id number]   


 


  Oral  100 


 


Output:   


 


  Urine  50 


 


Other:   


 


  Voiding Method Incontinent Incontinent Incontinent


 


  # Bowel Movements 1  














- Exam


General: non toxic, no distress, appears at stated age


Derm: warm, dry


Head: atraumatic, normocephalic, symmetric


Eyes: EOMI, no lid lag, anicteric sclera


Mouth: no lip lesion, mucus membranes moist


Cardiovascular: S1S2 reg, no murmur, positive posterior tibial pulse bilateral, 


Lungs: Decreased breath sounds bilateral bases, no accessory muscle use


Abdominal: soft,  nontender to palpation, no guarding, no appreciable 

organomegaly


Ext: no gross muscle atrophy, 2+ edema, no contractures


Neuro:  CN II-XI grossly intact, no focal neuro deficits


Psych: Alert, oriented, appropriate affect, fatigued and somnolent 











- Labs


CBC & Chem 7: 


 07/20/18 07:07





 07/20/18 07:07


Labs: 


 Abnormal Lab Results - Last 24 Hours (Table)











  07/19/18 07/19/18 07/20/18 Range/Units





  17:03 20:37 07:07 


 


RBC     (4.30-5.90)  m/uL


 


Hgb     (13.0-17.5)  gm/dL


 


Hct     (39.0-53.0)  %


 


MCHC     (31.0-37.0)  g/dL


 


Sodium    135 L  (137-145)  mmol/L


 


Potassium    5.2 H  (3.5-5.1)  mmol/L


 


BUN    33 H  (9-20)  mg/dL


 


Creatinine    4.80 H  (0.66-1.25)  mg/dL


 


Glucose    100 H  (74-99)  mg/dL


 


POC Glucose (mg/dL)  219 H  221 H   (75-99)  mg/dL














  07/20/18 07/20/18 07/20/18 Range/Units





  07:07 07:21 11:56 


 


RBC  2.98 L    (4.30-5.90)  m/uL


 


Hgb  8.2 L    (13.0-17.5)  gm/dL


 


Hct  27.3 L    (39.0-53.0)  %


 


MCHC  30.2 L    (31.0-37.0)  g/dL


 


Sodium     (137-145)  mmol/L


 


Potassium     (3.5-5.1)  mmol/L


 


BUN     (9-20)  mg/dL


 


Creatinine     (0.66-1.25)  mg/dL


 


Glucose     (74-99)  mg/dL


 


POC Glucose (mg/dL)   103 H  173 H  (75-99)  mg/dL








 Microbiology - Last 24 Hours (Table)











 07/18/18 13:20 Blood Culture - Preliminary





 Blood    No Growth after 48 hours


 


 07/17/18 13:15 Blood Culture - Preliminary





 Blood    No Growth after 72 hours


 


 07/18/18 11:20 Blood Culture - Preliminary





 Blood    No Growth after 48 hours


 


 07/17/18 14:05 Blood Culture - Preliminary





 Blood    No Growth after 48 hours














Assessment and Plan


Assessment: 


Fever with concern for bacteremia


-Blood cultures negative for 48 hours


- ID recs: Stop Vanco and Zosyn, monitor fever profile off antibiotics





ESTEVAN on CKD currently HD dependent


- Management per nephrology





Anemia of chronic kidney disease


- on ferrous gluconate and aranesp


- follow intermittent CBC





DM 2 with neuropathy


- fixed dose and SSI with novol, levemir, follow BS


-A1C 9





Compensated diastolic CHF 


- fluid control with HD and lasix


- Coreg


- No ACEI due to hx of hyperkalemia





HTN, improved control


- continue current meds


- follow BP





Hepatitis C 


- currently on Mavyret has not been receiving this hospital stay as did not 

bring in from home. 





Chronic:


HLD


Asthma


COPD





DVT prophylaxis: Heparin


Discussed with: Patient, nursing, nephrology


Anticipated discharge: 24 HOURS, hope to be able to D/C back to [facility] in AM.


Anticipated discharge place: back to [facility]


A total of 35 minutes was spent on the care of this complex patient more than 50

% of the time was spent in counseling and care coordination.

## 2018-07-20 NOTE — PN
PROGRESS NOTE



DATE OF SERVICE:

07/20/2018



REASON FOR FOLLOWUP:

Fever with a question of possible PermCath infection.



INTERVAL HISTORY:

The patient has been afebrile for more than 72 hours now. The patient has been

breathing comfortably.  Denies having any chest pain.  No shortness of breath or cough.

No abdominal pain or any diarrhea.



PHYSICAL EXAMINATION:

Blood pressure 125/57 with a pulse of 65, temperature 98.1. He is 99% on 4 L nasal

cannula.

General description is an elderly male lying in bed in no distress.

RESPIRATORY SYSTEM: Unlabored breathing. Clear to auscultation anteriorly.

HEART: S1, S2.  Regular rate and rhythm.

ABDOMEN: Soft. No tenderness.



LABS:

Hemoglobin 8.2, white count 9.2 with BUN of 33, creatinine 4.80.  Blood culture has

been negative.



DIAGNOSTIC IMPRESSION AND PLAN:

Patient with a fever with concern for possible PermCath site infection; however, blood

culture has been negative.  That will make it unlikely. The patient is currently with

no other clinical focus of infection.  His antibiotic has been discontinued.  Will

continue to monitor the patient closely off antibiotic therapy.  Continue with

supportive care.





MMODL / IJN: [id number] / Job#: [id number]

## 2018-07-20 NOTE — P.PN
Subjective





Patient is seen in follow-up for acute kidney injury currently hemodialysis 

dependent maintained on a Monday Wednesday Friday schedule.  Patient presented 

with fever.  There is concern that the groin dialysis catheter is infected.  

Cultures have been negative.  He is off antibiotics at this time.  Patient is 

not a very reliable historian.  States he wants to go home.





Vital signs are stable.


General: The patient appeared well nourished and normally developed. 


HEENT: Head exam is unremarkable. Neck is without jugular venous distension.


LUNGS: Lungs are clear to auscultation and percussion. Breath sounds decreased.


HEART: Rate and Rhythm are regular. First and second heart sounds normal. No 

murmurs, rubs or gallops. 


ABDOMEN: Abdominal exam reveals normal bowel sounds. Non-tender and non-

distended. No evidence of peritonitis.


EXTREMITITES: No clubbing, cyanosis, or edema.





Objective





- Vital Signs


Vital signs: 


 Vital Signs











Temp  98.5 F   07/20/18 05:41


 


Pulse  56 L  07/20/18 05:41


 


Resp  22   07/20/18 05:41


 


BP  150/70   07/20/18 05:41


 


Pulse Ox  93 L  07/20/18 05:41








 Intake & Output











 07/19/18 07/20/18 07/20/18





 18:59 06:59 18:59


 


Intake Total 150 100 


 


Output Total  50 


 


Balance 150 50 


 


Weight  104.6 kg 104.6 kg


 


Intake:   


 


  Intake, IV Titration 150  





  Amount   


 


    Piperacillin-Tazobactam 3 50  





    .375 gm In Dextrose/Water   





    1 50ml.bag @ 12.5 mls/hr   





    IVPB Q12HR ANTOLIN Rx#:   





    [id number]   


 


    Sodium Ferric Gluconat- 100  





    Sucrose 125 mg In Sodium   





    Chloride 0.9% 100 ml @   





    100 mls/hr IVPB DAILY ANTOLIN   





    Rx#:[id number]   


 


  Oral  100 


 


Output:   


 


  Urine  50 


 


Other:   


 


  Voiding Method Incontinent Incontinent 


 


  # Bowel Movements 1  














- Labs


CBC & Chem 7: 


 07/20/18 07:07





 07/20/18 07:07


Labs: 


 Abnormal Lab Results - Last 24 Hours (Table)











  07/19/18 07/19/18 07/20/18 Range/Units





  17:03 20:37 07:07 


 


RBC     (4.30-5.90)  m/uL


 


Hgb     (13.0-17.5)  gm/dL


 


Hct     (39.0-53.0)  %


 


MCHC     (31.0-37.0)  g/dL


 


Sodium    135 L  (137-145)  mmol/L


 


Potassium    5.2 H  (3.5-5.1)  mmol/L


 


BUN    33 H  (9-20)  mg/dL


 


Creatinine    4.80 H  (0.66-1.25)  mg/dL


 


Glucose    100 H  (74-99)  mg/dL


 


POC Glucose (mg/dL)  219 H  221 H   (75-99)  mg/dL














  07/20/18 07/20/18 07/20/18 Range/Units





  07:07 07:21 11:56 


 


RBC  2.98 L    (4.30-5.90)  m/uL


 


Hgb  8.2 L    (13.0-17.5)  gm/dL


 


Hct  27.3 L    (39.0-53.0)  %


 


MCHC  30.2 L    (31.0-37.0)  g/dL


 


Sodium     (137-145)  mmol/L


 


Potassium     (3.5-5.1)  mmol/L


 


BUN     (9-20)  mg/dL


 


Creatinine     (0.66-1.25)  mg/dL


 


Glucose     (74-99)  mg/dL


 


POC Glucose (mg/dL)   103 H  173 H  (75-99)  mg/dL








 Microbiology - Last 24 Hours (Table)











 07/18/18 11:20 Blood Culture - Preliminary





 Blood    No Growth after 48 hours


 


 07/17/18 14:05 Blood Culture - Preliminary





 Blood    No Growth after 48 hours


 


 07/18/18 13:20 Blood Culture - Preliminary





 Blood    No Growth after 24 hours


 


 07/17/18 13:15 Blood Culture - Preliminary





 Blood    No Growth after 48 hours














Assessment and Plan


Plan: 





Assessment:


1.  Acute kidney injury currently hemodialysis dependent maintain on a Monday Wednesday Friday schedule via groin catheter.  No hydronephrosis noted on CT of 

the abdomen and pelvis done earlier this month.


2.  Fever.  Concern for catheter infection.  Infectious disease following.  


 antibiotics discontinued.


3.  Benign hypertension. controlled.


4.  Insulin-dependent diabetes mellitus.


5.  Anemia.  Iron deficiency noted.


6.  Hyperkalemia secondary to acute kidney injury.  Improved postdialysis.


7.  Hepatitis C.


8.  Diastolic CHF.


9.  Hyperphosphatemia secondary to acute kidney injury. Phos 5.4. Expect 

improvement with dialysis.





Plan:


Hemodialysis on Monday.


Ferrlecit 125 mg IV daily for 3 days.   Second  dose today.


Maintain Aranesp.


Continue Lasix 80 mg orally twice daily.


Follow-up cultures.


Will continue to monitor renal function for recovery.


Discontinued Fleet enemas.

## 2018-07-21 VITALS — HEART RATE: 64 BPM | SYSTOLIC BLOOD PRESSURE: 153 MMHG | TEMPERATURE: 97 F | DIASTOLIC BLOOD PRESSURE: 76 MMHG

## 2018-07-21 VITALS — RESPIRATION RATE: 18 BRPM

## 2018-07-21 LAB
ANION GAP SERPL CALC-SCNC: 7 MMOL/L
BUN SERPL-SCNC: 18 MG/DL (ref 9–20)
CALCIUM SPEC-MCNC: 8.8 MG/DL (ref 8.4–10.2)
CHLORIDE SERPL-SCNC: 101 MMOL/L (ref 98–107)
CO2 SERPL-SCNC: 26 MMOL/L (ref 22–30)
ERYTHROCYTE [DISTWIDTH] IN BLOOD BY AUTOMATED COUNT: 2.88 M/UL (ref 4.3–5.9)
ERYTHROCYTE [DISTWIDTH] IN BLOOD: 13.9 % (ref 11.5–15.5)
GLUCOSE BLD-MCNC: 107 MG/DL (ref 75–99)
GLUCOSE BLD-MCNC: 114 MG/DL (ref 75–99)
GLUCOSE SERPL-MCNC: 105 MG/DL (ref 74–99)
HCT VFR BLD AUTO: 26.5 % (ref 39–53)
HGB BLD-MCNC: 8.2 GM/DL (ref 13–17.5)
MCH RBC QN AUTO: 28.5 PG (ref 25–35)
MCHC RBC AUTO-ENTMCNC: 31.1 G/DL (ref 31–37)
MCV RBC AUTO: 91.8 FL (ref 80–100)
PLATELET # BLD AUTO: 186 K/UL (ref 150–450)
POTASSIUM SERPL-SCNC: 4.7 MMOL/L (ref 3.5–5.1)
SODIUM SERPL-SCNC: 134 MMOL/L (ref 137–145)
WBC # BLD AUTO: 8 K/UL (ref 3.8–10.6)

## 2018-07-21 RX ADMIN — SODIUM FERRIC GLUCONATE COMPLEX SCH MLS/HR: 12.5 INJECTION INTRAVENOUS at 10:03

## 2018-07-21 RX ADMIN — GABAPENTIN SCH MG: 100 CAPSULE ORAL at 14:01

## 2018-07-21 RX ADMIN — PANTOPRAZOLE SODIUM SCH MG: 40 TABLET, DELAYED RELEASE ORAL at 09:11

## 2018-07-21 RX ADMIN — NIFEDIPINE SCH MG: 30 TABLET, FILM COATED, EXTENDED RELEASE ORAL at 09:11

## 2018-07-21 RX ADMIN — GABAPENTIN SCH MG: 100 CAPSULE ORAL at 06:28

## 2018-07-21 RX ADMIN — ASPIRIN SCH: 325 TABLET ORAL at 09:12

## 2018-07-21 RX ADMIN — HEPARIN SODIUM SCH UNIT: 5000 INJECTION, SOLUTION INTRAVENOUS; SUBCUTANEOUS at 09:12

## 2018-07-21 RX ADMIN — ISOSORBIDE MONONITRATE SCH MG: 60 TABLET, EXTENDED RELEASE ORAL at 09:11

## 2018-07-21 RX ADMIN — INSULIN ASPART SCH: 100 INJECTION, SOLUTION INTRAVENOUS; SUBCUTANEOUS at 09:11

## 2018-07-21 RX ADMIN — CARVEDILOL SCH MG: 12.5 TABLET, FILM COATED ORAL at 09:11

## 2018-07-21 RX ADMIN — INSULIN ASPART SCH UNIT: 100 INJECTION, SOLUTION INTRAVENOUS; SUBCUTANEOUS at 14:01

## 2018-07-21 RX ADMIN — FUROSEMIDE SCH MG: 80 TABLET ORAL at 06:28

## 2018-07-21 RX ADMIN — INSULIN ASPART SCH: 100 INJECTION, SOLUTION INTRAVENOUS; SUBCUTANEOUS at 11:48

## 2018-07-21 RX ADMIN — FUROSEMIDE SCH MG: 80 TABLET ORAL at 14:01

## 2018-07-21 NOTE — P.DS
Providers


Date of admission: 


07/17/18 15:40





Expected date of discharge: 07/21/18


Attending physician: 


Santi Braga MD





Consults: 





 





07/17/18 15:37


Consult Physician Routine 


   Consulting Provider: Isabella Reyes


   Consult Reason/Comments: arf


   Do you want consulting provider notified?: Yes





07/18/18 07:47


Consult Physician Routine 


   Consulting Provider: Rg Mane


   Consult Reason/Comments: Fever


   Do you want consulting provider notified?: Yes











Primary care physician: 


Edward Austin





[facility] Course: 


Discharge Diagnosis:


Fever, bacteremia ruled out


ESTEVAN on CKD currently HD dependent


Anemia of chronic kidney disease


DM 2 with neuropathy


Compensated diastolic CHF 


HTN, improved control


Hepatitis C 


HLD


Asthma


COPD





Hospital Course:


Patient is a 73-year-old -American male with a past medical history of 

coronary artery disease, systolic congestive heart failure, prior pancreatitis, 

multiple other medical comorbidities who presented to the emergency department 

from [facility] with altered mentation and Fever. He had just left the hospital 

after a prolonged stay for ESTEVAN requiring HD on CKD, AE CHF, Pancreatitis, and 

abdominal infections with possible UTI. He was seen in the emergency department 

and was found ot have elevated potassium and mildly elevated troponin. His UA 

and CXR were negative. Blood cultures were obtained. He was started on vano and 

zosyn and was admitted. He was seen by ID and nephrology. Concern is for 

possible infected HD cath.  Blood cultures were negative for 24 hours including 

one removed from pORT.  Patient may have an isolated febrile event.  Infectious 

disease recommended monitoring the patient off antibiotics and antibiotics were 

discontinued the morning of 7/20. He was off antibiotics for 24 hours and did 

not spike fevers, his WBC count remained normal.  His blood cultures were 

negative for 72 hours.  He was feeling back to his normal self and was 

determined stable for discharge. 





Patient seen and examined at bedside.  Denies any chest pain, shortness breath, 

nausea, or vomiting.  Feeling back to baseline.  Feeling well.





Vital signs reviewed and stable. 


General: non toxic, no distress, appears at stated age


Derm: warm, dry


Head: atraumatic, normocephalic, symmetric


Eyes: EOMI, no lid lag, anicteric sclera


Mouth: no lip lesion, mucus membranes moist


Cardiovascular: S1S2 reg, no murmur, positive posterior tibial pulse bilateral, 


Lungs: CTA bilateral, no rhonchi, no rales , no accessory muscle use


Abdominal: soft,  nontender to palpation, no guarding, no appreciable 

organomegaly


Ext: no gross muscle atrophy, 2+ edema, no contractures


Neuro:  CN II-XI grossly intact, no focal neuro deficits, patient has a chronic 

stutter and his response which is his baseline.


Psych: Alert, oriented, appropriate affect 








A total of 35 minutes of time were spent preparing this complex discharge 

summary .











Patient Condition at Discharge: Fair





Plan - Discharge Summary


Discharge Rx Participant: No


New Discharge Prescriptions: 


Continue


   Nitroglycerin Sl Tabs [Nitrostat] 0.4 mg SUBLINGUAL Q5M PRN


     PRN Reason: Angina


   Carvedilol 25 mg PO BID@0800,1700


   Docusate [Colace] 100 mg PO HS PRN


     PRN Reason: Constipation


   Pantoprazole [Protonix] 40 mg PO AC-BRKFST  tablet.


   Magnesium Oxide [Mag-Ox] 400 mg PO DAILY@1700


   Atorvastatin [Lipitor] 20 mg PO HS@2100


   Insulin Detemir [Levemir] 35 unit SQ HS@2100


   Glecaprevir/Pibrentasvir [Mavyret 100-40 mg Tablet] 3 tab PO DAILY@0800


   Insulin Aspart [NovoLOG (formulary)] 2 unit SQ AC-LUNCH #0 vial


   Polyethylene Glycol 3350 [Miralax] 17 gm PO DAILY PRN  powd.pack


     PRN Reason: Constipation


   NIFEdipine XL [Procardia XL] 30 mg PO DAILY@0800


   Isosorbide Mononitrate ER [Imdur] 60 mg PO DAILY@0800


   Insulin Aspart [NovoLOG (formulary)] 4 unit SQ AC-SUPPER@1700


   hydrALAZINE HCL [Apresoline] 100 mg PO TID@0600,1400,2100


   Gabapentin [Neurontin] 100 mg PO TID@0600,1400,2100


   Furosemide [Lasix] 80 mg PO BID@0600,1400


   Bisacodyl 10 mg RECTAL DAILY PRN


     PRN Reason: Constipation


   HYDROcodone/APAP 7.5-325MG [Norco 7.5-325] 1 tab PO Q6HR PRN #28 tab


     PRN Reason: Pain





Discontinued


   Na Phos,M-B/Na Phos,Di-Ba [Fleet Adult] 133 ml RECTAL DAILY PRN


     PRN Reason: Constipation


   Magnesium Hydroxide [Milk of Magnesia] 2,400 mg PO DAILY PRN


     PRN Reason: Constipation


   Insulin Aspart [NovoLOG (formulary)] See Protocol SQ ACHS


   Amoxic-Pot Clav 500-125 mg [Augmentin 500-125 mg] 1 tab PO BID@0800,1700


Discharge Medication List





Nitroglycerin Sl Tabs [Nitrostat] 0.4 mg SUBLINGUAL Q5M PRN 05/18/14 [History]


Carvedilol 25 mg PO BID@0800,1700 07/14/15 [History]


Docusate [Colace] 100 mg PO HS PRN 11/25/15 [History]


Pantoprazole [Protonix] 40 mg PO AC-BRKFST  tablet. 03/13/18 [Rx]


Atorvastatin [Lipitor] 20 mg PO HS@2100 06/26/18 [History]


Glecaprevir/Pibrentasvir [Mavyret 100-40 mg Tablet] 3 tab PO DAILY@0800 06/26/ 18 [History]


Insulin Detemir [Levemir] 35 unit SQ HS@2100 06/26/18 [History]


Magnesium Oxide [Mag-Ox] 400 mg PO DAILY@1700 06/26/18 [History]


Insulin Aspart [NovoLOG (formulary)] 2 unit SQ AC-LUNCH #0 vial 07/11/18 [Rx]


Polyethylene Glycol 3350 [Miralax] 17 gm PO DAILY PRN  powd.pack 07/11/18 [Rx]


Bisacodyl 10 mg RECTAL DAILY PRN 07/17/18 [History]


Furosemide [Lasix] 80 mg PO BID@0600,1400 07/17/18 [History]


Gabapentin [Neurontin] 100 mg PO TID@0600,1400,2100 07/17/18 [History]


Insulin Aspart [NovoLOG (formulary)] 4 unit SQ AC-SUPPER@1700 07/17/18 [History]


Isosorbide Mononitrate ER [Imdur] 60 mg PO DAILY@0800 07/17/18 [History]


NIFEdipine XL [Procardia XL] 30 mg PO DAILY@0800 07/17/18 [History]


hydrALAZINE HCL [Apresoline] 100 mg PO TID@0600,1400,2100 07/17/18 [History]


HYDROcodone/APAP 7.5-325MG [Norco 7.5-325] 1 tab PO Q6HR PRN #28 tab 07/21/18 [

Rx]








Follow up Appointment(s)/Referral(s): 


Isabella Reyes MD [STAFF PHYSICIAN] - 1 Week


Edward Avila MD [Primary Care Provider] - 1-2 days


Activity/Diet/Wound Care/Special Instructions: 


pt is currently set up with [facility] - they were notified 

that the pt was in the hospital - they just need to be called when the pt is d/

c. - [phone]





Hemodialysis on Mon, Wed, and Friday





Renal carb consistent diet


Actiivty as tolerated


Fall precautions


Physical and occupational therapy.


Discharge Disposition: TRANSFER TO SNF/ECF

## 2018-07-21 NOTE — PN
PROGRESS NOTE



DATE OF SERVICE:

07/21/2018.



REASON FOR FOLLOWUP:

Fever.



INTERVAL HISTORY:

The patient is currently afebrile.  He is breathing comfortably.  Denies having any

chest pain or any cough.  No abdominal pain and no diarrhea.



EXAMINATION:

Blood pressure 153/76 with a pulse of 74, temperature 97.  He is 95% on 4 L nasal

cannula. General description is an elderly male lying in bed in no distress.

Respiratory system:  Unlabored breathing.  Clear to auscultation anteriorly.

HEART: S1, S2.  Regular rate and rhythm. Abdomen soft, no tenderness.



LABS:

Hemoglobin 8.8, white count 8.0, BUN of 18, creatinine of 3.71.  Blood culture has been

negative.



DIAGNOSTIC IMPRESSION AND PLAN:

Patient with fever with concern for possible source being his dialysis catheter.

However, the culture has been negative making VRE unlikely.  The patient is currently

off antibiotics for more than 48 hours now with no evidence of any fever.  No need for

antibiotic on discharge.  Continue supportive care.





MMODL / IJN: [id number] / Job#: [id number]

## 2018-07-21 NOTE — PN
PROGRESS NOTE



Patient is seen for followup for advanced renal failure, currently hemodialysis

dependent.  The patient is maintained on a Monday, Wednesday, Friday schedule.  He was

dialyzed yesterday.



PHYSICAL EXAMINATION:

Today, he is sleeping. He is comfortable, not in any acute distress.  The patient is

arousable but goes back to sleep.  Blood pressure this morning was 153/76, heart rate

64 per minute.  He is afebrile. Examination of the heart: S1, S2.  Examination of the

lungs: Bilateral breath sounds are heard.  Abdomen is soft, nontender, obese.

Examination lower extremities shows edema 1+ bilaterally.  Chronic skin changes are

noted.



LAB:

Show sodium 134, potassium 4.7, hemoglobin 8.2 g/dL.



ASSESSMENT:

1. Hemodialysis dependent acute renal failure, currently maintained on Monday,

    Wednesday, Friday schedule.

2. Fever status post antibiotics, all cultures are negative, being followed by ID.

3. Hepatitis C.

4. Diastolic heart failure.

5. Hyperkalemia associated with renal failure, currently improved with dialysis.



PLAN:

Next dialysis will be on Monday.





MMODL / IJN: [id number] / Job#: [id number]

## 2019-04-24 ENCOUNTER — HOSPITAL ENCOUNTER (INPATIENT)
Dept: HOSPITAL 47 - EC | Age: 74
LOS: 2 days | Discharge: NURSING, REHAB OR LONG-TERM CARE | DRG: 871 | End: 2019-04-26
Attending: INTERNAL MEDICINE | Admitting: INTERNAL MEDICINE
Payer: MEDICARE

## 2019-04-24 VITALS — BODY MASS INDEX: 35.5 KG/M2

## 2019-04-24 DIAGNOSIS — I25.5: ICD-10-CM

## 2019-04-24 DIAGNOSIS — D69.6: ICD-10-CM

## 2019-04-24 DIAGNOSIS — Z98.41: ICD-10-CM

## 2019-04-24 DIAGNOSIS — M19.90: ICD-10-CM

## 2019-04-24 DIAGNOSIS — R09.02: ICD-10-CM

## 2019-04-24 DIAGNOSIS — D64.9: ICD-10-CM

## 2019-04-24 DIAGNOSIS — E87.5: ICD-10-CM

## 2019-04-24 DIAGNOSIS — N18.6: ICD-10-CM

## 2019-04-24 DIAGNOSIS — Z86.39: ICD-10-CM

## 2019-04-24 DIAGNOSIS — Z80.9: ICD-10-CM

## 2019-04-24 DIAGNOSIS — Z99.2: ICD-10-CM

## 2019-04-24 DIAGNOSIS — I25.10: ICD-10-CM

## 2019-04-24 DIAGNOSIS — Z79.899: ICD-10-CM

## 2019-04-24 DIAGNOSIS — Z87.19: ICD-10-CM

## 2019-04-24 DIAGNOSIS — Z79.4: ICD-10-CM

## 2019-04-24 DIAGNOSIS — Z86.61: ICD-10-CM

## 2019-04-24 DIAGNOSIS — G93.41: ICD-10-CM

## 2019-04-24 DIAGNOSIS — E11.42: ICD-10-CM

## 2019-04-24 DIAGNOSIS — J69.0: ICD-10-CM

## 2019-04-24 DIAGNOSIS — R65.20: ICD-10-CM

## 2019-04-24 DIAGNOSIS — E83.9: ICD-10-CM

## 2019-04-24 DIAGNOSIS — Z87.440: ICD-10-CM

## 2019-04-24 DIAGNOSIS — J44.9: ICD-10-CM

## 2019-04-24 DIAGNOSIS — E78.5: ICD-10-CM

## 2019-04-24 DIAGNOSIS — B18.2: ICD-10-CM

## 2019-04-24 DIAGNOSIS — F32.9: ICD-10-CM

## 2019-04-24 DIAGNOSIS — E11.22: ICD-10-CM

## 2019-04-24 DIAGNOSIS — Z82.49: ICD-10-CM

## 2019-04-24 DIAGNOSIS — Z99.81: ICD-10-CM

## 2019-04-24 DIAGNOSIS — I13.2: ICD-10-CM

## 2019-04-24 DIAGNOSIS — Z87.39: ICD-10-CM

## 2019-04-24 DIAGNOSIS — Z98.61: ICD-10-CM

## 2019-04-24 DIAGNOSIS — A41.9: Primary | ICD-10-CM

## 2019-04-24 DIAGNOSIS — L03.314: ICD-10-CM

## 2019-04-24 DIAGNOSIS — M51.36: ICD-10-CM

## 2019-04-24 DIAGNOSIS — Z98.42: ICD-10-CM

## 2019-04-24 DIAGNOSIS — I25.2: ICD-10-CM

## 2019-04-24 DIAGNOSIS — I50.22: ICD-10-CM

## 2019-04-24 LAB
ALBUMIN SERPL-MCNC: 4.2 G/DL (ref 3.5–5)
ALBUMIN SERPL-MCNC: 4.3 G/DL (ref 3.5–5)
ALP SERPL-CCNC: 104 U/L (ref 38–126)
ALP SERPL-CCNC: 92 U/L (ref 38–126)
ALT SERPL-CCNC: 21 U/L (ref 21–72)
ALT SERPL-CCNC: 22 U/L (ref 21–72)
ANION GAP SERPL CALC-SCNC: 10 MMOL/L
ANION GAP SERPL CALC-SCNC: 8 MMOL/L
APTT BLD: 27 SEC (ref 22–30)
AST SERPL-CCNC: 21 U/L (ref 17–59)
AST SERPL-CCNC: 27 U/L (ref 17–59)
BASOPHILS # BLD AUTO: 0 K/UL (ref 0–0.2)
BASOPHILS NFR BLD AUTO: 0 %
BUN SERPL-SCNC: 39 MG/DL (ref 9–20)
BUN SERPL-SCNC: 43 MG/DL (ref 9–20)
CALCIUM SPEC-MCNC: 9.5 MG/DL (ref 8.4–10.2)
CALCIUM SPEC-MCNC: 9.7 MG/DL (ref 8.4–10.2)
CELLS COUNTED: 100
CHLORIDE SERPL-SCNC: 101 MMOL/L (ref 98–107)
CHLORIDE SERPL-SCNC: 97 MMOL/L (ref 98–107)
CK SERPL-CCNC: 65 U/L (ref 55–170)
CO2 SERPL-SCNC: 25 MMOL/L (ref 22–30)
CO2 SERPL-SCNC: 30 MMOL/L (ref 22–30)
EOSINOPHIL # BLD AUTO: 0.3 K/UL (ref 0–0.7)
EOSINOPHIL # BLD MANUAL: 0.17 K/UL (ref 0–0.7)
EOSINOPHIL NFR BLD AUTO: 5 %
ERYTHROCYTE [DISTWIDTH] IN BLOOD BY AUTOMATED COUNT: 3.93 M/UL (ref 4.3–5.9)
ERYTHROCYTE [DISTWIDTH] IN BLOOD BY AUTOMATED COUNT: 3.94 M/UL (ref 4.3–5.9)
ERYTHROCYTE [DISTWIDTH] IN BLOOD: 14 % (ref 11.5–15.5)
ERYTHROCYTE [DISTWIDTH] IN BLOOD: 14.1 % (ref 11.5–15.5)
GLUCOSE BLD-MCNC: 112 MG/DL (ref 75–99)
GLUCOSE BLD-MCNC: 127 MG/DL (ref 75–99)
GLUCOSE BLD-MCNC: 144 MG/DL (ref 75–99)
GLUCOSE BLD-MCNC: 183 MG/DL (ref 75–99)
GLUCOSE BLD-MCNC: 84 MG/DL (ref 75–99)
GLUCOSE SERPL-MCNC: 115 MG/DL (ref 74–99)
GLUCOSE SERPL-MCNC: 78 MG/DL (ref 74–99)
HCO3 BLDV-SCNC: 28 MMOL/L (ref 24–28)
HCT VFR BLD AUTO: 37.9 % (ref 39–53)
HCT VFR BLD AUTO: 38.5 % (ref 39–53)
HGB BLD-MCNC: 11.8 GM/DL (ref 13–17.5)
HGB BLD-MCNC: 11.8 GM/DL (ref 13–17.5)
INR PPP: 1 (ref ?–1.2)
LYMPHOCYTES # BLD MANUAL: 1.26 K/UL (ref 1–4.8)
LYMPHOCYTES # SPEC AUTO: 0.5 K/UL (ref 1–4.8)
LYMPHOCYTES NFR SPEC AUTO: 9 %
MAGNESIUM SPEC-SCNC: 1.8 MG/DL (ref 1.6–2.3)
MAGNESIUM SPEC-SCNC: 2 MG/DL (ref 1.6–2.3)
MCH RBC QN AUTO: 30 PG (ref 25–35)
MCH RBC QN AUTO: 30 PG (ref 25–35)
MCHC RBC AUTO-ENTMCNC: 30.6 G/DL (ref 31–37)
MCHC RBC AUTO-ENTMCNC: 31 G/DL (ref 31–37)
MCV RBC AUTO: 96.5 FL (ref 80–100)
MCV RBC AUTO: 97.9 FL (ref 80–100)
MONOCYTES # BLD AUTO: 0.4 K/UL (ref 0–1)
MONOCYTES # BLD MANUAL: 0.76 K/UL (ref 0–1)
MONOCYTES NFR BLD AUTO: 7 %
NEUTROPHILS # BLD AUTO: 4.3 K/UL (ref 1.3–7.7)
NEUTROPHILS NFR BLD AUTO: 76 %
NEUTROPHILS NFR BLD MANUAL: 74 %
NEUTS SEG # BLD MANUAL: 6.22 K/UL (ref 1.3–7.7)
PCO2 BLDV: 57 MMHG (ref 37–51)
PH BLDV: 7.31 [PH] (ref 7.31–7.41)
PLATELET # BLD AUTO: 107 K/UL (ref 150–450)
PLATELET # BLD AUTO: 139 K/UL (ref 150–450)
POTASSIUM SERPL-SCNC: 6 MMOL/L (ref 3.5–5.1)
POTASSIUM SERPL-SCNC: 6.4 MMOL/L (ref 3.5–5.1)
PROT SERPL-MCNC: 7.9 G/DL (ref 6.3–8.2)
PROT SERPL-MCNC: 7.9 G/DL (ref 6.3–8.2)
PT BLD: 10.3 SEC (ref 9–12)
SODIUM SERPL-SCNC: 135 MMOL/L (ref 137–145)
SODIUM SERPL-SCNC: 136 MMOL/L (ref 137–145)
WBC # BLD AUTO: 5.7 K/UL (ref 3.8–10.6)
WBC # BLD AUTO: 8.4 K/UL (ref 3.8–10.6)

## 2019-04-24 PROCEDURE — 86706 HEP B SURFACE ANTIBODY: CPT

## 2019-04-24 PROCEDURE — 85025 COMPLETE CBC W/AUTO DIFF WBC: CPT

## 2019-04-24 PROCEDURE — 87040 BLOOD CULTURE FOR BACTERIA: CPT

## 2019-04-24 PROCEDURE — 36415 COLL VENOUS BLD VENIPUNCTURE: CPT

## 2019-04-24 PROCEDURE — 87324 CLOSTRIDIUM AG IA: CPT

## 2019-04-24 PROCEDURE — 82550 ASSAY OF CK (CPK): CPT

## 2019-04-24 PROCEDURE — 83036 HEMOGLOBIN GLYCOSYLATED A1C: CPT

## 2019-04-24 PROCEDURE — 85730 THROMBOPLASTIN TIME PARTIAL: CPT

## 2019-04-24 PROCEDURE — 90935 HEMODIALYSIS ONE EVALUATION: CPT

## 2019-04-24 PROCEDURE — 96375 TX/PRO/DX INJ NEW DRUG ADDON: CPT

## 2019-04-24 PROCEDURE — 99291 CRITICAL CARE FIRST HOUR: CPT

## 2019-04-24 PROCEDURE — 80053 COMPREHEN METABOLIC PANEL: CPT

## 2019-04-24 PROCEDURE — 86704 HEP B CORE ANTIBODY TOTAL: CPT

## 2019-04-24 PROCEDURE — 70450 CT HEAD/BRAIN W/O DYE: CPT

## 2019-04-24 PROCEDURE — 84100 ASSAY OF PHOSPHORUS: CPT

## 2019-04-24 PROCEDURE — 96366 THER/PROPH/DIAG IV INF ADDON: CPT

## 2019-04-24 PROCEDURE — 83605 ASSAY OF LACTIC ACID: CPT

## 2019-04-24 PROCEDURE — 83735 ASSAY OF MAGNESIUM: CPT

## 2019-04-24 PROCEDURE — 96368 THER/DIAG CONCURRENT INF: CPT

## 2019-04-24 PROCEDURE — 5A1D70Z PERFORMANCE OF URINARY FILTRATION, INTERMITTENT, LESS THAN 6 HOURS PER DAY: ICD-10-PCS

## 2019-04-24 PROCEDURE — 96365 THER/PROPH/DIAG IV INF INIT: CPT

## 2019-04-24 PROCEDURE — 87340 HEPATITIS B SURFACE AG IA: CPT

## 2019-04-24 PROCEDURE — 82803 BLOOD GASES ANY COMBINATION: CPT

## 2019-04-24 PROCEDURE — 85027 COMPLETE CBC AUTOMATED: CPT

## 2019-04-24 PROCEDURE — 71046 X-RAY EXAM CHEST 2 VIEWS: CPT

## 2019-04-24 PROCEDURE — 85610 PROTHROMBIN TIME: CPT

## 2019-04-24 PROCEDURE — 80202 ASSAY OF VANCOMYCIN: CPT

## 2019-04-24 PROCEDURE — 94640 AIRWAY INHALATION TREATMENT: CPT

## 2019-04-24 PROCEDURE — 96361 HYDRATE IV INFUSION ADD-ON: CPT

## 2019-04-24 RX ADMIN — PANTOPRAZOLE SODIUM SCH MG: 40 TABLET, DELAYED RELEASE ORAL at 06:23

## 2019-04-24 RX ADMIN — INSULIN DETEMIR SCH UNIT: 100 INJECTION, SOLUTION SUBCUTANEOUS at 20:35

## 2019-04-24 RX ADMIN — ISOSORBIDE MONONITRATE SCH MG: 60 TABLET, EXTENDED RELEASE ORAL at 08:25

## 2019-04-24 RX ADMIN — GABAPENTIN SCH MG: 100 CAPSULE ORAL at 20:30

## 2019-04-24 RX ADMIN — ASPIRIN SCH: 325 TABLET ORAL at 08:21

## 2019-04-24 RX ADMIN — NIFEDIPINE SCH MG: 30 TABLET, FILM COATED, EXTENDED RELEASE ORAL at 08:25

## 2019-04-24 RX ADMIN — GABAPENTIN SCH MG: 100 CAPSULE ORAL at 06:23

## 2019-04-24 RX ADMIN — METHYLPREDNISOLONE SODIUM SUCCINATE SCH MG: 125 INJECTION, POWDER, FOR SOLUTION INTRAMUSCULAR; INTRAVENOUS at 18:22

## 2019-04-24 RX ADMIN — INSULIN ASPART SCH: 100 INJECTION, SOLUTION INTRAVENOUS; SUBCUTANEOUS at 17:50

## 2019-04-24 RX ADMIN — METHYLPREDNISOLONE SODIUM SUCCINATE SCH MG: 125 INJECTION, POWDER, FOR SOLUTION INTRAMUSCULAR; INTRAVENOUS at 08:25

## 2019-04-24 RX ADMIN — METHYLPREDNISOLONE SODIUM SUCCINATE SCH MG: 125 INJECTION, POWDER, FOR SOLUTION INTRAMUSCULAR; INTRAVENOUS at 22:47

## 2019-04-24 RX ADMIN — IPRATROPIUM BROMIDE AND ALBUTEROL SULFATE SCH ML: .5; 3 SOLUTION RESPIRATORY (INHALATION) at 15:05

## 2019-04-24 RX ADMIN — CARVEDILOL SCH MG: 12.5 TABLET, FILM COATED ORAL at 18:22

## 2019-04-24 RX ADMIN — Medication SCH: at 17:51

## 2019-04-24 RX ADMIN — IPRATROPIUM BROMIDE AND ALBUTEROL SULFATE SCH ML: .5; 3 SOLUTION RESPIRATORY (INHALATION) at 12:23

## 2019-04-24 RX ADMIN — IPRATROPIUM BROMIDE AND ALBUTEROL SULFATE SCH ML: .5; 3 SOLUTION RESPIRATORY (INHALATION) at 08:50

## 2019-04-24 RX ADMIN — FUROSEMIDE SCH MG: 80 TABLET ORAL at 06:23

## 2019-04-24 RX ADMIN — CARVEDILOL SCH MG: 12.5 TABLET, FILM COATED ORAL at 08:25

## 2019-04-24 RX ADMIN — Medication SCH MG: at 14:00

## 2019-04-24 RX ADMIN — Medication SCH MG: at 18:22

## 2019-04-24 RX ADMIN — FUROSEMIDE SCH MG: 80 TABLET ORAL at 14:00

## 2019-04-24 RX ADMIN — ATORVASTATIN CALCIUM SCH MG: 20 TABLET, FILM COATED ORAL at 20:30

## 2019-04-24 RX ADMIN — INSULIN ASPART SCH UNIT: 100 INJECTION, SOLUTION INTRAVENOUS; SUBCUTANEOUS at 14:01

## 2019-04-24 RX ADMIN — IPRATROPIUM BROMIDE AND ALBUTEROL SULFATE SCH ML: .5; 3 SOLUTION RESPIRATORY (INHALATION) at 19:31

## 2019-04-24 RX ADMIN — GABAPENTIN SCH MG: 100 CAPSULE ORAL at 14:00

## 2019-04-24 NOTE — P.HPIM
History of Present Illness


H&P Date: 04/24/19


Chief Complaint: Sepsis, change mental status, aspiration pneumonia, noncont

rolled hypertens





74-year-old male who of known for the last 2 years from [facility] who is a 

permanent resident since his end-stage renal disease on hemodialysis is known to

have history of COPD atherosclerotic heart disease severe ischemic cardiopathy 

with ejection fraction of 20 percentile who had fistula graft of the right 

forearm done Dr. Bowens few weeks ago and had dialysis line in the right 

femoral area was kept on for over 6 months was taking out recently.


In the last few days patient has been having significant problem with blood 

pressure not been well controlled and running in the high 160-200, medication 

were adjusted.  Patient developed to have worsening dyspnea and shortness of 

breath and significant altered mental status with the blood pressure being high 

patient was quite bit distress continue to have significant hypoxia ended up 

being sent from [facility] to the emergency department at [facility] where was 

evaluated found to have temperature of 102 mildly elevated white blood cell, 

patient clinically has aspiration pneumonia also has right groin worsening 

drainage from the dialysis line was taking out recently as a line infection.  

Patient was giving 1 g of Vanco started on Zosyn will be admitted to the 

hospital.  Also his potassium was about 6 was giving bicarbonate and call 

nephrology patient will require to go for hemodialysis quickly today his chest 

x-ray revealed beside aspiration pneumonia fluid overload worsening in the right

than the left side.





Review of Systems





CONSTITUTIONAL: Elderly, overweight looks in mild respiratory distress having 

mild cough.


EYES: No icterus sclerae, no conjunctivitis.


EARS, NOSE, MOUTH, THROAT, and FACE: No sore throat, lymphadenopathy, carotid 

bruits or deformity.


RESPIRATORY: Positive shortness of breath cough wheezes.


CARDIOVASCULAR: Positive palpitation with chest tightness and fluid overload 

with significantly elevated blood pressure.


GASTROINTESTINAL: No Abd pain, Nausea or vomiting, no Diarrhea or constipation, 

No GI Bleed, no distention or masses.


GENITOURINARY: On hemodialysis, still making limited amount of urine.


INTEGUMENT/BREAST: Negative for any muscular injury with mild osteoarthritis..


HEMATOLOGIC/LYMPHATIC: Negative for bleed or purpura.


MUSCULOSKELTAL: Generalized arthralgia and myalgia patient still have lower back

pain and had significant problem with his mobility.


NEURLOGICAL: Confused with significant altered mental status no blurred vision 

not been able to do and a gait exam.


BEHAVIORAL/PSYCH: Mild depression.


ENDOCRINE: Negative.





Past Medical History


Past Medical History: Coronary Artery Disease (CAD), Chest Pain / Angina, Heart 

Failure, COPD, Diabetes Mellitus, Hyperlipidemia, Hypertension, Liver Disease, 

Myocardial Infarction (MI), Osteoarthritis (OA)


Additional Past Medical History / Comment(s): Chronic CHF systolic dysfunction 

with EF 20%, murmur, home oxygen, 3/3/18 acute pancreatitis, gout several 

joints, gouty arthiritis to R great toe, ddd lumbar region, folliculitis, con

stipation, ESRD/DIALYSIS, chronic anemia, thrombocytopenia, hep c 1-1-14, 

diabetes insipidus per old hx,  IDDM type II, DKA, acute metabolic encephalitis,

peripheral neuropathy hands, legs and feet, hemothorax associated with liver bx 

tx with chest tube, cellulitis to lt foot/ankle, pt was born with R leg larger 

than L leg.UTI


Last Myocardial Infarction Date:: 5/2014


History of Any Multi-Drug Resistant Organisms: None Reported


Past Surgical History: Heart Catheterization, Pacemaker


Additional Past Surgical History / Comment(s): PTCA 2011 in [place], CATARACTS ROCKY

EYES REMOVED ,DOUBLE LUMEN DIAYSIS CATH RT GROIN.  liver biopsy on 4-7-14,


Past Anesthesia/Blood Transfusion Reactions: No Reported Reaction


Type of Cardiac Device: Permanent Pacemaker


Device Placement Date:: 2014? pt unsure


Past Psychological History: Bipolar, Depression


Additional Psychological History / Comment(s): [facility]  He uses a 

walker. BEFORE THAT HE WAS IN AN APT He does not drive-he used the bus to get 

places. has walker


Smoking Status: Never smoker


Past Alcohol Use History: Occasional


Additional Past Alcohol Use History / Comment(s): Pt states he has an occasional

beer but none since hospitalized 6/26/18 .


Past Drug Use History: None Reported





- Past Family History


  ** Mother


Family Medical History: Hypertension





  ** Sister(s)


Family Medical History: Cancer





  ** Father


History Unknown: Yes





Medications and Allergies


                                Home Medications











 Medication  Instructions  Recorded  Confirmed  Type


 


Nitroglycerin Sl Tabs [Nitrostat] 0.4 mg SUBLINGUAL Q5M PRN 05/18/14 04/24/19 

History


 


Docusate [Colace] 100 mg PO BID 11/25/15 04/24/19 History


 


Pantoprazole [Protonix] 40 mg PO AC-BRKFST  tablet. 03/13/18 04/24/19 Rx


 


Atorvastatin [Lipitor] 20 mg PO HS@2100 06/26/18 04/24/19 History


 


Glecaprevir/Pibrentasvir [Mavyret 3 tab PO DAILY@0800 06/26/18 04/24/19 History





100-40 mg Tablet]    


 


Magnesium Oxide [Mag-Ox] 400 mg PO DAILY@1700 06/26/18 04/24/19 History


 


Polyethylene Glycol 3350 [Miralax] 17 gm PO DAILY PRN  powd.pack 07/11/18 04/24/19 Rx


 


Bisacodyl 10 mg RECTAL DAILY PRN 07/17/18 04/24/19 History


 


Furosemide [Lasix] 80 mg PO BID@0600,1400 07/17/18 04/24/19 History


 


NIFEdipine XL [Procardia XL] 30 mg PO DAILY@0800 07/17/18 04/24/19 History


 


hydrALAZINE HCL [Apresoline] 100 mg PO TID@0600,1400,2100 07/17/18 04/24/19 

History


 


Calcium Acetate [Phoslo] 667 mg PO TID@0800,1200,1700 04/24/19 04/24/19 History


 


Carvedilol [Coreg] 25 mg PO BID 04/24/19 04/24/19 History


 


Ergocalciferol (Vitamin D2) 50,000 unit PO Q14D 04/24/19 04/24/19 History





[Vitamin D2]    


 


Gabapentin [Neurontin] 200 mg PO BID 04/24/19 04/24/19 History


 


HYDROcodone/APAP 7.5-325MG [Norco 1 tab PO Q8H PRN 04/24/19 04/24/19 History





7.5-325]    


 


INSULIN LISPRO (humaLOG) [humaLOG] 10 units SQ AC-LUNCH 04/24/19 04/24/19 

History


 


INSULIN LISPRO (humaLOG) [humaLOG] 12 units SQ AC-BRKFST 04/24/19 04/24/19 

History


 


INSULIN LISPRO (humaLOG) [humaLOG] 12 units SQ AC-SUPPER 04/24/19 04/24/19 

History


 


INSULIN LISPRO (humaLOG) [humaLOG] See Protocol SQ AC-TID 04/24/19 04/24/19 

History


 


Insulin Glargine [Lantus] 28 unit SQ ONCE 04/24/19 04/24/19 History


 


Isosorbide Dinitrate 30 mg PO DAILY 04/24/19 04/24/19 History


 


Lactulose 20 gm PO DAILY 04/24/19 04/24/19 History


 


Patiromer Calcium Sorbitex 16.8 gm PO SUTUTH 04/24/19 04/24/19 History





[Veltassa]    


 


guaiFENesin [guaiFENesin Oral 200 mg PO Q4H PRN 04/24/19 04/24/19 History





Solution]    


 


hydrALAZINE HCL [Apresoline] 50 mg PO BID 04/24/19 04/24/19 History








                                    Allergies











Allergy/AdvReac Type Severity Reaction Status Date / Time


 


No Known Allergies Allergy   Verified 04/24/19 09:06














Physical Exam


Vitals: 


                                   Vital Signs











  Temp Pulse Pulse Resp BP BP Pulse Ox


 


 04/24/19 04:27  98.4 F   73  18   155/71  100


 


 04/24/19 03:50   68   16  136/78   99


 


 04/24/19 03:40   67   18  146/62  


 


 04/24/19 03:20   68   15  160/74  


 


 04/24/19 03:10   65   24  150/77   97


 


 04/24/19 03:00  98.4 F      


 


 04/24/19 02:50   67   20  136/40   99


 


 04/24/19 02:40   64   23  153/74   99


 


 04/24/19 02:20   62   6 L  168/75   99


 


 04/24/19 02:10   63   10 L  152/78  


 


 04/24/19 02:00  98.4 F  72   20    99


 


 04/24/19 01:40   61   14  129/71  


 


 04/24/19 00:16  99.4 F  73   19  125/63   96


 


 04/24/19 00:14   68   19  125/63   98








                                Intake and Output











 04/23/19 04/23/19 04/24/19





 14:59 22:59 06:59


 


Other:   


 


  Voiding Method   Urinal


 


  Weight   112.4 kg














General Appearance: Alert, confused mild respiratory distress.  Older than his 

age.


Neck HEENT: Supple, no lymphadenopathy, no thyroid enlargement, no carotid 

bruits.  Positive significant dry mucosa and thick secretion.


Lungs: Decreased breath some bilaterally specially the bases with fine rhonchi 

positive crackles positive mild expiratory wheezes.


Chest Wall: Decrease expansion with deep inspiration no tenderness and no 

deformity was found on exam, no costochondral pain or discomfort.


Heart: Irregular rate and rhythm, S1, S2 positive S3 positive systolic murmur 

with mild tachycardia..


Back: Symmetric, no curvature, ROM normal, no CVA tenderness.


Abdomen: Distended slight discomfort with decreased bowel sounds.


Extremities: Worsening edema and vascular cellulitis in the right side than the 

left side with significant drainage and slight open area where his dialysis line

 was in the groin with mild posterior drainage from it.


Pulses: 2+ and symmetric.


Skin: Skin color, texture, tugor normal, no rashes or lesions.


Neurologic: Alert severely confuse moving all his 4 extremities generalized 

weakness no focal deficit not been able to do gait exam.





Results


CBC & Chem 7: 


                                 04/24/19 06:17





                                 04/24/19 06:17


Labs: 


                  Abnormal Lab Results - Last 24 Hours (Table)











  04/24/19 04/24/19 04/24/19 Range/Units





  01:32 01:32 01:32 


 


RBC   3.93 L   (4.30-5.90)  m/uL


 


Hgb   11.8 L   (13.0-17.5)  gm/dL


 


Hct   37.9 L   (39.0-53.0)  %


 


Plt Count   139 L   (150-450)  k/uL


 


Lymphocytes #   0.5 L   (1.0-4.8)  k/uL


 


VBG pCO2     (37-51)  mmHg


 


Sodium    135 L  (137-145)  mmol/L


 


Potassium    6.4 H*  (3.5-5.1)  mmol/L


 


Chloride    97 L  ()  mmol/L


 


BUN    39 H  (9-20)  mg/dL


 


Creatinine    6.76 H  (0.66-1.25)  mg/dL


 


Glucose    115 H  (74-99)  mg/dL


 


POC Glucose (mg/dL)  112 H    (75-99)  mg/dL














  04/24/19 Range/Units





  02:23 


 


RBC   (4.30-5.90)  m/uL


 


Hgb   (13.0-17.5)  gm/dL


 


Hct   (39.0-53.0)  %


 


Plt Count   (150-450)  k/uL


 


Lymphocytes #   (1.0-4.8)  k/uL


 


VBG pCO2  57 H  (37-51)  mmHg


 


Sodium   (137-145)  mmol/L


 


Potassium   (3.5-5.1)  mmol/L


 


Chloride   ()  mmol/L


 


BUN   (9-20)  mg/dL


 


Creatinine   (0.66-1.25)  mg/dL


 


Glucose   (74-99)  mg/dL


 


POC Glucose (mg/dL)   (75-99)  mg/dL














Thrombosis Risk Factor Assmnt





- DVT/VTE Prophylaxis


DVT/VTE Prophylaxis: Pharmacologic Prophylaxis ordered, Mechanical Prophylaxis 

ordered





- Choose All That Apply


Any of the Below Risk Factors Present?: Yes


Each Factor Represents 1 point: Abnormal pulmonary function (COPD)


Other Risk Factors: Yes


Each Risk Factor Represents 2 Points: Age 61-74 years


Other congenital or acquired thrombophilia - If yes, enter type in comment: No


Thrombosis Risk Factor Assessment Total Risk Factor Score: 3


Thrombosis Risk Factor Assessment Level: Moderate Risk





Assessment and Plan


Plan: 





1 sepsis: Most likely aspiration pneumonia along with line infection blood 

culture was done urine culture will be done as well, his chest x-ray showed 

opacity in the basal area in the left more than the right side most likely 

aspiration type with early pulmonary edema as well.  Patient was started on 

Vanco and Zosyn will consult infectious disease awaiting for the culture, lactic

 acid was 1.





2 aspiration pneumonia: Patient will be on vancomycin and Zosyn smaller dose of 

levofloxacin can be added if Vanco.  Eventually.  Continue updraft treatment 

continue O2 try to keep his pulse ox above 90 percentile.





3 line sepsis: From the site of his dialysis catheter again will finalize a 

culture the meanwhile continue patient on vancomycin.





4 significant change mental status: CAT scan of the brain didn't show any 

abnormality this is mostly encephalopathy most likely's metabolic from severity 

of his infection temperature and such treat underlying disease.





5 severe hyperkalemia: Patient be going for dialysis in the meanwhile Kayexalate

 and sodium bicarbonate will be done.





6 end-stage renal disease: On hemodialysis 3 times a week hemodialysis will be 

done this morning urgent.





7 type 2 diabetes: Continue patient on NovoLog along with Lantus Accu-Chek with 

sliding scales coverage and be done.





8 severe cardiomyopathy: Most likely ischemic with very low ejection fraction 

patient hydralazine, furosemide, isosorbide to continue dialysis to clear more 

fluid retention.





9 mild pulmonary edema: From the severity of the sepsis the current problem 

continue diuretics and hemodialysis for now.





10 chronic hepatitis C: Patient has been treated with Mavyret.





11 severe gastroesophageal Flex syndrome: Has been on pantoprazole 40 mg daily.





12 urgent hypertension: Blood pressure still not well-controlled currently on 

Coreg and hydralazine still on nifedipine and if needed start patient on 

labetalol.





13 hyperlipidemia: On Lipitor 20 mg daily.





14 DVT prophylaxis: Patient will be on Venodyne boots and knee-high KEIRA hose and

 heparin subcutaneous.





CODE STATUS: Full code.





Admit patient to inpatient status for more than 2 nights.

## 2019-04-24 NOTE — US
EXAMINATION TYPE: US venous doppler duplex LE RT

 

DATE OF EXAM: 4/24/2019 12:46 PM

 

COMPARISON: US 2018

 

CLINICAL HISTORY: edema. Right leg edema

 

SIDE PERFORMED: Right  

 

TECHNIQUE:  The lower extremity deep venous system is examined utilizing real time linear array sonog
cortes with graded compression, doppler sonography and color-flow sonography.

 

VESSELS IMAGED:

External Iliac Vein (EIV)

Common Femoral Vein

Deep Femoral Vein

Greater Saphenous Vein *

Femoral Vein

Popliteal Vein

Small Saphenous Vein *

Proximal Calf Veins

(* superficial vessels)

 

**Exam started at proximal femoral vein due to bandage covering groin area, EIV, CFV, GSV and deep fe
m vein not imaged.  There is normal flow, compressibility, vascular waveforms.

 

Right Leg:  Visualized portions appear negative for DVT

 

Exam is limited.

 

IMPRESSION: No evident deep venous thrombosis limited to the superficial femoral vein and popliteal v
ein as described. Limited exam.

## 2019-04-24 NOTE — P.NPCON
History of Present Illness





- Reason for Consult


end stage renal disease





- History of Present Illness





Reason for consultation: End-stage renal disease





History of present illness:


Patient is a 74-year-old male seen in renal consultation for end-stage renal 

disease.  He is maintained on hemodialysis on a Monday Wednesday Friday schedule

via AV fistula.  Patient resides at an extended care facility.  He was noted to 

be more lethargic and also had a fever of 101F.  Patient was subsequently sent 

to the hospital.  On admission his potassium level was 6.4 which was medically 

treated with IV insulin and Kayexalate.  He also received a dose of IV calcium. 

Repeat potassium level came down to 6.0.  He is due for hemodialysis today.  

Patient is currently resting in bed.  He is quite lethargic.  He is not able to 

provide much history.  He is maintained on IV antibiotics.  Cultures have been 

drawn and are pending.  Hemodynamically stable.





Vital signs are stable.


General: The patient appeared well nourished and normally developed. 


HEENT: Head exam is unremarkable. Neck is without jugular venous distension.


LUNGS: Breath sounds decreased.  Scattered rhonchi.


HEART: Rate and Rhythm are regular. First and second heart sounds normal. No 

murmurs, rubs or gallops. 


ABDOMEN: Abdominal exam reveals normal bowel sounds. Non-tender and non-

distended. No evidence of peritonitis.


EXTREMITITES: No clubbing, cyanosis, or edema.





Past Medical History


Past Medical History: Coronary Artery Disease (CAD), Chest Pain / Angina, Heart 

Failure, COPD, Diabetes Mellitus, Hyperlipidemia, Hypertension, Liver Disease, 

Myocardial Infarction (MI), Osteoarthritis (OA)


Additional Past Medical History / Comment(s): Chronic CHF systolic dysfunction 

with EF 20%, murmur, home oxygen, 3/3/18 acute pancreatitis, gout several 

joints, gouty arthiritis to R great toe, ddd lumbar region, folliculitis, 

constipation, ESRD/DIALYSIS, chronic anemia, thrombocytopenia, hep c 1-1-14, 

diabetes insipidus per old hx,  IDDM type II, DKA, acute metabolic encephalitis,

peripheral neuropathy hands, legs and feet, hemothorax associated with liver bx 

tx with chest tube, cellulitis to lt foot/ankle, pt was born with R leg larger 

than L leg.UTI


Last Myocardial Infarction Date:: 5/2014


History of Any Multi-Drug Resistant Organisms: None Reported


Past Surgical History: Heart Catheterization, Pacemaker


Additional Past Surgical History / Comment(s): PTCA 2011 in [place], CATARACTS ROCKY

EYES REMOVED ,DOUBLE LUMEN DIAYSIS CATH RT GROIN.  liver biopsy on 4-7-14,


Past Anesthesia/Blood Transfusion Reactions: No Reported Reaction


Type of Cardiac Device: Permanent Pacemaker


Device Placement Date:: 2014? pt unsure


Past Psychological History: Bipolar, Depression


Additional Psychological History / Comment(s): PT AT [facility]  He uses a 

walker. BEFORE THAT HE WAS IN AN APT He does not drive-he used the bus to get 

places. has walker


Smoking Status: Never smoker


Past Alcohol Use History: Occasional


Additional Past Alcohol Use History / Comment(s): Pt states he has an occasional

beer but none since hospitalized 6/26/18 .


Past Drug Use History: None Reported





- Past Family History


  ** Mother


Family Medical History: Hypertension





  ** Sister(s)


Family Medical History: Cancer





  ** Father


History Unknown: Yes





Medications and Allergies


                                Home Medications











 Medication  Instructions  Recorded  Confirmed  Type


 


Nitroglycerin Sl Tabs [Nitrostat] 0.4 mg SUBLINGUAL Q5M PRN 05/18/14 04/24/19 

History


 


Docusate [Colace] 100 mg PO HS PRN 11/25/15 04/24/19 History


 


Pantoprazole [Protonix] 40 mg PO AC-BRKFST  tablet. 03/13/18 04/24/19 Rx


 


Atorvastatin [Lipitor] 20 mg PO HS@2100 06/26/18 04/24/19 History


 


Glecaprevir/Pibrentasvir [Mavyret 3 tab PO DAILY@0800 06/26/18 04/24/19 History





100-40 mg Tablet]    


 


Magnesium Oxide [Mag-Ox] 400 mg PO DAILY@1700 06/26/18 04/24/19 History


 


INSULIN ASPART (NovoLOG) [NovoLOG 2 unit SQ AC-LUNCH #0 vial 07/11/18 04/24/19 

Rx





(formulary)]    


 


Polyethylene Glycol 3350 [Miralax] 17 gm PO DAILY PRN  powd.pack 07/11/18 04/24/19 Rx


 


Bisacodyl 10 mg RECTAL DAILY PRN 07/17/18 04/24/19 History


 


Furosemide [Lasix] 80 mg PO BID@0600,1400 07/17/18 04/24/19 History


 


INSULIN ASPART (NovoLOG) [NovoLOG 4 unit SQ AC-SUPPER@1700 07/17/18 04/24/19 

History





(formulary)]    


 


Isosorbide Mononitrate ER [Imdur] 60 mg PO DAILY@0800 07/17/18 04/24/19 History


 


NIFEdipine XL [Procardia XL] 30 mg PO DAILY@0800 07/17/18 04/24/19 History


 


hydrALAZINE HCL [Apresoline] 100 mg PO TID@0600,1400,2100 07/17/18 04/24/19 

History


 


Calcium Acetate [Phoslo] 667 mg PO TID@0800,1200,1700 04/24/19 04/24/19 History


 


HYDROcodone/APAP 7.5-325MG [Norco 1 tab PO Q8H PRN 04/24/19 04/24/19 History





7.5-325]    


 


Insulin Glargine [Lantus] 28 unit SQ ONCE 04/24/19 04/24/19 History


 


guaiFENesin [guaiFENesin Oral 200 mg PO Q4H PRN 04/24/19 04/24/19 History





Solution]    


 


hydrALAZINE HCL [Apresoline] 50 mg PO ONCE PRN 04/24/19 04/24/19 History








                                    Allergies











Allergy/AdvReac Type Severity Reaction Status Date / Time


 


No Known Allergies Allergy   Verified 04/24/19 09:06














Physical Exam


Vitals: 


                                   Vital Signs











  Temp Pulse Pulse Resp BP BP Pulse Ox


 


 04/24/19 09:00   80     


 


 04/24/19 08:50   80     


 


 04/24/19 08:15  100.6 F H   78  18   174/67  92 L


 


 04/24/19 04:27  98.4 F   73  18   155/71  100


 


 04/24/19 03:50   68   16  136/78   99


 


 04/24/19 03:40   67   18  146/62  


 


 04/24/19 03:20   68   15  160/74  


 


 04/24/19 03:10   65   24  150/77   97


 


 04/24/19 03:00  98.4 F      


 


 04/24/19 02:50   67   20  136/40   99


 


 04/24/19 02:40   64   23  153/74   99


 


 04/24/19 02:20   62   6 L  168/75   99


 


 04/24/19 02:10   63   10 L  152/78  


 


 04/24/19 02:00  98.4 F  72   20    99


 


 04/24/19 01:40   61   14  129/71  


 


 04/24/19 00:16  99.4 F  73   19  125/63   96


 


 04/24/19 00:14   68   19  125/63   98








                                Intake and Output











 04/23/19 04/24/19 04/24/19





 22:59 06:59 14:59


 


Intake Total  500 90


 


Balance  500 90


 


Intake:   


 


  Intake, IV Titration  500 





  Amount   


 


    Vancomycin 1,750 mg In  500 





    Sodium Chloride 0.9% 500   





    ml 500 ml @ 167 mls/hr   





    IVPB ONCE STA Rx#:   





    [id number]   


 


  Oral   90


 


Other:   


 


  Voiding Method  Urinal 


 


  Weight  112.4 kg 














Results





- Lab Results


                             Most recent lab results











Calcium  9.5 mg/dL (8.4-10.2)   04/24/19  06:17    


 


Phosphorus  3.4 mg/dL (2.5-4.5)   04/24/19  06:17    


 


Magnesium  1.8 mg/dL (1.6-2.3)   04/24/19  06:17    














                                 04/24/19 06:17





                                 04/24/19 06:17





Assessment and Plan


Plan: 





Assessment:


1.  End-stage renal disease maintained on hemodialysis on a Monday Wednesday Friday schedule.


2.  Hyperkalemia secondary to chronic kidney disease.  Expect improvement after 

dialysis today.


3.  Pneumonia maintained on antibiotics.


4.  Hypertension with chronic kidney disease.


5.  Insulin-dependent diabetes mellitus.


6.  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





Plan:


Hemodialysis today.


Resume PhosLo with meals.


Renal diet.


Follow-up cultures.


Monitor vancomycin levels.  Target level 15.





Thank you for the consultation.  I will continue to follow the patient with you 

during his hospital stay.

## 2019-04-24 NOTE — P.CON
Consult Note





- .


Consult date: 04/24/19


Assessment/Plan:: 





This is a 74-year-old -American male long-term resident at [facility] 

in transferred with concerns regarding fever of 101 with increasing lethargy and

concern for aspiration pneumonia or dialysis line infection.  Patient has h

istory of end-stage renal disease on hemodialysis Monday Wednesdays and Fridays 

scheduled with AV fistula.  Previous to that patient had a dialysis line in the 

right femoral area which was recently removed.


Patient came into [facility] emergency center for evaluation.  

He has been afebrile, white count was 5.7, hemoglobin 11.8 and platelet count 

139.  BUN 39, creatinine 6.76, potassium 6.4, sodium 135, chloride 97 CO2 30, 

blood sugar was 115.  Lactic acid was 1.0.  Chest x-ray showed basilar opacities

left greater than right maybe atelectasis although basilar infiltrates or other 

process cannot be excluded.  Suspect mild pulmonary vascular congestion, stable 

enlarged cardiac silhouette  cardiac device.  CT brain showed no evidence of 

acute intracranial abnormality.  Atrophy and chronic small vessel ischemic 

change present.  The patient was started on cefepime and vancomycin and admitted

to the selective care unit.  Patient also was treated for the hyperkalemia with 

dextrose, insulin, calcium gluconate and Kayexalate.  Patient is followed by 

nephrology with plan for hemodialysis today. Ultrasound of the right lower 

extremity was negative for DVT.  Patient was seen by ID during an admission in 

August 2018 with concern for PermCath infection which was ruled out at that time

with negative blood cultures.  Please see the consult note as dictated by nurse 

practitioner  Loretta Waltersjeremy.


74 -year-old male who is a resident of [facility].  Upon asking more he states he 

is not able to relate where he resides.  The patient however per the nursing 

staff is much more awake alert and interactive than he was earlier today before 

his dialysis session.  He does look to the observer and answer simple questions 

with yes or no.  He denies any discomfort.  As noted the right groin has 

evidence of the recent removal of hemodialysis catheter and in that fold with 

some odor.  However is not a large amount of drainage.  The patient appears to 

have a metabolic and septic encephalopathy which is starting to improve with his

hemodialysis improvement of his electrolytes initiation of antibiotic therapy.  

There is a concern for potential pneumonia.  The patient does not seem to have 

significant respiratory distress.  Chest x-ray is not convincing.  At this time 

cultures are pending for further help direct course of antibiotic therapy as he 

improves.  Currently cefepime and vancomycin are being utilized which we give us

coverage for pseudomonas, gram-negative pathogens as well as staph aureus and 

MRSA.  I agree with evaluation, assessment and plan is to Practitioner Mrs. Loretta Carr.

## 2019-04-24 NOTE — XR
EXAM:

  XR Chest, 2 Views

 

CLINICAL HISTORY:

  Weakness.

 

TECHNIQUE:

  Frontal and lateral views of the chest.

 

COMPARISON:

  CXR dated 07/17/2018.

 

FINDINGS:

  Lungs:  Basilar opacities (L>R).  Suspect mild pulmonary vascular 

congestion.

  Pleural space:  Small pleural effusions not excluded.  No pneumothorax.

  Heart:  Stable enlargement of cardiac silhouette.  Stable cardiac 

device.

  Mediastinum:  Unremarkable.

  Bones/joints:  Unremarkable.

 

IMPRESSION:     

1.  Basilar opacities (L>R) which may represent atelectasis although 

basilar infiltrate(s) or other process cannot be excluded.

2.  Suspect mild pulmonary vascular congestion.

3.  Stable enlargement of cardiac silhouette and stable cardiac device.

## 2019-04-24 NOTE — ED
General Adult HPI





- General


Chief complaint: Weakness


Stated complaint: confused,lethargic


Time Seen by Provider: 04/24/19 00:19


Source: patient, RN notes reviewed, old records reviewed


Mode of arrival: EMS





- History of Present Illness


Initial comments: 





74-year-old male presents with fever and lethargy.  Patient was reported to have

fever 101 at the nursing home, increased lethargy throughout the day today.  

Patient has history of end-stage renal disease, last hemodialysis unknown at the

time my evaluation.  Patient unable to significantly contribute to history.  He 

is lethargic and altered.





- Related Data


                                Home Medications











 Medication  Instructions  Recorded  Confirmed


 


Nitroglycerin Sl Tabs [Nitrostat] 0.4 mg SUBLINGUAL Q5M PRN 05/18/14 07/17/18


 


Carvedilol 25 mg PO BID@0800,1700 07/14/15 07/17/18


 


Docusate [Colace] 100 mg PO HS PRN 11/25/15 07/17/18


 


Atorvastatin [Lipitor] 20 mg PO HS@2100 06/26/18 07/17/18


 


Glecaprevir/Pibrentasvir [Mavyret 3 tab PO DAILY@0800 06/26/18 07/17/18





100-40 mg Tablet]   


 


Insulin Detemir (Levemir) [Levemir] 35 unit SQ HS@2100 06/26/18 07/17/18


 


Magnesium Oxide [Mag-Ox] 400 mg PO DAILY@1700 06/26/18 07/17/18


 


Bisacodyl 10 mg RECTAL DAILY PRN 07/17/18 07/17/18


 


Furosemide [Lasix] 80 mg PO BID@0600,1400 07/17/18 07/17/18


 


Gabapentin [Neurontin] 100 mg PO TID@0600,1400,2100 07/17/18 07/17/18


 


INSULIN ASPART (NovoLOG) [NovoLOG 4 unit SQ AC-SUPPER@1700 07/17/18 07/17/18





(formulary)]   


 


Isosorbide Mononitrate ER [Imdur] 60 mg PO DAILY@0800 07/17/18 07/17/18


 


NIFEdipine XL [Procardia XL] 30 mg PO DAILY@0800 07/17/18 07/17/18


 


hydrALAZINE HCL [Apresoline] 100 mg PO TID@0600,1400,2100 07/17/18 07/17/18








                                  Previous Rx's











 Medication  Instructions  Recorded


 


Pantoprazole [Protonix] 40 mg PO AC-BRKFST  tablet. 03/13/18


 


INSULIN ASPART (NovoLOG) [NovoLOG 2 unit SQ AC-LUNCH #0 vial 07/11/18





(formulary)]  


 


Polyethylene Glycol 3350 [Miralax] 17 gm PO DAILY PRN  powd.pack 07/11/18


 


HYDROcodone/APAP 7.5-325MG [Norco 1 tab PO Q6HR PRN #28 tab 07/21/18





7.5-325]  











                                    Allergies











Allergy/AdvReac Type Severity Reaction Status Date / Time


 


No Known Allergies Allergy   Verified 07/17/18 12:49














Review of Systems


ROS Statement: 


Those systems with pertinent positive or pertinent negative responses have been 

documented in the HPI.





ROS Other: All systems not noted in ROS Statement are negative.





Past Medical History


Past Medical History: Coronary Artery Disease (CAD), Chest Pain / Angina, Heart 

Failure, COPD, Diabetes Mellitus, Hyperlipidemia, Hypertension, Liver Disease, 

Myocardial Infarction (MI), Osteoarthritis (OA)


Additional Past Medical History / Comment(s): Chronic CHF systolic dysfunction 

with EF 20%, murmur, home oxygen, 3/3/18 acute pancreatitis, gout several 

joints, gouty arthiritis to R great toe, ddd lumbar region, folliculitis, 

constipation, ESRD/DIALYSIS, chronic anemia, thrombocytopenia, hep c 1-1-14, 

diabetes insipidus per old hx,  IDDM type II, DKA, acute metabolic encephalitis,

peripheral neuropathy hands, legs and feet, hemothorax associated with liver bx 

tx with chest tube, cellulitis to lt foot/ankle, pt was born with R leg larger 

than L leg.UTI


Last Myocardial Infarction Date:: 5/2014


History of Any Multi-Drug Resistant Organisms: None Reported


Past Surgical History: Heart Catheterization, Pacemaker


Additional Past Surgical History / Comment(s): PTCA 2011 in [place], CATARACTS ROCKY

EYES REMOVED ,DOUBLE LUMEN DIAYSIS CATH RT GROIN.  liver biopsy on 4-7-14,


Past Anesthesia/Blood Transfusion Reactions: No Reported Reaction


Type of Cardiac Device: Permanent Pacemaker


Device Placement Date:: 2014? pt unsure


Past Psychological History: Bipolar, Depression


Smoking Status: Never smoker





- Past Family History


  ** Mother


Family Medical History: Hypertension





  ** Sister(s)


Family Medical History: Cancer





  ** Father


History Unknown: Yes





General Exam


General appearance: in no apparent distress, lethargic


Head exam: Present: atraumatic, normocephalic


Eye exam: Present: normal appearance


ENT exam: Present: mucous membranes dry


Neck exam: Present: normal inspection.  Absent: tenderness, meningismus


Respiratory exam: Present: rhonchi (Bilateral).  Absent: respiratory distress


Cardiovascular Exam: Present: regular rate, normal rhythm


GI/Abdominal exam: Present: soft, distended.  Absent: tenderness, guarding


Extremities exam: Present: pedal edema, other (Incisions in the right proximal 

thigh, no ALLERGIES cellulitis, no purulent drainage, appear several days old.)


Neurological exam: Absent: motor sensory deficit


Skin exam: Present: warm, dry, intact.  Absent: cyanosis, diaphoretic





Course


                                   Vital Signs











  04/24/19 04/24/19 04/24/19





  00:14 00:16 01:40


 


Temperature  99.4 F 


 


Pulse Rate 68 73 61


 


Respiratory 19 19 14





Rate   


 


Blood Pressure 125/63 125/63 129/71


 


O2 Sat by Pulse 98 96 





Oximetry   














  04/24/19 04/24/19





  02:00 02:10


 


Temperature  


 


Pulse Rate 72 63


 


Respiratory 20 10 L





Rate  


 


Blood Pressure  152/78


 


O2 Sat by Pulse 99 





Oximetry  














EKG Findings





- EKG Comments:


EKG Findings:: EKG: Atrial sensed ventricular paced rhythm rate of 74, NM 

interval 120, QRS duration 126, 





Medical Decision Making





- Medical Decision Making





74-year-old male, end-stage renal disease on hemodialysis Monday Wednesday Friday presenting with increased lethargy, fever, weakness.  Patient has 

nonfocal exam vital signs are stable in the emergency department.  He is 

confused.  Exam reveals mild fluid overload, he has incisions in the right 

proximal thigh, these do not appear infected on exam.  Chest x-ray is obtained, 

consistent with mild fluid overload and concern for possible pneumonia given a 

history of fever he is treated for pneumonia with cefepime and vancomycin.  He 

has normal white blood cell count stable hemoglobin, potassium is 6.4 which is 

treated with Keflex orally, insulin, dextrose, and calcium.  Mild elevation in 

BUN of 39.  Head CT is obtained which is negative for any acute intracranial 

abnormality.  He will be admitted for treatment of healthcare associated 

pneumonia, concern for bacteremia, hyperkalemia.  Nephrology placed on consult.





- Lab Data


Result diagrams: 


                                 04/24/19 01:32





                                 04/24/19 01:32


                                   Lab Results











  04/24/19 04/24/19 04/24/19 Range/Units





  01:32 01:32 01:32 


 


WBC   5.7   (3.8-10.6)  k/uL


 


RBC   3.93 L   (4.30-5.90)  m/uL


 


Hgb   11.8 L   (13.0-17.5)  gm/dL


 


Hct   37.9 L   (39.0-53.0)  %


 


MCV   96.5   (80.0-100.0)  fL


 


MCH   30.0   (25.0-35.0)  pg


 


MCHC   31.0   (31.0-37.0)  g/dL


 


RDW   14.1   (11.5-15.5)  %


 


Plt Count   139 L   (150-450)  k/uL


 


Neutrophils %   76   %


 


Lymphocytes %   9   %


 


Monocytes %   7   %


 


Eosinophils %   5   %


 


Basophils %   0   %


 


Neutrophils #   4.3   (1.3-7.7)  k/uL


 


Lymphocytes #   0.5 L   (1.0-4.8)  k/uL


 


Monocytes #   0.4   (0-1.0)  k/uL


 


Eosinophils #   0.3   (0-0.7)  k/uL


 


Basophils #   0.0   (0-0.2)  k/uL


 


Hypochromasia   Slight   


 


PT     (9.0-12.0)  sec


 


INR     (<1.2)  


 


APTT     (22.0-30.0)  sec


 


VBG pH     (7.31-7.41)  


 


VBG pCO2     (37-51)  mmHg


 


VBG HCO3     (24-28)  mmol/L


 


Sodium    135 L  (137-145)  mmol/L


 


Potassium    6.4 H*  (3.5-5.1)  mmol/L


 


Chloride    97 L  ()  mmol/L


 


Carbon Dioxide    30  (22-30)  mmol/L


 


Anion Gap    8  mmol/L


 


BUN    39 H  (9-20)  mg/dL


 


Creatinine    6.76 H  (0.66-1.25)  mg/dL


 


Est GFR (CKD-EPI)AfAm    9  (>60 ml/min/1.73 sqM)  


 


Est GFR (CKD-EPI)NonAf    7  (>60 ml/min/1.73 sqM)  


 


Glucose    115 H  (74-99)  mg/dL


 


POC Glucose (mg/dL)  112 H    (75-99)  mg/dL


 


POC Glu Operater ID  Stanford Cervantes    


 


Plasma Lactic Acid Buddy     (0.7-2.0)  mmol/L


 


Calcium    9.7  (8.4-10.2)  mg/dL


 


Magnesium    2.0  (1.6-2.3)  mg/dL


 


Total Bilirubin    0.4  (0.2-1.3)  mg/dL


 


AST    21  (17-59)  U/L


 


ALT    21  (21-72)  U/L


 


Alkaline Phosphatase    104  ()  U/L


 


Creatine Kinase    65  ()  U/L


 


Total Protein    7.9  (6.3-8.2)  g/dL


 


Albumin    4.3  (3.5-5.0)  g/dL














  04/24/19 04/24/19 04/24/19 Range/Units





  01:32 01:32 02:23 


 


WBC     (3.8-10.6)  k/uL


 


RBC     (4.30-5.90)  m/uL


 


Hgb     (13.0-17.5)  gm/dL


 


Hct     (39.0-53.0)  %


 


MCV     (80.0-100.0)  fL


 


MCH     (25.0-35.0)  pg


 


MCHC     (31.0-37.0)  g/dL


 


RDW     (11.5-15.5)  %


 


Plt Count     (150-450)  k/uL


 


Neutrophils %     %


 


Lymphocytes %     %


 


Monocytes %     %


 


Eosinophils %     %


 


Basophils %     %


 


Neutrophils #     (1.3-7.7)  k/uL


 


Lymphocytes #     (1.0-4.8)  k/uL


 


Monocytes #     (0-1.0)  k/uL


 


Eosinophils #     (0-0.7)  k/uL


 


Basophils #     (0-0.2)  k/uL


 


Hypochromasia     


 


PT   10.3   (9.0-12.0)  sec


 


INR   1.0   (<1.2)  


 


APTT   27.0   (22.0-30.0)  sec


 


VBG pH    7.31  (7.31-7.41)  


 


VBG pCO2    57 H  (37-51)  mmHg


 


VBG HCO3    28  (24-28)  mmol/L


 


Sodium     (137-145)  mmol/L


 


Potassium     (3.5-5.1)  mmol/L


 


Chloride     ()  mmol/L


 


Carbon Dioxide     (22-30)  mmol/L


 


Anion Gap     mmol/L


 


BUN     (9-20)  mg/dL


 


Creatinine     (0.66-1.25)  mg/dL


 


Est GFR (CKD-EPI)AfAm     (>60 ml/min/1.73 sqM)  


 


Est GFR (CKD-EPI)NonAf     (>60 ml/min/1.73 sqM)  


 


Glucose     (74-99)  mg/dL


 


POC Glucose (mg/dL)     (75-99)  mg/dL


 


POC Glu Operater ID     


 


Plasma Lactic Acid Buddy  1.0    (0.7-2.0)  mmol/L


 


Calcium     (8.4-10.2)  mg/dL


 


Magnesium     (1.6-2.3)  mg/dL


 


Total Bilirubin     (0.2-1.3)  mg/dL


 


AST     (17-59)  U/L


 


ALT     (21-72)  U/L


 


Alkaline Phosphatase     ()  U/L


 


Creatine Kinase     ()  U/L


 


Total Protein     (6.3-8.2)  g/dL


 


Albumin     (3.5-5.0)  g/dL














Critical Care Time


Critical Care Time: Yes


Total Critical Care Time: 35





Disposition


Clinical Impression: 


 Pulmonary edema, ESRD (end stage renal disease) on dialysis, Altered mental 

status, Pneumonia, Hyperkalemia





Disposition: ADMITTED AS IP TO THIS [facility]


Condition: Stable


Is patient prescribed a controlled substance at d/c from ED?: No


Referrals: 


Edward Avila MD [Primary Care Provider] - 1-2 days


Decision to Admit Reason: Admit from EC


Decision Date: 04/24/19


Decision Time: 03:36

## 2019-04-24 NOTE — P.CONS
History of Present Illness





- Reason for Consult


Consult date: 04/24/19


Sepsis





- History of Present Illness





This is a 74-year-old -American male long-term resident at [facility] 

in transferred with concerns regarding fever of 101 with increasing lethargy and

concern for aspiration pneumonia or dialysis line infection.  Patient has 

history of end-stage renal disease on hemodialysis Monday Wednesdays and Fridays

scheduled with AV fistula.  Previous to that patient had a dialysis line in the 

right femoral area which was recently removed.


Patient came into [facility] emergency center for evaluation.  

He has been afebrile, white count was 5.7, hemoglobin 11.8 and platelet count 

139.  BUN 39, creatinine 6.76, potassium 6.4, sodium 135, chloride 97 CO2 30, 

blood sugar was 115.  Lactic acid was 1.0.  Chest x-ray showed basilar opacities

left greater than right maybe atelectasis although basilar infiltrates or other 

process cannot be excluded.  Suspect mild pulmonary vascular congestion, stable 

enlarged cardiac silhouette instable cardiac device.  CT brain showed no 

evidence of acute intracranial abnormality.  Atrophy and chronic small vessel 

ischemic change present.  The patient was started on cefepime and vancomycin and

admitted to the selective care unit.  Patient also was treated for the 

hyperkalemia with dextrose, insulin, calcium gluconate and Kayexalate.  Patient 

is followed by nephrology with plan for hemodialysis today. Ultrasound of the 

right lower extremity was negative for DVT.  Patient was seen by Dr. Mane 

during an admission in August 2018 with concern for PermCath infection which was

ruled out at that time with negative blood cultures.





Review of Systems


ROS unobtainable: due to mental status





Past Medical History


Past Medical History: Coronary Artery Disease (CAD), Chest Pain / Angina, Heart 

Failure, COPD, Diabetes Mellitus, Hyperlipidemia, Hypertension, Liver Disease, 

Myocardial Infarction (MI), Osteoarthritis (OA)


Additional Past Medical History / Comment(s): Chronic CHF systolic dysfunction 

with EF 20%, murmur, home oxygen, 3/3/18 acute pancreatitis, gout several 

joints, gouty arthiritis to R great toe, ddd lumbar region, folliculitis, 

constipation, ESRD/DIALYSIS, chronic anemia, thrombocytopenia, hep c 1-1-14, perla

betes insipidus per old hx,  IDDM type II, DKA, acute metabolic encephalitis, 

peripheral neuropathy hands, legs and feet, hemothorax associated with liver bx 

tx with chest tube, cellulitis to lt foot/ankle, pt was born with R leg larger 

than L leg.UTI


Last Myocardial Infarction Date:: 5/2014


History of Any Multi-Drug Resistant Organisms: None Reported


Past Surgical History: Heart Catheterization, Pacemaker


Additional Past Surgical History / Comment(s): PTCA 2011 in [place], CATARACTS ROCKY

EYES REMOVED ,DOUBLE LUMEN DIAYSIS CATH RT GROIN.  liver biopsy on 4-7-14,


Past Anesthesia/Blood Transfusion Reactions: No Reported Reaction


Type of Cardiac Device: Permanent Pacemaker


Device Placement Date:: 2014? pt unsure


Past Psychological History: Bipolar, Depression


Additional Psychological History / Comment(s): PT AT [facility]  He uses a 

walker. BEFORE THAT HE WAS IN AN APT He does not drive-he used the bus to get 

places. has walker


Smoking Status: Never smoker


Past Alcohol Use History: Occasional


Additional Past Alcohol Use History / Comment(s): Pt states he has an occasional

beer but none since hospitalized 6/26/18 .


Past Drug Use History: None Reported





- Past Family History


  ** Mother


Family Medical History: Hypertension





  ** Sister(s)


Family Medical History: Cancer





  ** Father


History Unknown: Yes





Medications and Allergies


                                Home Medications











 Medication  Instructions  Recorded  Confirmed  Type


 


Nitroglycerin Sl Tabs [Nitrostat] 0.4 mg SUBLINGUAL Q5M PRN 05/18/14 04/24/19 

History


 


Docusate [Colace] 100 mg PO BID 11/25/15 04/24/19 History


 


Pantoprazole [Protonix] 40 mg PO AC-BRKFST  tablet. 03/13/18 04/24/19 Rx


 


Atorvastatin [Lipitor] 20 mg PO HS@2100 06/26/18 04/24/19 History


 


Glecaprevir/Pibrentasvir [Mavyret 3 tab PO DAILY@0800 06/26/18 04/24/19 History





100-40 mg Tablet]    


 


Magnesium Oxide [Mag-Ox] 400 mg PO DAILY@1700 06/26/18 04/24/19 History


 


Polyethylene Glycol 3350 [Miralax] 17 gm PO DAILY PRN  powd.pack 07/11/18 04/24/19 Rx


 


Bisacodyl 10 mg RECTAL DAILY PRN 07/17/18 04/24/19 History


 


Furosemide [Lasix] 80 mg PO BID@0600,1400 07/17/18 04/24/19 History


 


NIFEdipine XL [Procardia XL] 30 mg PO DAILY@0800 07/17/18 04/24/19 History


 


hydrALAZINE HCL [Apresoline] 100 mg PO TID@0600,1400,2100 07/17/18 04/24/19 

History


 


Calcium Acetate [Phoslo] 667 mg PO TID@0800,1200,1700 04/24/19 04/24/19 History


 


Carvedilol [Coreg] 25 mg PO BID 04/24/19 04/24/19 History


 


Ergocalciferol (Vitamin D2) 50,000 unit PO Q14D 04/24/19 04/24/19 History





[Vitamin D2]    


 


Gabapentin [Neurontin] 200 mg PO BID 04/24/19 04/24/19 History


 


HYDROcodone/APAP 7.5-325MG [Norco 1 tab PO Q8H PRN 04/24/19 04/24/19 History





7.5-325]    


 


INSULIN LISPRO (humaLOG) [humaLOG] 10 units SQ AC-LUNCH 04/24/19 04/24/19 

History


 


INSULIN LISPRO (humaLOG) [humaLOG] 12 units SQ AC-BRKFST 04/24/19 04/24/19 

History


 


INSULIN LISPRO (humaLOG) [humaLOG] 12 units SQ AC-SUPPER 04/24/19 04/24/19 

History


 


INSULIN LISPRO (humaLOG) [humaLOG] See Protocol SQ AC-TID 04/24/19 04/24/19 

History


 


Insulin Glargine [Lantus] 28 unit SQ ONCE 04/24/19 04/24/19 History


 


Isosorbide Dinitrate 30 mg PO DAILY 04/24/19 04/24/19 History


 


Lactulose 20 gm PO DAILY 04/24/19 04/24/19 History


 


Patiromer Calcium Sorbitex 16.8 gm PO SUTUTH 04/24/19 04/24/19 History





[Veltassa]    


 


guaiFENesin [guaiFENesin Oral 200 mg PO Q4H PRN 04/24/19 04/24/19 History





Solution]    


 


hydrALAZINE HCL [Apresoline] 50 mg PO BID 04/24/19 04/24/19 History








                                    Allergies











Allergy/AdvReac Type Severity Reaction Status Date / Time


 


No Known Allergies Allergy   Verified 04/24/19 09:06














Physical Exam


Vitals: 


                                   Vital Signs











  Temp Pulse Pulse Resp BP BP Pulse Ox


 


 04/24/19 12:37   76     


 


 04/24/19 12:23   72     


 


 04/24/19 09:00   80     


 


 04/24/19 08:50   80     


 


 04/24/19 08:15  100.6 F H   78  18   174/67  92 L


 


 04/24/19 04:27  98.4 F   73  18   155/71  100


 


 04/24/19 03:50   68   16  136/78   99


 


 04/24/19 03:40   67   18  146/62  


 


 04/24/19 03:20   68   15  160/74  


 


 04/24/19 03:10   65   24  150/77   97


 


 04/24/19 03:00  98.4 F      


 


 04/24/19 02:50   67   20  136/40   99


 


 04/24/19 02:40   64   23  153/74   99


 


 04/24/19 02:20   62   6 L  168/75   99


 


 04/24/19 02:10   63   10 L  152/78  


 


 04/24/19 02:00  98.4 F  72   20    99


 


 04/24/19 01:40   61   14  129/71  


 


 04/24/19 00:16  99.4 F  73   19  125/63   96


 


 04/24/19 00:14   68   19  125/63   98








                                Intake and Output











 04/23/19 04/24/19 04/24/19





 22:59 06:59 14:59


 


Intake Total  500 180


 


Output Total   1


 


Balance  500 179


 


Intake:   


 


  Intake, IV Titration  500 





  Amount   


 


    Vancomycin 1,750 mg In  500 





    Sodium Chloride 0.9% 500   





    ml 500 ml @ 167 mls/hr   





    IVPB ONCE STA Rx#:   





    [id number]   


 


  Oral   180


 


Output:   


 


  Stool   1


 


Other:   


 


  Voiding Method  Urinal 


 


  # Voids   0


 


  # Bowel Movements   1


 


  Weight  112.4 kg 

















Gen: This is a 74-year-old -American male.  He is resting in bed.  He is 

confused and unable to answer questions or follow directions.


HEENT: Head is atraumatic, normocephalic. Pupils equal, round. Sclerae is 

anicteric. 


NECK: Supple. No JVD. No lymphadenopathy. No thyromegaly. 


LUNGS: Decreased breath sounds bilaterally.  No intercostal retractions.


HEART: Regular rate and rhythm.  Systolic murmur. 


ABDOMEN: Soft. Bowel sounds are present. No masses.  No tenderness.


EXTREMITIES: No pedal edema.  Right groin has area with sutures from previous 

dialysis catheter.  Follow older noted.


NEUROLOGICAL: Patient is oriented x1.  Generalized weakness. 








Results


CBC & Chem 7: 


                                 04/24/19 06:17





                                 04/24/19 06:17


Labs: 


                  Abnormal Lab Results - Last 24 Hours (Table)











  04/24/19 04/24/19 04/24/19 Range/Units





  01:32 01:32 01:32 


 


RBC   3.93 L   (4.30-5.90)  m/uL


 


Hgb   11.8 L   (13.0-17.5)  gm/dL


 


Hct   37.9 L   (39.0-53.0)  %


 


MCHC     (31.0-37.0)  g/dL


 


Plt Count   139 L   (150-450)  k/uL


 


Lymphocytes #   0.5 L   (1.0-4.8)  k/uL


 


VBG pCO2     (37-51)  mmHg


 


Sodium    135 L  (137-145)  mmol/L


 


Potassium    6.4 H*  (3.5-5.1)  mmol/L


 


Chloride    97 L  ()  mmol/L


 


BUN    39 H  (9-20)  mg/dL


 


Creatinine    6.76 H  (0.66-1.25)  mg/dL


 


Glucose    115 H  (74-99)  mg/dL


 


POC Glucose (mg/dL)  112 H    (75-99)  mg/dL














  04/24/19 04/24/19 04/24/19 Range/Units





  02:23 06:17 06:17 


 


RBC   3.94 L   (4.30-5.90)  m/uL


 


Hgb   11.8 L   (13.0-17.5)  gm/dL


 


Hct   38.5 L   (39.0-53.0)  %


 


MCHC   30.6 L   (31.0-37.0)  g/dL


 


Plt Count   107 L   (150-450)  k/uL


 


Lymphocytes #     (1.0-4.8)  k/uL


 


VBG pCO2  57 H    (37-51)  mmHg


 


Sodium    136 L  (137-145)  mmol/L


 


Potassium    6.0 H  (3.5-5.1)  mmol/L


 


Chloride     ()  mmol/L


 


BUN    43 H  (9-20)  mg/dL


 


Creatinine    6.72 H  (0.66-1.25)  mg/dL


 


Glucose     (74-99)  mg/dL


 


POC Glucose (mg/dL)     (75-99)  mg/dL














  04/24/19 Range/Units





  11:55 


 


RBC   (4.30-5.90)  m/uL


 


Hgb   (13.0-17.5)  gm/dL


 


Hct   (39.0-53.0)  %


 


MCHC   (31.0-37.0)  g/dL


 


Plt Count   (150-450)  k/uL


 


Lymphocytes #   (1.0-4.8)  k/uL


 


VBG pCO2   (37-51)  mmHg


 


Sodium   (137-145)  mmol/L


 


Potassium   (3.5-5.1)  mmol/L


 


Chloride   ()  mmol/L


 


BUN   (9-20)  mg/dL


 


Creatinine   (0.66-1.25)  mg/dL


 


Glucose   (74-99)  mg/dL


 


POC Glucose (mg/dL)  144 H  (75-99)  mg/dL














Assessment and Plan


Plan: 





This is a 74-year-old man admitted to the hospital with signs of sepsis with 

fever and metabolic encephalopathy possibly related to aspiration pneumonia and 

there is concern for dialysis catheter infection that was recently removed from 

the right groin.  Patient is currently on cefepime and vancomycin.  Blood 

cultures status received.  Patient had stool specimen sent to the lab for C. 

difficile testing the patient did receive Kayexalate.  Continue supportive care.

  Further recommendations as patient progresses.





The above dictated assessment and findings were discussed with Dr. Padilla.  The 

impression and plan of care have been directed as dictated.  Loretta Carr nurse 

practitioner acting as scribe for Dr. Padilla.

## 2019-04-24 NOTE — CT
EXAM:

  CT Head Without Intravenous Contrast

 

CLINICAL HISTORY:

  Pain.

 

TECHNIQUE:

  Axial computed tomography images of the head/brain without intravenous 

contrast.  Coronal and sagittal reformatted images were created and 

reviewed.  CTDI is 49.27 mGy and DLP is 1129.4 mGy-cm.  This CT exam was 

performed using one or more of the following dose reduction techniques: 

automated exposure control, adjustment of the mA and/or kV according to 

patient size, and/or use of iterative reconstruction technique.

 

COMPARISON:

CT dated 09/16/2016.

 

FINDINGS:

  Brain:  No acute intracranial hemorrhage.  No evidence of acute 

territorial infarct.  No mass effect or midline shift.  Atrophy and 

chronic small vessel schema disease.

  Ventricles:  Unremarkable for age.  No ventriculomegaly.

  Bones/joints:  Stable chronic deformity of right medial orbital wall.  

No acute skull fracture.

  Soft tissues:  Unremarkable.

  Sinuses:  Unremarkable as visualized.  No acute sinusitis.

  Mastoid air cells:  Unremarkable as visualized.  No mastoid effusion.

 

IMPRESSION:     

1.  No evidence of acute intracranial abnormality.

2.  Atrophy and chronic small vessel ischemic disease.

## 2019-04-25 LAB
ALBUMIN SERPL-MCNC: 3.9 G/DL (ref 3.5–5)
ALP SERPL-CCNC: 90 U/L (ref 38–126)
ALT SERPL-CCNC: 20 U/L (ref 21–72)
ANION GAP SERPL CALC-SCNC: 12 MMOL/L
AST SERPL-CCNC: 20 U/L (ref 17–59)
BUN SERPL-SCNC: 39 MG/DL (ref 9–20)
CALCIUM SPEC-MCNC: 9 MG/DL (ref 8.4–10.2)
CHLORIDE SERPL-SCNC: 98 MMOL/L (ref 98–107)
CO2 SERPL-SCNC: 28 MMOL/L (ref 22–30)
GLUCOSE BLD-MCNC: 194 MG/DL (ref 75–99)
GLUCOSE BLD-MCNC: 214 MG/DL (ref 75–99)
GLUCOSE BLD-MCNC: 235 MG/DL (ref 75–99)
GLUCOSE BLD-MCNC: 260 MG/DL (ref 75–99)
GLUCOSE SERPL-MCNC: 213 MG/DL (ref 74–99)
HBV SURFACE AB SERPL IA-ACNC: 1000 MIU/ML
POTASSIUM SERPL-SCNC: 4.9 MMOL/L (ref 3.5–5.1)
PROT SERPL-MCNC: 7.3 G/DL (ref 6.3–8.2)
SODIUM SERPL-SCNC: 138 MMOL/L (ref 137–145)

## 2019-04-25 RX ADMIN — ASPIRIN SCH: 325 TABLET ORAL at 08:22

## 2019-04-25 RX ADMIN — CARVEDILOL SCH MG: 12.5 TABLET, FILM COATED ORAL at 08:21

## 2019-04-25 RX ADMIN — Medication SCH MG: at 12:29

## 2019-04-25 RX ADMIN — Medication SCH MG: at 16:42

## 2019-04-25 RX ADMIN — GABAPENTIN SCH MG: 100 CAPSULE ORAL at 20:58

## 2019-04-25 RX ADMIN — Medication SCH MG: at 06:29

## 2019-04-25 RX ADMIN — FUROSEMIDE SCH MG: 80 TABLET ORAL at 06:29

## 2019-04-25 RX ADMIN — INSULIN ASPART SCH UNIT: 100 INJECTION, SOLUTION INTRAVENOUS; SUBCUTANEOUS at 16:43

## 2019-04-25 RX ADMIN — GABAPENTIN SCH MG: 100 CAPSULE ORAL at 12:29

## 2019-04-25 RX ADMIN — ATORVASTATIN CALCIUM SCH MG: 20 TABLET, FILM COATED ORAL at 20:59

## 2019-04-25 RX ADMIN — GABAPENTIN SCH MG: 100 CAPSULE ORAL at 06:29

## 2019-04-25 RX ADMIN — METHYLPREDNISOLONE SODIUM SUCCINATE SCH MG: 125 INJECTION, POWDER, FOR SOLUTION INTRAMUSCULAR; INTRAVENOUS at 08:22

## 2019-04-25 RX ADMIN — PANTOPRAZOLE SODIUM SCH MG: 40 TABLET, DELAYED RELEASE ORAL at 06:29

## 2019-04-25 RX ADMIN — CEFEPIME HYDROCHLORIDE SCH MLS/HR: 1 INJECTION, POWDER, FOR SOLUTION INTRAMUSCULAR; INTRAVENOUS at 02:41

## 2019-04-25 RX ADMIN — INSULIN DETEMIR SCH UNIT: 100 INJECTION, SOLUTION SUBCUTANEOUS at 20:59

## 2019-04-25 RX ADMIN — IPRATROPIUM BROMIDE AND ALBUTEROL SULFATE SCH ML: .5; 3 SOLUTION RESPIRATORY (INHALATION) at 12:02

## 2019-04-25 RX ADMIN — IPRATROPIUM BROMIDE AND ALBUTEROL SULFATE SCH ML: .5; 3 SOLUTION RESPIRATORY (INHALATION) at 08:45

## 2019-04-25 RX ADMIN — FUROSEMIDE SCH MG: 80 TABLET ORAL at 12:29

## 2019-04-25 RX ADMIN — IPRATROPIUM BROMIDE AND ALBUTEROL SULFATE SCH: .5; 3 SOLUTION RESPIRATORY (INHALATION) at 21:23

## 2019-04-25 RX ADMIN — Medication SCH: at 17:24

## 2019-04-25 RX ADMIN — IPRATROPIUM BROMIDE AND ALBUTEROL SULFATE SCH ML: .5; 3 SOLUTION RESPIRATORY (INHALATION) at 16:18

## 2019-04-25 RX ADMIN — CARVEDILOL SCH MG: 12.5 TABLET, FILM COATED ORAL at 16:43

## 2019-04-25 RX ADMIN — ISOSORBIDE MONONITRATE SCH MG: 60 TABLET, EXTENDED RELEASE ORAL at 08:21

## 2019-04-25 RX ADMIN — NIFEDIPINE SCH MG: 30 TABLET, FILM COATED, EXTENDED RELEASE ORAL at 08:22

## 2019-04-25 RX ADMIN — INSULIN ASPART SCH UNIT: 100 INJECTION, SOLUTION INTRAVENOUS; SUBCUTANEOUS at 12:29

## 2019-04-25 NOTE — P.PN
Subjective


Progress Note Date: 04/25/19





74-year-old male who of known for the last 2 years from [facility] who is a 

permanent resident since his end-stage renal disease on hemodialysis is known to

have history of COPD atherosclerotic heart disease severe ischemic cardiopathy 

with ejection fraction of 20 percentile who had fistula graft of the right 

forearm done Dr. Bowens few weeks ago and had dialysis line in the right 

femoral area was kept on for over 6 months was taking out recently.


In the last few days patient has been having significant problem with blood 

pressure not been well controlled and running in the high 160-200, medication 

were adjusted.  Patient developed to have worsening dyspnea and shortness of 

breath and significant altered mental status with the blood pressure being high 

patient was quite bit distress continue to have significant hypoxia ended up 

being sent from [facility] to the emergency department at [facility] where was 

evaluated found to have temperature of 102 mildly elevated white blood cell, 

patient clinically has aspiration pneumonia also has right groin worsening 

drainage from the dialysis line was taking out recently as a line infection.  

Patient was giving 1 g of Vanco started on Zosyn will be admitted to the 

hospital.  Also his potassium was about 6 was giving bicarbonate and call 

nephrology patient will require to go for hemodialysis quickly today his chest 

x-ray revealed beside aspiration pneumonia fluid overload worsening in the right

than the left side.





4/25: Patient has been seen by Dr. Padilla with recommendations to continue 

vancomycin and cefepime to cover for pseudomonas.  Blood culture showing no 

growth at 24 hours.  No urine culture has been obtained as patient does not make

urine.  Blood pressure this morning is 144/74, pulse ox 99% on 4 L, he has been 

afebrile, heart rate in the 60s.  Temperature max of 100.6 was obtained 

yesterday morning at 8 AM.  BUN 39 and creatinine 5.39, blood sugars running 

127-194.  Cellulitis to the right groin is improving.  Patient will be 

transferred to MedSurg floor today.  Solu-Medrol was started by the ER which 

will be discontinued.  Nephrology has patient scheduled for hemodialysis 

tomorrow.  Patient's mental status is much improved from yesterday.  To be back 

to his baseline.











 Review of Systems 


CONSTITUTIONAL: Elderly, overweight looks in no respiratory distress having mild

cough.


EYES: No icterus sclerae, no conjunctivitis.


EARS, NOSE, MOUTH, THROAT, and FACE: No sore throat, lymphadenopathy, carotid 

bruits or deformity.


RESPIRATORY: Positive shortness of breath cough wheezes.


CARDIOVASCULAR: Positive palpitation with chest tightness and fluid overload 

with significantly elevated blood pressure.


GASTROINTESTINAL: No Abd pain, Nausea or vomiting, no Diarrhea or constipation, 

No GI Bleed, no distention or masses.


GENITOURINARY: On hemodialysis, still making limited amount of urine.


INTEGUMENT/BREAST: Negative for any muscular injury with mild osteoarthritis..


HEMATOLOGIC/LYMPHATIC: Negative for bleed or purpura.


MUSCULOSKELTAL: Generalized arthralgia and myalgia patient still have lower back

pain and had significant problem with his mobility.


NEURLOGICAL: Confused with significant altered mental status no blurred vision 

not been able to do and a gait exam.


BEHAVIORAL/PSYCH: Mild depression.


ENDOCRINE: Negative.





Objective





- Vital Signs


Vital signs: 


                                   Vital Signs











Temp  98.7 F   04/25/19 08:00


 


Pulse  68   04/25/19 09:00


 


Resp  16   04/25/19 08:00


 


BP  144/73   04/25/19 08:00


 


Pulse Ox  99   04/25/19 08:00








                                 Intake & Output











 04/24/19 04/25/19 04/25/19





 18:59 06:59 18:59


 


Intake Total 480 210 120


 


Output Total 1633  


 


Balance -1153 210 120


 


Weight  119 kg 


 


Intake:   


 


  IV  160 


 


    0.9  160 


 


  Intake, IV Titration  50 





  Amount   


 


    Cefepime 0.5 gm In Sodium  50 





    Chloride 0.9% 50 ml @   





    100 mls/hr IVPB Q24H [facility]   





    Rx#:[id number]   


 


  Oral 180  120


 


  Hemodialysis 300  


 


Output:   


 


  Stool 1  


 


  Hemodialysis 1632  


 


Other:   


 


  Voiding Method  Urinal 


 


  # Voids 0 0 


 


  # Bowel Movements 0  














- Exam





General Appearance: Alert, no respiratory distress.  Older than his age.


Neck HEENT: Supple, no lymphadenopathy, no thyroid enlargement, no carotid 

bruits.  Positive significant dry mucosa and thick secretion.


Lungs: Decreased breath some bilaterally specially the bases with fine rhonchi 

positive crackles positive mild expiratory wheezes.


Chest Wall: Decrease expansion with deep inspiration no tenderness and no 

deformity was found on exam, no costochondral pain or discomfort.


Heart: Irregular rate and rhythm, S1, S2 positive S3 positive systolic murmur 

with mild tachycardia..


Back: Symmetric, no curvature, ROM normal, no CVA tenderness.


Abdomen: Distended slight discomfort with decreased bowel sounds.


Extremities: Worsening edema and vascular cellulitis in the right side than the 

left side with significant drainage and slight open area where his dialysis line

was in the groin with mild posterior drainage from it.


Pulses: 2+ and symmetric.


Skin: Skin color, texture, tugor normal, no rashes or lesions.


Neurologic: Alert moving all his 4 extremities generalized weakness no focal 

deficit not been able to do gait exam.





- Labs


CBC & Chem 7: 


                                 04/24/19 06:17





                                 04/25/19 06:40


Labs: 


                  Abnormal Lab Results - Last 24 Hours (Table)











  04/24/19 04/24/19 04/24/19 Range/Units





  11:55 16:39 17:05 


 


BUN     (9-20)  mg/dL


 


Creatinine     (0.66-1.25)  mg/dL


 


Glucose     (74-99)  mg/dL


 


POC Glucose (mg/dL)  144 H  127 H   (75-99)  mg/dL


 


ALT     (21-72)  U/L


 


Hep Bs Antibody    Reactive H  (Non-Reactive)  


 


Hep B Core Total Ab    Reactive H  (Non-Reactive)  














  04/24/19 04/25/19 04/25/19 Range/Units





  20:33 06:19 06:40 


 


BUN    39 H  (9-20)  mg/dL


 


Creatinine    5.39 H  (0.66-1.25)  mg/dL


 


Glucose    213 H  (74-99)  mg/dL


 


POC Glucose (mg/dL)  183 H  194 H   (75-99)  mg/dL


 


ALT    20 L  (21-72)  U/L


 


Hep Bs Antibody     (Non-Reactive)  


 


Hep B Core Total Ab     (Non-Reactive)  








                      Microbiology - Last 24 Hours (Table)











 04/24/19 02:13 Blood Culture - Preliminary





 Blood    No Growth after 24 hours














Assessment and Plan


Plan: 





1 sepsis: Most likely aspiration pneumonia and/or cellulitis of the right groin 

had previous dialysis catheter site.  Consult with Dr. Padilla appreciated.  

Continue cefepime and vancomycin.  Blood culture showing no growth at 24 hours. 

Urine cultures not been obtained. 





2 aspiration pneumonia: Patient will be on vancomycin and cefepime.  Eventually.

 Continue updraft treatment continue O2 try to keep his pulse ox above 90 

percentile.





3 cellulitis right groin at site of removal of dialysis catheter.  Consult with 

Dr. Padilla appreciated.  Continue cefepime and vancomycin.





4 septic and metabolic encephalopathy, improving: CAT scan of the brain didn't 

show any abnormality this is mostly encephalopathy most likely's metabolic from 

severity of his infection temperature and such treat underlying disease.





5 severe hyperkalemia: Patient be going for dialysis in the meanwhile Kayexalate

and sodium bicarbonate will be done.





6 end-stage renal disease: On hemodialysis 3 times a week hemodialysis will be 

done this morning urgent.  Nephrology consult appreciated.





7 type 2 diabetes: Continue patient on NovoLog along with Lantus Accu-Chek with 

sliding scales coverage and be done.





8 severe cardiomyopathy: Most likely ischemic with very low ejection fraction 

patient hydralazine, furosemide, isosorbide to continue dialysis to clear more 

fluid retention.





9 mild pulmonary edema: From the severity of the sepsis the current problem 

continue diuretics and hemodialysis for now.





10 chronic hepatitis C: Patient has been treated with Mavyret.





11 severe gastroesophageal Flex syndrome: Has been on pantoprazole 40 mg daily.





12 urgent hypertension: Blood pressure still not well-controlled currently on 

Coreg and hydralazine still on nifedipine and if needed start patient on labetal

ol.





13 hyperlipidemia: On Lipitor 20 mg daily.





14 DVT prophylaxis: Patient will be on Venodyne boots and knee-high KEIRA hose and

heparin subcutaneous.





CODE STATUS: Full code.





Discharge plan: Return to [facility]





Impression and plan of care have been directed as dictated by the signing 

physician.  Loretta Carr nurse practitioner acting as scribe for signing 

physician.

## 2019-04-25 NOTE — P.PN
Subjective





Patient is seen in follow-up for end-stage renal disease.  He is maintained on 

hemodialysis on a Monday Wednesday Friday schedule.  Tolerated hemodialysis well

yesterday.  He is more awake and alert today.  Denies active chest pain or 

shortness of breath.  Complains of a nonproductive cough.





Vital signs are stable.


General: The patient appeared well nourished and normally developed. 


HEENT: Head exam is unremarkable. Neck is without jugular venous distension.


LUNGS: Breath sounds decreased.


HEART: Rate and Rhythm are regular. First and second heart sounds normal. No 

murmurs, rubs or gallops. 


ABDOMEN: Abdominal exam reveals normal bowel sounds. Non-tender and non-

distended. No evidence of peritonitis.


EXTREMITITES: No clubbing, cyanosis, or edema.





Objective





- Vital Signs


Vital signs: 


                                   Vital Signs











Temp  98.7 F   04/25/19 08:00


 


Pulse  68   04/25/19 09:00


 


Resp  16   04/25/19 08:00


 


BP  144/73   04/25/19 08:00


 


Pulse Ox  99   04/25/19 08:00








                                 Intake & Output











 04/24/19 04/25/19 04/25/19





 18:59 06:59 18:59


 


Intake Total 480 210 120


 


Output Total 1633  


 


Balance -1153 210 120


 


Weight  119 kg 


 


Intake:   


 


  IV  160 


 


    0.9  160 


 


  Intake, IV Titration  50 





  Amount   


 


    Cefepime 0.5 gm In Sodium  50 





    Chloride 0.9% 50 ml @   





    100 mls/hr IVPB Q24H [facility]   





    Rx#:[id number]   


 


  Oral 180  120


 


  Hemodialysis 300  


 


Output:   


 


  Stool 1  


 


  Hemodialysis 1632  


 


Other:   


 


  Voiding Method  Urinal 


 


  # Voids 0 0 


 


  # Bowel Movements 0  














- Labs


CBC & Chem 7: 


                                 04/24/19 06:17





                                 04/25/19 06:40


Labs: 


                  Abnormal Lab Results - Last 24 Hours (Table)











  04/24/19 04/24/19 04/24/19 Range/Units





  11:55 16:39 17:05 


 


BUN     (9-20)  mg/dL


 


Creatinine     (0.66-1.25)  mg/dL


 


Glucose     (74-99)  mg/dL


 


POC Glucose (mg/dL)  144 H  127 H   (75-99)  mg/dL


 


ALT     (21-72)  U/L


 


Hep Bs Antibody    Reactive H  (Non-Reactive)  


 


Hep B Core Total Ab    Reactive H  (Non-Reactive)  














  04/24/19 04/25/19 04/25/19 Range/Units





  20:33 06:19 06:40 


 


BUN    39 H  (9-20)  mg/dL


 


Creatinine    5.39 H  (0.66-1.25)  mg/dL


 


Glucose    213 H  (74-99)  mg/dL


 


POC Glucose (mg/dL)  183 H  194 H   (75-99)  mg/dL


 


ALT    20 L  (21-72)  U/L


 


Hep Bs Antibody     (Non-Reactive)  


 


Hep B Core Total Ab     (Non-Reactive)  








                      Microbiology - Last 24 Hours (Table)











 04/24/19 02:13 Blood Culture - Preliminary





 Blood    No Growth after 24 hours














Assessment and Plan


Plan: 





Assessment:


1.  End-stage renal disease maintained on hemodialysis on a Monday Wednesday Friday schedule.


2.  Hyperkalemia secondary to chronic kidney disease.  Improved post dialysis.


3.  Pneumonia maintained on antibiotics.  Infectious disease following.


4.  Hypertension with chronic kidney disease.  Controlled.


5.  Insulin-dependent diabetes mellitus.


6.  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





Plan:


Hemodialysis tomorrow.


Renal diet.


Monitor vancomycin levels.  Target level 15.

## 2019-04-25 NOTE — P.PN
Subjective


Progress Note Date: 04/25/19





This is a 74-year-old -American male long-term resident at [facility] 

in transferred with concerns regarding fever of 101 with increasing lethargy and

concern for aspiration pneumonia or dialysis line infection.  Patient has 

history of end-stage renal disease on hemodialysis Monday Wednesdays and Fridays

scheduled with AV fistula.  Previous to that patient had a dialysis line in the 

right femoral area which was recently removed.


Patient came into [facility] emergency center for evaluation.  

He has been afebrile, white count was 5.7, hemoglobin 11.8 and platelet count 

139.  BUN 39, creatinine 6.76, potassium 6.4, sodium 135, chloride 97 CO2 30, 

blood sugar was 115.  Lactic acid was 1.0.  Chest x-ray showed basilar opacities

left greater than right maybe atelectasis although basilar infiltrates or other 

process cannot be excluded.  Suspect mild pulmonary vascular congestion, stable 

enlarged cardiac silhouette instable cardiac device.  CT brain showed no 

evidence of acute intracranial abnormality.  Atrophy and chronic small vessel 

ischemic change present.  The patient was started on cefepime and vancomycin and

admitted to the selective care unit.  Patient also was treated for the 

hyperkalemia with dextrose, insulin, calcium gluconate and Kayexalate.  Patient 

is followed by nephrology with plan for hemodialysis today. Ultrasound of the 

right lower extremity was negative for DVT.  Patient was seen by ID during an 

admission in August 2018 with concern for PermCath infection which was ruled out

at that time with negative blood cultures.


04/25/2019 patient Little change of his status.  Is not having any new 

discomfort.  Right groin without worsening pain





Objective





- Vital Signs


Vital signs: 


                                   Vital Signs











Temp  98.7 F   04/25/19 08:00


 


Pulse  80   04/25/19 16:30


 


Resp  16   04/25/19 15:39


 


BP  129/63   04/25/19 12:00


 


Pulse Ox  96   04/25/19 12:00








                                 Intake & Output











 04/25/19 04/25/19 04/26/19





 06:59 18:59 06:59


 


Intake Total 210 440 60


 


Output Total  1 


 


Balance 210 439 60


 


Weight 119 kg  


 


Intake:   


 


    60


 


    0.9 160  60


 


  Intake, IV Titration 50  





  Amount   


 


    Cefepime 0.5 gm In Sodium 50  





    Chloride 0.9% 50 ml @   





    100 mls/hr IVPB Q24H [facility]   





    Rx#:[id number]   


 


  Oral  440 


 


Output:   


 


  Urine  0 


 


  Stool  1 


 


Other:   


 


  Voiding Method Urinal Urinal Urinal


 


  # Voids 0 0 


 


  # Bowel Movements  0 














- Exam





Gen: This is a 74-year-old -American male.  He is resting in bed.  He is 

confused and unable to answer questions or follow directions.


HEENT: Head is atraumatic, normocephalic. Pupils equal, round. Sclerae is 

anicteric. 


NECK: Supple. No JVD. No lymphadenopathy. No thyromegaly. 


LUNGS: Decreased breath sounds bilaterally.  No intercostal retractions.


HEART: Regular rate and rhythm.  Systolic murmur. 


ABDOMEN: Soft. Bowel sounds are present. No masses.  No tenderness.


EXTREMITIES: No pedal edema.  Right groin has area with sutures from previous 

dialysis catheter.  Follow older noted.


NEUROLOGICAL: Patient is oriented x1.  Generalized weakness. 





- Labs


CBC & Chem 7: 


                                 04/24/19 06:17





                                 04/25/19 06:40


Labs: 


                  Abnormal Lab Results - Last 24 Hours (Table)











  04/24/19 04/25/19 04/25/19 Range/Units





  17:05 06:19 06:40 


 


BUN    39 H  (9-20)  mg/dL


 


Creatinine    5.39 H  (0.66-1.25)  mg/dL


 


Glucose    213 H  (74-99)  mg/dL


 


POC Glucose (mg/dL)   194 H   (75-99)  mg/dL


 


ALT    20 L  (21-72)  U/L


 


Hep Bs Antibody  Reactive H    (Non-Reactive)  


 


Hep B Core Total Ab  Reactive H    (Non-Reactive)  














  04/25/19 04/25/19 04/25/19 Range/Units





  11:18 16:21 20:28 


 


BUN     (9-20)  mg/dL


 


Creatinine     (0.66-1.25)  mg/dL


 


Glucose     (74-99)  mg/dL


 


POC Glucose (mg/dL)  214 H  260 H  235 H  (75-99)  mg/dL


 


ALT     (21-72)  U/L


 


Hep Bs Antibody     (Non-Reactive)  


 


Hep B Core Total Ab     (Non-Reactive)  








                      Microbiology - Last 24 Hours (Table)











 04/24/19 02:13 Blood Culture - Preliminary





 Blood    No Growth after 24 hours








                               Laboratory Results











WBC  8.4 k/uL (3.8-10.6)   04/24/19  06:17    


 


RBC  3.94 m/uL (4.30-5.90)  L  04/24/19  06:17    


 


Hgb  11.8 gm/dL (13.0-17.5)  L  04/24/19  06:17    


 


Hct  38.5 % (39.0-53.0)  L  04/24/19  06:17    


 


MCV  97.9 fL (80.0-100.0)   04/24/19  06:17    


 


MCH  30.0 pg (25.0-35.0)   04/24/19  06:17    


 


MCHC  30.6 g/dL (31.0-37.0)  L  04/24/19  06:17    


 


RDW  14.0 % (11.5-15.5)   04/24/19  06:17    


 


Plt Count  107 k/uL (150-450)  L  04/24/19  06:17    


 


Neutrophils %  76 %  04/24/19  01:32    


 


Neutrophils % (Manual)  74 %  04/24/19  06:17    


 


Lymphocytes %  9 %  04/24/19  01:32    


 


Lymphocytes % (Manual)  15 %  04/24/19  06:17    


 


Monocytes %  7 %  04/24/19  01:32    


 


Monocytes % (Manual)  9 %  04/24/19  06:17    


 


Eosinophils %  5 %  04/24/19  01:32    


 


Eosinophils % (Manual)  2 %  04/24/19  06:17    


 


Basophils %  0 %  04/24/19  01:32    


 


Neutrophils #  4.3 k/uL (1.3-7.7)   04/24/19  01:32    


 


Neutrophils # (Manual)  6.22 k/uL (1.3-7.7)   04/24/19  06:17    


 


Lymphocytes #  0.5 k/uL (1.0-4.8)  L  04/24/19  01:32    


 


Lymphocytes # (Manual)  1.26 k/uL (1.0-4.8)   04/24/19  06:17    


 


Monocytes #  0.4 k/uL (0-1.0)   04/24/19  01:32    


 


Monocytes # (Manual)  0.76 k/uL (0-1.0)   04/24/19  06:17    


 


Eosinophils #  0.3 k/uL (0-0.7)   04/24/19  01:32    


 


Eosinophils # (Manual)  0.17 k/uL (0-0.7)   04/24/19  06:17    


 


Basophils #  0.0 k/uL (0-0.2)   04/24/19  01:32    


 


Nucleated RBCs  0 /100 WBC (0-0)   04/24/19  06:17    


 


Manual Slide Review  Performed   04/24/19  06:17    


 


Large Platelets  Present   04/24/19  06:17    


 


Hypochromasia  Marked   04/24/19  06:17    


 


PT  10.3 sec (9.0-12.0)   04/24/19  01:32    


 


INR  1.0  (<1.2)   04/24/19  01:32    


 


APTT  27.0 sec (22.0-30.0)   04/24/19  01:32    


 


VBG pH  7.31  (7.31-7.41)   04/24/19  02:23    


 


VBG pCO2  57 mmHg (37-51)  H  04/24/19  02:23    


 


VBG HCO3  28 mmol/L (24-28)   04/24/19  02:23    


 


Sodium  138 mmol/L (137-145)   04/25/19  06:40    


 


Potassium  4.9 mmol/L (3.5-5.1)   04/25/19  06:40    


 


Chloride  98 mmol/L ()   04/25/19  06:40    


 


Carbon Dioxide  28 mmol/L (22-30)   04/25/19  06:40    


 


Anion Gap  12 mmol/L  04/25/19  06:40    


 


BUN  39 mg/dL (9-20)  H  04/25/19  06:40    


 


Creatinine  5.39 mg/dL (0.66-1.25)  H  04/25/19  06:40    


 


Est GFR (CKD-EPI)AfAm  11  (>60 ml/min/1.73 sqM)   04/25/19  06:40    


 


Est GFR (CKD-EPI)NonAf  10  (>60 ml/min/1.73 sqM)   04/25/19  06:40    


 


Glucose  213 mg/dL (74-99)  H  04/25/19  06:40    


 


POC Glucose (mg/dL)  235 mg/dL (75-99)  H  04/25/19  20:28    


 


POC Glu Operater ID  Serena Mae   04/25/19  20:28    


 


Plasma Lactic Acid Buddy  1.0 mmol/L (0.7-2.0)   04/24/19  01:32    


 


Calcium  9.0 mg/dL (8.4-10.2)   04/25/19  06:40    


 


Phosphorus  3.4 mg/dL (2.5-4.5)   04/24/19  06:17    


 


Magnesium  1.8 mg/dL (1.6-2.3)   04/24/19  06:17    


 


Total Bilirubin  0.3 mg/dL (0.2-1.3)   04/25/19  06:40    


 


AST  20 U/L (17-59)   04/25/19  06:40    


 


ALT  20 U/L (21-72)  L  04/25/19  06:40    


 


Alkaline Phosphatase  90 U/L ()   04/25/19  06:40    


 


Creatine Kinase  65 U/L ()   04/24/19  01:32    


 


Total Protein  7.3 g/dL (6.3-8.2)   04/25/19  06:40    


 


Albumin  3.9 g/dL (3.5-5.0)   04/25/19  06:40    


 


C. difficile (EIA) Intrp  Negative  (Negative)   04/24/19  13:00    


 


Hep Bs Antigen  Non-Reactive  (Non-Reactive)   04/24/19  17:05    


 


Hep Bs Antibody  Reactive  (Non-Reactive)  H  04/24/19  17:05    


 


Hep Bs Antibody, Quant  1000.0 mIU/mL  04/24/19  17:05    


 


Hep B Core Total Ab  Reactive  (Non-Reactive)    04/24/19  17:05    








                                  Microbiology





04/24/19 02:13   Blood   Blood Culture - Preliminary


                            No Growth after 24 hours











Assessment and Plan


(1) Altered mental status


Narrative/Plan: 


74 -year-old male who is a resident of [facility].  Upon asking more he states he 

is not able to relate where he resides.  The patient however per the nursing 

staff is much more awake alert and interactive than he was earlier today before 

his dialysis session.  He does look to the observer and answer simple questions 

with yes or no.  He denies any discomfort.  As noted the right groin has 

evidence of the recent removal of hemodialysis catheter and in that fold with 

some odor.  However is not a large amount of drainage.  The patient appears to 

have a metabolic and septic encephalopathy which is starting to improve with his

hemodialysis improvement of his electrolytes initiation of antibiotic therapy.  

There is a concern for potential pneumonia.  The patient does not seem to have 

significant respiratory distress.  Chest x-ray is not convincing.  At this time 

cultures are pending for further help direct course of antibiotic therapy as he 

improves.  Currently cefepime and vancomycin are being utilized which we give us

coverage for pseudomonas, gram-negative pathogens as well as staph aureus and 

MRSA


04/25/2019 patient is slightly brighter and his affect.  Looks forward to 

returning to his extended care facility residence.


Does not appear to have any significant infection of the right groin area.  

Concerns pneumonia.  Blood cultures are in process to further help direct 

antibiotic therapy.


Current Visit: Yes   Status: Acute   Code(s): R41.82 - ALTERED MENTAL STATUS, 

UNSPECIFIED   SNOMED Code(s): 680477822


   





(2) ESRD (end stage renal disease) on dialysis


Current Visit: Yes   Status: Acute   Code(s): N18.6 - END STAGE RENAL DISEASE; 

Z99.2 - DEPENDENCE ON RENAL DIALYSIS   SNOMED Code(s): 871503134

## 2019-04-26 VITALS — SYSTOLIC BLOOD PRESSURE: 143 MMHG | HEART RATE: 75 BPM | DIASTOLIC BLOOD PRESSURE: 74 MMHG

## 2019-04-26 VITALS — TEMPERATURE: 97.6 F

## 2019-04-26 VITALS — RESPIRATION RATE: 12 BRPM

## 2019-04-26 LAB
ALBUMIN SERPL-MCNC: 3.9 G/DL (ref 3.5–5)
ALP SERPL-CCNC: 88 U/L (ref 38–126)
ALT SERPL-CCNC: 11 U/L (ref 21–72)
ANION GAP SERPL CALC-SCNC: 14 MMOL/L
AST SERPL-CCNC: 22 U/L (ref 17–59)
BUN SERPL-SCNC: 57 MG/DL (ref 9–20)
CALCIUM SPEC-MCNC: 8.8 MG/DL (ref 8.4–10.2)
CHLORIDE SERPL-SCNC: 100 MMOL/L (ref 98–107)
CO2 SERPL-SCNC: 24 MMOL/L (ref 22–30)
ERYTHROCYTE [DISTWIDTH] IN BLOOD BY AUTOMATED COUNT: 3.63 M/UL (ref 4.3–5.9)
ERYTHROCYTE [DISTWIDTH] IN BLOOD: 14.1 % (ref 11.5–15.5)
GLUCOSE BLD-MCNC: 119 MG/DL (ref 75–99)
GLUCOSE BLD-MCNC: 96 MG/DL (ref 75–99)
GLUCOSE SERPL-MCNC: 99 MG/DL (ref 74–99)
HBA1C MFR BLD: 7.1 % (ref 4–6)
HCT VFR BLD AUTO: 35.2 % (ref 39–53)
HGB BLD-MCNC: 10.8 GM/DL (ref 13–17.5)
MCH RBC QN AUTO: 29.8 PG (ref 25–35)
MCHC RBC AUTO-ENTMCNC: 30.7 G/DL (ref 31–37)
MCV RBC AUTO: 97.1 FL (ref 80–100)
PLATELET # BLD AUTO: 120 K/UL (ref 150–450)
POTASSIUM SERPL-SCNC: 4.4 MMOL/L (ref 3.5–5.1)
PROT SERPL-MCNC: 7.2 G/DL (ref 6.3–8.2)
SODIUM SERPL-SCNC: 138 MMOL/L (ref 137–145)
VANCOMYCIN SERPL-MCNC: 25.6 UG/ML
WBC # BLD AUTO: 7.4 K/UL (ref 3.8–10.6)

## 2019-04-26 PROCEDURE — 5A1D70Z PERFORMANCE OF URINARY FILTRATION, INTERMITTENT, LESS THAN 6 HOURS PER DAY: ICD-10-PCS

## 2019-04-26 RX ADMIN — Medication SCH: at 09:31

## 2019-04-26 RX ADMIN — INSULIN ASPART SCH UNIT: 100 INJECTION, SOLUTION INTRAVENOUS; SUBCUTANEOUS at 12:52

## 2019-04-26 RX ADMIN — GABAPENTIN SCH MG: 100 CAPSULE ORAL at 14:02

## 2019-04-26 RX ADMIN — FUROSEMIDE SCH MG: 80 TABLET ORAL at 05:25

## 2019-04-26 RX ADMIN — IPRATROPIUM BROMIDE AND ALBUTEROL SULFATE SCH: .5; 3 SOLUTION RESPIRATORY (INHALATION) at 13:24

## 2019-04-26 RX ADMIN — CEFEPIME HYDROCHLORIDE SCH MLS/HR: 1 INJECTION, POWDER, FOR SOLUTION INTRAMUSCULAR; INTRAVENOUS at 02:43

## 2019-04-26 RX ADMIN — IPRATROPIUM BROMIDE AND ALBUTEROL SULFATE SCH: .5; 3 SOLUTION RESPIRATORY (INHALATION) at 09:37

## 2019-04-26 RX ADMIN — GABAPENTIN SCH MG: 100 CAPSULE ORAL at 05:25

## 2019-04-26 RX ADMIN — Medication SCH MG: at 12:53

## 2019-04-26 RX ADMIN — ASPIRIN SCH: 325 TABLET ORAL at 09:31

## 2019-04-26 RX ADMIN — CARVEDILOL SCH MG: 12.5 TABLET, FILM COATED ORAL at 14:03

## 2019-04-26 RX ADMIN — ISOSORBIDE MONONITRATE SCH MG: 60 TABLET, EXTENDED RELEASE ORAL at 14:03

## 2019-04-26 RX ADMIN — PANTOPRAZOLE SODIUM SCH MG: 40 TABLET, DELAYED RELEASE ORAL at 09:33

## 2019-04-26 NOTE — P.DS
Providers


Date of admission: 


04/24/19 03:31





Attending physician: 


Edward Avila





Consults: 





                                        





04/24/19 03:32


Consult Physician Routine 


   Consulting Provider: Isabella Reyes


   Consult Reason/Comments: Hyperkalemia, end-stage renal disease


   Do you want consulting provider notified?: Yes





04/24/19 05:43


Consult Physician Routine 


   Consulting Provider: Isabella Reyes


   Consult Reason/Comments: ESRD


   Do you want consulting provider notified?: Yes





04/24/19 05:44


Consult Physician Routine 


   Consulting Provider: Edward Padilla


   Consult Reason/Comments: Sepsis


   Do you want consulting provider notified?: Yes











Primary care physician: 


[facility] Course: 





74-year-old male who of known for the last 2 years from [facility] who is a 

permanent resident since his end-stage renal disease on hemodialysis is known to

have history of COPD atherosclerotic heart disease severe ischemic cardiopathy 

with ejection fraction of 20 percentile who had fistula graft of the right 

forearm done Dr. Bowens few weeks ago and had dialysis line in the right 

femoral area was kept on for over 6 months was taking out recently.


In the last few days patient has been having significant problem with blood 

pressure not been well controlled and running in the high 160-200, medication 

were adjusted.  Patient developed to have worsening dyspnea and shortness of 

breath and significant altered mental status with the blood pressure being high 

patient was quite bit distress continue to have significant hypoxia ended up 

being sent from [facility] to the emergency department at [facility] where was 

evaluated found to have temperature of 102 mildly elevated white blood cell, 

patient clinically has aspiration pneumonia also has right groin worsening 

drainage from the dialysis line was taking out recently as a line infection.  

Patient was giving 1 g of Vanco started on Zosyn will be admitted to the 

hospital.  Also his potassium was about 6 was giving bicarbonate and call 

nephrology patient will require to go for hemodialysis quickly today his chest 

x-ray revealed beside aspiration pneumonia fluid overload worsening in the right

than the left side.





4/25: Patient has been seen by Dr. Padilla with recommendations to continue 

vancomycin and cefepime to cover for pseudomonas.  Blood culture showing no 

growth at 24 hours.  No urine culture has been obtained as patient does not make

urine.  Blood pressure this morning is 144/74, pulse ox 99% on 4 L, he has been 

afebrile, heart rate in the 60s.  Temperature max of 100.6 was obtained 

yesterday morning at 8 AM.  BUN 39 and creatinine 5.39, blood sugars running 

127-194.  Cellulitis to the right groin is improving.  Patient will be 

transferred to [facility] floor today.  Solu-Medrol was started by the ER which 

will be discontinued.  Nephrology has patient scheduled for hemodialysis 

tomorrow.  Patient's mental status is much improved from yesterday.  To be back 

to his baseline.








- Exam





General Appearance: Alert, no respiratory distress.  Older than his age.


Neck HEENT: Supple, no lymphadenopathy, no thyroid enlargement, no carotid 

bruits.  Positive significant dry mucosa and thick secretion.


Lungs: Decreased breath some bilaterally specially the bases with fine rhonchi 

positive crackles positive mild expiratory wheezes.


Chest Wall: Decrease expansion with deep inspiration no tenderness and no 

deformity was found on exam, no costochondral pain or discomfort.


Heart: Irregular rate and rhythm, S1, S2 positive S3 positive systolic murmur 

with mild tachycardia..


Back: Symmetric, no curvature, ROM normal, no CVA tenderness.


Abdomen: Distended slight discomfort with decreased bowel sounds.


Extremities: Worsening edema and vascular cellulitis in the right side than the 

left side with significant drainage and slight open area where his dialysis line

was in the groin with mild posterior drainage from it.


Pulses: 2+ and symmetric.


Skin: Skin color, texture, tugor normal, no rashes or lesions.


Neurologic: Alert moving all his 4 extremities generalized weakness no focal 

deficit not been able to do gait exam.








Assessment and Plan


Plan: 





1 sepsis: Most likely aspiration pneumonia and/or cellulitis of the right groin 

had previous dialysis catheter site.  Consult with Dr. Padilla appreciated.  

Continue cefepime and vancomycin.  Blood culture showing no growth at 24 hours. 

Urine cultures not been obtained. 





2 aspiration pneumonia: Patient will be on vancomycin and cefepime.  Eventually.

 Continue updraft treatment continue O2 try to keep his pulse ox above 90 

percentile.  With switch patient to Levaquin 250 mg daily to complete a course 

of 7 days.





3 cellulitis right groin at site of removal of dialysis catheter.  Consult with 

Dr. Padilla appreciated.  Continue cefepime and vancomycin.





4 septic and metabolic encephalopathy, improving: CAT scan of the brain didn't 

show any abnormality this is mostly encephalopathy most likely's metabolic from 

severity of his infection temperature and such treat underlying disease.





5 severe hyperkalemia: Patient be going for dialysis in the meanwhile Kayexalate

and sodium bicarbonate will be done.





6 end-stage renal disease: On hemodialysis 3 times a week hemodialysis will be 

done this morning urgent.  Nephrology consult appreciated.





7 type 2 diabetes: Continue patient on NovoLog along with Lantus Accu-Chek with 

sliding scales coverage and be done.





8 severe cardiomyopathy: Most likely ischemic with very low ejection fraction 

patient hydralazine, furosemide, isosorbide to continue dialysis to clear more 

fluid retention.





9 mild pulmonary edema: From the severity of the sepsis the current problem 

continue diuretics and hemodialysis for now.





10 chronic hepatitis C: Patient has been treated with Mavyret.





11 severe gastroesophageal Flex syndrome: Has been on pantoprazole 40 mg daily.





12 urgent hypertension: Blood pressure still not well-controlled currently on 

Coreg and hydralazine still on nifedipine and if needed start patient on 

labetalol.





13 hyperlipidemia: On Lipitor 20 mg daily.








Discharge planning: Patient is doing very well and was seen infectious disease, 

culture between urine and blood came back negative, the site of the right groin 

looks much better with no more pus or drainage.  Cephalosporin was stopped and 

patient was started on Levaquin and he'll be discharged back to [facility] today on

Levaquin for 7 more days for severe bronchitis pneumonitis.





Blood pressure is well-controlled patient will be on hydralazine 100 mg 3 times 

a day and back on Procardia still on Coreg 25 g twice a day as controlling his 

blood pressure much better.


Patient Condition at Discharge: Stable





Plan - Discharge Summary


Discharge Rx Participant: No


New Discharge Prescriptions: 


New


   Ipratropium-Albuterol Nebulize [Duoneb 0.5 mg-3 mg/3 ml Soln] 3 ml INHALATION

RT-QID  ampul.neb


   Gabapentin [Neurontin] 100 mg PO TID@0600,1400,2100 #12 cap


   Levofloxacin [Levaquin] 250 mg PO DAILY #5 tab





Continue


   Nitroglycerin Sl Tabs [Nitrostat] 0.4 mg SUBLINGUAL Q5M PRN


     PRN Reason: Angina


   Docusate [Colace] 100 mg PO BID


   Pantoprazole [Protonix] 40 mg PO AC-BRKFST  tablet.


   Magnesium Oxide [Mag-Ox] 400 mg PO DAILY@1700


   Atorvastatin [Lipitor] 20 mg PO HS@2100


   Glecaprevir/Pibrentasvir [Mavyret 100-40 mg Tablet] 3 tab PO DAILY@0800


   Polyethylene Glycol 3350 [Miralax] 17 gm PO DAILY PRN  powd.pack


     PRN Reason: Constipation


   NIFEdipine XL [Procardia XL] 30 mg PO DAILY@0800


   hydrALAZINE HCL [Apresoline] 100 mg PO TID@0600,1400,2100


   Furosemide [Lasix] 80 mg PO BID@0600,1400


   Bisacodyl 10 mg RECTAL DAILY PRN


     PRN Reason: Constipation


   Calcium Acetate [PhosLo] 667 mg PO TID@0800,1200,1700


   guaiFENesin [guaiFENesin Oral Solution] 200 mg PO Q4H PRN


     PRN Reason: Cough


   Carvedilol [Coreg] 25 mg PO BID


   Ergocalciferol (Vitamin D2) [Vitamin D2] 50,000 unit PO Q14D


   INSULIN LISPRO (humaLOG) [humaLOG] See Protocol SQ AC-TID


   INSULIN LISPRO (humaLOG) [humaLOG] 12 units SQ AC-SUPPER


   INSULIN LISPRO (humaLOG) [humaLOG] 10 units SQ AC-LUNCH


   INSULIN LISPRO (humaLOG) [humaLOG] 12 units SQ AC-BRKFST


   Isosorbide Dinitrate 30 mg PO DAILY


   Lactulose 20 gm PO DAILY


   Patiromer Calcium Sorbitex [Veltassa] 16.8 gm PO SUTUTH


   HYDROcodone/APAP 7.5-325MG [Norco 7.5-325] 1 tab PO Q8H PRN #12 tab


     PRN Reason: Pain





Changed


   Insulin Glargine [Lantus] 28 unit SQ HS #0





Discontinued


   hydrALAZINE HCL [Apresoline] 50 mg PO BID


   Gabapentin [Neurontin] 200 mg PO BID


Discharge Medication List





Nitroglycerin Sl Tabs [Nitrostat] 0.4 mg SUBLINGUAL Q5M PRN 05/18/14 [History]


Docusate [Colace] 100 mg PO BID 11/25/15 [History]


Pantoprazole [Protonix] 40 mg PO AC-BRKFST  tablet. 03/13/18 [Rx]


Atorvastatin [Lipitor] 20 mg PO HS@2100 06/26/18 [History]


Glecaprevir/Pibrentasvir [Mavyret 100-40 mg Tablet] 3 tab PO DAILY@0800 06/26/18

[History]


Magnesium Oxide [Mag-Ox] 400 mg PO DAILY@1700 06/26/18 [History]


Polyethylene Glycol 3350 [Miralax] 17 gm PO DAILY PRN  powd.pack 07/11/18 [Rx]


Bisacodyl 10 mg RECTAL DAILY PRN 07/17/18 [History]


Furosemide [Lasix] 80 mg PO BID@0600,1400 07/17/18 [History]


NIFEdipine XL [Procardia XL] 30 mg PO DAILY@0800 07/17/18 [History]


hydrALAZINE HCL [Apresoline] 100 mg PO TID@0600,1400,2100 07/17/18 [History]


Calcium Acetate [PhosLo] 667 mg PO TID@0800,1200,1700 04/24/19 [History]


Carvedilol [Coreg] 25 mg PO BID 04/24/19 [History]


Ergocalciferol (Vitamin D2) [Vitamin D2] 50,000 unit PO Q14D 04/24/19 [History]


INSULIN LISPRO (humaLOG) [humaLOG] 10 units SQ AC-LUNCH 04/24/19 [History]


INSULIN LISPRO (humaLOG) [humaLOG] 12 units SQ AC-BRKFST 04/24/19 [History]


INSULIN LISPRO (humaLOG) [humaLOG] 12 units SQ AC-SUPPER 04/24/19 [History]


INSULIN LISPRO (humaLOG) [humaLOG] See Protocol SQ AC-TID 04/24/19 [History]


Isosorbide Dinitrate 30 mg PO DAILY 04/24/19 [History]


Lactulose 20 gm PO DAILY 04/24/19 [History]


Patiromer Calcium Sorbitex [Veltassa] 16.8 gm PO SUTUTH 04/24/19 [History]


guaiFENesin [guaiFENesin Oral Solution] 200 mg PO Q4H PRN 04/24/19 [History]


Gabapentin [Neurontin] 100 mg PO TID@0600,1400,2100 #12 cap 04/26/19 [Rx]


HYDROcodone/APAP 7.5-325MG [Norco 7.5-325] 1 tab PO Q8H PRN #12 tab 04/26/19 

[Rx]


Insulin Glargine [Lantus] 28 unit SQ HS #0 04/26/19 [Rx]


Ipratropium-Albuterol Nebulize [Duoneb 0.5 mg-3 mg/3 ml Soln] 3 ml INHALATION 

RT-QID  ampul.neb 04/26/19 [Rx]


Levofloxacin [Levaquin] 250 mg PO DAILY #5 tab 04/26/19 [Rx]








Follow up Appointment(s)/Referral(s): 


Edward Avila MD [Primary Care Provider] - 1-2 days


Activity/Diet/Wound Care/Special Instructions: 


ECF


Discharge Disposition: TRANSFER TO SNF/ECF

## 2019-04-26 NOTE — P.PN
Subjective





Patient is seen in follow-up for end-stage renal disease.  He is maintained on 

hemodialysis on a Monday Wednesday Friday schedule.  He is more awake and alert 

today.  He's been working with physical therapy.  Denies active chest pain or 

shortness of breath.  Complains of a nonproductive cough.





Vital signs are stable.


General: The patient appeared well nourished and normally developed. 


HEENT: Head exam is unremarkable. Neck is without jugular venous distension.


LUNGS: Breath sounds decreased.


HEART: Rate and Rhythm are regular. First and second heart sounds normal. No 

murmurs, rubs or gallops. 


ABDOMEN: Abdominal exam reveals normal bowel sounds. Non-tender and non-

distended. No evidence of peritonitis.


EXTREMITITES: No clubbing, cyanosis, or edema.





Objective





- Vital Signs


Vital signs: 


                                   Vital Signs











Temp  98.2 F   04/26/19 07:00


 


Pulse  66   04/26/19 07:00


 


Resp  18   04/26/19 07:00


 


BP  121/63   04/26/19 07:00


 


Pulse Ox  96   04/26/19 07:00








                                 Intake & Output











 04/25/19 04/26/19 04/26/19





 18:59 06:59 18:59


 


Intake Total 440 410 


 


Output Total 1  


 


Balance 439 410 


 


Intake:   


 


  IV  60 


 


    0.9  60 


 


  Intake, IV Titration  50 





  Amount   


 


    Cefepime 0.5 gm In Sodium  50 





    Chloride 0.9% 50 ml @   





    100 mls/hr IVPB Q24H [facility]   





    Rx#:[id number]   


 


  Oral 440 300 


 


Output:   


 


  Urine 0  


 


  Stool 1  


 


Other:   


 


  Voiding Method Urinal Urinal 


 


  # Voids 0 0 


 


  # Bowel Movements 0 1 1














- Labs


CBC & Chem 7: 


                                 04/24/19 06:17





                                 04/25/19 06:40


Labs: 


                  Abnormal Lab Results - Last 24 Hours (Table)











  04/25/19 04/25/19 04/25/19 Range/Units





  11:18 16:21 20:28 


 


POC Glucose (mg/dL)  214 H  260 H  235 H  (75-99)  mg/dL








                      Microbiology - Last 24 Hours (Table)











 04/24/19 02:13 Blood Culture - Preliminary





 Blood    No Growth after 48 hours














Assessment and Plan


Plan: 





Assessment:


1.  End-stage renal disease maintained on hemodialysis on a Monday Wednesday Friday schedule.


2.  Hyperkalemia secondary to chronic kidney disease.  Improved post dialysis.


3.  Pneumonia maintained on antibiotics.  Infectious disease following.


4.  Hypertension with chronic kidney disease.  Controlled.


5.  Insulin-dependent diabetes mellitus.


6.  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





Plan:


Hemodialysis today.


Renal diet.


Monitor vancomycin levels.  Target level 15.

## 2021-10-11 NOTE — P.PN
Called patient x3, no voicemail available. Left message via [employer].    Labs ordered    Subjective


Progress Note Date: 03/10/18


Patient is a 73-year-old black male with multiple medical problems including 

acute pancreatitis, acute renal failure, and CHF exacerbation.  At this time he 

denies any abdominal pain.  A computed tomography scan on initial admission 

revealed a distal appendicolith with some questionable mild stranding.  Repeat 

CAT scan did not reveal any increase in inflammation at this site.  This is 

felt to be an incidental finding.  The patient is being treated medically and 

at this time it does not have an acute surgical problem.








Objective





- Vital Signs


Vital signs: 


 Vital Signs











Temp  97.7 F   03/10/18 10:59


 


Pulse  74   03/10/18 10:59


 


Resp  21   03/10/18 10:59


 


BP  166/78   03/10/18 10:59


 


Pulse Ox  94 L  03/10/18 10:59








 Intake & Output











 03/09/18 03/10/18 03/10/18





 18:59 06:59 18:59


 


Intake Total 600 360 


 


Output Total  300 


 


Balance 600 60 


 


Weight 113 kg 104.5 kg 


 


Intake:   


 


  Oral 600 360 


 


Output:   


 


  Urine  300 


 


Other:   


 


  Voiding Method Indwelling Catheter Indwelling Catheter Indwelling Catheter














- Constitutional


General appearance: Present: obese





- Respiratory


Details: 





Decreased breath sounds bilaterally at the bases


Patient using accessory muscles of respiration, no wheezing





- Cardiovascular


Rhythm: regular


Heart sounds: normal: S1, S2





- Gastrointestinal


Gastrointestinal Comment(s): 





No guarding or rebound


General gastrointestinal: Present: decreased bowel sounds, distended, soft





- Psychiatric


Psychiatric Comment(s): 





oriented to person and place





- Labs


CBC & Chem 7: 


 03/10/18 05:49





 03/10/18 05:49


Labs: 


 Abnormal Lab Results - Last 24 Hours (Table)











  03/07/18 03/09/18 03/09/18 Range/Units





  07:41 11:25 14:03 


 


RBC     (4.30-5.90)  m/uL


 


Hgb     (13.0-17.5)  gm/dL


 


Hct     (39.0-53.0)  %


 


Plt Count     (150-450)  k/uL


 


Lymphocytes #     (1.0-4.8)  k/uL


 


Potassium     (3.5-5.1)  mmol/L


 


Carbon Dioxide     (22-30)  mmol/L


 


BUN     (9-20)  mg/dL


 


Creatinine     (0.66-1.25)  mg/dL


 


Glucose     (74-99)  mg/dL


 


POC Glucose (mg/dL)   287 H  248 H  (75-99)  mg/dL


 


Total Bilirubin     (0.2-1.3)  mg/dL


 


Alkaline Phosphatase     ()  U/L


 


Ammonia     (<30)  umol/L


 


Albumin     (3.5-5.0)  g/dL


 


Amylase     ()  U/L


 


Lipase     ()  U/L


 


HCV RNA Qual (PCR)  DETECTED H    (Not detected)  


 


Hepatitis C RNA Quant  415,721 H    (<12)  IU/mL


 


HCV RNA PCR log /ml  5.62 H    (<1.08)   


 


Hepatitis C Genotype  1a H    














  03/09/18 03/09/18 03/10/18 Range/Units





  17:09 20:36 05:38 


 


RBC     (4.30-5.90)  m/uL


 


Hgb     (13.0-17.5)  gm/dL


 


Hct     (39.0-53.0)  %


 


Plt Count     (150-450)  k/uL


 


Lymphocytes #     (1.0-4.8)  k/uL


 


Potassium     (3.5-5.1)  mmol/L


 


Carbon Dioxide     (22-30)  mmol/L


 


BUN     (9-20)  mg/dL


 


Creatinine     (0.66-1.25)  mg/dL


 


Glucose     (74-99)  mg/dL


 


POC Glucose (mg/dL)  269 H  239 H  276 H  (75-99)  mg/dL


 


Total Bilirubin     (0.2-1.3)  mg/dL


 


Alkaline Phosphatase     ()  U/L


 


Ammonia     (<30)  umol/L


 


Albumin     (3.5-5.0)  g/dL


 


Amylase     ()  U/L


 


Lipase     ()  U/L


 


HCV RNA Qual (PCR)     (Not detected)  


 


Hepatitis C RNA Quant     (<12)  IU/mL


 


HCV RNA PCR log /ml     (<1.08)   


 


Hepatitis C Genotype     














  03/10/18 03/10/18 03/10/18 Range/Units





  05:49 05:49 05:49 


 


RBC    3.86 L  (4.30-5.90)  m/uL


 


Hgb    11.7 L  (13.0-17.5)  gm/dL


 


Hct    36.7 L  (39.0-53.0)  %


 


Plt Count    131 L  (150-450)  k/uL


 


Lymphocytes #    0.9 L  (1.0-4.8)  k/uL


 


Potassium     (3.5-5.1)  mmol/L


 


Carbon Dioxide     (22-30)  mmol/L


 


BUN     (9-20)  mg/dL


 


Creatinine     (0.66-1.25)  mg/dL


 


Glucose     (74-99)  mg/dL


 


POC Glucose (mg/dL)     (75-99)  mg/dL


 


Total Bilirubin     (0.2-1.3)  mg/dL


 


Alkaline Phosphatase     ()  U/L


 


Ammonia   61 H   (<30)  umol/L


 


Albumin     (3.5-5.0)  g/dL


 


Amylase  190 H    ()  U/L


 


Lipase  2185 H    ()  U/L


 


HCV RNA Qual (PCR)     (Not detected)  


 


Hepatitis C RNA Quant     (<12)  IU/mL


 


HCV RNA PCR log /ml     (<1.08)   


 


Hepatitis C Genotype     














  03/10/18 Range/Units





  05:49 


 


RBC   (4.30-5.90)  m/uL


 


Hgb   (13.0-17.5)  gm/dL


 


Hct   (39.0-53.0)  %


 


Plt Count   (150-450)  k/uL


 


Lymphocytes #   (1.0-4.8)  k/uL


 


Potassium  5.2 H  (3.5-5.1)  mmol/L


 


Carbon Dioxide  21 L  (22-30)  mmol/L


 


BUN  59 H  (9-20)  mg/dL


 


Creatinine  2.00 H  (0.66-1.25)  mg/dL


 


Glucose  279 H  (74-99)  mg/dL


 


POC Glucose (mg/dL)   (75-99)  mg/dL


 


Total Bilirubin  2.0 H  (0.2-1.3)  mg/dL


 


Alkaline Phosphatase  355 H  ()  U/L


 


Ammonia   (<30)  umol/L


 


Albumin  3.1 L  (3.5-5.0)  g/dL


 


Amylase   ()  U/L


 


Lipase   ()  U/L


 


HCV RNA Qual (PCR)   (Not detected)  


 


Hepatitis C RNA Quant   (<12)  IU/mL


 


HCV RNA PCR log /ml   (<1.08)   


 


Hepatitis C Genotype   








 Microbiology - Last 24 Hours (Table)











 03/05/18 13:20 Blood Culture - Preliminary





 Blood    No Growth after 96 hours














Assessment and Plan


Assessment: 


Impression/plan:


1.  73-year-old male with medical history of congestive heart failure left 

ventricular ejection fraction of 20%


2.  Acute pancreatitis patient cannot have an MRCP secondary to pacemaker 

lipase has increased slightly today after patient began diet have discussed 

with GI and will continue to follow.


3.  Acute kidney injury.


4.  Nephrology suggested massive diuresis before considering hemodialysis


Plan:


1.  Medical management of medical problems


2.  Patient does not have an acute surgical problem at this time we'll continue 

to follow with particular attention to the CT findings of the incidental 

fecalith in the distal appendix no